# Patient Record
Sex: FEMALE | Race: BLACK OR AFRICAN AMERICAN | NOT HISPANIC OR LATINO | Employment: OTHER | URBAN - METROPOLITAN AREA
[De-identification: names, ages, dates, MRNs, and addresses within clinical notes are randomized per-mention and may not be internally consistent; named-entity substitution may affect disease eponyms.]

---

## 2017-02-18 ENCOUNTER — HOSPITAL ENCOUNTER (EMERGENCY)
Facility: HOSPITAL | Age: 57
Discharge: HOME | End: 2017-02-18
Attending: SURGERY
Payer: MEDICAID

## 2017-02-18 VITALS
WEIGHT: 147 LBS | SYSTOLIC BLOOD PRESSURE: 157 MMHG | DIASTOLIC BLOOD PRESSURE: 71 MMHG | BODY MASS INDEX: 26.04 KG/M2 | OXYGEN SATURATION: 97 % | HEART RATE: 78 BPM | RESPIRATION RATE: 16 BRPM | TEMPERATURE: 98 F

## 2017-02-18 DIAGNOSIS — J40 BRONCHITIS: Primary | ICD-10-CM

## 2017-02-18 DIAGNOSIS — Z76.0 MEDICATION REFILL: ICD-10-CM

## 2017-02-18 DIAGNOSIS — E78.5 HYPERLIPIDEMIA: ICD-10-CM

## 2017-02-18 PROCEDURE — 63600175 PHARM REV CODE 636 W HCPCS: Performed by: SURGERY

## 2017-02-18 PROCEDURE — 99284 EMERGENCY DEPT VISIT MOD MDM: CPT

## 2017-02-18 PROCEDURE — 96372 THER/PROPH/DIAG INJ SC/IM: CPT

## 2017-02-18 PROCEDURE — 25000003 PHARM REV CODE 250: Performed by: SURGERY

## 2017-02-18 PROCEDURE — 94640 AIRWAY INHALATION TREATMENT: CPT

## 2017-02-18 RX ORDER — ALBUTEROL SULFATE 90 UG/1
2 AEROSOL, METERED RESPIRATORY (INHALATION) EVERY 6 HOURS PRN
Qty: 18 G | Refills: 0 | Status: SHIPPED | OUTPATIENT
Start: 2017-02-18 | End: 2017-03-07

## 2017-02-18 RX ORDER — HYDROCHLOROTHIAZIDE 25 MG/1
25 TABLET ORAL DAILY
Qty: 30 TABLET | Refills: 0 | Status: SHIPPED | OUTPATIENT
Start: 2017-02-18 | End: 2018-06-01 | Stop reason: SDUPTHER

## 2017-02-18 RX ORDER — BENZONATATE 100 MG/1
200 CAPSULE ORAL 3 TIMES DAILY PRN
Qty: 20 CAPSULE | Refills: 0 | Status: SHIPPED | OUTPATIENT
Start: 2017-02-18 | End: 2017-02-28

## 2017-02-18 RX ORDER — AZITHROMYCIN 250 MG/1
TABLET, FILM COATED ORAL
Qty: 6 TABLET | Refills: 0 | Status: SHIPPED | OUTPATIENT
Start: 2017-02-18 | End: 2017-02-22 | Stop reason: ALTCHOICE

## 2017-02-18 RX ORDER — METHYLPREDNISOLONE 4 MG/1
TABLET ORAL
Qty: 1 PACKAGE | Refills: 0 | Status: SHIPPED | OUTPATIENT
Start: 2017-02-18 | End: 2017-03-07

## 2017-02-18 RX ORDER — LEVALBUTEROL 1.25 MG/.5ML
1.25 SOLUTION, CONCENTRATE RESPIRATORY (INHALATION)
Status: COMPLETED | OUTPATIENT
Start: 2017-02-18 | End: 2017-02-18

## 2017-02-18 RX ORDER — ATORVASTATIN CALCIUM 40 MG/1
40 TABLET, FILM COATED ORAL NIGHTLY
Qty: 30 TABLET | Refills: 11 | Status: SHIPPED | OUTPATIENT
Start: 2017-02-18 | End: 2019-02-20

## 2017-02-18 RX ORDER — ALBUTEROL SULFATE 0.83 MG/ML
2.5 SOLUTION RESPIRATORY (INHALATION) EVERY 6 HOURS PRN
Qty: 1 BOX | Refills: 0 | Status: ON HOLD | OUTPATIENT
Start: 2017-02-18 | End: 2019-02-12 | Stop reason: HOSPADM

## 2017-02-18 RX ADMIN — LEVALBUTEROL 1.25 MG: 1.25 SOLUTION, CONCENTRATE RESPIRATORY (INHALATION) at 09:02

## 2017-02-18 RX ADMIN — METHYLPREDNISOLONE SODIUM SUCCINATE 125 MG: 125 INJECTION, POWDER, FOR SOLUTION INTRAMUSCULAR; INTRAVENOUS at 09:02

## 2017-02-18 NOTE — DISCHARGE INSTRUCTIONS
What Is Acute Bronchitis?  Acute or short-term bronchitis last for days or weeks. It occurs when the bronchial tubes (airways in the lungs) are irritated by a virus, bacteria, or allergen. This causes a cough that produces yellow or greenish mucus.  Inside healthy lungs    Air travels in and out of the lungs through the airways. The linings of these airways produce sticky mucus. This mucus traps particles that enter the lungs. Tiny structures called cilia then sweep the particles out of the airways.     Healthy airway: Airways are normally open. Air moves in and out easily.      Healthy cilia: Tiny, hairlike cilia sweep mucus and particles up and out of the airways.   Lungs with bronchitis  Bronchitis often occurs with a cold or the flu virus. The airways become inflamed (red and swollen). There is a deep hacking cough from the extra mucus. Other symptoms may include:  · Wheezing  · Chest discomfort  · Shortness of breath  · Mild fever  A second infection, this time due to bacteria, may then occur. And airways irritated by allergens or smoke are more likely to get infected.        Inflamed airway: Inflammation and extra mucus narrow the airway, causing shortness of breath.      Impaired cilia: Extra mucus impairs cilia, causing congestion and wheezing. Smoking makes the problem worse.   Making a diagnosis  A physical exam, health history, and certain tests help your healthcare provider make the diagnosis.  Health history  Your healthcare provider will ask you about your symptoms.  The exam  Your provider listens to your chest for signs of congestion. He or she may also check your ears, nose, and throat.  Possible tests  · A sputum test for bacteria. This requires a sample of mucus from the lungs.  · A nasal or throat swab for bacterial infection.  · A chest X-ray if your healthcare provider thinks you have pneumonia.  · Tests to check for an underlying condition, such as allergies, asthma, or COPD. You may need  to see a specialist for more lung function testing.  Treating a cough  The main treatment for bronchitis is easing symptoms. Avoiding smoke, allergens, and other things that trigger coughing can often help. If the infection is bacterial, you may be given antibiotics. During the illness, it's important to get plenty of sleep. To ease symptoms:  · Dont smoke, and avoid secondhand smoke.  · Use a humidifier, or breathe in steam from a hot shower. This may help loosen mucus.  · Drink a lot of water and juice. They can soothe the throat and may help thin mucus.  · Sit up or use extra pillows when in bed to help lessen coughing and congestion.  · Ask your provider about using cough medicine, pain and fever medicine, or a decongestant.  Antibiotics  Most cases of bronchitis are caused by cold or flu viruses. Antibiotics dont treat viral illness. Taking antibiotics when they are not needed increases your risk of getting an infection later that is antibiotic-resistant. Your provider will prescribe antibiotics if the infection is caused by bacteria. If they are prescribed:  · Take the medicine until it is used up, even if symptoms have improved. If you dont, the bronchitis may come back.  · Take them as directed. For instance, some medicines should be taken with food.  · Ask your provider or pharmacist what side effects are common, and what to do about them.  Follow-up care  You should see your provider again in 2 to 3 weeks. By this time, symptoms should have improved. An infection that lasts longer may mean you have a more serious problem.  Prevention  · Avoid tobacco smoke. If you smoke, quit. Stay away from smoky places. Ask friends and family not to smoke around you, or in your home or car.  · Get checked for allergies.  · Ask your provider about getting a yearly flu shot, and pneumococcal or pneumonia shots.  · Wash your hands often. This helps reduce the chance of picking up viruses that cause colds and flu.  Call  your healthcare provider if:  · Symptoms worsen, or new symptoms develop.  · Breathing problems worsen or  become severe.  · Symptoms dont get better within a week, or within 3 days of taking antibiotics.   Date Last Reviewed: 6/18/2014  © 5953-0013 [employer]. [address] [place] [address], [place] [address]. All rights reserved. This information is not intended as a substitute for professional medical care. Always follow your healthcare professional's instructions.

## 2017-02-18 NOTE — ED PROVIDER NOTES
[facility]                                        February 18, 2017                   Chief Complaint  57 y.o. female with Cough (x 2 days)    History of Present Illness  Xiomy Duncan presents to the emergency room with nasal congestion today  The patient has had cough and cold symptoms since yesterday, went to local ER  The patient left without being seen, as a dry hacking cough with a smoking history  Patient is afebrile with room air oxygenation of 98%, no fever reported to the ER  Patient has been out of her blood pressure medication and breathing treatments  The patient has no sputum production, shortness of breath, clear chest x-ray today    The history is provided by the patient    Past Medical History   -- Anemia    -- Asthma    -- GERD (gastroesophageal reflux disease)    -- Hoarseness    -- Hypertension    -- Screen for colon cancer    -- Seizures    -- Ulcer      Past surgical history: BTL, tonsillectomy, cholecystectomy   ALLERGIES: PCN     Review of Systems and Physical Exam     Review of Systems  -- Constitution - no fever, denies fatigue, no weakness, no chills  -- Eyes - no tearing or redness, no visual disturbance  -- Ear, Nose - no tinnitus or earache, no nasal congestion or discharge  -- Mouth,Throat - no sore throat, no toothache, normal voice, normal swallowing  -- Respiratory - cough and congestion, no shortness of breath, no GOMEZ  -- Cardiovascular - denies chest pain, no palpitations, denies claudication  -- Gastrointestinal - denies abdominal pain, nausea, vomiting, or diarrhea  -- Musculoskeletal - denies back pain, negative for myalgias and arthralgias   -- Neurological - no headache, denies weakness or seizure; no LOC  -- Skin - denies pallor, rash, or changes in skin. no hives or welts noted    Vital Signs  -- Her blood pressure is 159/75 (abnormal) and her pulse is 78.  -- Her respiration is 16 and oxygen saturation is 97%.      Physical Exam  -- Nursing note  and vitals reviewed  -- Head: Atraumatic. Normocephalic. No obvious abnormality  -- Eyes: Pupils are equal and reactive to light. Normal conjunctiva and lids  -- Nose: Nose normal in appearance, nares grossly normal. No discharge  -- Throat: Mucous membranes moist, pharynx normal, normal tonsils. No lesions   -- Ears: External ears and TM normal bilaterally. Normal hearing and no drainage  -- Neck: Normal range of motion. Neck supple. No masses, trachea midline  -- Cardiac: Normal rate, regular rhythm and normal heart sounds  -- Pulmonary: faint rhonchi at the bilateral bases with no active wheezing   -- Abdominal: Soft, no tenderness. Normal bowel sounds. Normal liver edge  -- Musculoskeletal: Normal range of motion, no effusions. Joints stable   -- Neurological: No focal deficits. Showed good interaction with staff    Emergency Room Course     Treatment and Evaluation  -- Chest x-ray showed no infiltrate and showed no acute pathology   -- IM Steroids given today in the ER  -- Xopenex breathing treatment given today in the ER    Diagnosis  -- The primary encounter diagnosis was Bronchitis.   -- Diagnoses of Medication refill and Hyperlipidemia were also pertinent to this visit.    Disposition and Plan  -- Disposition: home  -- Condition: stable  -- Follow-up: Patient to follow up with No primary care provider on file. in 1-2 days.  -- I advised the patient that we have found no life threatening condition today  -- At this time, I believe the patient is clinically stable for discharge.   -- The patient acknowledges that close follow up with a MD is required   -- Patient agrees to comply with all instruction and direction given in the ER    This note is dictated on Dragon Natural Speaking word recognition program.  There are word recognition mistakes that are occasionally missed on review.           Celso Warren MD  02/18/17 0984

## 2017-02-18 NOTE — ED AVS SNAPSHOT
[facility] [address]               Xiomy Duncan   2017  9:12 AM   ED    Description:  Female : 1960   Department:  [facility]           Your Care was Coordinated By:     Provider Role From To    Celso Warren MD Attending Provider 17 0907 --      Reason for Visit     Cough           Diagnoses this Visit        Comments    Bronchitis    -  Primary     Medication refill         Hyperlipidemia           ED Disposition     ED Disposition Condition Comment    Discharge             To Do List           Follow-up Information     Follow up with Brittney Barrett MD. Schedule an appointment as soon as possible for a visit in 2 days.    Specialty:  Family Medicine    Contact information:    [address]  [facility] [address]  [phone]         These Medications        Disp Refills Start End    hydrochlorothiazide (HYDRODIURIL) 25 MG tablet 30 tablet 0 2017     Take 1 tablet (25 mg total) by mouth once daily. Taking 1/2 tablet daily - Oral    Pharmacy: [facility]/pharmacy #[id number] - [place] - [address] Ph #: [phone]       beclomethasone (QVAR) 40 mcg/actuation Aero 120 each 0 2017     Inhale 1 puff into the lungs 2 (two) times daily. - Inhalation    Pharmacy: [facility]/pharmacy #[id number] [place] [address] Ph #: [phone]       atorvastatin (LIPITOR) 40 MG tablet 30 tablet 11 2017     Take 1 tablet (40 mg total) by mouth every evening. - Oral    Pharmacy: [facility]/pharmacy #[id number] [place] [address] Ph #: [phone]       albuterol 90 mcg/actuation inhaler 18 g 0 2017     Inhale 2 puffs into the lungs every 6 (six) hours as needed for Wheezing. - Inhalation    Pharmacy: [facility]/pharmacy #[id number] [address] Ph #: [phone]       methylPREDNISolone (MEDROL DOSEPACK) 4 mg tablet 1 Package 0 2017     Pack as directed    Pharmacy: [facility]/pharmacy #[id number] [place]  [address] Ph #: [phone]       benzonatate (TESSALON) 100 MG capsule 20 capsule 0 2/18/2017 2/28/2017    Take 2 capsules (200 mg total) by mouth 3 (three) times daily as needed for Cough. - Oral    Pharmacy: [facility]/pharmacy #[id number]  [place] - [address] [facility] [address] Ph #: [phone]       azithromycin (Z-DORIS) 250 MG tablet 6 tablet 0 2/18/2017     Z-PACK AS DIRECTED    Pharmacy: [facility]/pharmacy #[id number] [facility] [address] [facility] Ph #: [phone]       albuterol (PROVENTIL) 2.5 mg /3 mL (0.083 %) nebulizer solution 1 Box 0 2/18/2017 2/18/2018    Take 3 mLs (2.5 mg total) by nebulization every 6 (six) hours as needed for Wheezing. - Nebulization    [id number] [place] - [address] [facility] Ph #: [phone]         [facility]     [facility] - 24/7 Assistance  Registered nurses in the [facility] provide clinical advisement, health education, appointment booking, and other advisory services.  Call for this free service at [phone].             Medications           Message regarding Medications     Verify the changes and/or additions to your medication regime listed below are the same as discussed with your clinician today.  If any of these changes or additions are incorrect, please notify your healthcare provider.        START taking these NEW medications        Refills    methylPREDNISolone (MEDROL DOSEPACK) 4 mg tablet 0    Sig: Pack as directed    Class: Normal    benzonatate (TESSALON) 100 MG capsule 0    Sig: Take 2 capsules (200 mg total) by mouth 3 (three) times daily as needed for Cough.    Class: Normal    Route: Oral    azithromycin (Z-DORIS) 250 MG tablet 0    Sig: Z-PACK AS DIRECTED    Class: Normal    albuterol (PROVENTIL) 2.5 mg /3 mL (0.083 %) nebulizer solution 0    Sig: Take 3 mLs (2.5 mg total) by nebulization every 6 (six) hours as needed for Wheezing.    Class: Normal    Route: Nebulization      These medications were administered today        Dose  Freq    levalbuterol nebulizer solution 1.25 mg 1.25 mg ED 1 Time    Sig: Take 0.5 mLs (1.25 mg total) by nebulization ED 1 Time.    Class: Normal    Route: Nebulization    methylPREDNISolone sod suc(PF) 125 mg/2 mL injection 125 mg 125 mg ED 1 Time    Sig: Inject 125 mg into the muscle ED 1 Time.    Class: Normal    Route: Intramuscular      CHANGE how you are taking these medications     Start Taking Instead of    hydrochlorothiazide (HYDRODIURIL) 25 MG tablet hydrochlorothiazide (HYDRODIURIL) 25 MG tablet    Dosage:  Take 1 tablet (25 mg total) by mouth once daily. Taking 1/2 tablet daily Dosage:  Take 25 mg by mouth once daily. Taking 1/2 tablet daily      STOP taking these medications     albuterol 2.5 mg /3 mL (0.083 %) Nebu 3 mL, albuterol 5 mg/mL Nebu 0.5 mL Inhale into the lungs.    clonidine (CATAPRES) 0.3 MG tablet Take 0.3 mg by mouth once.    lisinopril (PRINIVIL,ZESTRIL) 40 MG tablet Take 40 mg by mouth once daily.           Verify that the below list of medications is an accurate representation of the medications you are currently taking.  If none reported, the list may be blank. If incorrect, please contact your healthcare provider. Carry this list with you in case of emergency.           Current Medications     albuterol (PROVENTIL) 2.5 mg /3 mL (0.083 %) nebulizer solution Take 3 mLs (2.5 mg total) by nebulization every 6 (six) hours as needed for Wheezing.    albuterol 90 mcg/actuation inhaler Inhale 2 puffs into the lungs every 6 (six) hours as needed for Wheezing.    atorvastatin (LIPITOR) 40 MG tablet Take 1 tablet (40 mg total) by mouth every evening.    azithromycin (Z-DORIS) 250 MG tablet Z-PACK AS DIRECTED    beclomethasone (QVAR) 40 mcg/actuation Aero Inhale 1 puff into the lungs 2 (two) times daily.    benzonatate (TESSALON) 100 MG capsule Take 2 capsules (200 mg total) by mouth 3 (three) times daily as needed for Cough.    hydrochlorothiazide (HYDRODIURIL) 25 MG tablet Take 1 tablet (25 mg  total) by mouth once daily. Taking 1/2 tablet daily    methylPREDNISolone (MEDROL DOSEPACK) 4 mg tablet Pack as directed           Clinical Reference Information           Your Vitals Were     BP Pulse Temp Resp Weight SpO2    159/75 (BP Location: Right arm, Patient Position: Sitting) 78 98.1 °F (36.7 °C) (Oral) 16 66.7 kg (147 lb) 97%    BMI                26.04 kg/m2          Allergies as of 2/18/2017        Reactions    Penicillins Rash      Immunizations Administered on Date of Encounter - 2/18/2017     None      ED Micro, Lab, POCT     None      ED Imaging Orders     Start Ordered       Status Ordering Provider    02/18/17 0913 02/18/17 0912  X-Ray Chest PA And Lateral  1 time imaging      Final result         Discharge Instructions         What Is Acute Bronchitis?  Acute or short-term bronchitis last for days or weeks. It occurs when the bronchial tubes (airways in the lungs) are irritated by a virus, bacteria, or allergen. This causes a cough that produces yellow or greenish mucus.  Inside healthy lungs    Air travels in and out of the lungs through the airways. The linings of these airways produce sticky mucus. This mucus traps particles that enter the lungs. Tiny structures called cilia then sweep the particles out of the airways.     Healthy airway: Airways are normally open. Air moves in and out easily.      Healthy cilia: Tiny, hairlike cilia sweep mucus and particles up and out of the airways.   Lungs with bronchitis  Bronchitis often occurs with a cold or the flu virus. The airways become inflamed (red and swollen). There is a deep hacking cough from the extra mucus. Other symptoms may include:  · Wheezing  · Chest discomfort  · Shortness of breath  · Mild fever  A second infection, this time due to bacteria, may then occur. And airways irritated by allergens or smoke are more likely to get infected.        Inflamed airway: Inflammation and extra mucus narrow the airway, causing shortness of breath.       Impaired cilia: Extra mucus impairs cilia, causing congestion and wheezing. Smoking makes the problem worse.   Making a diagnosis  A physical exam, health history, and certain tests help your healthcare provider make the diagnosis.  Health history  Your healthcare provider will ask you about your symptoms.  The exam  Your provider listens to your chest for signs of congestion. He or she may also check your ears, nose, and throat.  Possible tests  · A sputum test for bacteria. This requires a sample of mucus from the lungs.  · A nasal or throat swab for bacterial infection.  · A chest X-ray if your healthcare provider thinks you have pneumonia.  · Tests to check for an underlying condition, such as allergies, asthma, or COPD. You may need to see a specialist for more lung function testing.  Treating a cough  The main treatment for bronchitis is easing symptoms. Avoiding smoke, allergens, and other things that trigger coughing can often help. If the infection is bacterial, you may be given antibiotics. During the illness, it's important to get plenty of sleep. To ease symptoms:  · Dont smoke, and avoid secondhand smoke.  · Use a humidifier, or breathe in steam from a hot shower. This may help loosen mucus.  · Drink a lot of water and juice. They can soothe the throat and may help thin mucus.  · Sit up or use extra pillows when in bed to help lessen coughing and congestion.  · Ask your provider about using cough medicine, pain and fever medicine, or a decongestant.  Antibiotics  Most cases of bronchitis are caused by cold or flu viruses. Antibiotics dont treat viral illness. Taking antibiotics when they are not needed increases your risk of getting an infection later that is antibiotic-resistant. Your provider will prescribe antibiotics if the infection is caused by bacteria. If they are prescribed:  · Take the medicine until it is used up, even if symptoms have improved. If you dont, the bronchitis may come  back.  · Take them as directed. For instance, some medicines should be taken with food.  · Ask your provider or pharmacist what side effects are common, and what to do about them.  Follow-up care  You should see your provider again in 2 to 3 weeks. By this time, symptoms should have improved. An infection that lasts longer may mean you have a more serious problem.  Prevention  · Avoid tobacco smoke. If you smoke, quit. Stay away from smoky places. Ask friends and family not to smoke around you, or in your home or car.  · Get checked for allergies.  · Ask your provider about getting a yearly flu shot, and pneumococcal or pneumonia shots.  · Wash your hands often. This helps reduce the chance of picking up viruses that cause colds and flu.  Call your healthcare provider if:  · Symptoms worsen, or new symptoms develop.  · Breathing problems worsen or  become severe.  · Symptoms dont get better within a week, or within 3 days of taking antibiotics.   Date Last Reviewed: 6/18/2014  © 3332-6466 [employer]. [address] [place] [address]. All rights reserved. This information is not intended as a substitute for professional medical care. Always follow your healthcare professional's instructions.          Smoking Cessation     If you would like to quit smoking:   You may be eligible for free services if you are a [place] resident and started smoking cigarettes before September 1, 1988.  Call the Smoking Cessation [employer] ([employer]) toll free at (767) 198-6023 or (267) 577-1918.   Call 2-738-QUIT-NOW if you do not meet the above criteria.             [facility] complies with applicable Federal civil rights laws and does not discriminate on the basis of race, color, national origin, age, disability, or sex.        Language Assistance Services     ATTENTION: Language assistance services are available, free of charge. Please call [phone].      ATENCIÓN: javier Glez  a mackenzie disposición servicios gratuitos de asistencia lingüística. Llame al [phone].     SUNG Ý: N?u b?n nói Ti?ng Vi?t, có các d?ch v? h? tr? ngôn ng? mi?n phí dành cho b?n. G?i s? [phone].

## 2017-02-22 ENCOUNTER — OFFICE VISIT (OUTPATIENT)
Dept: FAMILY MEDICINE | Facility: CLINIC | Age: 57
End: 2017-02-22
Payer: MEDICAID

## 2017-02-22 VITALS
SYSTOLIC BLOOD PRESSURE: 150 MMHG | DIASTOLIC BLOOD PRESSURE: 82 MMHG | HEIGHT: 63 IN | TEMPERATURE: 98 F | WEIGHT: 154.81 LBS | HEART RATE: 80 BPM | BODY MASS INDEX: 27.43 KG/M2 | RESPIRATION RATE: 20 BRPM

## 2017-02-22 DIAGNOSIS — F51.01 PRIMARY INSOMNIA: ICD-10-CM

## 2017-02-22 DIAGNOSIS — E78.5 HYPERLIPIDEMIA, UNSPECIFIED HYPERLIPIDEMIA TYPE: ICD-10-CM

## 2017-02-22 DIAGNOSIS — R73.03 PRE-DIABETES: ICD-10-CM

## 2017-02-22 DIAGNOSIS — Z78.0 MENOPAUSE: ICD-10-CM

## 2017-02-22 DIAGNOSIS — J40 WHEEZY BRONCHITIS: ICD-10-CM

## 2017-02-22 DIAGNOSIS — I10 ESSENTIAL HYPERTENSION: Primary | ICD-10-CM

## 2017-02-22 PROCEDURE — 99999 PR PBB SHADOW E&M-EST. PATIENT-LVL III: CPT | Mod: PBBFAC,,, | Performed by: FAMILY MEDICINE

## 2017-02-22 PROCEDURE — 99204 OFFICE O/P NEW MOD 45 MIN: CPT | Mod: S$PBB,,, | Performed by: FAMILY MEDICINE

## 2017-02-22 PROCEDURE — 99213 OFFICE O/P EST LOW 20 MIN: CPT | Mod: PBBFAC,25 | Performed by: FAMILY MEDICINE

## 2017-02-22 PROCEDURE — 96372 THER/PROPH/DIAG INJ SC/IM: CPT | Mod: PBBFAC

## 2017-02-22 RX ORDER — CLONIDINE HYDROCHLORIDE 0.1 MG/1
0.1 TABLET ORAL 2 TIMES DAILY
Qty: 30 TABLET | Refills: 2 | Status: SHIPPED | OUTPATIENT
Start: 2017-02-22 | End: 2017-10-15 | Stop reason: SDUPTHER

## 2017-02-22 RX ORDER — METHYLPREDNISOLONE ACETATE 40 MG/ML
40 INJECTION, SUSPENSION INTRA-ARTICULAR; INTRALESIONAL; INTRAMUSCULAR; SOFT TISSUE
Status: COMPLETED | OUTPATIENT
Start: 2017-02-22 | End: 2017-02-22

## 2017-02-22 RX ORDER — METHYLPREDNISOLONE ACETATE 40 MG/ML
40 INJECTION, SUSPENSION INTRA-ARTICULAR; INTRALESIONAL; INTRAMUSCULAR; SOFT TISSUE
Status: DISCONTINUED | OUTPATIENT
Start: 2017-02-22 | End: 2017-02-22

## 2017-02-22 RX ORDER — CEFUROXIME AXETIL 250 MG/1
250 TABLET ORAL 2 TIMES DAILY
Qty: 20 TABLET | Refills: 0 | Status: SHIPPED | OUTPATIENT
Start: 2017-02-22 | End: 2017-03-04

## 2017-02-22 RX ADMIN — METHYLPREDNISOLONE ACETATE 40 MG: 40 INJECTION, SUSPENSION INTRA-ARTICULAR; INTRALESIONAL; INTRAMUSCULAR; SOFT TISSUE at 04:02

## 2017-02-22 NOTE — PROGRESS NOTES
Chief complaint: Sinus congestion    History of present illness: Pt is 57 y.o. female complaints of sinus congestion, facial pressure, sore throat, ear ache.  Patient states glands are swollen and neck.  Patient has a cough.  This started 5 days ago.  Patient denies chest pain, shortness of breath, fever.  Patient has itchy watery eyes, itchy scratchy throat.  The patient complains of decreased hearing.  Cough is nonproductive  Patient is taking her statin every day for hyperlipidemia.  She is feeling well on the medication.  She denies side effects such as myalgias.  She tolerates her blood pressure medication as well as not having side effects such as chronic cough or dizziness.      Review of systems:  Constitutional-no weight loss, weight gain  HEENT-allergy symptoms such as itchy watery eyes, post nasal drip, itchy palate, come and go.  Respiratory-no wheezing, see history of present illness  Neurological-no weakness or numbness    Past medical history, family history, social history-same as note dated today    Medications-all reviewed and verified in nurses notes.    Physical exam: Vital signs-reviewed and verified in nurses notes  Gen.-alert, oriented, no apparent distress.  Coughing.  Head-positive facial tenderness over the frontal and maxillary sinuses  Eyes: Pupils equal round reactive to light and accommodation, extraocular muscles intact, conjunctiva clear  Ears: Tympanic membranes are clear and mobile, no fluid present.  Nose: Injected mucous membranes, erythematous, mucopurulent discharge  Throat:  Injected red streaky mucosa,  tonsils normal  Neck: Shotty, tender anterior lymphadenopathy  Heart: Regular rate and rhythm, no murmurs, rubs or gallops  Lungs:Lungs were clear to auscultation and percussion, and with normal diaphragmatic excursion. No wheezes or rales were noted.     Assessment/Plan:   Essential hypertension  -     cloNIDine (CATAPRES) 0.1 MG tablet; Take 1 tablet (0.1 mg total) by mouth 2  (two) times daily.  Dispense: 30 tablet; Refill: 2  Continue hydrochlorothiazide 25 mg daily    Hyperlipidemia, unspecified hyperlipidemia type  Low fat diet.  Avoid sweets.  A 10 pound weight loss by the next visit as a  good goal.  Increase consumption of fruits and vegetables, fish and chicken.  Use medications below:  Lipitor 20 mg by mouth daily   -     ALT (SGPT); Future  -     Basic metabolic panel; Future  -     Lipid panel; Future    Pre-diabetes   follow low carbohydrate diet     Wheezy bronchitis  -     methylPREDNISolone acetate injection 40 mg; 1 mL (40 mg total) by INTRABURSAL route one time.  -     cefUROXime (CEFTIN) 250 MG tablet; Take 1 tablet (250 mg total) by mouth 2 (two) times daily.  Dispense: 20 tablet; Refill: 0  -     umeclidinium-vilanterol (ANORO ELLIPTA) 62.5-25 mcg/actuation DsDv; Inhale 1 puff into the lungs once daily.  Dispense: 60 each; Refill: 5  -     dextromethorphan-guaifenesin  mg (MUCINEX DM)  mg per 12 hr tablet; Take 1 tablet by mouth 2 (two) times daily as needed.  Dispense: 20 tablet; Refill: 2  -     CBC auto differential; Future  Discontinue Qvar.  Quit smoking    Primary insomnia  Refill clonidine 0.1 mg before bedtime.    Menopause  -     Ambulatory referral to Gynecology    Sinusitis, acute  - azithromycin (ZITHROMAX) 500 MG tablet; Take 1 tablet (500 mg total) by mouth once daily.  - cetirizine (ZYRTEC) 10 MG tablet; Take 1 tablet (10 mg total) by mouth once daily.  - diphenhydrAMINE (BENADRYL) 25 mg capsule; Take 1 each (25 mg total) by mouth nightly as needed for Itching.    Simply saline nasal lavage q.2 to 3 hours p.r.n.  Avoid dust, allergens, other sinus irritants.  Handwashing technique discussed to prevent spreading germs.    RTC in 3 months

## 2017-02-22 NOTE — MR AVS SNAPSHOT
[facility]  [address]  [facility] [address]  Phone: [phone]  Fax: [phone]                  Xiomy Duncan   2017 3:30 PM   Office Visit    Description:  Female : 1960   Provider:  Arnie Monson MD   Department:  [facility]           Reason for Visit     Establish Care           Diagnoses this Visit        Comments    Essential hypertension    -  Primary     Hyperlipidemia, unspecified hyperlipidemia type         Pre-diabetes         Wheezy bronchitis         Primary insomnia         Menopause                To Do List           Goals (5 Years of Data)     None       These Medications        Disp Refills Start End    cefUROXime (CEFTIN) 250 MG tablet 20 tablet 0 2017 3/4/2017    Take 1 tablet (250 mg total) by mouth 2 (two) times daily. - Oral    Pharmacy: [facility]/pharmacy #[id number] [place], [place] - [address] Ph #: [phone]       umeclidinium-vilanterol (ANORO ELLIPTA) 62.5-25 mcg/actuation DsDv 60 each 5 2017     Inhale 1 puff into the lungs once daily. - Inhalation    Pharmacy: [facility]/pharmacy #[id number] [place], [place] - [address] Ph #: [phone]       dextromethorphan-guaifenesin  mg (MUCINEX DM)  mg per 12 hr tablet 20 tablet 2 2017 3/4/2017    Take 1 tablet by mouth 2 (two) times daily as needed. - Oral    Pharmacy: [facility]/pharmacy #[id number] [place], [place] - [address] Ph #: [phone]       cloNIDine (CATAPRES) 0.1 MG tablet 30 tablet 2 2017 3/24/2017    Take 1 tablet (0.1 mg total) by mouth 2 (two) times daily. - Oral    Pharmacy: [facility]/pharmacy #[id number] [place] [address] [address] Ph #: [phone]         [facility] -  Assistance  Registered nurses in the [facility] provide clinical advisement, health education, appointment booking, and other advisory services.  Call for this free service at [phone].             Medications           Message  regarding Medications     Verify the changes and/or additions to your medication regime listed below are the same as discussed with your clinician today.  If any of these changes or additions are incorrect, please notify your healthcare provider.        START taking these NEW medications        Refills    cefUROXime (CEFTIN) 250 MG tablet 0    Sig: Take 1 tablet (250 mg total) by mouth 2 (two) times daily.    Class: Normal    Route: Oral    umeclidinium-vilanterol (ANORO ELLIPTA) 62.5-25 mcg/actuation DsDv 5    Sig: Inhale 1 puff into the lungs once daily.    Class: Normal    Route: Inhalation    dextromethorphan-guaifenesin  mg (MUCINEX DM)  mg per 12 hr tablet 2    Sig: Take 1 tablet by mouth 2 (two) times daily as needed.    Class: Normal    Route: Oral    cloNIDine (CATAPRES) 0.1 MG tablet 2    Sig: Take 1 tablet (0.1 mg total) by mouth 2 (two) times daily.    Class: Normal    Route: Oral      These medications were administered today        Dose Freq    methylPREDNISolone acetate injection 40 mg 40 mg [facility] 1 time    Si mL (40 mg total) by INTRABURSAL route one time.    Class: Normal    Route: INTRABURSAL      STOP taking these medications     azithromycin (Z-DORIS) 250 MG tablet Z-PACK AS DIRECTED    beclomethasone (QVAR) 40 mcg/actuation Aero Inhale 1 puff into the lungs 2 (two) times daily.           Verify that the below list of medications is an accurate representation of the medications you are currently taking.  If none reported, the list may be blank. If incorrect, please contact your healthcare provider. Carry this list with you in case of emergency.           Current Medications     albuterol (PROVENTIL) 2.5 mg /3 mL (0.083 %) nebulizer solution Take 3 mLs (2.5 mg total) by nebulization every 6 (six) hours as needed for Wheezing.    albuterol 90 mcg/actuation inhaler Inhale 2 puffs into the lungs every 6 (six) hours as needed for Wheezing.    atorvastatin (LIPITOR) 40 MG tablet Take  "1 tablet (40 mg total) by mouth every evening.    benzonatate (TESSALON) 100 MG capsule Take 2 capsules (200 mg total) by mouth 3 (three) times daily as needed for Cough.    hydrochlorothiazide (HYDRODIURIL) 25 MG tablet Take 1 tablet (25 mg total) by mouth once daily. Taking 1/2 tablet daily    methylPREDNISolone (MEDROL DOSEPACK) 4 mg tablet Pack as directed    cefUROXime (CEFTIN) 250 MG tablet Take 1 tablet (250 mg total) by mouth 2 (two) times daily.    cloNIDine (CATAPRES) 0.1 MG tablet Take 1 tablet (0.1 mg total) by mouth 2 (two) times daily.    dextromethorphan-guaifenesin  mg (MUCINEX DM)  mg per 12 hr tablet Take 1 tablet by mouth 2 (two) times daily as needed.    umeclidinium-vilanterol (ANORO ELLIPTA) 62.5-25 mcg/actuation DsDv Inhale 1 puff into the lungs once daily.           Clinical Reference Information           Your Vitals Were     BP Pulse Temp Resp    150/82 (BP Location: Right arm, Patient Position: Sitting, BP Method: Manual) 80 97.5 °F (36.4 °C) (Tympanic) 20    Height Weight BMI    5' 3" (1.6 m) 70.2 kg (154 lb 12.8 oz) 27.42 kg/m2      Blood Pressure          Most Recent Value    BP  (!)  150/82      Allergies as of 2/22/2017     Penicillins      Immunizations Administered on Date of Encounter - 2/22/2017     None      Orders Placed During Today's Visit      Normal Orders This Visit    Ambulatory referral to Gynecology     Future Labs/Procedures Expected by Expires    ALT (SGPT)  2/23/2017 4/22/2017    Basic metabolic panel  2/23/2017 4/22/2017    CBC auto differential  2/23/2017 4/22/2017    Lipid panel  2/23/2017 4/22/2017      Smoking Cessation     If you would like to quit smoking:   You may be eligible for free services if you are a [place] resident and started smoking cigarettes before September 1, 1988.  Call the [employer]oking [employer]essation [employer] ([employer]) toll free at (965) 706-6706 or (852) 254-0573.   Call 1-800-QUIT-NOW if you do not meet the above criteria.            Language " Assistance Services     ATTENTION: Language assistance services are available, free of charge. Please call [phone].      ATENCIÓN: Si habla erika, tiene a mackenzie disposición servicios gratuitos de asistencia lingüística. Llame al [phone].     CHÚ Ý: N?u b?n nói Ti?ng Vi?t, có các d?ch v? h? tr? ngôn ng? mi?n phí dành cho b?n. G?i s? [phone].         [facility] complies with applicable Federal civil rights laws and does not discriminate on the basis of race, color, national origin, age, disability, or sex.

## 2017-02-24 DIAGNOSIS — Z12.31 OTHER SCREENING MAMMOGRAM: ICD-10-CM

## 2017-03-07 ENCOUNTER — CLINICAL SUPPORT (OUTPATIENT)
Dept: FAMILY MEDICINE | Facility: CLINIC | Age: 57
End: 2017-03-07
Payer: MEDICAID

## 2017-03-07 ENCOUNTER — OFFICE VISIT (OUTPATIENT)
Dept: OBSTETRICS AND GYNECOLOGY | Facility: CLINIC | Age: 57
End: 2017-03-07
Payer: MEDICAID

## 2017-03-07 VITALS
RESPIRATION RATE: 13 BRPM | HEIGHT: 63 IN | SYSTOLIC BLOOD PRESSURE: 132 MMHG | HEART RATE: 70 BPM | BODY MASS INDEX: 28.17 KG/M2 | WEIGHT: 159 LBS | DIASTOLIC BLOOD PRESSURE: 68 MMHG

## 2017-03-07 DIAGNOSIS — J40 WHEEZY BRONCHITIS: ICD-10-CM

## 2017-03-07 DIAGNOSIS — N95.1 MENOPAUSAL SYMPTOMS: ICD-10-CM

## 2017-03-07 DIAGNOSIS — E78.5 HYPERLIPIDEMIA, UNSPECIFIED HYPERLIPIDEMIA TYPE: ICD-10-CM

## 2017-03-07 DIAGNOSIS — Z12.39 BREAST CANCER SCREENING: ICD-10-CM

## 2017-03-07 DIAGNOSIS — Z01.411 ENCOUNTER FOR GYNECOLOGICAL EXAMINATION WITH ABNORMAL FINDING: Primary | ICD-10-CM

## 2017-03-07 DIAGNOSIS — Z12.4 CERVICAL CANCER SCREENING: ICD-10-CM

## 2017-03-07 DIAGNOSIS — Z13.820 OSTEOPOROSIS SCREENING: ICD-10-CM

## 2017-03-07 DIAGNOSIS — N39.41 URGE INCONTINENCE OF URINE: ICD-10-CM

## 2017-03-07 LAB
ALT SERPL W/O P-5'-P-CCNC: 24 U/L
ANION GAP SERPL CALC-SCNC: 10 MMOL/L
BASOPHILS # BLD AUTO: 0.05 K/UL
BASOPHILS NFR BLD: 0.5 %
BUN SERPL-MCNC: 22 MG/DL
CALCIUM SERPL-MCNC: 9.9 MG/DL
CHLORIDE SERPL-SCNC: 101 MMOL/L
CHOLEST/HDLC SERPL: 3.5 {RATIO}
CO2 SERPL-SCNC: 28 MMOL/L
CREAT SERPL-MCNC: 1.3 MG/DL
DIFFERENTIAL METHOD: ABNORMAL
EOSINOPHIL # BLD AUTO: 0.2 K/UL
EOSINOPHIL NFR BLD: 2.2 %
ERYTHROCYTE [DISTWIDTH] IN BLOOD BY AUTOMATED COUNT: 15.3 %
EST. GFR  (AFRICAN AMERICAN): 53 ML/MIN/1.73 M^2
EST. GFR  (NON AFRICAN AMERICAN): 46 ML/MIN/1.73 M^2
GLUCOSE SERPL-MCNC: 75 MG/DL
HCT VFR BLD AUTO: 42 %
HDL/CHOLESTEROL RATIO: 28.8 %
HDLC SERPL-MCNC: 177 MG/DL
HDLC SERPL-MCNC: 51 MG/DL
HGB BLD-MCNC: 13.8 G/DL
LDLC SERPL CALC-MCNC: 101.6 MG/DL
LYMPHOCYTES # BLD AUTO: 3.2 K/UL
LYMPHOCYTES NFR BLD: 33 %
MCH RBC QN AUTO: 31.4 PG
MCHC RBC AUTO-ENTMCNC: 32.9 %
MCV RBC AUTO: 96 FL
MONOCYTES # BLD AUTO: 0.8 K/UL
MONOCYTES NFR BLD: 8.7 %
NEUTROPHILS # BLD AUTO: 5.4 K/UL
NEUTROPHILS NFR BLD: 55.6 %
NONHDLC SERPL-MCNC: 126 MG/DL
PLATELET # BLD AUTO: 257 K/UL
PMV BLD AUTO: 12.3 FL
POTASSIUM SERPL-SCNC: 4.1 MMOL/L
RBC # BLD AUTO: 4.39 M/UL
SODIUM SERPL-SCNC: 139 MMOL/L
TRIGL SERPL-MCNC: 122 MG/DL
WBC # BLD AUTO: 9.62 K/UL

## 2017-03-07 PROCEDURE — 88175 CYTOPATH C/V AUTO FLUID REDO: CPT

## 2017-03-07 PROCEDURE — 99999 PR PBB SHADOW E&M-EST. PATIENT-LVL III: CPT | Mod: PBBFAC,,, | Performed by: OBSTETRICS & GYNECOLOGY

## 2017-03-07 PROCEDURE — 99213 OFFICE O/P EST LOW 20 MIN: CPT | Mod: PBBFAC | Performed by: OBSTETRICS & GYNECOLOGY

## 2017-03-07 PROCEDURE — 99396 PREV VISIT EST AGE 40-64: CPT | Mod: S$PBB,,, | Performed by: OBSTETRICS & GYNECOLOGY

## 2017-03-07 PROCEDURE — G0101 CA SCREEN;PELVIC/BREAST EXAM: HCPCS | Mod: PBBFAC | Performed by: OBSTETRICS & GYNECOLOGY

## 2017-03-07 PROCEDURE — 99999 PR PBB SHADOW E&M-EST. PATIENT-LVL I: CPT | Mod: PBBFAC,,,

## 2017-03-07 RX ORDER — OXYBUTYNIN CHLORIDE 5 MG/1
5 TABLET ORAL 2 TIMES DAILY
Qty: 60 TABLET | Refills: 2 | Status: SHIPPED | OUTPATIENT
Start: 2017-03-07 | End: 2017-07-30 | Stop reason: SDUPTHER

## 2017-03-07 NOTE — PROGRESS NOTES
Subjective:       Patient ID: Xiomy Duncan is a 57 y.o. female.    Chief Complaint:  Well Woman; Hot Flashes; and Night Sweats      History of Present Illness  Patient presents for annual exam.  Patient admits is time for mammogram.  She states she's never had a DEXA bone scan.  Patient admits to urgency incontinence of urine.  She states she will lose urine before getting to the bathroom.  She states that this is been going on for 1-2 years and happens almost every time she needs to void.  Patient also complaining of hot flashes along with mood swings and trouble sleeping.  She also admits to vaginal dryness.  Patient states she went through menopause 2014.  She states she's been having these symptoms ever since.  Counseling was done and patient would like to do trial of hormone replacement therapy along with medication for incontinence.    Menstrual History:  OB History      Para Term  AB TAB SAB Ectopic Multiple Living    4 4        4         Menarche age:   No LMP recorded. Patient is postmenopausal.         Review of Systems  Review of Systems   Constitutional: Positive for chills and diaphoresis. Negative for activity change, appetite change, fatigue, fever and unexpected weight change.   HENT: Negative for congestion, dental problem, drooling, ear discharge, ear pain, facial swelling, hearing loss, mouth sores, nosebleeds, postnasal drip, rhinorrhea, sinus pressure, sneezing, sore throat, tinnitus, trouble swallowing and voice change.    Eyes: Negative for photophobia, pain, discharge, redness, itching and visual disturbance.   Respiratory: Positive for shortness of breath and wheezing. Negative for apnea, cough, choking, chest tightness and stridor.    Cardiovascular: Negative for chest pain, palpitations and leg swelling.   Gastrointestinal: Positive for constipation. Negative for abdominal distention, abdominal pain, anal bleeding, blood in stool, diarrhea, nausea, rectal pain  and vomiting.   Endocrine: Positive for heat intolerance and polyuria. Negative for cold intolerance, polydipsia and polyphagia.   Genitourinary: Negative for decreased urine volume, difficulty urinating, dyspareunia, dysuria, enuresis, flank pain, frequency, genital sores, hematuria, menstrual problem, pelvic pain, urgency, vaginal bleeding, vaginal discharge and vaginal pain.   Musculoskeletal: Positive for back pain and neck pain. Negative for arthralgias, gait problem, joint swelling, myalgias and neck stiffness.   Skin: Negative for color change, pallor, rash and wound.   Allergic/Immunologic: Negative for environmental allergies, food allergies and immunocompromised state.   Neurological: Positive for numbness and headaches. Negative for dizziness, tremors, seizures, syncope, facial asymmetry, speech difficulty, weakness and light-headedness.   Hematological: Negative for adenopathy. Does not bruise/bleed easily.   Psychiatric/Behavioral: Positive for dysphoric mood. Negative for agitation, behavioral problems, confusion, decreased concentration, hallucinations, self-injury, sleep disturbance and suicidal ideas. The patient is not nervous/anxious and is not hyperactive.            Objective:    Physical Exam   Constitutional: She is oriented to person, place, and time. She appears well-developed and well-nourished.   Neck: No thyromegaly present.   Cardiovascular: Normal rate and regular rhythm.    Pulmonary/Chest: Effort normal and breath sounds normal. Right breast exhibits no inverted nipple, no mass, no nipple discharge, no skin change and no tenderness. Left breast exhibits no inverted nipple, no mass, no nipple discharge, no skin change and no tenderness. Breasts are symmetrical.   Abdominal: Soft. Bowel sounds are normal. She exhibits no mass. There is no tenderness. Hernia confirmed negative in the right inguinal area and confirmed negative in the left inguinal area.   Genitourinary: Vagina normal and  uterus normal. Rectal exam shows no external hemorrhoid. No breast tenderness or discharge. Uterus is not enlarged and not tender. Cervix exhibits no motion tenderness, no discharge and no friability. Right adnexum displays no mass, no tenderness and no fullness. Left adnexum displays no mass, no tenderness and no fullness. No tenderness in the vagina. No foreign body in the vagina. No vaginal discharge found.   Musculoskeletal: Normal range of motion.   Lymphadenopathy:        Right: No inguinal adenopathy present.        Left: No inguinal adenopathy present.   Neurological: She is alert and oriented to person, place, and time. She has normal reflexes.   Skin: Skin is dry.   Psychiatric: She has a normal mood and affect. Her behavior is normal. Judgment and thought content normal.   Nursing note and vitals reviewed.        Assessment:        1. Encounter for gynecological examination with abnormal finding    2. Cervical cancer screening    3. Menopausal symptoms    4. Osteoporosis screening    5. Urge incontinence of urine    6. Breast cancer screening               Plan:        Xiomy was seen today for well woman, hot flashes and night sweats.    Diagnoses and all orders for this visit:    Encounter for gynecological examination with abnormal finding    Cervical cancer screening  -     Liquid-based pap smear, screening    Menopausal symptoms  -     estrogen, conjugated,-medroxyprogesterone (PREMPRO) 0.45-1.5 mg per tablet; Take 1 tablet by mouth once daily.    Osteoporosis screening  -     DXA Bone Density Spine And Hip_Axial Skeleton; Future  -     DXA Bone Density Spine And Hip_Axial Skeleton    Urge incontinence of urine  -     oxybutynin (DITROPAN) 5 MG Tab; Take 1 tablet (5 mg total) by mouth 2 (two) times daily.    Breast cancer screening  -     Mammo Digital Screening Bilat with CAD; Standing

## 2017-03-16 ENCOUNTER — HOSPITAL ENCOUNTER (OUTPATIENT)
Dept: RADIOLOGY | Facility: HOSPITAL | Age: 57
Discharge: HOME | End: 2017-03-16
Attending: FAMILY MEDICINE
Payer: MEDICAID

## 2017-03-16 ENCOUNTER — HOSPITAL ENCOUNTER (OUTPATIENT)
Dept: RADIOLOGY | Facility: HOSPITAL | Age: 57
Discharge: HOME | End: 2017-03-16
Attending: OBSTETRICS & GYNECOLOGY
Payer: MEDICAID

## 2017-03-16 DIAGNOSIS — Z12.39 BREAST CANCER SCREENING: ICD-10-CM

## 2017-03-16 PROCEDURE — 77067 SCR MAMMO BI INCL CAD: CPT | Mod: TC

## 2017-03-16 PROCEDURE — 77080 DXA BONE DENSITY AXIAL: CPT | Mod: 26,,, | Performed by: RADIOLOGY

## 2017-03-16 PROCEDURE — 77080 DXA BONE DENSITY AXIAL: CPT | Mod: TC

## 2017-03-16 PROCEDURE — 77063 BREAST TOMOSYNTHESIS BI: CPT | Mod: 26,,, | Performed by: RADIOLOGY

## 2017-03-16 PROCEDURE — 77067 SCR MAMMO BI INCL CAD: CPT | Mod: 26,,, | Performed by: RADIOLOGY

## 2017-07-04 ENCOUNTER — HOSPITAL ENCOUNTER (EMERGENCY)
Facility: HOSPITAL | Age: 57
Discharge: HOME | End: 2017-07-04
Attending: SURGERY
Payer: MEDICAID

## 2017-07-04 VITALS
SYSTOLIC BLOOD PRESSURE: 160 MMHG | OXYGEN SATURATION: 98 % | HEART RATE: 88 BPM | HEIGHT: 63 IN | TEMPERATURE: 98 F | DIASTOLIC BLOOD PRESSURE: 70 MMHG | BODY MASS INDEX: 26.58 KG/M2 | RESPIRATION RATE: 18 BRPM | WEIGHT: 150 LBS

## 2017-07-04 DIAGNOSIS — J02.9 PHARYNGITIS, UNSPECIFIED ETIOLOGY: Primary | ICD-10-CM

## 2017-07-04 PROCEDURE — 63600175 PHARM REV CODE 636 W HCPCS: Performed by: SURGERY

## 2017-07-04 PROCEDURE — 99283 EMERGENCY DEPT VISIT LOW MDM: CPT | Mod: 25

## 2017-07-04 PROCEDURE — 96372 THER/PROPH/DIAG INJ SC/IM: CPT

## 2017-07-04 RX ORDER — AZITHROMYCIN 250 MG/1
TABLET, FILM COATED ORAL
Qty: 6 TABLET | Refills: 0 | Status: SHIPPED | OUTPATIENT
Start: 2017-07-04 | End: 2018-06-01

## 2017-07-04 RX ORDER — METHYLPREDNISOLONE 4 MG/1
TABLET ORAL
Qty: 1 PACKAGE | Refills: 0 | Status: SHIPPED | OUTPATIENT
Start: 2017-07-04 | End: 2017-12-26

## 2017-07-04 RX ORDER — METHYLPREDNISOLONE SOD SUCC 125 MG
125 VIAL (EA) INJECTION
Status: COMPLETED | OUTPATIENT
Start: 2017-07-04 | End: 2017-07-04

## 2017-07-04 RX ADMIN — METHYLPREDNISOLONE SODIUM SUCCINATE 125 MG: 125 INJECTION, POWDER, FOR SOLUTION INTRAMUSCULAR; INTRAVENOUS at 01:07

## 2017-07-04 NOTE — ED NOTES
Injection given as ordered and charted per MAR. Instructed to wait 15 additional minutes prior to leaving to monitor for reaction. Caretaker instructed to notify staff if reaction is suspected. Caregiver voiced understanding.

## 2017-07-04 NOTE — ED PROVIDER NOTES
[facility]                                        July 4, 2017                   Chief Complaint  57 y.o. female with Sore Throat    History of Present Illness  Xiomy Duncan presents to the emergency room with a sore throat this week  Patient on exam is mild oropharyngitis with no signs of tonsillitis or exudate now  Patient is a long-time smoker, she has a history of hoarseness and voice issues  Patient denies any nasal congestion, no cough or cold or earache on interview  Patient is afebrile, no stridor or drool, normal phonation and swallowing here    The history is provided by the patient    Past Medical History   -- Anemia    -- Asthma    -- Cannabis abuse, daily use    -- Colon polyps    -- GERD (gastroesophageal reflux disease)    -- Hoarseness    -- Hypertension    -- Seizures    -- Thyroid nodule    -- Ulcer      Surgical history: Cholecystectomy, tonsillectomy, BTL  ALLERGIES: Penicillin    Review of Systems and Physical Exam     Review of Systems  -- Constitution - no fever, denies fatigue, no weakness, no chills  -- Eyes - no tearing or redness, no visual disturbance  -- Ear, Nose - no tinnitus or earache, no nasal congestion or discharge  -- Mouth,Throat - sore throat, no toothache, normal voice, normal swallowing  -- Respiratory - denies cough and congestion, no shortness of breath, no GOMEZ  -- Cardiovascular - denies chest pain, no palpitations, denies claudication  -- Gastrointestinal - denies abdominal pain, nausea, vomiting, or diarrhea  -- Musculoskeletal - denies back pain, negative for myalgias and arthralgias   -- Neurological - no headache, denies weakness or seizure; no LOC  -- Skin - denies pallor, rash, or changes in skin. no hives or welts noted    Vital Signs  -- Her oral temperature is 97.9 °F (36.6 °C).   -- Her blood pressure is 168/74 (abnormal) and her pulse is 84.   -- Her respiration is 18 and oxygen saturation is 98%.      Physical Exam  -- Nursing note  and vitals reviewed  -- Constitutional: Appears well-developed and well-nourished  -- Head: Atraumatic. Normocephalic. No obvious abnormality  -- Eyes: Pupils are equal and reactive to light. Normal conjunctiva and lids  -- Nose: Nose normal in appearance, nares grossly normal. No discharge  -- Throat: mild posterior oropharnyx erythema with no exudate   -- Ears: External ears and TM normal bilaterally. Normal hearing and no drainage  -- Neck: Normal range of motion. Neck supple. No masses, trachea midline  -- Cardiac: Normal rate, regular rhythm and normal heart sounds  -- Pulmonary: Normal respiratory effort, breath sounds clear to auscultation  -- Abdominal: Soft, no tenderness. Normal bowel sounds. Normal liver edge  -- Musculoskeletal: Normal range of motion, no effusions. Joints stable   -- Neurological: No focal deficits. Showed good interaction with staff  -- Vascular: Posterior tibial, dorsalis pedis and radial pulses 2+ bilaterally      Emergency Room Course     Treatment and Evaluation  --  mg Solumedrol given today in the ER    Diagnosis  -- The encounter diagnosis was Pharyngitis, unspecified etiology.    Disposition and Plan  -- Disposition: home  -- Condition: stable  -- Follow-up: Patient to follow up with Arnie Monson MD in 1-2 days.  -- I advised the patient that we have found no life threatening condition today  -- At this time, I believe the patient is clinically stable for discharge.   -- The patient acknowledges that close follow up with a MD is required   -- Patient agrees to comply with all instruction and direction given in the ER    This note is dictated on Dragon Natural Speaking word recognition program.  There are word recognition mistakes that are occasionally missed on review.           Celso Warren MD  07/04/17 [id number]

## 2017-07-30 DIAGNOSIS — N39.41 URGE INCONTINENCE OF URINE: ICD-10-CM

## 2017-07-31 RX ORDER — OXYBUTYNIN CHLORIDE 5 MG/1
5 TABLET ORAL 2 TIMES DAILY
Qty: 60 TABLET | Refills: 2 | Status: SHIPPED | OUTPATIENT
Start: 2017-07-31 | End: 2017-10-17 | Stop reason: SDUPTHER

## 2017-09-12 DIAGNOSIS — J40 WHEEZY BRONCHITIS: ICD-10-CM

## 2017-09-12 RX ORDER — CEFUROXIME AXETIL 250 MG/1
250 TABLET ORAL 2 TIMES DAILY
Qty: 20 TABLET | Refills: 0 | OUTPATIENT
Start: 2017-09-12 | End: 2017-09-22

## 2017-10-15 DIAGNOSIS — I10 ESSENTIAL HYPERTENSION: ICD-10-CM

## 2017-10-16 RX ORDER — CLONIDINE HYDROCHLORIDE 0.1 MG/1
0.1 TABLET ORAL 2 TIMES DAILY
Qty: 30 TABLET | Refills: 2 | Status: SHIPPED | OUTPATIENT
Start: 2017-10-16 | End: 2017-12-17 | Stop reason: SDUPTHER

## 2017-10-17 DIAGNOSIS — N39.41 URGE INCONTINENCE OF URINE: ICD-10-CM

## 2017-10-17 RX ORDER — OXYBUTYNIN CHLORIDE 5 MG/1
5 TABLET ORAL 2 TIMES DAILY
Qty: 60 TABLET | Refills: 2 | Status: SHIPPED | OUTPATIENT
Start: 2017-10-17 | End: 2018-01-07 | Stop reason: SDUPTHER

## 2017-12-17 DIAGNOSIS — I10 ESSENTIAL HYPERTENSION: ICD-10-CM

## 2017-12-18 RX ORDER — CLONIDINE HYDROCHLORIDE 0.1 MG/1
0.1 TABLET ORAL 2 TIMES DAILY
Qty: 30 TABLET | Refills: 2 | Status: SHIPPED | OUTPATIENT
Start: 2017-12-18 | End: 2019-01-24 | Stop reason: SDUPTHER

## 2017-12-26 ENCOUNTER — OFFICE VISIT (OUTPATIENT)
Dept: FAMILY MEDICINE | Facility: CLINIC | Age: 57
End: 2017-12-26
Payer: MEDICAID

## 2017-12-26 VITALS
BODY MASS INDEX: 28 KG/M2 | DIASTOLIC BLOOD PRESSURE: 80 MMHG | OXYGEN SATURATION: 95 % | RESPIRATION RATE: 18 BRPM | TEMPERATURE: 97 F | SYSTOLIC BLOOD PRESSURE: 128 MMHG | WEIGHT: 164 LBS | HEIGHT: 64 IN | HEART RATE: 80 BPM

## 2017-12-26 DIAGNOSIS — H25.013 CORTICAL AGE-RELATED CATARACT OF BOTH EYES: Primary | ICD-10-CM

## 2017-12-26 DIAGNOSIS — Z01.818 PREOPERATIVE EVALUATION TO RULE OUT SURGICAL CONTRAINDICATION: ICD-10-CM

## 2017-12-26 PROCEDURE — 99213 OFFICE O/P EST LOW 20 MIN: CPT | Mod: S$PBB,,, | Performed by: FAMILY MEDICINE

## 2017-12-26 PROCEDURE — 99213 OFFICE O/P EST LOW 20 MIN: CPT | Mod: PBBFAC | Performed by: FAMILY MEDICINE

## 2017-12-26 PROCEDURE — 99999 PR PBB SHADOW E&M-EST. PATIENT-LVL III: CPT | Mod: PBBFAC,,, | Performed by: FAMILY MEDICINE

## 2017-12-26 RX ORDER — CIPROFLOXACIN HYDROCHLORIDE 3 MG/ML
SOLUTION/ DROPS OPHTHALMIC
Refills: 2 | COMMUNITY
Start: 2017-12-15 | End: 2018-06-21

## 2017-12-26 RX ORDER — FLUOXETINE HYDROCHLORIDE 20 MG/1
CAPSULE ORAL
Refills: 1 | COMMUNITY
Start: 2017-12-13 | End: 2019-02-20

## 2017-12-26 RX ORDER — KETOROLAC TROMETHAMINE 4 MG/ML
SOLUTION/ DROPS OPHTHALMIC
Refills: 2 | COMMUNITY
Start: 2017-12-15 | End: 2018-06-21

## 2017-12-26 NOTE — PROGRESS NOTES
CC: surgical clearance    HPI: Xiomy Duncan is a 57 y.o. female here for medical clearance for cataracts.  Patient has no other medical complaints.    ROS:   Gen.:  No fever, chills, weight loss, weight gain  HEENT: No headaches, sinus congestion, sore throat, change in vision  CV: No chest pain  RESP: No shortness of breath, wheezing, cough  GI: No change in bowel habits, no diarrhea, no abdominal pain, no hematochezia  : No dysuria, frequency  MSK: No muscle pain, joint pain, back pain  NEURO: No numbness, weakness  ENDO: No polyuria, polydipsia, heat or cold intolerance    PMH, PSH, ALLERGIES, SH, FH reviewed in nurse's notes above  Medications reconciled in the nurse's notes    PHYSICAL EXAM;   APPEARANCE: Well nourished, well developed, in no acute distress.    HEAD: Normocephalic, atraumatic.  EYES: PERRL. EOMI.      EARS: TM's intact. Light reflex normal. No retraction or perforation.    NOSE: Mucosa pink. Airway clear.  MOUTH & THROAT: No tonsillar enlargement. No pharyngeal erythema or exudate. No stridor.  NECK: Supple.   NODES: No cervical, axillary or inguinal lymph node enlargement.  CHEST: Lungs clear to auscultation.  CARDIOVASCULAR: Normal S1, S2. No rubs, murmurs or gallops.  ABDOMEN: Bowel sounds normal. Not distended. Soft. No tenderness or masses.  MUSCULOSKELETAL: Able to touch toes, no back pain  SKIN: No rashes or pigmented moles.  NEUROLOGIC:       Cranial Nerves: II-XII grossly intact.      Motor: 5/5 strength major flexors/extensors.      DTR's: Knees, Ankles 2+ and equal bilaterally; downgoing toes.      Sensory: Intact to light touch distally.      Gait & Posture: Normal gait and fine motion. No cerebellar signs.  MENTAL STATUS: Normal orientation to person, place and time, normal affect, normal flow thought, normal memory.    ASSESSMENT/PLAN:  Cortical age-related cataract of both eyes    Preoperative evaluation to rule out surgical contraindication      Medically cleared for  cataract surgery

## 2018-01-07 DIAGNOSIS — N39.41 URGE INCONTINENCE OF URINE: ICD-10-CM

## 2018-01-08 RX ORDER — OXYBUTYNIN CHLORIDE 5 MG/1
5 TABLET ORAL 2 TIMES DAILY
Qty: 60 TABLET | Refills: 2 | Status: SHIPPED | OUTPATIENT
Start: 2018-01-08 | End: 2020-01-08

## 2018-03-09 DIAGNOSIS — J40 WHEEZY BRONCHITIS: ICD-10-CM

## 2018-03-09 DIAGNOSIS — N95.1 MENOPAUSAL SYMPTOMS: ICD-10-CM

## 2018-03-09 RX ORDER — CONJUGATED ESTROGENS AND MEDROXYPROGESTERONE ACETATE .45; 1.5 MG/1; MG/1
1 TABLET, SUGAR COATED ORAL DAILY
Qty: 28 TABLET | Refills: 10 | Status: SHIPPED | OUTPATIENT
Start: 2018-03-09 | End: 2019-01-24 | Stop reason: SDUPTHER

## 2018-03-09 RX ORDER — UMECLIDINIUM BROMIDE AND VILANTEROL TRIFENATATE 62.5; 25 UG/1; UG/1
1 POWDER RESPIRATORY (INHALATION) DAILY
Qty: 30 EACH | Refills: 5 | Status: SHIPPED | OUTPATIENT
Start: 2018-03-09 | End: 2019-02-19 | Stop reason: SDUPTHER

## 2018-04-10 DIAGNOSIS — N39.41 URGE INCONTINENCE OF URINE: ICD-10-CM

## 2018-04-10 RX ORDER — OXYBUTYNIN CHLORIDE 5 MG/1
5 TABLET ORAL 2 TIMES DAILY
Qty: 60 TABLET | Refills: 2 | OUTPATIENT
Start: 2018-04-10 | End: 2019-04-10

## 2018-06-01 ENCOUNTER — OFFICE VISIT (OUTPATIENT)
Dept: FAMILY MEDICINE | Facility: CLINIC | Age: 58
End: 2018-06-01
Payer: MEDICAID

## 2018-06-01 VITALS
DIASTOLIC BLOOD PRESSURE: 70 MMHG | WEIGHT: 163.13 LBS | RESPIRATION RATE: 16 BRPM | BODY MASS INDEX: 28.9 KG/M2 | SYSTOLIC BLOOD PRESSURE: 128 MMHG | HEIGHT: 63 IN | HEART RATE: 74 BPM

## 2018-06-01 DIAGNOSIS — I10 ESSENTIAL HYPERTENSION: Primary | ICD-10-CM

## 2018-06-01 DIAGNOSIS — Z11.59 ENCOUNTER FOR HEPATITIS C SCREENING TEST FOR LOW RISK PATIENT: ICD-10-CM

## 2018-06-01 DIAGNOSIS — E78.5 HYPERLIPIDEMIA, UNSPECIFIED HYPERLIPIDEMIA TYPE: ICD-10-CM

## 2018-06-01 DIAGNOSIS — R73.03 PRE-DIABETES: ICD-10-CM

## 2018-06-01 DIAGNOSIS — Z12.31 VISIT FOR SCREENING MAMMOGRAM: ICD-10-CM

## 2018-06-01 LAB
ALBUMIN SERPL BCP-MCNC: 3.6 G/DL
ALP SERPL-CCNC: 68 U/L
ALT SERPL W/O P-5'-P-CCNC: 19 U/L
ANION GAP SERPL CALC-SCNC: 9 MMOL/L
AST SERPL-CCNC: 24 U/L
BASOPHILS # BLD AUTO: 0.06 K/UL
BASOPHILS NFR BLD: 0.5 %
BILIRUB SERPL-MCNC: 0.3 MG/DL
BUN SERPL-MCNC: 20 MG/DL
CALCIUM SERPL-MCNC: 9.6 MG/DL
CHLORIDE SERPL-SCNC: 106 MMOL/L
CHOLEST SERPL-MCNC: 175 MG/DL
CHOLEST/HDLC SERPL: 4.5 {RATIO}
CO2 SERPL-SCNC: 26 MMOL/L
CREAT SERPL-MCNC: 1 MG/DL
CREAT UR-MCNC: 249.1 MG/DL
DIFFERENTIAL METHOD: ABNORMAL
EOSINOPHIL # BLD AUTO: 0.2 K/UL
EOSINOPHIL NFR BLD: 1.7 %
ERYTHROCYTE [DISTWIDTH] IN BLOOD BY AUTOMATED COUNT: 14.5 %
EST. GFR  (AFRICAN AMERICAN): >60 ML/MIN/1.73 M^2
EST. GFR  (NON AFRICAN AMERICAN): >60 ML/MIN/1.73 M^2
ESTIMATED AVG GLUCOSE: 114 MG/DL
GLUCOSE SERPL-MCNC: 84 MG/DL
HBA1C MFR BLD HPLC: 5.6 %
HCT VFR BLD AUTO: 42.6 %
HDLC SERPL-MCNC: 39 MG/DL
HDLC SERPL: 22.3 %
HGB BLD-MCNC: 14 G/DL
LDLC SERPL CALC-MCNC: 115 MG/DL
LYMPHOCYTES # BLD AUTO: 3.4 K/UL
LYMPHOCYTES NFR BLD: 30.5 %
MCH RBC QN AUTO: 31.7 PG
MCHC RBC AUTO-ENTMCNC: 32.9 G/DL
MCV RBC AUTO: 96 FL
MICROALBUMIN UR DL<=1MG/L-MCNC: 7 UG/ML
MICROALBUMIN/CREATININE RATIO: 2.8 UG/MG
MONOCYTES # BLD AUTO: 0.8 K/UL
MONOCYTES NFR BLD: 6.8 %
NEUTROPHILS # BLD AUTO: 6.7 K/UL
NEUTROPHILS NFR BLD: 60.5 %
NONHDLC SERPL-MCNC: 136 MG/DL
PLATELET # BLD AUTO: 255 K/UL
PMV BLD AUTO: 11.7 FL
POTASSIUM SERPL-SCNC: 4.2 MMOL/L
PROT SERPL-MCNC: 7.5 G/DL
RBC # BLD AUTO: 4.42 M/UL
SODIUM SERPL-SCNC: 141 MMOL/L
TRIGL SERPL-MCNC: 105 MG/DL
TSH SERPL DL<=0.005 MIU/L-ACNC: 1.08 UIU/ML
WBC # BLD AUTO: 11.06 K/UL

## 2018-06-01 PROCEDURE — 83036 HEMOGLOBIN GLYCOSYLATED A1C: CPT

## 2018-06-01 PROCEDURE — 80053 COMPREHEN METABOLIC PANEL: CPT

## 2018-06-01 PROCEDURE — 36415 COLL VENOUS BLD VENIPUNCTURE: CPT | Mod: PBBFAC

## 2018-06-01 PROCEDURE — 84443 ASSAY THYROID STIM HORMONE: CPT

## 2018-06-01 PROCEDURE — 86803 HEPATITIS C AB TEST: CPT

## 2018-06-01 PROCEDURE — 99999 PR PBB SHADOW E&M-EST. PATIENT-LVL III: CPT | Mod: PBBFAC,,, | Performed by: FAMILY MEDICINE

## 2018-06-01 PROCEDURE — 82043 UR ALBUMIN QUANTITATIVE: CPT

## 2018-06-01 PROCEDURE — 85025 COMPLETE CBC W/AUTO DIFF WBC: CPT

## 2018-06-01 PROCEDURE — 99214 OFFICE O/P EST MOD 30 MIN: CPT | Mod: S$PBB,,, | Performed by: FAMILY MEDICINE

## 2018-06-01 PROCEDURE — 99213 OFFICE O/P EST LOW 20 MIN: CPT | Mod: PBBFAC | Performed by: FAMILY MEDICINE

## 2018-06-01 PROCEDURE — 80061 LIPID PANEL: CPT

## 2018-06-01 RX ORDER — TIZANIDINE 2 MG/1
2 TABLET ORAL EVERY 6 HOURS PRN
Qty: 30 TABLET | Refills: 0 | Status: SHIPPED | OUTPATIENT
Start: 2018-06-01 | End: 2018-06-11

## 2018-06-01 RX ORDER — HYDROCHLOROTHIAZIDE 25 MG/1
25 TABLET ORAL DAILY
Qty: 30 TABLET | Refills: 1 | Status: SHIPPED | OUTPATIENT
Start: 2018-06-01 | End: 2018-06-21 | Stop reason: SDUPTHER

## 2018-06-01 NOTE — PROGRESS NOTES
Subjective:       Patient ID: Xiomy Duncan is a 58 y.o. female.    Chief Complaint: medication check up    HPI  58 year old female comes in to discuss her blood pressure. She says she goes to see Mental Health and her pressures have been high. She says she was told to come and check in on her pressures, but today they are good. She is concerned because her pressures have been 130s/100s at times. She has headaches at times and she thinks it is because of her pressures.     She has some muscle spasm in her back at times that 'catches her.'    PMH, PSH, ALLERGIES, SH, FH reviewed in nurse's notes above  Medications reconciled in the nurse's notes      Review of Systems   Constitutional: Negative for chills and fever.   HENT: Negative for congestion, ear pain, postnasal drip, rhinorrhea, sore throat and trouble swallowing.    Eyes: Negative for redness and itching.   Respiratory: Negative for cough, shortness of breath and wheezing.    Cardiovascular: Negative for chest pain and palpitations.   Gastrointestinal: Negative for abdominal pain, diarrhea, nausea and vomiting.   Genitourinary: Negative for dysuria and frequency.   Musculoskeletal: Positive for back pain.   Skin: Negative for rash.   Neurological: Negative for weakness and headaches.       Objective:      Physical Exam   Constitutional: She is oriented to person, place, and time. She appears well-developed. No distress.   HENT:   Head: Normocephalic and atraumatic.   Eyes: Conjunctivae are normal. Pupils are equal, round, and reactive to light.   Neck: Normal range of motion. Neck supple. No thyromegaly present.   Cardiovascular: Normal rate, regular rhythm, normal heart sounds and intact distal pulses.    Pulmonary/Chest: Effort normal and breath sounds normal. No respiratory distress. She has no wheezes.   Abdominal: Soft. Bowel sounds are normal. There is no tenderness.   Musculoskeletal: Normal range of motion. She exhibits no edema.    Lymphadenopathy:     She has no cervical adenopathy.   Neurological: She is alert and oriented to person, place, and time.   Skin: Skin is warm and dry. No rash noted.   Psychiatric: She has a normal mood and affect. Her behavior is normal.   Nursing note and vitals reviewed.       Assessment/Plan:       Problem List Items Addressed This Visit        Cardiac/Vascular    HTN (hypertension) - Primary    Relevant Medications    hydroCHLOROthiazide (HYDRODIURIL) 25 MG tablet    Other Relevant Orders    CBC auto differential    Comprehensive metabolic panel    TSH    Microalbumin/creatinine urine ratio    Hyperlipidemia    Relevant Orders    Lipid panel       Endocrine    Pre-diabetes    Relevant Orders    Hemoglobin A1c      Other Visit Diagnoses     Visit for screening mammogram        Relevant Orders    Mammo Digital Screening Bilateral With CAD    Encounter for hepatitis C screening test for low risk patient        Relevant Orders    Hepatitis C antibody      discussed diet, exercise, avoidance of tobacco and alcohol    RTC if condition acutely worsens or any other concerns, otherwise RTC as scheduled

## 2018-06-04 LAB — HCV AB SERPL QL IA: NEGATIVE

## 2018-06-04 NOTE — PROGRESS NOTES
Lab results called to pt/ Please notify patient that results have returned, and they are normal. The patient can call with any questions. Otherwise, have them return for next visit as previously scheduled, per Dr Barrett, pt verbalizes understanding

## 2018-06-06 ENCOUNTER — HOSPITAL ENCOUNTER (OUTPATIENT)
Dept: RADIOLOGY | Facility: HOSPITAL | Age: 58
Discharge: HOME | End: 2018-06-06
Attending: FAMILY MEDICINE
Payer: MEDICAID

## 2018-06-06 VITALS — WEIGHT: 163 LBS | HEIGHT: 63 IN | BODY MASS INDEX: 28.88 KG/M2

## 2018-06-06 DIAGNOSIS — Z12.31 VISIT FOR SCREENING MAMMOGRAM: ICD-10-CM

## 2018-06-06 PROCEDURE — 77067 SCR MAMMO BI INCL CAD: CPT | Mod: 26,,, | Performed by: RADIOLOGY

## 2018-06-06 PROCEDURE — 77063 BREAST TOMOSYNTHESIS BI: CPT | Mod: 26,,, | Performed by: RADIOLOGY

## 2018-06-06 PROCEDURE — 77067 SCR MAMMO BI INCL CAD: CPT | Mod: TC

## 2018-06-21 ENCOUNTER — OFFICE VISIT (OUTPATIENT)
Dept: FAMILY MEDICINE | Facility: CLINIC | Age: 58
End: 2018-06-21
Payer: MEDICAID

## 2018-06-21 VITALS
BODY MASS INDEX: 29.41 KG/M2 | WEIGHT: 159.81 LBS | HEIGHT: 62 IN | DIASTOLIC BLOOD PRESSURE: 70 MMHG | RESPIRATION RATE: 20 BRPM | SYSTOLIC BLOOD PRESSURE: 130 MMHG | HEART RATE: 80 BPM

## 2018-06-21 DIAGNOSIS — I10 ESSENTIAL HYPERTENSION: ICD-10-CM

## 2018-06-21 DIAGNOSIS — M62.838 MUSCLE SPASM: ICD-10-CM

## 2018-06-21 DIAGNOSIS — M54.50 LUMBAR PAIN: Primary | ICD-10-CM

## 2018-06-21 LAB
ANION GAP SERPL CALC-SCNC: 11 MMOL/L
BUN SERPL-MCNC: 21 MG/DL
CALCIUM SERPL-MCNC: 9.6 MG/DL
CHLORIDE SERPL-SCNC: 102 MMOL/L
CO2 SERPL-SCNC: 25 MMOL/L
CREAT SERPL-MCNC: 1.2 MG/DL
EST. GFR  (AFRICAN AMERICAN): 58 ML/MIN/1.73 M^2
EST. GFR  (NON AFRICAN AMERICAN): 51 ML/MIN/1.73 M^2
GLUCOSE SERPL-MCNC: 79 MG/DL
POTASSIUM SERPL-SCNC: 4.1 MMOL/L
SODIUM SERPL-SCNC: 138 MMOL/L

## 2018-06-21 PROCEDURE — 80048 BASIC METABOLIC PNL TOTAL CA: CPT

## 2018-06-21 PROCEDURE — 99213 OFFICE O/P EST LOW 20 MIN: CPT | Mod: PBBFAC | Performed by: FAMILY MEDICINE

## 2018-06-21 PROCEDURE — 99999 PR PBB SHADOW E&M-EST. PATIENT-LVL III: CPT | Mod: PBBFAC,,, | Performed by: FAMILY MEDICINE

## 2018-06-21 PROCEDURE — 36415 COLL VENOUS BLD VENIPUNCTURE: CPT | Mod: PBBFAC

## 2018-06-21 PROCEDURE — 99213 OFFICE O/P EST LOW 20 MIN: CPT | Mod: S$PBB,,, | Performed by: FAMILY MEDICINE

## 2018-06-21 RX ORDER — HYDROCHLOROTHIAZIDE 25 MG/1
25 TABLET ORAL DAILY
Qty: 30 TABLET | Refills: 2 | Status: SHIPPED | OUTPATIENT
Start: 2018-06-21 | End: 2019-03-15 | Stop reason: DRUGHIGH

## 2018-06-21 RX ORDER — CYCLOBENZAPRINE HCL 5 MG
5 TABLET ORAL NIGHTLY
Qty: 30 TABLET | Refills: 2 | Status: SHIPPED | OUTPATIENT
Start: 2018-06-21 | End: 2018-06-28

## 2018-06-21 RX ORDER — HYDROCHLOROTHIAZIDE 25 MG/1
25 TABLET ORAL DAILY
Qty: 30 TABLET | Refills: 2 | Status: SHIPPED | OUTPATIENT
Start: 2018-06-21 | End: 2018-06-21 | Stop reason: SDUPTHER

## 2018-06-21 NOTE — PROGRESS NOTES
Subjective:       Patient ID: Xiomy Duncan is a 58 y.o. female.    Chief Complaint: Follow-up (for blood work) and Back Pain    HPI  58 year old female comes in for f/o back pain. She says that she is still waking up at night with a catching in her back. She only gets relief if she bends over and stretches. She is most concerned because hr mom had bone cancer. Her blood work all came back normal recently. 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 She says that she does lift heavy people at times. The muscle relaxer isnt't doing anything.    PMH, PSH, ALLERGIES, SH, FH reviewed in nurse's notes above  Medications reconciled in the nurse's notes      Review of Systems   Constitutional: Negative for chills and fever.   HENT: Negative for congestion, ear pain, postnasal drip, rhinorrhea, sore throat and trouble swallowing.    Eyes: Negative for redness and itching.   Respiratory: Negative for cough, shortness of breath and wheezing.    Cardiovascular: Negative for chest pain and palpitations.   Gastrointestinal: Negative for abdominal pain, diarrhea, nausea and vomiting.   Genitourinary: Negative for dysuria and frequency.   Skin: Negative for rash.   Neurological: Negative for weakness and headaches.       Objective:      Physical Exam   Constitutional: She is oriented to person, place, and time. She appears well-developed. No distress.   HENT:   Head: Normocephalic and atraumatic.   Eyes: Conjunctivae are normal. Pupils are equal, round, and reactive to light.   Neck: Normal range of motion. Neck supple. No thyromegaly present.   Cardiovascular: Normal rate, regular rhythm, normal heart sounds and intact distal pulses.    Pulmonary/Chest: Effort normal and breath sounds normal. No respiratory distress. She has no wheezes.   Abdominal: Soft. Bowel sounds are normal. There is no tenderness.   Musculoskeletal: Normal range of motion. She exhibits no edema.   Tenderness - lumbar paraspinal  muscles   Lymphadenopathy:     She has no cervical adenopathy.   Neurological: She is alert and oriented to person, place, and time.   Skin: Skin is warm and dry. No rash noted.   Psychiatric: She has a normal mood and affect. Her behavior is normal.   Nursing note and vitals reviewed.       Assessment/Plan:       Problem List Items Addressed This Visit     None      Visit Diagnoses     Lumbar pain    -  Primary    Relevant Orders    Ambulatory Referral to Physical/Occupational Therapy    Muscle spasm        Relevant Orders    Ambulatory Referral to Physical/Occupational Therapy    Essential hypertension        Relevant Medications    hydroCHLOROthiazide (HYDRODIURIL) 25 MG tablet    Other Relevant Orders    Basic metabolic panel        RTC if condition acutely worsens or any other concerns, otherwise RTC as scheduled

## 2018-06-26 ENCOUNTER — TELEPHONE (OUTPATIENT)
Dept: FAMILY MEDICINE | Facility: CLINIC | Age: 58
End: 2018-06-26

## 2018-06-26 NOTE — TELEPHONE ENCOUNTER
----- Message from Annie Rich sent at 2018  4:24 PM CDT -----  Contact: Self  Xiomy Duncan  MRN: [id number]  : 1960  PCP: Arnie Monson  Home Phone      [phone]  Work Phone      Not on file.  Mobile          Not on file.      MESSAGE:   Doesn't feel as the is cyclobenzaprine (FLEXERIL) 5 MG tablet is working for her back. Took two instead of one and still didn't work.  Also, the chiropractor that she was referred too has an eleven month waiting list.   Please call to advise [phone]

## 2018-06-28 DIAGNOSIS — N39.41 URGE INCONTINENCE OF URINE: ICD-10-CM

## 2018-06-28 RX ORDER — OXYBUTYNIN CHLORIDE 5 MG/1
5 TABLET ORAL 2 TIMES DAILY
Qty: 60 TABLET | Refills: 2 | OUTPATIENT
Start: 2018-06-28 | End: 2019-06-28

## 2018-06-28 RX ORDER — METHOCARBAMOL 500 MG/1
500 TABLET, FILM COATED ORAL 4 TIMES DAILY
Qty: 40 TABLET | Refills: 0 | Status: SHIPPED | OUTPATIENT
Start: 2018-06-28 | End: 2018-10-12 | Stop reason: SDUPTHER

## 2018-07-10 ENCOUNTER — OFFICE VISIT (OUTPATIENT)
Dept: FAMILY MEDICINE | Facility: CLINIC | Age: 58
End: 2018-07-10
Payer: MEDICAID

## 2018-07-10 VITALS
BODY MASS INDEX: 28.32 KG/M2 | RESPIRATION RATE: 20 BRPM | DIASTOLIC BLOOD PRESSURE: 80 MMHG | HEART RATE: 80 BPM | HEIGHT: 63 IN | SYSTOLIC BLOOD PRESSURE: 120 MMHG | WEIGHT: 159.81 LBS

## 2018-07-10 DIAGNOSIS — I10 ESSENTIAL HYPERTENSION: ICD-10-CM

## 2018-07-10 DIAGNOSIS — Z72.0 TOBACCO USE: Primary | ICD-10-CM

## 2018-07-10 DIAGNOSIS — Z86.010 HX OF COLONIC POLYPS: ICD-10-CM

## 2018-07-10 DIAGNOSIS — K29.70 GASTRITIS, PRESENCE OF BLEEDING UNSPECIFIED, UNSPECIFIED CHRONICITY, UNSPECIFIED GASTRITIS TYPE: ICD-10-CM

## 2018-07-10 PROCEDURE — 99213 OFFICE O/P EST LOW 20 MIN: CPT | Mod: PBBFAC | Performed by: FAMILY MEDICINE

## 2018-07-10 PROCEDURE — 99214 OFFICE O/P EST MOD 30 MIN: CPT | Mod: S$PBB,,, | Performed by: FAMILY MEDICINE

## 2018-07-10 PROCEDURE — 99999 PR PBB SHADOW E&M-EST. PATIENT-LVL III: CPT | Mod: PBBFAC,,, | Performed by: FAMILY MEDICINE

## 2018-07-10 RX ORDER — PANTOPRAZOLE SODIUM 40 MG/1
40 TABLET, DELAYED RELEASE ORAL DAILY
Qty: 30 TABLET | Refills: 0 | Status: SHIPPED | OUTPATIENT
Start: 2018-07-10 | End: 2018-08-06 | Stop reason: SDUPTHER

## 2018-07-10 RX ORDER — METHOCARBAMOL 500 MG/1
500 TABLET, FILM COATED ORAL 3 TIMES DAILY
Qty: 60 TABLET | Refills: 0 | Status: SHIPPED | OUTPATIENT
Start: 2018-07-10 | End: 2018-11-14

## 2018-07-10 RX ORDER — LACTULOSE 10 G/15ML
20 SOLUTION ORAL 3 TIMES DAILY
Qty: 300 ML | Refills: 0 | Status: SHIPPED | OUTPATIENT
Start: 2018-07-10 | End: 2018-10-05 | Stop reason: SDUPTHER

## 2018-07-10 NOTE — PROGRESS NOTES
Subjective:       Patient ID: Xiomy Duncan is a 58 y.o. female.    Chief Complaint: Follow-up (2 week f/u)    HPI  58 year old female comes in for f/u of her back. She says that she is much better. If she takes robaxin she is fine. She has not been to PT as her appt has not come up yet. She says that she has been constipated in spite using dulcolax and mom but last BM was 3 days ago. She also  Notes some nausea. She does admit to not having smoked marijuana over the last 3 days though. She normally smokes daily.     PMH, PSH, ALLERGIES, SH, FH reviewed in nurse's notes above  Medications reconciled in the nurse's notes    Review of Systems   Constitutional: Negative for chills and fever.   HENT: Negative for congestion, ear pain, postnasal drip, rhinorrhea, sore throat and trouble swallowing.    Eyes: Negative for redness and itching.   Respiratory: Negative for cough, shortness of breath and wheezing.    Cardiovascular: Negative for chest pain and palpitations.   Gastrointestinal: Positive for constipation and nausea. Negative for abdominal pain, diarrhea and vomiting.   Genitourinary: Negative for dysuria and frequency.   Musculoskeletal: Positive for back pain.   Skin: Negative for rash.        Toenail pain   Neurological: Negative for weakness and headaches.       Objective:      Physical Exam   Constitutional: She is oriented to person, place, and time. She appears well-developed. No distress.   HENT:   Head: Normocephalic and atraumatic.   Eyes: Conjunctivae are normal. Pupils are equal, round, and reactive to light.   Neck: Normal range of motion. Neck supple. No thyromegaly present.   Cardiovascular: Normal rate, regular rhythm, normal heart sounds and intact distal pulses.    Pulmonary/Chest: Effort normal and breath sounds normal. No respiratory distress. She has no wheezes.   Abdominal: Soft. Bowel sounds are normal. There is no tenderness.   + epigastric  tenderness   Musculoskeletal: Normal range  of motion. She exhibits no edema.   Lymphadenopathy:     She has no cervical adenopathy.   Neurological: She is alert and oriented to person, place, and time.   Skin: Skin is warm and dry. No rash noted.   Psychiatric: She has a normal mood and affect. Her behavior is normal.   Nursing note and vitals reviewed.       Assessment/Plan:       Problem List Items Addressed This Visit        Cardiac/Vascular    HTN (hypertension)       GI    Hx of colonic polyps      Other Visit Diagnoses     Tobacco use    -  Primary    Relevant Orders    Ambulatory referral to Smoking Cessation Program    Gastritis, presence of bleeding unspecified, unspecified chronicity, unspecified gastritis type        Relevant Medications    pantoprazole (PROTONIX) 40 MG tablet      okay to continue on robaxin.  Use protonix for the next 2-4 weeks.  Physical therapy once appt available.    Lactulose for constipation. If this doesn't resolve, will do colonoscopy sooner rather than 2019  (she does have a h/o colon polyps.    RTC if condition acutely worsens or any other concerns, otherwise RTC as scheduled

## 2018-07-12 ENCOUNTER — TELEPHONE (OUTPATIENT)
Dept: FAMILY MEDICINE | Facility: CLINIC | Age: 58
End: 2018-07-12

## 2018-07-12 NOTE — TELEPHONE ENCOUNTER
PA for Carlene Saldivar submitted to insurance company via [employer] web site. Key: [id number]  Awaiting insurance company response/ decision.

## 2018-07-12 NOTE — TELEPHONE ENCOUNTER
Received determination of denial for Anoro Ellipta state patient must try preferred drug Incruse Ellipta,Stiolto Respimat,Combivent,Arcapta Neohaler, and Atrovent.valdez that Dx must be one of the following COPD,Chronic Bronchitis ,Emphysema .Please review and advise.Thank you

## 2018-07-16 ENCOUNTER — CLINICAL SUPPORT (OUTPATIENT)
Dept: SMOKING CESSATION | Facility: CLINIC | Age: 58
End: 2018-07-16
Payer: COMMERCIAL

## 2018-07-16 VITALS — HEART RATE: 97 BPM | OXYGEN SATURATION: 98 %

## 2018-07-16 DIAGNOSIS — F17.210 HEAVY SMOKER (MORE THAN 20 CIGARETTES PER DAY): Primary | ICD-10-CM

## 2018-07-16 PROCEDURE — 99404 PREV MED CNSL INDIV APPRX 60: CPT | Mod: S$GLB,,,

## 2018-07-16 PROCEDURE — 99999 PR PBB SHADOW E&M-EST. PATIENT-LVL II: CPT | Mod: PBBFAC,,,

## 2018-07-16 RX ORDER — IBUPROFEN 200 MG
1 TABLET ORAL DAILY
Qty: 14 PATCH | Refills: 0 | Status: SHIPPED | OUTPATIENT
Start: 2018-07-16 | End: 2018-07-31 | Stop reason: SDUPTHER

## 2018-07-16 RX ORDER — IBUPROFEN 200 MG
1 TABLET ORAL DAILY
Qty: 14 PATCH | Refills: 0 | OUTPATIENT
Start: 2018-07-16 | End: 2018-07-16

## 2018-07-16 RX ORDER — IBUPROFEN 200 MG
1 TABLET ORAL DAILY
Qty: 14 PATCH | Refills: 0 | Status: ON HOLD | OUTPATIENT
Start: 2018-07-16 | End: 2018-12-15

## 2018-07-16 NOTE — Clinical Note
This is the first quit for this patient.She states she has never quit for any period of time. She has a grandchild that has asthma that cannot be around smoke and she wants to quit. She is smoking 40 cigarettes/day and will be using the 21 and 14 mg nicotine patches together. She would like to try Chantix and will check with [facility] to see if appropriate. Will come to bi-weekly sessions.

## 2018-07-18 ENCOUNTER — TELEPHONE (OUTPATIENT)
Dept: SMOKING CESSATION | Facility: CLINIC | Age: 58
End: 2018-07-18

## 2018-07-18 NOTE — TELEPHONE ENCOUNTER
Tried to call about Chant ix. The patient does have a history of seizures and not sure  If she is on medication at this time. Do not have any listed. Called to see to  more  information about the Chantix . Tried to call andrew bolton, could not leave message.

## 2018-07-31 ENCOUNTER — CLINICAL SUPPORT (OUTPATIENT)
Dept: SMOKING CESSATION | Facility: CLINIC | Age: 58
End: 2018-07-31
Payer: COMMERCIAL

## 2018-07-31 DIAGNOSIS — F17.210 HEAVY SMOKER (MORE THAN 20 CIGARETTES PER DAY): Primary | ICD-10-CM

## 2018-07-31 PROCEDURE — 99999 PR PBB SHADOW E&M-EST. PATIENT-LVL I: CPT | Mod: PBBFAC,,,

## 2018-07-31 PROCEDURE — 99403 PREV MED CNSL INDIV APPRX 45: CPT | Mod: S$GLB,,,

## 2018-07-31 RX ORDER — IBUPROFEN 200 MG
1 TABLET ORAL DAILY
Qty: 14 PATCH | Refills: 0 | Status: ON HOLD | OUTPATIENT
Start: 2018-07-31 | End: 2018-12-15

## 2018-07-31 NOTE — PROGRESS NOTES
Individual Follow-Up Form    7/31/2018    Quit Date: Oct 1, 2018    Clinical Status of Patient: Outpatient    Length of Service: 45 minutes    Continuing Medication: yes  Patches    Other Medications: none     Target Symptoms: Withdrawal and medication side effects. The following were  rated moderate (3) to severe (4) on TCRS:  · Moderate (3): desire/crave, depressed. Anxious, restless,heartracing,headaches,tremor, sweating more than usual , dry mouth. Constipation, drowsiness. Nicotine replacement therapy, nicotine withdrawal symptoms.   · Severe (4): difficulty concentrating, insomnia, skin burning and itching, nausea.v nicotine with placement therapy, nicotine withdrawal .     Comments: The patient has cut back to 15 cigarettes/ day using the 21 mg and 14 mg nicotine patch. She does have trouble keeping there patch on due to sweating from hot flashes. She will try moving to different sites. She has stopped taking cigarettes to work, but immediately wants one when she gets home. Suggested may a few. CO measurement = 25 ppm which indicates a heavy smoker. completion of TCRS (Tobacco Cessation Rating Scale) reviewed strategies, cues, and triggers. Introduced the negative impact of tobacco on health, the health advantages of discontinuing the use of tobacco, time line improved health changes after a quit, withdrawal issues to expect from nicotine and habit, and ways to achieve the goal of a quit.      Diagnosis: F17.210    Next Visit: 2 weeks

## 2018-07-31 NOTE — Clinical Note
The patient has cut back to 15 cigarettes/ day using the 21 mg and 14 mg nicotine patch. She does have trouble keeping there patch on due to sweating from hot flashes. She will try moving to different sites. She has stopped taking cigarettes to work, but immediately wants one when she gets home. Suggested may a few. CO measurement = 25 ppm which indicates a heavy smoker. completion of TCRS (Tobacco Cessation Rating Scale) reviewed strategies, cues, and triggers. Introduced the negative impact of tobacco on health, the health advantages of discontinuing the use of tobacco, time line improved health changes after a quit, withdrawal issues to expect from nicotine and habit, and ways to achieve the goal of a quit.

## 2018-08-06 DIAGNOSIS — K29.70 GASTRITIS, PRESENCE OF BLEEDING UNSPECIFIED, UNSPECIFIED CHRONICITY, UNSPECIFIED GASTRITIS TYPE: ICD-10-CM

## 2018-08-07 RX ORDER — PANTOPRAZOLE SODIUM 40 MG/1
TABLET, DELAYED RELEASE ORAL
Qty: 30 TABLET | Refills: 0 | Status: SHIPPED | OUTPATIENT
Start: 2018-08-07 | End: 2018-09-06 | Stop reason: SDUPTHER

## 2018-08-14 ENCOUNTER — CLINICAL SUPPORT (OUTPATIENT)
Dept: SMOKING CESSATION | Facility: CLINIC | Age: 58
End: 2018-08-14
Payer: COMMERCIAL

## 2018-08-14 DIAGNOSIS — F17.210 HEAVY SMOKER (MORE THAN 20 CIGARETTES PER DAY): Primary | ICD-10-CM

## 2018-08-14 PROCEDURE — 99403 PREV MED CNSL INDIV APPRX 45: CPT | Mod: S$GLB,,,

## 2018-08-14 PROCEDURE — 99999 PR PBB SHADOW E&M-EST. PATIENT-LVL I: CPT | Mod: PBBFAC,,,

## 2018-08-14 RX ORDER — VARENICLINE TARTRATE 0.5 (11)-1
KIT ORAL
Qty: 1 PACKAGE | Refills: 0 | Status: SHIPPED | OUTPATIENT
Start: 2018-08-14 | End: 2018-08-28

## 2018-08-14 NOTE — Clinical Note
The patient is smoking 5-7 cigarettes/day using the 21 mg nicotine patches which she states do not help as much. She had requested the Chantix starter pack which was ordered today after trial with patches. CO measurement = 25 which indicates heavy smoking recently. completion of TCRS (Tobacco Cessation Rating Scale) reviewed strategies, controlling environment, cues, triggers, new goals set. Introduced high risk situations with preparation interventions, caffeine similarities with withdrawal issues of habit and nicotine, Alcohol, Understanding urges, cravings, stress and relaxation. Open discussion with intervention discussion. The patient denies any abnormal behavioral or mental changes at this time.The patient will continue individual sessions and medication monitoring by CTTS. Prescribed medication management will be by practitoner.

## 2018-08-14 NOTE — PROGRESS NOTES
Individual Follow-Up Form # 2  8/14/2018    Quit Date: TBD    Clinical Status of Patient: Outpatient    Length of Service: 45 minutes    Continuing Medication: yes  Patches    Other Medications: none     Target Symptoms: Withdrawal and medication side effects. The following were  rated moderate (3) to severe (4) on TCRS:  · Moderate (3): weight gain , depression, difficulty concentration,back pain, appetite loss,burning skin, headaches , nausea, dry mouth, constipation= nicotine withdrawal symptoms,  · Severe (4): insomnia, nicotine withdrawal    Comments: The patient is smoking 5-7 cigarettes/day using the 21 mg nicotine patches which she states do not help as much. She had requested the Chantix starter pack which was ordered today after trial with patches. CO measurement = 25 which indicates heavy smoking recently. completion of TCRS (Tobacco Cessation Rating Scale) reviewed strategies, controlling environment, cues, triggers, new goals set. Introduced high risk situations with preparation interventions, caffeine similarities with withdrawal issues of habit and nicotine, Alcohol, Understanding urges, cravings, stress and relaxation. Open discussion with intervention discussion. The patient denies any abnormal behavioral or mental changes at this time.The patient will continue individual sessions and medication monitoring by CTTS. Prescribed medication management will be by practitoner.           Diagnosis: F17.210    Next Visit: 2 weeks

## 2018-08-28 ENCOUNTER — CLINICAL SUPPORT (OUTPATIENT)
Dept: SMOKING CESSATION | Facility: CLINIC | Age: 58
End: 2018-08-28
Payer: COMMERCIAL

## 2018-08-28 DIAGNOSIS — F17.210 HEAVY SMOKER (MORE THAN 20 CIGARETTES PER DAY): Primary | ICD-10-CM

## 2018-08-28 PROCEDURE — 99999 PR PBB SHADOW E&M-EST. PATIENT-LVL I: CPT | Mod: PBBFAC,,,

## 2018-08-28 PROCEDURE — 99403 PREV MED CNSL INDIV APPRX 45: CPT | Mod: S$GLB,,,

## 2018-08-28 RX ORDER — VARENICLINE TARTRATE 0.5 (11)-1
KIT ORAL
Qty: 1 PACKAGE | Refills: 0 | Status: ON HOLD | OUTPATIENT
Start: 2018-08-28 | End: 2018-12-15

## 2018-08-28 NOTE — PROGRESS NOTES
Individual Follow-Up Form    8/28/2018    Quit Date: TBD    Clinical Status of Patient: Outpatient    Length of Service: 45 minutes    Continuing Medication: yes  Patches    Other Medications: Chantix     Target Symptoms: Withdrawal and medication side effects. The following were  rated moderate (3) to severe (4) on TCRS:  · Moderate (3): angry, depressed, difficulty concentrating, insomnia, restless, skin burning ,headaches, sweating more than usual, dry mouth, urinate more often,constipation, blurred vision, nicotine withdrawal, nicotine withdrawal therapy  · Severe (4): none    Comments: The patient is smoking 3 cigarettes per day using the 21 mg and 14 mg nicotine patch. She has motivation from an asthmatic grandchild and her son. She was congratulated on her accomplishment and she is pleased with her progress in the program. The Chantix order did not go through and was reordered today and was received by the pharmacy. Will wear the 21 mg patch for 1 week after starting the Chantix starter pack and then wear no patches since the Chantix is a nicotine blocker. completion of TCRS (Tobacco Cessation Rating Scale) reviewed strategies, controlling environment, cues, triggers, new goals set. Introduced high risk situations with preparation interventions, caffeine similarities with withdrawal issues of habit and nicotine, Alcohol, Understanding urges, cravings, stress and relaxation.The patient will continue individual sessions and medication monitoring by CTTS. Prescribed medication management will be by practitoner.The patient denies any abnormal behavioral or mental changes at this time.    Diagnosis: F17.210    Next Visit: 2 weeks

## 2018-08-28 NOTE — Clinical Note
The patient is smoking 3 cigarettes per day using the 21 mg and 14 mg nicotine patch. She has motivation from an asthmatic grandchild and her son. She was congratulated on her accomplishment and she is pleased with her progress in the program. The Chantix order did not go through and was reordered today and was received by the pharmacy. Will wear the 21 mg patch for 1 week after starting the Chantix starter pack and then wear no patches since the Chantix is a nicotine blocker. completion of TCRS (Tobacco Cessation Rating Scale) reviewed strategies, controlling environment, cues, triggers, new goals set. Introduced high risk situations with preparation interventions, caffeine similarities with withdrawal issues of habit and nicotine, Alcohol, Understanding urges, cravings, stress and relaxation.The patient will continue individual sessions and medication monitoring by CTTS. Prescribed medication management will be by practitoner.The patient denies any abnormal behavioral or mental changes at this time.

## 2018-09-06 DIAGNOSIS — K29.70 GASTRITIS, PRESENCE OF BLEEDING UNSPECIFIED, UNSPECIFIED CHRONICITY, UNSPECIFIED GASTRITIS TYPE: ICD-10-CM

## 2018-09-06 RX ORDER — PANTOPRAZOLE SODIUM 40 MG/1
TABLET, DELAYED RELEASE ORAL
Qty: 30 TABLET | Refills: 0 | Status: SHIPPED | OUTPATIENT
Start: 2018-09-06 | End: 2018-10-04 | Stop reason: SDUPTHER

## 2018-09-11 ENCOUNTER — CLINICAL SUPPORT (OUTPATIENT)
Dept: SMOKING CESSATION | Facility: CLINIC | Age: 58
End: 2018-09-11
Payer: COMMERCIAL

## 2018-09-11 DIAGNOSIS — F17.210 LIGHT CIGARETTE SMOKER (1-9 CIGS/DAY): Primary | ICD-10-CM

## 2018-09-11 PROCEDURE — 99407 BEHAV CHNG SMOKING > 10 MIN: CPT | Mod: S$GLB,,,

## 2018-09-11 RX ORDER — VARENICLINE TARTRATE 1 MG/1
1 TABLET, FILM COATED ORAL 2 TIMES DAILY
Qty: 60 TABLET | Refills: 0 | Status: SHIPPED | OUTPATIENT
Start: 2018-09-11 | End: 2018-10-09 | Stop reason: SDUPTHER

## 2018-09-11 NOTE — PROGRESS NOTES
The patient is smoking 1 cigarettes/day using the 1 mg Chantix BID. She does state it makes her nauseated, but she is dealing with it. Explained it does go away and she is eating before she takes it and is drinking water after. She has been delaying smoking and it is beginning to taste bad. She feels the Chantix is helping even with the nausea. Discussed working on habite more because this would be the problem. Scheduled appt.for 9/24/18 and Reordered ! Mg Chantix BID because she will run out before appt. Will call if nausea not better.

## 2018-09-24 ENCOUNTER — TELEPHONE (OUTPATIENT)
Dept: SMOKING CESSATION | Facility: CLINIC | Age: 58
End: 2018-09-24

## 2018-09-25 ENCOUNTER — CLINICAL SUPPORT (OUTPATIENT)
Dept: SMOKING CESSATION | Facility: CLINIC | Age: 58
End: 2018-09-25
Payer: COMMERCIAL

## 2018-09-25 DIAGNOSIS — F17.210 LIGHT CIGARETTE SMOKER (1-9 CIGS/DAY): Primary | ICD-10-CM

## 2018-09-25 PROCEDURE — 99403 PREV MED CNSL INDIV APPRX 45: CPT | Mod: S$GLB,,,

## 2018-09-25 NOTE — Clinical Note
The patient is smoking 1 cigarette/day for  The rest of the week using the 1 mg Chantix BID. Her goal is to do dry runs next week. She is very excited about her progress and was praised for her efforts. The CO measurement = 25  Ppm possibly due to heavily smoked up car. The patient denies any abnormal behavioral or mental changes at this time.The patient will continue individual sessions and medication monitoring by CTTS. Prescribed medication management will be by practitoner.completion of TCRS (Tobacco Cessation Rating Scale) reviewed strategies, habitual behavior, stress, and high risk situations. Introduced stress with addition interventions, SOLVE, relaxation with interventions, nutrition, exercise, weight gain, and the importance of rewarding oneself for accomplishments toward becoming tobacco free. Open discussion of all items with interventions.

## 2018-10-04 DIAGNOSIS — K29.70 GASTRITIS, PRESENCE OF BLEEDING UNSPECIFIED, UNSPECIFIED CHRONICITY, UNSPECIFIED GASTRITIS TYPE: ICD-10-CM

## 2018-10-04 RX ORDER — PANTOPRAZOLE SODIUM 40 MG/1
TABLET, DELAYED RELEASE ORAL
Qty: 30 TABLET | Refills: 0 | Status: SHIPPED | OUTPATIENT
Start: 2018-10-04 | End: 2018-11-01 | Stop reason: SDUPTHER

## 2018-10-08 RX ORDER — LACTULOSE 10 G/15ML
SOLUTION ORAL; RECTAL
Qty: 300 ML | Refills: 0 | Status: ON HOLD | OUTPATIENT
Start: 2018-10-08 | End: 2019-02-12 | Stop reason: HOSPADM

## 2018-10-09 DIAGNOSIS — F17.210 LIGHT CIGARETTE SMOKER (1-9 CIGS/DAY): ICD-10-CM

## 2018-10-09 RX ORDER — VARENICLINE TARTRATE 1 MG/1
TABLET, FILM COATED ORAL
Qty: 60 TABLET | Refills: 0 | Status: ON HOLD | OUTPATIENT
Start: 2018-10-09 | End: 2018-12-15

## 2018-10-09 NOTE — TELEPHONE ENCOUNTER
Attempt 1 tried to call missed appt. Phone would not take messages, played music.Chantix reordered today

## 2018-10-12 ENCOUNTER — TELEPHONE (OUTPATIENT)
Dept: FAMILY MEDICINE | Facility: CLINIC | Age: 58
End: 2018-10-12

## 2018-10-12 RX ORDER — METHOCARBAMOL 500 MG/1
500 TABLET, FILM COATED ORAL 4 TIMES DAILY
Qty: 40 TABLET | Refills: 0 | Status: SHIPPED | OUTPATIENT
Start: 2018-10-12 | End: 2018-10-22

## 2018-10-16 ENCOUNTER — CLINICAL SUPPORT (OUTPATIENT)
Dept: SMOKING CESSATION | Facility: CLINIC | Age: 58
End: 2018-10-16
Payer: COMMERCIAL

## 2018-10-16 DIAGNOSIS — F17.210 LIGHT CIGARETTE SMOKER (1-9 CIGS/DAY): Primary | ICD-10-CM

## 2018-10-16 PROCEDURE — 99406 BEHAV CHNG SMOKING 3-10 MIN: CPT | Mod: S$GLB,,,

## 2018-10-16 NOTE — PROGRESS NOTES
The patient has not smoked in 24 hours which is her quit date. She is really excited and determined not to smoke. She does state she does feel more stress and her sleep is disrupted more which possibly can be related to nicotine withdrawal. She states she still has some cravings, but the 1 mg Chantix BID has really helped. She just got a refill and will use the Chantix BID for 2 weeks then will try to cut back. Stated she may be able to get 3rd refill if she has trouble. Will call for a follow-up in 2 weeks.

## 2018-10-17 NOTE — TELEPHONE ENCOUNTER
Received a fax from [facility] that the Methocarbamol 500mg is not available and they are requesting an alternative. Please advise

## 2018-10-19 NOTE — TELEPHONE ENCOUNTER
Patient has tried several other muscle relaxers this has been the best.  Should I send to a different pharmacy?  vj

## 2018-10-30 ENCOUNTER — TELEPHONE (OUTPATIENT)
Dept: SMOKING CESSATION | Facility: CLINIC | Age: 58
End: 2018-10-30

## 2018-10-31 ENCOUNTER — TELEPHONE (OUTPATIENT)
Dept: FAMILY MEDICINE | Facility: CLINIC | Age: 58
End: 2018-10-31

## 2018-10-31 NOTE — TELEPHONE ENCOUNTER
Pt called states she needs a rx for back pain. States she can not see the chiropractor until 3 more months    Pharmacy: [facility]

## 2018-11-01 DIAGNOSIS — K29.70 GASTRITIS, PRESENCE OF BLEEDING UNSPECIFIED, UNSPECIFIED CHRONICITY, UNSPECIFIED GASTRITIS TYPE: ICD-10-CM

## 2018-11-06 DIAGNOSIS — K29.70 GASTRITIS, PRESENCE OF BLEEDING UNSPECIFIED, UNSPECIFIED CHRONICITY, UNSPECIFIED GASTRITIS TYPE: ICD-10-CM

## 2018-11-06 RX ORDER — PANTOPRAZOLE SODIUM 40 MG/1
TABLET, DELAYED RELEASE ORAL
Qty: 30 TABLET | Refills: 0 | Status: SHIPPED | OUTPATIENT
Start: 2018-11-06 | End: 2018-11-06 | Stop reason: SDUPTHER

## 2018-11-07 RX ORDER — PANTOPRAZOLE SODIUM 40 MG/1
TABLET, DELAYED RELEASE ORAL
Qty: 30 TABLET | Refills: 0 | Status: ON HOLD | OUTPATIENT
Start: 2018-11-07 | End: 2018-12-16 | Stop reason: HOSPADM

## 2018-11-10 DIAGNOSIS — F17.210 LIGHT CIGARETTE SMOKER (1-9 CIGS/DAY): ICD-10-CM

## 2018-11-14 ENCOUNTER — APPOINTMENT (OUTPATIENT)
Dept: RADIOLOGY | Facility: CLINIC | Age: 58
End: 2018-11-14
Attending: FAMILY MEDICINE
Payer: MEDICAID

## 2018-11-14 ENCOUNTER — OFFICE VISIT (OUTPATIENT)
Dept: FAMILY MEDICINE | Facility: CLINIC | Age: 58
End: 2018-11-14
Payer: MEDICAID

## 2018-11-14 VITALS
HEART RATE: 62 BPM | SYSTOLIC BLOOD PRESSURE: 130 MMHG | RESPIRATION RATE: 20 BRPM | WEIGHT: 162.5 LBS | BODY MASS INDEX: 29.9 KG/M2 | DIASTOLIC BLOOD PRESSURE: 82 MMHG | HEIGHT: 62 IN

## 2018-11-14 DIAGNOSIS — M54.50 LUMBAR PAIN: Primary | ICD-10-CM

## 2018-11-14 DIAGNOSIS — M54.50 LUMBAR PAIN: ICD-10-CM

## 2018-11-14 PROCEDURE — 72100 X-RAY EXAM L-S SPINE 2/3 VWS: CPT | Mod: 26,,, | Performed by: RADIOLOGY

## 2018-11-14 PROCEDURE — 99999 PR PBB SHADOW E&M-EST. PATIENT-LVL IV: CPT | Mod: PBBFAC,,, | Performed by: FAMILY MEDICINE

## 2018-11-14 PROCEDURE — 99214 OFFICE O/P EST MOD 30 MIN: CPT | Mod: S$PBB,,, | Performed by: FAMILY MEDICINE

## 2018-11-14 PROCEDURE — 99214 OFFICE O/P EST MOD 30 MIN: CPT | Mod: PBBFAC,25 | Performed by: FAMILY MEDICINE

## 2018-11-14 PROCEDURE — 72100 X-RAY EXAM L-S SPINE 2/3 VWS: CPT | Mod: TC,PO

## 2018-11-14 RX ORDER — VARENICLINE TARTRATE 1 MG/1
TABLET, FILM COATED ORAL
Qty: 60 TABLET | Refills: 0 | OUTPATIENT
Start: 2018-11-14

## 2018-11-14 RX ORDER — TIOTROPIUM BROMIDE AND OLODATEROL 3.124; 2.736 UG/1; UG/1
SPRAY, METERED RESPIRATORY (INHALATION)
Qty: 4 G | Refills: 2 | Status: SHIPPED | OUTPATIENT
Start: 2018-11-14 | End: 2019-02-17 | Stop reason: SDUPTHER

## 2018-11-14 RX ORDER — ALPRAZOLAM 0.25 MG/1
0.25 TABLET ORAL 2 TIMES DAILY PRN
Qty: 10 TABLET | Refills: 0 | Status: SHIPPED | OUTPATIENT
Start: 2018-11-14 | End: 2018-12-21 | Stop reason: SDUPTHER

## 2018-11-14 RX ORDER — CHLORZOXAZONE 500 MG/1
500 TABLET ORAL 4 TIMES DAILY PRN
Qty: 60 TABLET | Refills: 0 | Status: SHIPPED | OUTPATIENT
Start: 2018-11-14 | End: 2018-11-25 | Stop reason: SDUPTHER

## 2018-11-14 NOTE — PROGRESS NOTES
Subjective:       Patient ID: Xiomy Duncan is a 58 y.o. female.    Chief Complaint: Back Pain    HPI  58 year old female comes in with c/o continued lumbar pain. We ordered PT over 5 months ago and she is still on a waiting list. Her pain is worsening. She notes that she has a sharp pain in her lumbar area without pain into her legs or weakness.    PMH, PSH, ALLERGIES, SH, FH reviewed in nurse's notes above  Medications reconciled in the nurse's notes      Review of Systems   Constitutional: Negative for chills and fever.   HENT: Negative for congestion, ear pain, postnasal drip, rhinorrhea, sore throat and trouble swallowing.    Eyes: Negative for redness and itching.   Respiratory: Negative for cough, shortness of breath and wheezing.    Cardiovascular: Negative for chest pain and palpitations.   Gastrointestinal: Negative for abdominal pain, diarrhea, nausea and vomiting.   Genitourinary: Negative for dysuria and frequency.   Musculoskeletal: Positive for back pain.   Skin: Negative for rash.   Neurological: Negative for weakness and headaches.       Objective:      Physical Exam   Constitutional: She is oriented to person, place, and time. She appears well-developed. No distress.   HENT:   Head: Normocephalic and atraumatic.   Eyes: Conjunctivae are normal. Pupils are equal, round, and reactive to light.   Neck: Normal range of motion. Neck supple. No thyromegaly present.   Cardiovascular: Normal rate, regular rhythm, normal heart sounds and intact distal pulses.   Pulmonary/Chest: Effort normal and breath sounds normal. No respiratory distress. She has no wheezes.   Abdominal: Soft. Bowel sounds are normal. There is no tenderness.   Musculoskeletal: Normal range of motion. She exhibits no edema.   Tenderness to right paraspinal mm in L4/5   Lymphadenopathy:     She has no cervical adenopathy.   Neurological: She is alert and oriented to person, place, and time.   Skin: Skin is warm and dry. No rash noted.    Psychiatric: She has a normal mood and affect. Her behavior is normal.   Nursing note and vitals reviewed.       Assessment/Plan:       Problem List Items Addressed This Visit     None      Visit Diagnoses     Lumbar pain    -  Primary    Relevant Medications    ALPRAZolam (XANAX) 0.25 MG tablet    Other Relevant Orders    X-Ray Lumbar Spine Ap And Lateral    MRI Lumbar Spine Without Contrast        RTC if condition acutely worsens or any other concerns, otherwise RTC as scheduled

## 2018-11-20 ENCOUNTER — TELEPHONE (OUTPATIENT)
Dept: FAMILY MEDICINE | Facility: CLINIC | Age: 58
End: 2018-11-20

## 2018-11-20 ENCOUNTER — HOSPITAL ENCOUNTER (OUTPATIENT)
Dept: RADIOLOGY | Facility: HOSPITAL | Age: 58
Discharge: HOME | End: 2018-11-20
Attending: FAMILY MEDICINE
Payer: MEDICAID

## 2018-11-20 DIAGNOSIS — M54.50 LUMBAR PAIN: ICD-10-CM

## 2018-11-20 PROCEDURE — 72148 MRI LUMBAR SPINE W/O DYE: CPT | Mod: TC

## 2018-11-20 PROCEDURE — 72148 MRI LUMBAR SPINE W/O DYE: CPT | Mod: 26,,, | Performed by: RADIOLOGY

## 2018-11-27 RX ORDER — CHLORZOXAZONE 500 MG/1
500 TABLET ORAL 4 TIMES DAILY PRN
Qty: 60 TABLET | Refills: 0 | Status: SHIPPED | OUTPATIENT
Start: 2018-11-27 | End: 2018-12-22 | Stop reason: SDUPTHER

## 2018-12-02 ENCOUNTER — TELEPHONE (OUTPATIENT)
Dept: FAMILY MEDICINE | Facility: CLINIC | Age: 58
End: 2018-12-02

## 2018-12-02 DIAGNOSIS — M54.50 LUMBAR PAIN: Primary | ICD-10-CM

## 2018-12-02 NOTE — TELEPHONE ENCOUNTER
Please call patient and have her scheduled to see dr. reese this week if he has an opening.  Thanks!  Mh

## 2018-12-02 NOTE — TELEPHONE ENCOUNTER
----- Message from Flaco Orantes Jr., MD sent at 11/27/2018  7:36 PM CST -----  I think it would be worth seeing her in clinic. I am not too familiar with her insurance. She may want to see if they cover interventional pain procedures prior to making an appointment.   ----- Message -----  From: Brittney Barrett MD  Sent: 11/27/2018   8:26 AM  To: MD Anat Toth Jr. Jong,  This lady has chronic back pain but we have really hit a brick wall with physical therapy - she has been on a wait list for like 7 months or something ridiculous.  Based on her MRI, do you think she'd benefit from intervention with you?  I hate to waste your time and hers sending her to you - she really doesn't have any neurologic findings, but chronic lumbar pain and muscle spasm...  Thanks.  Luci

## 2018-12-07 ENCOUNTER — TELEPHONE (OUTPATIENT)
Dept: FAMILY MEDICINE | Facility: CLINIC | Age: 58
End: 2018-12-07

## 2018-12-07 DIAGNOSIS — M54.50 CHRONIC LOW BACK PAIN WITHOUT SCIATICA, UNSPECIFIED BACK PAIN LATERALITY: Primary | ICD-10-CM

## 2018-12-07 DIAGNOSIS — G89.29 CHRONIC LOW BACK PAIN WITHOUT SCIATICA, UNSPECIFIED BACK PAIN LATERALITY: Primary | ICD-10-CM

## 2018-12-07 RX ORDER — BENZONATATE 200 MG/1
200 CAPSULE ORAL 3 TIMES DAILY PRN
Qty: 20 CAPSULE | Refills: 2 | Status: ON HOLD | OUTPATIENT
Start: 2018-12-07 | End: 2018-12-16 | Stop reason: HOSPADM

## 2018-12-07 NOTE — TELEPHONE ENCOUNTER
Attempted to contact pt to obtain more information, no answer, unable to LM. Please advise, thank you.

## 2018-12-07 NOTE — TELEPHONE ENCOUNTER
----- Message from Vannesa Monzon sent at 2018  9:17 AM CST -----  Contact: Self  Xiomy Duncan  MRN: [id number]  : 1960  PCP: Arnie Monson  Home Phone      [phone]  Work Phone      Not on file.  Mobile          [phone]      MESSAGE:   Patient would like to get medical advice.  Symptoms (please be specific): dry Cough  How long has patient had these symptoms:  2 days  Pharmacy name and location:  [facility]  Any drug allergies:  N/A  Comments:     Phone: [phone]

## 2018-12-07 NOTE — TELEPHONE ENCOUNTER
Pt called back states she has a dry cough, denied fever. Requesting a rx     Pharmacy: [facility]

## 2018-12-14 ENCOUNTER — HOSPITAL ENCOUNTER (EMERGENCY)
Facility: HOSPITAL | Age: 58
Discharge: ANOTHER HOSPITAL | End: 2018-12-15
Attending: SURGERY
Payer: MEDICAID

## 2018-12-14 DIAGNOSIS — K92.2 GASTROINTESTINAL HEMORRHAGE, UNSPECIFIED GASTROINTESTINAL HEMORRHAGE TYPE: Primary | ICD-10-CM

## 2018-12-14 DIAGNOSIS — R11.10 VOMITING: ICD-10-CM

## 2018-12-14 LAB
ABO + RH BLD: NORMAL
ALBUMIN SERPL BCP-MCNC: 3.2 G/DL
ALP SERPL-CCNC: 63 U/L
ALT SERPL W/O P-5'-P-CCNC: 17 U/L
ANION GAP SERPL CALC-SCNC: 10 MMOL/L
APTT BLDCRRT: 27.6 SEC
AST SERPL-CCNC: 23 U/L
BASOPHILS # BLD AUTO: 0.04 K/UL
BASOPHILS # BLD AUTO: 0.05 K/UL
BASOPHILS NFR BLD: 0.3 %
BASOPHILS NFR BLD: 0.3 %
BILIRUB SERPL-MCNC: 0.2 MG/DL
BLD GP AB SCN CELLS X3 SERPL QL: NORMAL
BUN SERPL-MCNC: 32 MG/DL
CALCIUM SERPL-MCNC: 8.7 MG/DL
CHLORIDE SERPL-SCNC: 108 MMOL/L
CK MB SERPL-MCNC: 2 NG/ML
CK MB SERPL-RTO: 0.5 %
CK SERPL-CCNC: 366 U/L
CK SERPL-CCNC: 366 U/L
CO2 SERPL-SCNC: 24 MMOL/L
CREAT SERPL-MCNC: 1 MG/DL
DIFFERENTIAL METHOD: ABNORMAL
DIFFERENTIAL METHOD: ABNORMAL
EOSINOPHIL # BLD AUTO: 0.1 K/UL
EOSINOPHIL # BLD AUTO: 0.2 K/UL
EOSINOPHIL NFR BLD: 0.4 %
EOSINOPHIL NFR BLD: 1.2 %
ERYTHROCYTE [DISTWIDTH] IN BLOOD BY AUTOMATED COUNT: 14.9 %
ERYTHROCYTE [DISTWIDTH] IN BLOOD BY AUTOMATED COUNT: 15 %
EST. GFR  (AFRICAN AMERICAN): >60 ML/MIN/1.73 M^2
EST. GFR  (NON AFRICAN AMERICAN): >60 ML/MIN/1.73 M^2
GLUCOSE SERPL-MCNC: 70 MG/DL
HCT VFR BLD AUTO: 30.3 %
HCT VFR BLD AUTO: 37 %
HGB BLD-MCNC: 11.6 G/DL
HGB BLD-MCNC: 9.7 G/DL
INR PPP: 1
LYMPHOCYTES # BLD AUTO: 3.7 K/UL
LYMPHOCYTES # BLD AUTO: 5.5 K/UL
LYMPHOCYTES NFR BLD: 26.8 %
LYMPHOCYTES NFR BLD: 32.6 %
MCH RBC QN AUTO: 30.3 PG
MCH RBC QN AUTO: 30.9 PG
MCHC RBC AUTO-ENTMCNC: 31.4 G/DL
MCHC RBC AUTO-ENTMCNC: 32 G/DL
MCV RBC AUTO: 97 FL
MCV RBC AUTO: 97 FL
MONOCYTES # BLD AUTO: 0.7 K/UL
MONOCYTES # BLD AUTO: 1.4 K/UL
MONOCYTES NFR BLD: 5.3 %
MONOCYTES NFR BLD: 8.2 %
NEUTROPHILS # BLD AUTO: 9.3 K/UL
NEUTROPHILS # BLD AUTO: 9.7 K/UL
NEUTROPHILS NFR BLD: 57.7 %
NEUTROPHILS NFR BLD: 67.2 %
PLATELET # BLD AUTO: 237 K/UL
PLATELET # BLD AUTO: 301 K/UL
PMV BLD AUTO: 11.4 FL
PMV BLD AUTO: 11.5 FL
POTASSIUM SERPL-SCNC: 3.7 MMOL/L
PROT SERPL-MCNC: 6.7 G/DL
PROTHROMBIN TIME: 11 SEC
RBC # BLD AUTO: 3.14 M/UL
RBC # BLD AUTO: 3.83 M/UL
SODIUM SERPL-SCNC: 142 MMOL/L
TROPONIN I SERPL DL<=0.01 NG/ML-MCNC: <0.006 NG/ML
WBC # BLD AUTO: 13.84 K/UL
WBC # BLD AUTO: 16.89 K/UL

## 2018-12-14 PROCEDURE — 63600175 PHARM REV CODE 636 W HCPCS: Performed by: SURGERY

## 2018-12-14 PROCEDURE — 85730 THROMBOPLASTIN TIME PARTIAL: CPT

## 2018-12-14 PROCEDURE — 25500020 PHARM REV CODE 255: Performed by: SURGERY

## 2018-12-14 PROCEDURE — C9113 INJ PANTOPRAZOLE SODIUM, VIA: HCPCS | Performed by: SURGERY

## 2018-12-14 PROCEDURE — 93010 ELECTROCARDIOGRAM REPORT: CPT | Mod: ,,, | Performed by: INTERNAL MEDICINE

## 2018-12-14 PROCEDURE — 82550 ASSAY OF CK (CPK): CPT

## 2018-12-14 PROCEDURE — 85610 PROTHROMBIN TIME: CPT

## 2018-12-14 PROCEDURE — 86901 BLOOD TYPING SEROLOGIC RH(D): CPT

## 2018-12-14 PROCEDURE — 36415 COLL VENOUS BLD VENIPUNCTURE: CPT

## 2018-12-14 PROCEDURE — 25000003 PHARM REV CODE 250: Performed by: SURGERY

## 2018-12-14 PROCEDURE — 80053 COMPREHEN METABOLIC PANEL: CPT

## 2018-12-14 PROCEDURE — 84484 ASSAY OF TROPONIN QUANT: CPT

## 2018-12-14 PROCEDURE — 99285 EMERGENCY DEPT VISIT HI MDM: CPT | Mod: 25

## 2018-12-14 PROCEDURE — 96375 TX/PRO/DX INJ NEW DRUG ADDON: CPT

## 2018-12-14 PROCEDURE — 96365 THER/PROPH/DIAG IV INF INIT: CPT

## 2018-12-14 PROCEDURE — 82553 CREATINE MB FRACTION: CPT

## 2018-12-14 PROCEDURE — 96374 THER/PROPH/DIAG INJ IV PUSH: CPT | Mod: 59

## 2018-12-14 PROCEDURE — 96361 HYDRATE IV INFUSION ADD-ON: CPT

## 2018-12-14 PROCEDURE — 85025 COMPLETE CBC W/AUTO DIFF WBC: CPT

## 2018-12-14 PROCEDURE — 93005 ELECTROCARDIOGRAM TRACING: CPT

## 2018-12-14 RX ORDER — ONDANSETRON 2 MG/ML
4 INJECTION INTRAMUSCULAR; INTRAVENOUS
Status: COMPLETED | OUTPATIENT
Start: 2018-12-14 | End: 2018-12-14

## 2018-12-14 RX ORDER — PANTOPRAZOLE SODIUM 40 MG/10ML
INJECTION, POWDER, LYOPHILIZED, FOR SOLUTION INTRAVENOUS
Status: DISCONTINUED
Start: 2018-12-14 | End: 2018-12-15 | Stop reason: HOSPADM

## 2018-12-14 RX ORDER — SODIUM CHLORIDE 9 MG/ML
1000 INJECTION, SOLUTION INTRAVENOUS
Status: COMPLETED | OUTPATIENT
Start: 2018-12-14 | End: 2018-12-14

## 2018-12-14 RX ADMIN — SODIUM CHLORIDE 1000 ML: 0.9 INJECTION, SOLUTION INTRAVENOUS at 05:12

## 2018-12-14 RX ADMIN — PANTOPRAZOLE SODIUM 8 MG/HR: 40 INJECTION, POWDER, LYOPHILIZED, FOR SOLUTION INTRAVENOUS at 09:12

## 2018-12-14 RX ADMIN — IOHEXOL 75 ML: 350 INJECTION, SOLUTION INTRAVENOUS at 06:12

## 2018-12-14 RX ADMIN — ONDANSETRON 4 MG: 2 INJECTION INTRAMUSCULAR; INTRAVENOUS at 05:12

## 2018-12-14 RX ADMIN — SODIUM CHLORIDE 1000 ML: 0.9 INJECTION, SOLUTION INTRAVENOUS at 06:12

## 2018-12-14 RX ADMIN — SODIUM CHLORIDE 1000 ML: 0.9 INJECTION, SOLUTION INTRAVENOUS at 08:12

## 2018-12-14 RX ADMIN — IOHEXOL 30 ML: 350 INJECTION, SOLUTION INTRAVENOUS at 05:12

## 2018-12-15 ENCOUNTER — HOSPITAL ENCOUNTER (OUTPATIENT)
Facility: HOSPITAL | Age: 58
Discharge: HOME | DRG: 379 | End: 2018-12-16
Attending: FAMILY MEDICINE | Admitting: FAMILY MEDICINE
Payer: MEDICAID

## 2018-12-15 ENCOUNTER — ANESTHESIA (OUTPATIENT)
Dept: ENDOSCOPY | Facility: HOSPITAL | Age: 58
DRG: 379 | End: 2018-12-15
Payer: MEDICAID

## 2018-12-15 ENCOUNTER — ANESTHESIA EVENT (OUTPATIENT)
Dept: ENDOSCOPY | Facility: HOSPITAL | Age: 58
DRG: 379 | End: 2018-12-15
Payer: MEDICAID

## 2018-12-15 VITALS
SYSTOLIC BLOOD PRESSURE: 116 MMHG | DIASTOLIC BLOOD PRESSURE: 59 MMHG | HEART RATE: 68 BPM | TEMPERATURE: 97 F | OXYGEN SATURATION: 100 % | RESPIRATION RATE: 16 BRPM

## 2018-12-15 DIAGNOSIS — I10 ESSENTIAL HYPERTENSION: ICD-10-CM

## 2018-12-15 DIAGNOSIS — K29.70 GASTRITIS, PRESENCE OF BLEEDING UNSPECIFIED, UNSPECIFIED CHRONICITY, UNSPECIFIED GASTRITIS TYPE: ICD-10-CM

## 2018-12-15 DIAGNOSIS — K92.2 UPPER GI BLEED: Primary | ICD-10-CM

## 2018-12-15 LAB
ABO + RH BLD: NORMAL
ALBUMIN SERPL BCP-MCNC: 2.9 G/DL
ALP SERPL-CCNC: 58 U/L
ALT SERPL W/O P-5'-P-CCNC: 15 U/L
ANION GAP SERPL CALC-SCNC: 6 MMOL/L
APTT BLDCRRT: 31.2 SEC
AST SERPL-CCNC: 22 U/L
BASOPHILS # BLD AUTO: 0.04 K/UL
BASOPHILS NFR BLD: 0.3 %
BILIRUB SERPL-MCNC: 0.3 MG/DL
BILIRUB UR QL STRIP: NEGATIVE
BLD GP AB SCN CELLS X3 SERPL QL: NORMAL
BUN SERPL-MCNC: 22 MG/DL
CALCIUM SERPL-MCNC: 8.1 MG/DL
CHLORIDE SERPL-SCNC: 110 MMOL/L
CLARITY UR: CLEAR
CO2 SERPL-SCNC: 23 MMOL/L
COLOR UR: YELLOW
CREAT SERPL-MCNC: 0.8 MG/DL
DIFFERENTIAL METHOD: ABNORMAL
EOSINOPHIL # BLD AUTO: 0.2 K/UL
EOSINOPHIL NFR BLD: 1.2 %
ERYTHROCYTE [DISTWIDTH] IN BLOOD BY AUTOMATED COUNT: 14.9 %
EST. GFR  (AFRICAN AMERICAN): >60 ML/MIN/1.73 M^2
EST. GFR  (NON AFRICAN AMERICAN): >60 ML/MIN/1.73 M^2
ESTIMATED AVG GLUCOSE: 114 MG/DL
GLUCOSE SERPL-MCNC: 89 MG/DL
GLUCOSE UR QL STRIP: NEGATIVE
HBA1C MFR BLD HPLC: 5.6 %
HCT VFR BLD AUTO: 31.1 %
HCT VFR BLD AUTO: 32.2 %
HCT VFR BLD AUTO: 33.5 %
HGB BLD-MCNC: 10.1 G/DL
HGB BLD-MCNC: 10.3 G/DL
HGB BLD-MCNC: 10.7 G/DL
HGB UR QL STRIP: ABNORMAL
INR PPP: 1
KETONES UR QL STRIP: NEGATIVE
LEUKOCYTE ESTERASE UR QL STRIP: NEGATIVE
LYMPHOCYTES # BLD AUTO: 5.1 K/UL
LYMPHOCYTES NFR BLD: 39.2 %
MAGNESIUM SERPL-MCNC: 1.9 MG/DL
MCH RBC QN AUTO: 30.5 PG
MCHC RBC AUTO-ENTMCNC: 31.9 G/DL
MCV RBC AUTO: 95 FL
MONOCYTES # BLD AUTO: 0.9 K/UL
MONOCYTES NFR BLD: 7.1 %
NEUTROPHILS # BLD AUTO: 6.8 K/UL
NEUTROPHILS NFR BLD: 52 %
NITRITE UR QL STRIP: NEGATIVE
PH UR STRIP: 7 [PH] (ref 5–8)
PHOSPHATE SERPL-MCNC: 2.3 MG/DL
PLATELET # BLD AUTO: 262 K/UL
PMV BLD AUTO: 11.4 FL
POCT GLUCOSE: 123 MG/DL (ref 70–110)
POTASSIUM SERPL-SCNC: 4 MMOL/L
PROT SERPL-MCNC: 6 G/DL
PROT UR QL STRIP: NEGATIVE
PROTHROMBIN TIME: 10.7 SEC
RBC # BLD AUTO: 3.51 M/UL
SODIUM SERPL-SCNC: 139 MMOL/L
SP GR UR STRIP: <=1.005 (ref 1–1.03)
URN SPEC COLLECT METH UR: ABNORMAL
UROBILINOGEN UR STRIP-ACNC: NEGATIVE EU/DL
WBC # BLD AUTO: 13.06 K/UL

## 2018-12-15 PROCEDURE — 88342 IMHCHEM/IMCYTCHM 1ST ANTB: CPT

## 2018-12-15 PROCEDURE — G0378 HOSPITAL OBSERVATION PER HR: HCPCS

## 2018-12-15 PROCEDURE — 80053 COMPREHEN METABOLIC PANEL: CPT

## 2018-12-15 PROCEDURE — 88342 IMHCHEM/IMCYTCHM 1ST ANTB: CPT | Mod: 26,,, | Performed by: PATHOLOGY

## 2018-12-15 PROCEDURE — 85014 HEMATOCRIT: CPT

## 2018-12-15 PROCEDURE — 11000001 HC ACUTE MED/SURG PRIVATE ROOM

## 2018-12-15 PROCEDURE — 83735 ASSAY OF MAGNESIUM: CPT

## 2018-12-15 PROCEDURE — 85018 HEMOGLOBIN: CPT | Mod: 91

## 2018-12-15 PROCEDURE — 43255 EGD CONTROL BLEEDING ANY: CPT | Mod: 59 | Performed by: INTERNAL MEDICINE

## 2018-12-15 PROCEDURE — 25000003 PHARM REV CODE 250: Performed by: STUDENT IN AN ORGANIZED HEALTH CARE EDUCATION/TRAINING PROGRAM

## 2018-12-15 PROCEDURE — 37000009 HC ANESTHESIA EA ADD 15 MINS: Performed by: INTERNAL MEDICINE

## 2018-12-15 PROCEDURE — 99900038 HC OT GENERIC THERAPY SCREENING (STAT)

## 2018-12-15 PROCEDURE — 63600175 PHARM REV CODE 636 W HCPCS: Performed by: STUDENT IN AN ORGANIZED HEALTH CARE EDUCATION/TRAINING PROGRAM

## 2018-12-15 PROCEDURE — 94761 N-INVAS EAR/PLS OXIMETRY MLT: CPT

## 2018-12-15 PROCEDURE — 43239 EGD BIOPSY SINGLE/MULTIPLE: CPT | Performed by: INTERNAL MEDICINE

## 2018-12-15 PROCEDURE — 0W3P8ZZ CONTROL BLEEDING IN GASTROINTESTINAL TRACT, VIA NATURAL OR ARTIFICIAL OPENING ENDOSCOPIC: ICD-10-PCS | Performed by: INTERNAL MEDICINE

## 2018-12-15 PROCEDURE — 99900037 HC PT THERAPY SCREENING (STAT)

## 2018-12-15 PROCEDURE — 36415 COLL VENOUS BLD VENIPUNCTURE: CPT

## 2018-12-15 PROCEDURE — 25000003 PHARM REV CODE 250: Performed by: FAMILY MEDICINE

## 2018-12-15 PROCEDURE — 63600175 PHARM REV CODE 636 W HCPCS: Performed by: ANESTHESIOLOGY

## 2018-12-15 PROCEDURE — 88341 IMHCHEM/IMCYTCHM EA ADD ANTB: CPT | Mod: 26,,, | Performed by: PATHOLOGY

## 2018-12-15 PROCEDURE — 86901 BLOOD TYPING SEROLOGIC RH(D): CPT

## 2018-12-15 PROCEDURE — 85730 THROMBOPLASTIN TIME PARTIAL: CPT

## 2018-12-15 PROCEDURE — 99233 SBSQ HOSP IP/OBS HIGH 50: CPT | Mod: 25,,, | Performed by: INTERNAL MEDICINE

## 2018-12-15 PROCEDURE — C9113 INJ PANTOPRAZOLE SODIUM, VIA: HCPCS | Performed by: STUDENT IN AN ORGANIZED HEALTH CARE EDUCATION/TRAINING PROGRAM

## 2018-12-15 PROCEDURE — 94640 AIRWAY INHALATION TREATMENT: CPT

## 2018-12-15 PROCEDURE — 0DB68ZX EXCISION OF STOMACH, VIA NATURAL OR ARTIFICIAL OPENING ENDOSCOPIC, DIAGNOSTIC: ICD-10-PCS | Performed by: INTERNAL MEDICINE

## 2018-12-15 PROCEDURE — 37000008 HC ANESTHESIA 1ST 15 MINUTES: Performed by: INTERNAL MEDICINE

## 2018-12-15 PROCEDURE — 43270 EGD LESION ABLATION: CPT | Mod: ,,, | Performed by: INTERNAL MEDICINE

## 2018-12-15 PROCEDURE — 00731 ANES UPR GI NDSC PX NOS: CPT | Performed by: INTERNAL MEDICINE

## 2018-12-15 PROCEDURE — 88305 TISSUE EXAM BY PATHOLOGIST: CPT | Mod: 26,,, | Performed by: PATHOLOGY

## 2018-12-15 PROCEDURE — 84100 ASSAY OF PHOSPHORUS: CPT

## 2018-12-15 PROCEDURE — 27201012 HC FORCEPS, HOT/COLD, DISP: Performed by: INTERNAL MEDICINE

## 2018-12-15 PROCEDURE — 83036 HEMOGLOBIN GLYCOSYLATED A1C: CPT

## 2018-12-15 PROCEDURE — 81003 URINALYSIS AUTO W/O SCOPE: CPT

## 2018-12-15 PROCEDURE — 25000242 PHARM REV CODE 250 ALT 637 W/ HCPCS: Performed by: STUDENT IN AN ORGANIZED HEALTH CARE EDUCATION/TRAINING PROGRAM

## 2018-12-15 PROCEDURE — 85025 COMPLETE CBC W/AUTO DIFF WBC: CPT

## 2018-12-15 PROCEDURE — 85610 PROTHROMBIN TIME: CPT

## 2018-12-15 PROCEDURE — 86920 COMPATIBILITY TEST SPIN: CPT

## 2018-12-15 PROCEDURE — 88305 TISSUE EXAM BY PATHOLOGIST: CPT

## 2018-12-15 PROCEDURE — G0379 DIRECT REFER HOSPITAL OBSERV: HCPCS

## 2018-12-15 PROCEDURE — 27202330 HC PROBE, INJECTION: Performed by: INTERNAL MEDICINE

## 2018-12-15 PROCEDURE — 43239 EGD BIOPSY SINGLE/MULTIPLE: CPT | Mod: 59,,, | Performed by: INTERNAL MEDICINE

## 2018-12-15 RX ORDER — IBUPROFEN 200 MG
1 TABLET ORAL DAILY
Status: DISCONTINUED | OUTPATIENT
Start: 2018-12-15 | End: 2018-12-16 | Stop reason: HOSPADM

## 2018-12-15 RX ORDER — DICLOFENAC SODIUM 25 MG/1
75 TABLET, DELAYED RELEASE ORAL 2 TIMES DAILY
Status: ON HOLD | COMMUNITY
End: 2018-12-16 | Stop reason: HOSPADM

## 2018-12-15 RX ORDER — GLUCAGON 1 MG
1 KIT INJECTION
Status: DISCONTINUED | OUTPATIENT
Start: 2018-12-15 | End: 2018-12-16 | Stop reason: HOSPADM

## 2018-12-15 RX ORDER — IBUPROFEN 200 MG
24 TABLET ORAL
Status: DISCONTINUED | OUTPATIENT
Start: 2018-12-15 | End: 2018-12-16 | Stop reason: HOSPADM

## 2018-12-15 RX ORDER — PANTOPRAZOLE SODIUM 40 MG/10ML
40 INJECTION, POWDER, LYOPHILIZED, FOR SOLUTION INTRAVENOUS 2 TIMES DAILY
Status: DISCONTINUED | OUTPATIENT
Start: 2018-12-15 | End: 2018-12-16 | Stop reason: HOSPADM

## 2018-12-15 RX ORDER — ONDANSETRON 2 MG/ML
4 INJECTION INTRAMUSCULAR; INTRAVENOUS EVERY 6 HOURS PRN
Status: DISCONTINUED | OUTPATIENT
Start: 2018-12-15 | End: 2018-12-16 | Stop reason: HOSPADM

## 2018-12-15 RX ORDER — IPRATROPIUM BROMIDE AND ALBUTEROL SULFATE 2.5; .5 MG/3ML; MG/3ML
3 SOLUTION RESPIRATORY (INHALATION) EVERY 4 HOURS PRN
Status: DISCONTINUED | OUTPATIENT
Start: 2018-12-15 | End: 2018-12-16 | Stop reason: HOSPADM

## 2018-12-15 RX ORDER — HYDROCHLOROTHIAZIDE 25 MG/1
25 TABLET ORAL DAILY
Status: DISCONTINUED | OUTPATIENT
Start: 2018-12-15 | End: 2018-12-16 | Stop reason: HOSPADM

## 2018-12-15 RX ORDER — GABAPENTIN 100 MG/1
100 CAPSULE ORAL 3 TIMES DAILY
Status: DISCONTINUED | OUTPATIENT
Start: 2018-12-15 | End: 2018-12-16 | Stop reason: HOSPADM

## 2018-12-15 RX ORDER — FLUOXETINE HYDROCHLORIDE 20 MG/1
20 CAPSULE ORAL DAILY
Status: DISCONTINUED | OUTPATIENT
Start: 2018-12-15 | End: 2018-12-16 | Stop reason: HOSPADM

## 2018-12-15 RX ORDER — SODIUM CHLORIDE 0.9 % (FLUSH) 0.9 %
5 SYRINGE (ML) INJECTION
Status: DISCONTINUED | OUTPATIENT
Start: 2018-12-15 | End: 2018-12-16 | Stop reason: HOSPADM

## 2018-12-15 RX ORDER — OXYBUTYNIN CHLORIDE 5 MG/1
5 TABLET ORAL 2 TIMES DAILY
Status: DISCONTINUED | OUTPATIENT
Start: 2018-12-15 | End: 2018-12-16 | Stop reason: HOSPADM

## 2018-12-15 RX ORDER — HYDROCODONE BITARTRATE AND ACETAMINOPHEN 500; 5 MG/1; MG/1
TABLET ORAL
Status: DISCONTINUED | OUTPATIENT
Start: 2018-12-15 | End: 2018-12-16 | Stop reason: HOSPADM

## 2018-12-15 RX ORDER — ACETAMINOPHEN 325 MG/1
650 TABLET ORAL EVERY 6 HOURS PRN
Status: DISCONTINUED | OUTPATIENT
Start: 2018-12-15 | End: 2018-12-16 | Stop reason: HOSPADM

## 2018-12-15 RX ORDER — ACETAMINOPHEN 10 MG/ML
1000 INJECTION, SOLUTION INTRAVENOUS ONCE
Status: DISCONTINUED | OUTPATIENT
Start: 2018-12-15 | End: 2018-12-15

## 2018-12-15 RX ORDER — IBUPROFEN 200 MG
16 TABLET ORAL
Status: DISCONTINUED | OUTPATIENT
Start: 2018-12-15 | End: 2018-12-16 | Stop reason: HOSPADM

## 2018-12-15 RX ORDER — GABAPENTIN 100 MG/1
100 CAPSULE ORAL 3 TIMES DAILY
COMMUNITY
End: 2019-02-27 | Stop reason: SDUPTHER

## 2018-12-15 RX ORDER — ATORVASTATIN CALCIUM 40 MG/1
40 TABLET, FILM COATED ORAL NIGHTLY
Status: DISCONTINUED | OUTPATIENT
Start: 2018-12-15 | End: 2018-12-16 | Stop reason: HOSPADM

## 2018-12-15 RX ORDER — PANTOPRAZOLE SODIUM 40 MG/10ML
40 INJECTION, POWDER, LYOPHILIZED, FOR SOLUTION INTRAVENOUS DAILY
Status: DISCONTINUED | OUTPATIENT
Start: 2018-12-15 | End: 2018-12-15

## 2018-12-15 RX ORDER — ALPRAZOLAM 0.25 MG/1
0.25 TABLET ORAL 2 TIMES DAILY PRN
Status: DISCONTINUED | OUTPATIENT
Start: 2018-12-15 | End: 2018-12-16 | Stop reason: HOSPADM

## 2018-12-15 RX ORDER — PROPOFOL 10 MG/ML
VIAL (ML) INTRAVENOUS
Status: DISCONTINUED | OUTPATIENT
Start: 2018-12-15 | End: 2018-12-15

## 2018-12-15 RX ADMIN — PANTOPRAZOLE SODIUM 40 MG: 40 INJECTION, POWDER, FOR SOLUTION INTRAVENOUS at 02:12

## 2018-12-15 RX ADMIN — GABAPENTIN 100 MG: 100 CAPSULE ORAL at 09:12

## 2018-12-15 RX ADMIN — PROPOFOL 30 MG: 10 INJECTION, EMULSION INTRAVENOUS at 01:12

## 2018-12-15 RX ADMIN — PROPOFOL 50 MG: 10 INJECTION, EMULSION INTRAVENOUS at 01:12

## 2018-12-15 RX ADMIN — PANTOPRAZOLE SODIUM 40 MG: 40 INJECTION, POWDER, FOR SOLUTION INTRAVENOUS at 09:12

## 2018-12-15 RX ADMIN — GABAPENTIN 100 MG: 100 CAPSULE ORAL at 08:12

## 2018-12-15 RX ADMIN — IPRATROPIUM BROMIDE AND ALBUTEROL SULFATE 3 ML: .5; 3 SOLUTION RESPIRATORY (INHALATION) at 07:12

## 2018-12-15 RX ADMIN — OXYBUTYNIN CHLORIDE 5 MG: 5 TABLET ORAL at 09:12

## 2018-12-15 RX ADMIN — GABAPENTIN 100 MG: 100 CAPSULE ORAL at 02:12

## 2018-12-15 RX ADMIN — ONDANSETRON 4 MG: 2 INJECTION INTRAMUSCULAR; INTRAVENOUS at 02:12

## 2018-12-15 RX ADMIN — HYDROCHLOROTHIAZIDE 25 MG: 25 TABLET ORAL at 08:12

## 2018-12-15 RX ADMIN — FLUOXETINE HYDROCHLORIDE 20 MG: 20 CAPSULE ORAL at 08:12

## 2018-12-15 RX ADMIN — PANTOPRAZOLE SODIUM 40 MG: 40 INJECTION, POWDER, FOR SOLUTION INTRAVENOUS at 08:12

## 2018-12-15 RX ADMIN — OXYBUTYNIN CHLORIDE 5 MG: 5 TABLET ORAL at 08:12

## 2018-12-15 RX ADMIN — ATORVASTATIN CALCIUM 40 MG: 40 TABLET, FILM COATED ORAL at 09:12

## 2018-12-15 RX ADMIN — ACETAMINOPHEN 650 MG: 325 TABLET ORAL at 11:12

## 2018-12-15 NOTE — HPI
58 year old female presented with 3 episodes of hematemesis. Presently she hemodynamically stable but reports light-headedness and dizziness at the onset of her symptoms.  She reports epigastric pain.  Patient ha been on NSAID for lower back pain.

## 2018-12-15 NOTE — PLAN OF CARE
Problem: Bleeding (Gastrointestinal Bleeding)  Goal: Hemostasis  Outcome: Ongoing (interventions implemented as appropriate)  Intervention: Manage Gastrointestinal Bleeding   12/15/18 0336   Manage Acute Allergic Reaction   Stabilization Measures provider notified   Monitor/Manage Chemotherapy Gastrointestinal Effects   Environmental Support calm environment promoted         Problem: Fluid Volume Deficit  Goal: Fluid Balance  Outcome: Ongoing (interventions implemented as appropriate)  Intervention: Monitor and Manage Hypovolemia   12/15/18 0336   Monitor and Manage Cardiac Rhythm Effects   Fluid/Electrolyte Management intravenous fluid replacement initiated

## 2018-12-15 NOTE — SUBJECTIVE & OBJECTIVE
Past Medical History:   Diagnosis Date    Anemia     Asthma     Cannabis abuse, daily use     Colon polyps     GERD (gastroesophageal reflux disease) 2014    Hoarseness 2014    Hypertension     Seizures     Thyroid nodule     Ulcer        Past Surgical History:   Procedure Laterality Date    CHOLECYSTECTOMY      COLONOSCOPY N/A 6/10/2014    Performed by GREG Aleman MD at [facility] ([address] [place] [address])    TONSILLECTOMY      TUBAL LIGATION         Review of patient's allergies indicates:   Allergen Reactions    Penicillins Rash     Family History     Problem Relation (Age of Onset)    Bone cancer Mother    Diabetes Maternal Uncle    Hypertension Brother, Maternal Uncle, Maternal Grandmother    Stomach cancer Maternal Grandfather        Tobacco Use    Smoking status: Former Smoker     Packs/day: 2.00     Years: 46.00     Pack years: 92.00     Last attempt to quit: 10/15/2018     Years since quittin.1    Smokeless tobacco: Never Used   Substance and Sexual Activity    Alcohol use: No    Drug use: No     Comment: every day    Sexual activity: No     Birth control/protection: None, Post-menopausal     Comment: single     Review of Systems   Constitutional: Negative for appetite change and fever.   HENT: Negative for sore throat and trouble swallowing.    Eyes: Negative for photophobia and visual disturbance.   Respiratory: Negative for wheezing.    Cardiovascular: Negative for chest pain and palpitations.   Gastrointestinal:        See HPI for details   Musculoskeletal: Negative for arthralgias, joint swelling and neck pain.   Skin: Negative for rash and wound.   Neurological: Negative for dizziness, tremors and weakness.   Psychiatric/Behavioral: Negative for behavioral problems and suicidal ideas.     Objective:     Vital Signs (Most Recent):  Temp: 98.4 °F (36.9 °C) (12/15/18 1100)  Pulse: 63 (12/15/18 1000)  Resp: 20 (12/15/18 1000)  BP: 138/62 (12/15/18 1000)  SpO2: 100 % (12/15/18 1000)  Vital Signs (24h Range):  Temp:  [96.7 °F (35.9 °C)-98.4 °F (36.9 °C)] 98.4 °F (36.9 °C)  Pulse:  [55-98] 63  Resp:  [15-26] 20  SpO2:  [99 %-100 %] 100 %  BP: (115-152)/(54-79) 138/62     Weight: 76.4 kg (168 lb 6.9 oz) (12/15/18 0245)  Body mass index is 30.81 kg/m².      Intake/Output Summary (Last 24 hours) at 12/15/2018 1129  Last data filed at 12/15/2018 0900  Gross per 24 hour   Intake 150 ml   Output 1350 ml   Net -1200 ml       Lines/Drains/Airways     Peripheral Intravenous Line                 Peripheral IV - Single Lumen 12/14/18 1705 Left Antecubital less than 1 day         Peripheral IV - Single Lumen 12/14/18 2202 Right Antecubital less than 1 day                Physical Exam   Constitutional: She is oriented to person, place, and time. She appears well-nourished.   HENT:   Mouth/Throat: Oropharynx is clear and moist.   Eyes: Pupils are equal, round, and reactive to light.   Neck: Neck supple.   Cardiovascular: Normal heart sounds.   Pulmonary/Chest: Effort normal and breath sounds normal.   Abdominal: Soft. She exhibits no mass. There is no tenderness. There is no guarding.   Musculoskeletal: Normal range of motion.   Lymphadenopathy:     She has no cervical adenopathy.   Neurological: She is alert and oriented to person, place, and time.   Skin: Skin is warm. No rash noted.   Psychiatric: She has a normal mood and affect.       Significant Labs:  CBC:   Recent Labs   Lab 12/14/18  1635 12/14/18  2048 12/15/18  0228 12/15/18  0824   WBC 16.89* 13.84* 13.06*  --    HGB 11.6* 9.7* 10.7* 10.3*   HCT 37.0 30.3* 33.5* 32.2*    237 262  --

## 2018-12-15 NOTE — ED NOTES
Patient has been accepted to [facility] room 262A.  EMS has been called.  Patient has been informed. No questions or concerns at this time.

## 2018-12-15 NOTE — [employer] PATIENT INSTRUCTIONS
Discharge Summary/Instructions after an Endoscopic Procedure  Patient Name: Xiomy Duncan  Patient MRN: [id number]  Patient YOB: 1960  Saturday, December 15, 2018  Alisson Villagomez MD  RESTRICTIONS:  During your procedure today, you received medications for sedation.  These   medications may affect your judgment, balance and coordination.  Therefore,   for 24 hours, you have the following restrictions:   - DO NOT drive a car, operate machinery, make legal/financial decisions,   sign important papers or drink alcohol.    ACTIVITY:  Today: no heavy lifting, straining or running due to procedural   sedation/anesthesia.  The following day: return to full activity including work.  DIET:  Eat and drink normally unless instructed otherwise.     TREATMENT FOR COMMON SIDE EFFECTS:  - Mild abdominal pain, nausea, belching, bloating or excessive gas:  rest,   eat lightly and use a heating pad.  - Sore Throat: treat with throat lozenges and/or gargle with warm salt   water.  - Because air was used during the procedure, expelling large amounts of air   from your rectum or belching is normal.  - If a bowel prep was taken, you may not have a bowel movement for 1-3 days.    This is normal.  SYMPTOMS TO WATCH FOR AND REPORT TO YOUR PHYSICIAN:  1. Abdominal pain or bloating, other than gas cramps.  2. Chest pain.  3. Back pain.  4. Signs of infection such as: chills or fever occurring within 24 hours   after the procedure.  5. Rectal bleeding, which would show as bright red, maroon, or black stools.   (A tablespoon of blood from the rectum is not serious, especially if   hemorrhoids are present.)  6. Vomiting.  7. Weakness or dizziness.  GO DIRECTLY TO THE NEAREST EMERGENCY ROOM IF YOU HAVE ANY OF THE FOLLOWING:      Difficulty breathing              Chills and/or fever over 101 F   Persistent vomiting and/or vomiting blood   Severe abdominal pain   Severe chest pain   Black, tarry stools   Bleeding- more than one  tablespoon   Any other symptom or condition that you feel may need urgent attention  Your doctor recommends these additional instructions:  If any biopsies were taken, your doctors clinic will contact you in 1 to 2   weeks with any results.  - Await pathology results.   - Recommend acid suppression medication.   - No aspirin, ibuprofen, naproxen, or other non-steroidal anti-inflammatory   drugs.   - Repeat upper endoscopy in 2 months for surveillance.   - May transfer patient to hospital [facility] for ongoing care.  For questions, problems or results please call your physician - Alisson Villagomez MD at Work:  [phone].  EMERGENCY PHONE NUMBER: [phone],  LAB RESULTS: [phone]  IF A COMPLICATION OR EMERGENCY SITUATION ARISES AND YOU ARE UNABLE TO REACH   YOUR PHYSICIAN - GO DIRECTLY TO THE EMERGENCY ROOM.  Alisson Villagomez MD  12/15/2018 1:38:59 PM  This report has been verified and signed electronically.  [employer]

## 2018-12-15 NOTE — H&P
[facility]  History & Physical  Critical Care       Admit Date: (Not on file)   LOS: 0 days     Chief Complaint/Reason for Admission:  Hematemesis    HPI    Xiomy Duncan is a 58 y.o. female patient with PMHx of HTN, HLD, GERD, PUD, colon polyps found in 2014 presented to ED with 3 episodes of hematemesis at home and another witnessed episode of hematemesis  ~300 cc bright red blood soon after arrival to the [place] ED.     She does report epigastric pain, but denies melena, hematochezia, NSAID/bloood thinner use,  dizziness, syncope, chest pain, sob, palpitation. She remained hemodynamically stable.     Labs did show a significant for drop in Hgb to 11.6 >> 2 L NS >> 9.7 from baseline Hgb ~14 (06/18). Platelets 234 and INR 1.0. CT abdomen showed hyperattenuating fluid I the right colon, which may represent fluid mixed with oral contrast. She was started on protonix infusion 8 mg/hr and transferred to the [place] ICU for  ICU admission and GI consultation.      PMHx  Past Medical History:   Diagnosis Date    Anemia     Asthma     Cannabis abuse, daily use     Colon polyps     GERD (gastroesophageal reflux disease) 8/7/2014    Hoarseness 8/7/2014    Hypertension     Seizures     Thyroid nodule     Ulcer         PSHx  Past Surgical History:   Procedure Laterality Date    CHOLECYSTECTOMY      COLONOSCOPY N/A 6/10/2014    Performed by [facility] at [facility] ([facility])    TONSILLECTOMY      TUBAL LIGATION         Family History  Family History   Problem Relation Age of Onset    Bone cancer Mother     Hypertension Brother     Hypertension Maternal Uncle     Diabetes Maternal Uncle     Hypertension Maternal Grandmother     Stomach cancer Maternal Grandfather     Breast cancer Neg Hx     Colon cancer Neg Hx     Ovarian cancer Neg Hx        Social  Social History     Socioeconomic History    Marital status: Single     Spouse name: Not on file    Number of children: Not  on file    Years of education: Not on file    Highest education level: Not on file   Social Needs    Financial resource strain: Not on file    Food insecurity - worry: Not on file    Food insecurity - inability: Not on file    Transportation needs - medical: Not on file    Transportation needs - non-medical: Not on file   Occupational History    Occupation:      Employer: [employer]     Comment: [employer]   Tobacco Use    Smoking status: Former Smoker     Packs/day: 2.00     Years: 46.00     Pack years: 92.00     Last attempt to quit: 10/15/2018     Years since quittin.1    Smokeless tobacco: Never Used   Substance and Sexual Activity    Alcohol use: No    Drug use: No     Comment: every day    Sexual activity: No     Birth control/protection: None, Post-menopausal     Comment: single   Other Topics Concern    Not on file   Social History Narrative    Not on file       Home Medications  Current Facility-Administered Medications on File Prior to Encounter   Medication Dose Route Frequency Provider Last Rate Last Dose    [COMPLETED] 0.9%  NaCl infusion  1,000 mL Intravenous ED 1 Time Celso Warren MD   Stopped at 18 1800    [COMPLETED] 0.9%  NaCl infusion  1,000 mL Intravenous ED 1 Time Celso Warren MD   Stopped at 18    [COMPLETED] 0.9%  NaCl infusion  1,000 mL Intravenous ED 1 Time Celso Warren  mL/hr at 18 1,000 mL at 18    [COMPLETED] omnipaque 350 iohexol 30 mL  30 mL Oral ONCE PRN Celso Warren MD   30 mL at 18 1700    [COMPLETED] omnipaque 350 iohexol 75 mL  75 mL Intravenous ONCE PRN Celso Warren MD   75 mL at 18 1810    [COMPLETED] ondansetron injection 4 mg  4 mg Intravenous ED 1 Time Celso Warren MD   4 mg at 18 1707    [COMPLETED] pantoprazole 40 mg in dextrose 5 % 100 mL infusion (ready to mix system)  8 mg/hr Intravenous ED 1 Time Celso Warren MD 20 mL/hr at 18 2132 8 mg/hr at  12/14/18 8909     Current Outpatient Medications on File Prior to Encounter   Medication Sig Dispense Refill    albuterol (PROVENTIL) 2.5 mg /3 mL (0.083 %) nebulizer solution Take 3 mLs (2.5 mg total) by nebulization every 6 (six) hours as needed for Wheezing. 1 Box 0    ALPRAZolam (XANAX) 0.25 MG tablet Take 1 tablet (0.25 mg total) by mouth 2 (two) times daily as needed for Anxiety. 10 tablet 0    ANORO ELLIPTA 62.5-25 mcg/actuation DsDv INHALE 1 PUFF INTO THE LUNGS ONCE DAILY. 30 each 5    atorvastatin (LIPITOR) 40 MG tablet Take 1 tablet (40 mg total) by mouth every evening. 30 tablet 11    benzonatate (TESSALON) 200 MG capsule Take 1 capsule (200 mg total) by mouth 3 (three) times daily as needed for Cough. 20 capsule 2    CHANTIX 1 mg Tab TAKE 1 TABLET (1MG TOTAL) BY MOUTH 2 (TWO) TIMES DAILY 60 tablet 0    cloNIDine (CATAPRES) 0.1 MG tablet TAKE 1 TABLET (0.1 MG TOTAL) BY MOUTH 2 (TWO) TIMES DAILY. 30 tablet 2    FLUoxetine (PROZAC) 20 MG capsule 1 CAPSULE(S) ORALLY 1 TIMES A DAY (IN THE MORNING)  1    hydroCHLOROthiazide (HYDRODIURIL) 25 MG tablet Take 1 tablet (25 mg total) by mouth once daily. 30 tablet 2    lactulose (CHRONULAC) 10 gram/15 mL solution TAKE 30 MLS (20 G TOTAL) BY MOUTH 3 (THREE) TIMES DAILY. 300 mL 0    nicotine (NICODERM CQ) 14 mg/24 hr Place 1 patch onto the skin once daily. 14 patch 0    nicotine (NICODERM CQ) 21 mg/24 hr Place 1 patch onto the skin once daily. . Heavy smoker  [employer]. 14 patch 0    oxybutynin (DITROPAN) 5 MG Tab TAKE 1 TABLET (5 MG TOTAL) BY MOUTH 2 (TWO) TIMES DAILY. 60 tablet 2    pantoprazole (PROTONIX) 40 MG tablet TAKE 1 TABLET BY MOUTH EVERY DAY 30 tablet 0    PREMPRO 0.45-1.5 mg per tablet TAKE 1 TABLET BY MOUTH ONCE DAILY. 28 tablet 10    STIOLTO RESPIMAT 2.5-2.5 mcg/actuation Mist INHALE 1 INHALATION INTO THE LUNGS ONCE DAILY. CONTROLLER 4 g 2    varenicline (CHANTIX STARTING MONTH BOX) 0.5 mg (11)- 1 mg (42) tablet Take one 0.5mg tab  by mouth once daily X3 days,then increase to one 0.5mg tab twice daily X4 days,then increase to one 1mg tab twice daily 1 Package 0       Allergies  Review of patient's allergies indicates:   Allergen Reactions    Penicillins Rash       ROS- As per HPI, otherwise negative  ROS    Objective  Vitals(Most Recent)                  Vitals Range  Temp:  [96.7 °F (35.9 °C)-98 °F (36.7 °C)]   Pulse:  [62-91]   Resp:  [16-18]   BP: (117-146)/(54-73)   SpO2:  [99 %-100 %]     I/O  No intake/output data recorded.  No intake or output data in the 24 hours ending 12/14/18 2343      Physical Exam  Physical Exam  General: No acute distress. Alert and oriented x3.  HEENT: normocephalic, atraumatic, pupils equally round and reactive. Extraocular muscles intact. Clear OP. Moist mucus membranes  Neck: supple, no lymphadenopathy, no JVD  Cardiovascular: regular rate and rhythm, no murmurs rubs or gallops  Lungs: clear to auscultation bilaterally, no wheezes or increase work of breathing.  Abdomen: Soft, epigastric, felice-umbilical and RUQ tender, no rebound, no guarding, non-distended  Extremities: No clubbing, cyanosis or edema. 2+ peripheral pulses, 5/5 strength in all extremities  Neuro: Cranial nerves 2-12 grossly intact with no focal deficits.  Skin: No rashes or erythema present.        Labs  Recent Labs   Lab 12/14/18  2048   WBC 13.84*   RBC 3.14*   HGB 9.7*   HCT 30.3*      MCV 97   MCH 30.9   MCHC 32.0     Recent Labs   Lab 12/14/18  1634   CALCIUM 8.7   PROT 6.7      K 3.7   CO2 24      BUN 32*   CREATININE 1.0   ALKPHOS 63   ALT 17   AST 23   BILITOT 0.2         Lab Results   Component Value Date    INR 1.0 12/14/2018    APTT 27.6 12/14/2018       Recent Labs     12/14/18  1634 12/14/18  1635   TROPONINI  --  <0.006   CPKMB 2.0  --    *  366*  --        A1c:   Lab Results   Component Value Date    HGBA1C 5.6 06/01/2018   , Last Gluc: No results for input(s): POCTGLUCOSE in the last 168  hours.    TSH:   Lab Results   Component Value Date    TSH 1.080 06/01/2018     Imaging     CT abdom/pelvis 12/14/18:  There is hyperattenuating fluid in the right colon, which may represent fluid mixed with oral contrast however findings are suspicious for blood given the homogeneous appearance of the fluid.  Occult mass lesion in this region not excluded.  Recommend direct visualization.  No obstruction. Appendix is normal.      Assessment/Plan:  Xiomy Duncan is a 58 y.o. female patient w/ PMHx of HTN, HLD, GERD, PUD, colon polyps who presented to the [place] ED with 3 episodes of hematemesis and subsequently had witnessed 300 CC of BRB vomiting in the ED.  H/H dropped from 11.6 to 9.7 after fluids and was down from baseline of 14.  Pt transferred for ICU observation with GI consultation.    Here with   1. Upper GI bleed  2. HTN  3. PUD  4. GERD  5. Hx of three 1-3 mm polyps in recto-sigmoid colon 2014      Neuro  AAO X4  No analgesia nor sedatation needed  Hx of anxiety, home meds include xanax 0.25 BID PRN and clonidine also perhaps for anxiety?  Pt had had seizures but has been weaned off of antieplileptics and seizure free for years now    CV  On HCTZ for BP control  Will delay restarting BP med until after ensuring stability with fluid status    Pulm:  Likely COPD, perhaps undiagnosed.  Ot did smoke for >12 pack years  Restarted home meds along with duo-neb breathing treatments  Nicotine patch ordered  BARI    FEN/GI:  Upper GI Bleed/ Peptic Ulcer Disease  Hematemsis at home and witnessed in the ED- suspect Acute Upper GI Bleed  Monitoring electrolytes daily  NPO  Avoid NSAIDS  Consult [facility] through switchboard.  Talked with fellow on call.  Protonix 40 mg IV BID    Heme/ID:  H/H in [place] 9.7/30.3 (down from 11.6/37 earlier in the day) baseline Hgb is 14  Type and screen obtained in [place] ED, will repeat at [facility] in case blood bank needs it repeated  Prepared two units  Monitor H/H q  4 hours  No physical exam signs of infection at this point, WBC 13.84  Will monitor    Renal:  BUN/Cr 31/1.0 with GFR > 60  No known kidney disease  Will monitor UOP    Endocrine  June HgBA1C 5.6  Will monitor Gluc in daily labs  TSH on admit    PPX:  GI ppx-  PPI BID  DVT ppx-  RAMESH hose, bleeding risk      Deconditioning: PT/OT  Code Status: Full    Dispo  - pending clinical improvement/stability and GI recommendations      12/14/2018 Adi Ruiz M.D., 2  Critical Care Medicine  [facility]

## 2018-12-15 NOTE — PROGRESS NOTES
Progress Note  ICU    Admit Date: 12/15/2018   LOS: 0 days     SUBJECTIVE:     No episodes of hematemesis since transferred.  H/H stable.  VSS.  Pt is hungry.  Dr. Villagomez aware of pt and will evaluate.  No melena, no hematochezia.      Continuous Infusions:  Scheduled Meds:   atorvastatin  40 mg Oral QHS    FLUoxetine  20 mg Oral Daily    gabapentin  100 mg Oral TID    hydroCHLOROthiazide  25 mg Oral Daily    nicotine  1 patch Transdermal Daily    oxybutynin  5 mg Oral BID    pantoprazole  40 mg Intravenous BID     PRN Meds:sodium chloride, albuterol-ipratropium, ALPRAZolam, dextrose 50%, dextrose 50%, glucagon (human recombinant), glucose, glucose, [START ON 12/16/2018] influenza, ondansetron, sodium chloride 0.9%    Review of patient's allergies indicates:   Allergen Reactions    Penicillins Rash     ROS   Denies chest pain, denies SOB, abdom pain present but mild, +hungry    OBJECTIVE:     Vital Signs (Most Recent)  Temp: 97.7 °F (36.5 °C) (12/15/18 0305)  Pulse: 60 (12/15/18 0700)  Resp: (!) 21 (12/15/18 0700)  BP: 125/60 (12/15/18 0700)  SpO2: 100 % (12/15/18 0700)    Vital Signs Range (Last 24H):  Temp:  [96.7 °F (35.9 °C)-98 °F (36.7 °C)]   Pulse:  [57-91]   Resp:  [15-26]   BP: (115-146)/(54-73)   SpO2:  [99 %-100 %]     Current Diet Order   Procedures    Diet NPO Except for: Medication, Sips with Medication     Order Specific Question:   Except for     Answer:   Medication     Order Specific Question:   Except for     Answer:   Sips with Medication      I & O (Last 24H):    Intake/Output Summary (Last 24 hours) at 12/15/2018 0750  Last data filed at 12/15/2018 0600  Gross per 24 hour   Intake 150 ml   Output 1050 ml   Net -900 ml     Wt Readings from Last 3 Encounters:   12/15/18 76.4 kg (168 lb 6.9 oz)   11/14/18 73.7 kg (162 lb 7.7 oz)   07/10/18 72.5 kg (159 lb 12.8 oz)       Physical Exam   General: No acute distress. Alert and oriented x3.  HEENT: normocephalic, atraumatic, pupils equally  round and reactive. Extraocular muscles intact. Clear OP. Moist mucus membranes  Neck: supple, no lymphadenopathy, no JVD  Cardiovascular: regular rate and rhythm, no murmurs rubs or gallops  Lungs: clear to auscultation bilaterally, no wheezes or increase work of breathing.  Abdomen: Soft, epigastric, felice-umbilical and RUQ tender, no rebound, no guarding, non-distended  Extremities: No clubbing, cyanosis or edema. 2+ peripheral pulses, 5/5 strength in all extremities  Neuro: Cranial nerves 2-12 grossly intact with no focal deficits.  Skin: No rashes or erythema present.          Lines/Drains:       Peripheral IV - Single Lumen 12/14/18 1705 Left Antecubital (Active)   Site Assessment Clean;Dry;Intact;No redness;No swelling 12/15/2018  3:05 AM   Line Status No blood return;Flushed;Saline locked 12/15/2018  3:05 AM   Dressing Status Clean;Dry;Intact 12/15/2018  3:05 AM   Dressing Change Due 12/18/18 12/15/2018  3:05 AM   Site Change Due 12/18/18 12/15/2018  2:10 AM   Reason Not Rotated Not due 12/15/2018  3:05 AM   Number of days: 0            Peripheral IV - Single Lumen 12/14/18 2202 Right Antecubital (Active)   Site Assessment Clean;Dry;Intact;No redness;No swelling 12/15/2018  3:05 AM   Line Status Saline locked 12/15/2018  3:05 AM   Dressing Status Clean;Dry;Intact 12/15/2018  3:05 AM   Dressing Change Due 12/18/18 12/15/2018  3:05 AM   Site Change Due 12/18/18 12/15/2018  2:10 AM   Reason Not Rotated Not due 12/15/2018  3:05 AM   Number of days: 0         LABS  CBC  Recent Labs   Lab 12/14/18  1635 12/14/18  2048 12/15/18  0228   WBC 16.89* 13.84* 13.06*   RBC 3.83* 3.14* 3.51*   HGB 11.6* 9.7* 10.7*   HCT 37.0 30.3* 33.5*    237 262   MCV 97 97 95   MCH 30.3 30.9 30.5   MCHC 31.4* 32.0 31.9*       BMP  Recent Labs   Lab 12/14/18  1634 12/15/18  0228    139   K 3.7 4.0   CO2 24 23    110   BUN 32* 22*   CREATININE 1.0 0.8       CMP  Sodium   Date Value Ref Range Status   12/15/2018 139 136 -  145 mmol/L Final     Potassium   Date Value Ref Range Status   12/15/2018 4.0 3.5 - 5.1 mmol/L Final     Chloride   Date Value Ref Range Status   12/15/2018 110 95 - 110 mmol/L Final     CO2   Date Value Ref Range Status   12/15/2018 23 23 - 29 mmol/L Final     Glucose   Date Value Ref Range Status   12/15/2018 89 70 - 110 mg/dL Final     BUN, Bld   Date Value Ref Range Status   12/15/2018 22 (H) 6 - 20 mg/dL Final     Creatinine   Date Value Ref Range Status   12/15/2018 0.8 0.5 - 1.4 mg/dL Final     Calcium   Date Value Ref Range Status   12/15/2018 8.1 (L) 8.7 - 10.5 mg/dL Final     Total Protein   Date Value Ref Range Status   12/15/2018 6.0 6.0 - 8.4 g/dL Final     Albumin   Date Value Ref Range Status   12/15/2018 2.9 (L) 3.5 - 5.2 g/dL Final     Total Bilirubin   Date Value Ref Range Status   12/15/2018 0.3 0.1 - 1.0 mg/dL Final     Comment:     For infants and newborns, interpretation of results should be based  on gestational age, weight and in agreement with clinical  observations.  Premature Infant recommended reference ranges:  Up to 24 hours.............<8.0 mg/dL  Up to 48 hours............<12.0 mg/dL  3-5 days..................<15.0 mg/dL  6-29 days.................<15.0 mg/dL       Alkaline Phosphatase   Date Value Ref Range Status   12/15/2018 58 55 - 135 U/L Final     AST   Date Value Ref Range Status   12/15/2018 22 10 - 40 U/L Final     ALT   Date Value Ref Range Status   12/15/2018 15 10 - 44 U/L Final     Anion Gap   Date Value Ref Range Status   12/15/2018 6 (L) 8 - 16 mmol/L Final     eGFR if    Date Value Ref Range Status   12/15/2018 >60 >60 mL/min/1.73 m^2 Final     eGFR if non    Date Value Ref Range Status   12/15/2018 >60 >60 mL/min/1.73 m^2 Final     Comment:     Calculation used to obtain the estimated glomerular filtration  rate (eGFR) is the CKD-EPI equation.            POCT-Glucose  No results found for: POCTGLUCOSE  Recent Labs   Lab 12/14/18  4745  12/15/18  0228   CALCIUM 8.7 8.1*   MG  --  1.9   PHOS  --  2.3*         LFT  Recent Labs   Lab 12/14/18  1634 12/15/18  0228   PROT 6.7 6.0   ALBUMIN 3.2* 2.9*   BILITOT 0.2 0.3   AST 23 22   ALKPHOS 63 58   ALT 17 15         COAGS  Recent Labs   Lab 12/14/18  1635 12/15/18  0228   INR 1.0 1.0   APTT 27.6 31.2       CE  Recent Labs   Lab 12/14/18  1635   TROPONINI <0.006     BNP  No results for input(s): BNP in the last 168 hours.    ABGs  No results for input(s): PH, PCO2, PO2, HCO3, POCSATURATED, BE in the last 24 hours.    UA  Recent Labs   Lab 12/15/18  0419   COLORU Yellow   SPECGRAV <=1.005*   PHUR 7.0   PROTEINUA Negative     LAST HbA1c  Lab Results   Component Value Date    HGBA1C 5.6 12/15/2018       Microbiology Results (last 7 days)     ** No results found for the last 168 hours. **          IP CONSULT TO GI    No new subjective & objective note has been filed under this hospital service since the last note was generated.        ASSESSMENT/PLAN:       Xiomy Duncan is a 58 y.o. female with extensive pmh who presented with Upper GI bleed.     The encounter diagnosis was Upper GI bleed.    Problem list  1. Upper GI bleed  2. HTN  3. PUD  4. GERD  5. Hx of three 1-3 mm polyps in recto-sigmoid colon 2014        Neuro  AAO X4  No analgesia nor sedatation needed  Hx of anxiety, home meds include xanax 0.25 BID PRN and clonidine also perhaps for anxiety?  Pt had had seizures but has been weaned off of antieplileptics and seizure free for years now     CV  On HCTZ for BP control  Will delay restarting BP med until after ensuring stability with fluid status     Pulm:  Likely COPD, perhaps undiagnosed.  Ot did smoke for >12 pack years  Restarted home meds along with duo-neb breathing treatments  Nicotine patch ordered  BARI     FEN/GI:  Upper GI Bleed/ Peptic Ulcer Disease  Hematemsis at home and witnessed in the ED- suspect Acute Upper GI Bleed  Monitoring electrolytes daily  NPO  Avoid NSAIDS  Consult  [facility] through switchboard.  Talked with fellow on call.  Protonix 40 mg IV BID     Heme/ID:  H/H in [place] 9.7/30.3 (down from 11.6/37 earlier in the day) baseline Hgb is 14  Type and screen obtained in [place] ED, will repeat at [facility] in case blood bank needs it repeated  Prepared two units  Monitor H/H q 4 hours  No physical exam signs of infection at this point, WBC improving without abx  Will monitor     Renal:  BUN/Cr 31/1.0 with GFR > 60  No known kidney disease  Will monitor UOP  Urine normal except for hematuria     Endocrine  June HgBA1C 5.6  Will monitor Gluc in daily labs     PPX:  GI ppx-  PPI BID  DVT ppx-  RAMESH hose, bleeding risk        Deconditioning: PT/OT  Code Status: Full     Dispo  - pending clinical improvement/stability and GI recommendations           Critical Care Time:   Critical secondary to Patient has a condition that poses threat to life and bodily function:      Critical care was time spent personally by me on the following activities: development of treatment plan with patient or surrogate and bedside caregivers, discussions with consultants, evaluation of patient's response to treatment, examination of patient, ordering and performing treatments and interventions, ordering and review of laboratory studies, ordering and review of radiographic studies, pulse oximetry, re-evaluation of patient's condition.  This critical care time did not overlap with that of any other provider or involve time for any procedures.     12/15/2018 Adi Ruiz M.D., PGY2  Critical Care Medicine  [facility]

## 2018-12-15 NOTE — PLAN OF CARE
Chief Complaint  58 y.o. female with Hematemesis    TN met bedside with pt  And sons bedside in ICU. 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 Pt's son, Ciro arcos is listed as emergency contact. He or his brother can provide transportation home for pt on discharge. Pt normally drives self to appts.    Pt denies any needs or concerns at this time. Discharge planning brochure and business card provided. CM numbers written on white board. Pt encouraged to call with any questions or needs. CM will continue to follow patient throughout the transitions of care, and assist with any discharge needs.       12/15/18 9686   Discharge Assessment   Assessment Type Discharge Planning Assessment   Confirmed/corrected address and phone number on facesheet? Yes   Assessment information obtained from? Patient;Caregiver  (son: Ciro Arcos [phone])   Expected Length of Stay (days) 3   Communicated expected length of stay with patient/caregiver yes   Prior to hospitilization cognitive status: Alert/Oriented   Prior to hospitalization functional status: Independent   Current cognitive status: Alert/Oriented   Current Functional Status: Needs Assistance   Facility Arrived From: [facility] ED   Lives With grandchild(cliff)  (7 yr old grand daughter)   Able to Return to Prior Arrangements yes   Is patient able to care for self after discharge? Yes   Who are your caregiver(s) and their phone number(s)? son: Ciro Arcos [phone]   Patient's perception of discharge disposition home or selfcare   Readmission Within the Last 30 Days no previous admission in last 30 days   Patient currently being followed by outpatient case management? No   Patient currently receives any other outside agency services? No   Equipment Currently Used at Home none   Do you have any problems affording any of your prescribed medications? No   Is the patient taking medications as prescribed? yes   Does the  patient have transportation home? Yes   Transportation Anticipated family or friend will provide   Dialysis Name and Scheduled days N/A   Does the patient receive services at the [facility]madin Clinic? No   Discharge Plan A Home   Discharge Plan B Home with family   Patient/Family in Agreement with Plan yes     Eliz Jamison RN Transitional Navigator  [phone]

## 2018-12-15 NOTE — TRANSFER OF CARE
"Anesthesia Transfer of Care Note    Patient: Xiomy Duncan    Procedure(s) Performed: Procedure(s) (LRB):  ESOPHAGOGASTRODUODENOSCOPY (EGD) (N/A)    Patient location: ICU    Anesthesia Type: general and MAC    Transport from OR: Transported from OR on 2-3 L/min O2 by NC with adequate spontaneous ventilation    Post pain: adequate analgesia    Post assessment: no apparent anesthetic complications    Post vital signs: stable    Level of consciousness: awake, alert and oriented    Nausea/Vomiting: no nausea/vomiting    Complications: none          Last vitals:   Visit Vitals  /62 (BP Location: Right arm, Patient Position: Lying)   Pulse 63   Temp 36.9 °C (98.4 °F) (Oral)   Resp 20   Ht 5' 2" (1.575 m)   Wt 76.4 kg (168 lb 6.9 oz)   SpO2 100%   Breastfeeding? No   BMI 30.81 kg/m²     "

## 2018-12-15 NOTE — PROGRESS NOTES
Physical Therapy   Screen    Xiomy Duncan   MRN: [id number]         Pt had a Eval/tx order and chart review was performed.  Pt reports performing bed mobility, transfers and ambulating to BSC independently with  no LE strength or balance deficits. RN verified that pt doesn't exhibit any mobility deficits.  Pt doesn't require PT intervention at this time.  Debbie Atkinson, PT   12/15/2018

## 2018-12-15 NOTE — CONSULTS
[facility]  Gastroenterology  Consult Note    Patient Name: Xiomy Duncan  MRN: [id number]  Admission Date: 12/15/2018  Hospital Length of Stay: 0 days  Code Status: Full Code   Attending Provider: Christoph Bull III, MD   Consulting Provider: Alisson Villagomez MD  Primary Care Physician: Arnie Monson MD  Principal Problem:Upper GI bleed    Consults  Subjective:     HPI:  58 year old female presented with 3 episodes of hematemesis. Presently she hemodynamically stable but reports light-headedness and dizziness at the onset of her symptoms.  She reports epigastric pain.  Patient ha been on NSAID for lower back pain.    Past Medical History:   Diagnosis Date    Anemia     Asthma     Cannabis abuse, daily use     Colon polyps     GERD (gastroesophageal reflux disease) 2014    Hoarseness 2014    Hypertension     Seizures     Thyroid nodule     Ulcer        Past Surgical History:   Procedure Laterality Date    CHOLECYSTECTOMY      COLONOSCOPY N/A 6/10/2014    Performed by GREG Aleman MD at [facility] ([address] [place] [address])    TONSILLECTOMY      TUBAL LIGATION         Review of patient's allergies indicates:   Allergen Reactions    Penicillins Rash     Family History     Problem Relation (Age of Onset)    Bone cancer Mother    Diabetes Maternal Uncle    Hypertension Brother, Maternal Uncle, Maternal Grandmother    Stomach cancer Maternal Grandfather        Tobacco Use    Smoking status: Former Smoker     Packs/day: 2.00     Years: 46.00     Pack years: 92.00     Last attempt to quit: 10/15/2018     Years since quittin.1    Smokeless tobacco: Never Used   Substance and Sexual Activity    Alcohol use: No    Drug use: No     Comment: every day    Sexual activity: No     Birth control/protection: None, Post-menopausal     Comment: single     Review of Systems   Constitutional: Negative for appetite change and fever.   HENT: Negative for sore throat and trouble swallowing.     Eyes: Negative for photophobia and visual disturbance.   Respiratory: Negative for wheezing.    Cardiovascular: Negative for chest pain and palpitations.   Gastrointestinal:        See HPI for details   Musculoskeletal: Negative for arthralgias, joint swelling and neck pain.   Skin: Negative for rash and wound.   Neurological: Negative for dizziness, tremors and weakness.   Psychiatric/Behavioral: Negative for behavioral problems and suicidal ideas.     Objective:     Vital Signs (Most Recent):  Temp: 98.4 °F (36.9 °C) (12/15/18 1100)  Pulse: 63 (12/15/18 1000)  Resp: 20 (12/15/18 1000)  BP: 138/62 (12/15/18 1000)  SpO2: 100 % (12/15/18 1000) Vital Signs (24h Range):  Temp:  [96.7 °F (35.9 °C)-98.4 °F (36.9 °C)] 98.4 °F (36.9 °C)  Pulse:  [55-98] 63  Resp:  [15-26] 20  SpO2:  [99 %-100 %] 100 %  BP: (115-152)/(54-79) 138/62     Weight: 76.4 kg (168 lb 6.9 oz) (12/15/18 0245)  Body mass index is 30.81 kg/m².      Intake/Output Summary (Last 24 hours) at 12/15/2018 1129  Last data filed at 12/15/2018 0900  Gross per 24 hour   Intake 150 ml   Output 1350 ml   Net -1200 ml       Lines/Drains/Airways     Peripheral Intravenous Line                 Peripheral IV - Single Lumen 12/14/18 1705 Left Antecubital less than 1 day         Peripheral IV - Single Lumen 12/14/18 2202 Right Antecubital less than 1 day                Physical Exam   Constitutional: She is oriented to person, place, and time. She appears well-nourished.   HENT:   Mouth/Throat: Oropharynx is clear and moist.   Eyes: Pupils are equal, round, and reactive to light.   Neck: Neck supple.   Cardiovascular: Normal heart sounds.   Pulmonary/Chest: Effort normal and breath sounds normal.   Abdominal: Soft. She exhibits no mass. There is no tenderness. There is no guarding.   Musculoskeletal: Normal range of motion.   Lymphadenopathy:     She has no cervical adenopathy.   Neurological: She is alert and oriented to person, place, and time.   Skin: Skin is warm.  No rash noted.   Psychiatric: She has a normal mood and affect.       Significant Labs:  CBC:   Recent Labs   Lab 12/14/18  1635 12/14/18  2048 12/15/18  0228 12/15/18  0824   WBC 16.89* 13.84* 13.06*  --    HGB 11.6* 9.7* 10.7* 10.3*   HCT 37.0 30.3* 33.5* 32.2*    237 262  --            Assessment/Plan:     * Upper GI bleed    Possibly from  secondary to NSAID use.  Continue PPI  Check serial Hemoglobin and hematocrit.  Transfuse as needed.  EGD  Avoid NSAID         Thank you for your consult. I will follow-up with patient. Please contact us if you have any additional questions.    Alisson Villagomez MD  Gastroenterology  [facility]

## 2018-12-15 NOTE — ED PROVIDER NOTES
[facility]                                                 Chief Complaint  58 y.o. female with Hematemesis    History of Present Illness  Xiomy Duncan presents to the emergency room with hematemesis today  Patient had 3 episodes of nausea and vomiting with blood this afternoon PTA  Patient arrived in triage with significant hematemesis, bright red blood per nurse  Patient approximately vomited 300 mls a bright red blood, has epigastric pain  Patient has a history significant for GERD and peptic ulcer disease per interview  Patient denies any black stools or melena, does not appear toxic or dehydrated    The history is provided by the patient   device was not used during this ER visit  Medical history: Anemia, asthma, cannabis, polyps, GERD, seizures, HTN, thyroid nodule, ulcers  Surgeries: Cholecystectomy, colonoscopy, tonsillectomy, tubal ligation  Allergies: Penicillin    Review of Systems and Physical Exam      Review of Systems  -- Constitution - no fever, denies fatigue, no weakness, no chills  -- Eyes - no tearing or redness, no visual disturbance  -- Ear, Nose - no tinnitus or earache, no nasal congestion or discharge  -- Mouth,Throat - no sore throat, no toothache, normal voice, normal swallowing  -- Respiratory - denies cough and congestion, no shortness of breath, no GOMEZ  -- Cardiovascular - denies chest pain, no palpitations, denies claudication  -- Gastrointestinal - nausea vomiting with blood, 300 cc in triage  -- Genitourinary - no dysuria, denies flank pain, no hematuria, no STD risk  -- Musculoskeletal - denies back pain, negative for myalgias and arthralgias   -- Neurological - no headache, denies weakness or seizure; no LOC  -- Skin - denies pallor, rash, or changes in skin. no hives or welts noted  -- Psychiatric - Denies SI or HI, no psychosis or fractured thought noted     Vital Signs  Her blood pressure is 137/73 and her pulse is 65.   Her  respiration is 18 and oxygen saturation is 99%.     Physical Exam  -- Nursing note and vitals reviewed  -- Constitutional: Appears well-developed and well-nourished  -- Head: Atraumatic. Normocephalic. No obvious abnormality  -- Eyes: Pupils are equal and reactive to light. Normal conjunctiva and lids  -- Cardiac: Normal rate, regular rhythm and normal heart sounds  -- Pulmonary: Normal respiratory effort, breath sounds clear to auscultation  -- Abdominal: Soft, no tenderness. Normal bowel sounds. Normal liver edge  -- Musculoskeletal: Normal range of motion, no effusions. Joints stable   -- Neurological: No focal deficits. Showed good interaction with staff  -- Vascular: Posterior tibial, dorsalis pedis and radial pulses 2+ bilaterally      Emergency Room Course      Lab Results     K 3.7      CO2 24   BUN 32 (H)   CREATININE 1.0   GLU 70   ALKPHOS 63   AST 23   ALT 17   BILITOT 0.2   ALBUMIN 3.2 (L)   PROT 6.7   WBC 13.84 (H)   HGB 9.7 (L)   HCT 30.3 (L)       (H)    (H)   CPKMB 2.0   TROPONINI <0.006   INR 1.0     EKG  -- The EKG findings today were without concerning findings from baseline    CT Abdomen Pelvis  There is hyperattenuating fluid in the right colon, which may represent fluid mixed with oral contrast however findings are suspicious for blood given the homogeneous appearance of the fluid.  Occult mass lesion in this region not excluded.  Recommend direct visualization.  No obstruction. Appendix is normal.     Medications Given  pantoprazole 40 mg in dextrose 5 % 100 mL infusion (ready to mix system) (not administered)   0.9%  NaCl infusion (0 mLs Intravenous Stopped 12/14/18 1800)   ondansetron injection 4 mg (4 mg Intravenous Given 12/14/18 1707)   omnipaque 350 iohexol 30 mL (30 mLs Oral Given 12/14/18 1700)   omnipaque 350 iohexol 75 mL (75 mLs Intravenous Given 12/14/18 1810)   0.9%  NaCl infusion (0 mLs Intravenous Stopped 12/14/18 2011)   0.9%  NaCl infusion  (1,000 mLs Intravenous New Bag 12/14/18 2012)     Medical Decision Making  -- Diagnosis management comments: 58 y.o. female with hematemesis this afternoon  -- patient had significant hematemesis in the ER, epigastric pain noted on exam  -- patient hemoglobin was 14 this year, 11 on triage, 9 after for are wait in the ER  -- continued abdominal pain, consider upper GI bleed in the differential diagnosis  -- IV Protonix started, patient be transferred to higher level of care for GI consult    Diagnosis  -- Gastrointestinal hemorrhage, unspecified gastrointestinal hemorrhage type.   -- A diagnosis of Vomiting was also pertinent to this visit.    Disposition and Plan  -- Disposition: transfer  -- Condition: stable  -- The patient needs a higher level of care  -- The patient is appropriate for transfer    This note is dictated on M*Modal word recognition program.  There are word recognition mistakes that are occasionally missed on review.         Celso Warren MD  12/14/18 1902

## 2018-12-15 NOTE — PLAN OF CARE
Problem: Fall Injury Risk  Goal: Absence of Fall and Fall-Related Injury    Intervention: Identify and Manage Contributors to Fall Injury Risk   12/15/18 0335   Manage Acute Allergic Reaction   Medication Review/Management medications reviewed   Identify and Manage Contributors to Fall Injury Risk   Self-Care Promotion independence encouraged;BADL personal objects within reach;BADL personal routines maintained

## 2018-12-15 NOTE — ANESTHESIA POSTPROCEDURE EVALUATION
"Anesthesia Post Evaluation    Patient: Xiomy Duncan    Procedure(s) Performed: Procedure(s) (LRB):  ESOPHAGOGASTRODUODENOSCOPY (EGD) (N/A)    Final Anesthesia Type: MAC  Patient location during evaluation: PACU  Patient participation: Yes- Able to Participate  Level of consciousness: awake and alert  Post-procedure vital signs: reviewed and stable  Pain management: adequate  Airway patency: patent  PONV status at discharge: No PONV  Anesthetic complications: no      Cardiovascular status: blood pressure returned to baseline  Respiratory status: unassisted  Hydration status: euvolemic          Visit Vitals  /62 (BP Location: Right arm, Patient Position: Lying)   Pulse 63   Temp 36.9 °C (98.4 °F) (Oral)   Resp 20   Ht 5' 2" (1.575 m)   Wt 76.4 kg (168 lb 6.9 oz)   SpO2 100%   Breastfeeding? No   BMI 30.81 kg/m²       Pain/Elodia Score: Pain Rating Prior to Med Admin: 6 (12/15/2018 11:18 AM)        "

## 2018-12-15 NOTE — PT/OT/SLP PROGRESS
Occupational Therapy  Functional Screen & Discharge    Patient Name:  Xiomy Duncan   MRN:  [id number]    OT eval and treat order recv'ed, chart reviewed, pt seen. Pt is indep and working at baseline.  Pt with chronic back pain.  Pt currently indep/Mod I LB dressing, toilet tf and toileting.  No functional deficits noted.    Discharge OT    Law Kitchen OT  12/15/2018

## 2018-12-15 NOTE — PLAN OF CARE
Outside Transfer Acceptance Note    Transferring Physician or Mid Level Provider/Speciality: Dr. Warren / ED     Accepting Physician: Clark Levine     Date of Acceptance: 12/14/2018     Code Status: Full    Transferring Facility/Hospital: [facility]     Reason for Transfer to [facility]: Acute GI bleed     Report from Transferring Physician or Mid-Level provider/Hospital course:     Patient is a 58 year old female with PMH of HTN, HLD, GERD, PUD, colon polyps presented to ED with vomiting blood. She had 3 episodes of hematemesis at home and another witnessed episode of hematemesis  ~300 cc bright red blood soon after arrival to ED. She endorses epigastric pain, but denies melena, hematochezia, NSAID/bloood thinner use,  dizziness, syncope, chest pain, sob, palpitation. She remained hemodynamically stable. Labs significant for Drop in Hgb to 11.6 >> 2 L NS >> 9.7 from baseline Hgb ~14 (06/18). Platelet 234 and INR 1.0. CT abdomen showed hyperattenuating fluid I the right colon, which may represent fluid mixed with oral contrast. She was started on protonix infusion 8 mg/hr.     /73 (BP Location: Right arm, Patient Position: Lying)   Pulse 65   Temp 98 °F (36.7 °C) (Oral)   Resp 18   SpO2 99%       Recent Results (from the past 24 hour(s))   Comprehensive metabolic panel    Collection Time: 12/14/18  4:34 PM   Result Value Ref Range    Sodium 142 136 - 145 mmol/L    Potassium 3.7 3.5 - 5.1 mmol/L    Chloride 108 95 - 110 mmol/L    CO2 24 23 - 29 mmol/L    Glucose 70 70 - 110 mg/dL    BUN, Bld 32 (H) 6 - 20 mg/dL    Creatinine 1.0 0.5 - 1.4 mg/dL    Calcium 8.7 8.7 - 10.5 mg/dL    Total Protein 6.7 6.0 - 8.4 g/dL    Albumin 3.2 (L) 3.5 - 5.2 g/dL    Total Bilirubin 0.2 0.1 - 1.0 mg/dL    Alkaline Phosphatase 63 55 - 135 U/L    AST 23 10 - 40 U/L    ALT 17 10 - 44 U/L    Anion Gap 10 8 - 16 mmol/L    eGFR if African American >60 >60 mL/min/1.73 m^2    eGFR if non African American >60 >60 mL/min/1.73 m^2   CK-MB     Collection Time: 12/14/18  4:34 PM   Result Value Ref Range     (H) 20 - 180 U/L    CPK MB 2.0 0.1 - 6.5 ng/mL    MB% 0.5 0.0 - 5.0 %   CK    Collection Time: 12/14/18  4:34 PM   Result Value Ref Range     (H) 20 - 180 U/L   CBC auto differential    Collection Time: 12/14/18  4:35 PM   Result Value Ref Range    WBC 16.89 (H) 3.90 - 12.70 K/uL    RBC 3.83 (L) 4.00 - 5.40 M/uL    Hemoglobin 11.6 (L) 12.0 - 16.0 g/dL    Hematocrit 37.0 37.0 - 48.5 %    MCV 97 82 - 98 fL    MCH 30.3 27.0 - 31.0 pg    MCHC 31.4 (L) 32.0 - 36.0 g/dL    RDW 15.0 (H) 11.5 - 14.5 %    Platelets 301 150 - 350 K/uL    MPV 11.5 9.2 - 12.9 fL    Gran # (ANC) 9.7 (H) 1.8 - 7.7 K/uL    Lymph # 5.5 (H) 1.0 - 4.8 K/uL    Mono # 1.4 (H) 0.3 - 1.0 K/uL    Eos # 0.2 0.0 - 0.5 K/uL    Baso # 0.05 0.00 - 0.20 K/uL    Gran% 57.7 38.0 - 73.0 %    Lymph% 32.6 18.0 - 48.0 %    Mono% 8.2 4.0 - 15.0 %    Eosinophil% 1.2 0.0 - 8.0 %    Basophil% 0.3 0.0 - 1.9 %    Differential Method Automated    Troponin I    Collection Time: 12/14/18  4:35 PM   Result Value Ref Range    Troponin I <0.006 0.000 - 0.026 ng/mL   APTT    Collection Time: 12/14/18  4:35 PM   Result Value Ref Range    aPTT 27.6 21.0 - 32.0 sec   Protime-INR    Collection Time: 12/14/18  4:35 PM   Result Value Ref Range    Prothrombin Time 11.0 9.0 - 12.5 sec    INR 1.0 0.8 - 1.2   CBC auto differential    Collection Time: 12/14/18  8:48 PM   Result Value Ref Range    WBC 13.84 (H) 3.90 - 12.70 K/uL    RBC 3.14 (L) 4.00 - 5.40 M/uL    Hemoglobin 9.7 (L) 12.0 - 16.0 g/dL    Hematocrit 30.3 (L) 37.0 - 48.5 %    MCV 97 82 - 98 fL    MCH 30.9 27.0 - 31.0 pg    MCHC 32.0 32.0 - 36.0 g/dL    RDW 14.9 (H) 11.5 - 14.5 %    Platelets 237 150 - 350 K/uL    MPV 11.4 9.2 - 12.9 fL    Gran # (ANC) 9.3 (H) 1.8 - 7.7 K/uL    Lymph # 3.7 1.0 - 4.8 K/uL    Mono # 0.7 0.3 - 1.0 K/uL    Eos # 0.1 0.0 - 0.5 K/uL    Baso # 0.04 0.00 - 0.20 K/uL    Gran% 67.2 38.0 - 73.0 %    Lymph% 26.8 18.0 - 48.0 %     Mono% 5.3 4.0 - 15.0 %    Eosinophil% 0.4 0.0 - 8.0 %    Basophil% 0.3 0.0 - 1.9 %    Differential Method Automated        CT abdomen with PO/IV contrast : There is hyperattenuating fluid in the right colon, which may represent fluid mixed with oral contrast however findings are suspicious for blood given the homogeneous appearance of the fluid.  Occult mass lesion in this region not excluded.  Recommend direct visualization.  No obstruction.      Impression:           - Three 1 to 3 mm polyps at the recto-sigmoid colon                                        and in the sigmoid colon. Resected and retrieved.  Recommendation:       - Repeat colonoscopy in 5-10 years for surveillance       I have discussed the case with Dr. Bull from [facility] for ICU admission with GI consult       To do list upon patient arrival:   -admit to ICU for acute upper GI bleed  -maintain 2 large bore IV access  -Intravascular volume resuscitation as needed with IVF and blood products   -avoid NSAID, antiplatelet and anticoagulant use   -NPO   - consult GI (Dr. Bull stated he would contact GI      Clark Levine DO   - [facility]   Attending Staff Physician   [facility]

## 2018-12-15 NOTE — ANESTHESIA PREPROCEDURE EVALUATION
12/15/2018  Xiomy Duncan is a 58 y.o., female here for upper GI Bleed    Past Medical History:   Diagnosis Date    Anemia     Asthma     Cannabis abuse, daily use     Colon polyps     GERD (gastroesophageal reflux disease) 8/7/2014    Hoarseness 8/7/2014    Hypertension     Seizures     Thyroid nodule     Ulcer      Past Surgical History:   Procedure Laterality Date    CHOLECYSTECTOMY      COLONOSCOPY N/A 6/10/2014    Performed by GREG [facility]man MD at [facility] (4TH F[facility])    TONSILLECTOMY      TUBAL LIGATION           Anesthesia Evaluation    I have reviewed the Patient Summary Reports.    I have reviewed the Nursing Notes.   I have reviewed the Medications.     Review of Systems  Anesthesia Hx:  History of prior surgery of interest to airway management or planning:  Denies Personal Hx of Anesthesia complications.   EENT/Dental:EENT/Dental Normal   Cardiovascular:   Hypertension    Pulmonary:   Asthma    Hepatic/GI:   PUD, GERD    Neurological:   Seizures    Endocrine:  Endocrine Normal        Physical Exam  General:  Obesity    Airway/Jaw/Neck:  Airway Findings: Mouth Opening: Normal General Airway Assessment: Adult  Mallampati: II  TM Distance: Normal, at least 6 cm     Eyes/Ears/Nose:  EYES/EARS/NOSE FINDINGS: Normal    Chest/Lungs:  Chest/Lungs Clear    Heart/Vascular:  Heart Findings: Normal       Mental Status:  Mental Status Findings: Normal        Anesthesia Plan  Type of Anesthesia, risks & benefits discussed:  Anesthesia Type:  general, MAC  Patient's Preference:   Intra-op Monitoring Plan:   Intra-op Monitoring Plan Comments:   Post Op Pain Control Plan:   Post Op Pain Control Plan Comments:   Induction:    Beta Blocker:         Informed Consent: Patient understands risks and agrees with Anesthesia plan.  Questions answered. Anesthesia consent signed with patient.  ASA Score: 2      Day of Surgery Review of History & Physical:            Ready For Surgery From Anesthesia Perspective.

## 2018-12-15 NOTE — NURSING TRANSFER
Nursing Transfer Note      12/15/2018     Transfer To: to rm 519 to AB[facility] Kumar            Transfer via bed      Transported by abner edwards and transport     Medicines sent: none    Chart send with patient: Yes    Notified: son, friend    Patient reassessed at: 12/15 @ 1545    Upon arrival to floor: cardiac monitor applied, patient oriented to room, call bell in reach and bed in lowest position

## 2018-12-15 NOTE — ASSESSMENT & PLAN NOTE
Possibly from  secondary to NSAID use.  Continue PPI  Check serial Hemoglobin and hematocrit.  Transfuse as needed.  EGD  Avoid NSAID

## 2018-12-16 VITALS
BODY MASS INDEX: 29.05 KG/M2 | RESPIRATION RATE: 20 BRPM | OXYGEN SATURATION: 100 % | HEIGHT: 62 IN | WEIGHT: 157.88 LBS | HEART RATE: 65 BPM | DIASTOLIC BLOOD PRESSURE: 62 MMHG | SYSTOLIC BLOOD PRESSURE: 139 MMHG | TEMPERATURE: 98 F

## 2018-12-16 LAB
ALBUMIN SERPL BCP-MCNC: 3 G/DL
ALP SERPL-CCNC: 55 U/L
ALT SERPL W/O P-5'-P-CCNC: 14 U/L
ANION GAP SERPL CALC-SCNC: 8 MMOL/L
AST SERPL-CCNC: 20 U/L
BASOPHILS # BLD AUTO: 0.04 K/UL
BASOPHILS # BLD AUTO: 0.06 K/UL
BASOPHILS # BLD AUTO: 0.06 K/UL
BASOPHILS NFR BLD: 0.4 %
BASOPHILS NFR BLD: 0.5 %
BASOPHILS NFR BLD: 0.6 %
BILIRUB SERPL-MCNC: 0.2 MG/DL
BUN SERPL-MCNC: 14 MG/DL
CALCIUM SERPL-MCNC: 8.8 MG/DL
CHLORIDE SERPL-SCNC: 105 MMOL/L
CO2 SERPL-SCNC: 26 MMOL/L
CREAT SERPL-MCNC: 0.9 MG/DL
DIFFERENTIAL METHOD: ABNORMAL
EOSINOPHIL # BLD AUTO: 0.2 K/UL
EOSINOPHIL # BLD AUTO: 0.2 K/UL
EOSINOPHIL # BLD AUTO: 0.3 K/UL
EOSINOPHIL NFR BLD: 1.5 %
EOSINOPHIL NFR BLD: 2 %
EOSINOPHIL NFR BLD: 2.3 %
ERYTHROCYTE [DISTWIDTH] IN BLOOD BY AUTOMATED COUNT: 14.8 %
ERYTHROCYTE [DISTWIDTH] IN BLOOD BY AUTOMATED COUNT: 15 %
ERYTHROCYTE [DISTWIDTH] IN BLOOD BY AUTOMATED COUNT: 15.1 %
EST. GFR  (AFRICAN AMERICAN): >60 ML/MIN/1.73 M^2
EST. GFR  (NON AFRICAN AMERICAN): >60 ML/MIN/1.73 M^2
GLUCOSE SERPL-MCNC: 91 MG/DL
HCT VFR BLD AUTO: 30 %
HCT VFR BLD AUTO: 30.7 %
HCT VFR BLD AUTO: 32.9 %
HGB BLD-MCNC: 10.7 G/DL
HGB BLD-MCNC: 9.8 G/DL
HGB BLD-MCNC: 9.9 G/DL
LYMPHOCYTES # BLD AUTO: 2.9 K/UL
LYMPHOCYTES # BLD AUTO: 3.9 K/UL
LYMPHOCYTES # BLD AUTO: 4 K/UL
LYMPHOCYTES NFR BLD: 32.7 %
LYMPHOCYTES NFR BLD: 35.3 %
LYMPHOCYTES NFR BLD: 41 %
MAGNESIUM SERPL-MCNC: 1.9 MG/DL
MCH RBC QN AUTO: 30.8 PG
MCH RBC QN AUTO: 30.9 PG
MCH RBC QN AUTO: 31.1 PG
MCHC RBC AUTO-ENTMCNC: 32.2 G/DL
MCHC RBC AUTO-ENTMCNC: 32.5 G/DL
MCHC RBC AUTO-ENTMCNC: 32.7 G/DL
MCV RBC AUTO: 95 FL
MCV RBC AUTO: 95 FL
MCV RBC AUTO: 96 FL
MONOCYTES # BLD AUTO: 0.7 K/UL
MONOCYTES # BLD AUTO: 0.8 K/UL
MONOCYTES # BLD AUTO: 0.8 K/UL
MONOCYTES NFR BLD: 6.7 %
MONOCYTES NFR BLD: 6.8 %
MONOCYTES NFR BLD: 9.3 %
NEUTROPHILS # BLD AUTO: 4.9 K/UL
NEUTROPHILS # BLD AUTO: 5 K/UL
NEUTROPHILS # BLD AUTO: 6.1 K/UL
NEUTROPHILS NFR BLD: 50.1 %
NEUTROPHILS NFR BLD: 54.9 %
NEUTROPHILS NFR BLD: 55.5 %
PHOSPHATE SERPL-MCNC: 3.7 MG/DL
PLATELET # BLD AUTO: 247 K/UL
PLATELET # BLD AUTO: 254 K/UL
PLATELET # BLD AUTO: 267 K/UL
PMV BLD AUTO: 11.1 FL
PMV BLD AUTO: 11.6 FL
PMV BLD AUTO: 12.2 FL
POCT GLUCOSE: 73 MG/DL (ref 70–110)
POTASSIUM SERPL-SCNC: 3.8 MMOL/L
PROT SERPL-MCNC: 6 G/DL
RBC # BLD AUTO: 3.15 M/UL
RBC # BLD AUTO: 3.21 M/UL
RBC # BLD AUTO: 3.46 M/UL
SODIUM SERPL-SCNC: 139 MMOL/L
WBC # BLD AUTO: 11.05 K/UL
WBC # BLD AUTO: 8.93 K/UL
WBC # BLD AUTO: 9.82 K/UL

## 2018-12-16 PROCEDURE — 90471 IMMUNIZATION ADMIN: CPT | Performed by: FAMILY MEDICINE

## 2018-12-16 PROCEDURE — 85025 COMPLETE CBC W/AUTO DIFF WBC: CPT

## 2018-12-16 PROCEDURE — 80053 COMPREHEN METABOLIC PANEL: CPT

## 2018-12-16 PROCEDURE — 63600175 PHARM REV CODE 636 W HCPCS: Performed by: STUDENT IN AN ORGANIZED HEALTH CARE EDUCATION/TRAINING PROGRAM

## 2018-12-16 PROCEDURE — 83735 ASSAY OF MAGNESIUM: CPT

## 2018-12-16 PROCEDURE — G0378 HOSPITAL OBSERVATION PER HR: HCPCS

## 2018-12-16 PROCEDURE — 84100 ASSAY OF PHOSPHORUS: CPT

## 2018-12-16 PROCEDURE — 63600175 PHARM REV CODE 636 W HCPCS: Performed by: FAMILY MEDICINE

## 2018-12-16 PROCEDURE — 25000003 PHARM REV CODE 250: Performed by: STUDENT IN AN ORGANIZED HEALTH CARE EDUCATION/TRAINING PROGRAM

## 2018-12-16 PROCEDURE — 90686 IIV4 VACC NO PRSV 0.5 ML IM: CPT | Performed by: FAMILY MEDICINE

## 2018-12-16 PROCEDURE — 36415 COLL VENOUS BLD VENIPUNCTURE: CPT

## 2018-12-16 PROCEDURE — C9113 INJ PANTOPRAZOLE SODIUM, VIA: HCPCS | Performed by: STUDENT IN AN ORGANIZED HEALTH CARE EDUCATION/TRAINING PROGRAM

## 2018-12-16 PROCEDURE — S4991 NICOTINE PATCH NONLEGEND: HCPCS | Performed by: STUDENT IN AN ORGANIZED HEALTH CARE EDUCATION/TRAINING PROGRAM

## 2018-12-16 RX ORDER — PANTOPRAZOLE SODIUM 40 MG/1
40 TABLET, DELAYED RELEASE ORAL DAILY
Qty: 360 TABLET | Refills: 0 | Status: SHIPPED | OUTPATIENT
Start: 2018-12-16 | End: 2018-12-16 | Stop reason: SDUPTHER

## 2018-12-16 RX ORDER — PANTOPRAZOLE SODIUM 40 MG/1
40 TABLET, DELAYED RELEASE ORAL 2 TIMES DAILY
Qty: 180 TABLET | Refills: 3 | Status: SHIPPED | OUTPATIENT
Start: 2018-12-16 | End: 2018-12-21 | Stop reason: SDUPTHER

## 2018-12-16 RX ADMIN — HYDROCHLOROTHIAZIDE 25 MG: 25 TABLET ORAL at 08:12

## 2018-12-16 RX ADMIN — FLUOXETINE HYDROCHLORIDE 20 MG: 20 CAPSULE ORAL at 08:12

## 2018-12-16 RX ADMIN — OXYBUTYNIN CHLORIDE 5 MG: 5 TABLET ORAL at 08:12

## 2018-12-16 RX ADMIN — PANTOPRAZOLE SODIUM 40 MG: 40 INJECTION, POWDER, FOR SOLUTION INTRAVENOUS at 08:12

## 2018-12-16 RX ADMIN — NICOTINE 1 PATCH: 21 PATCH, EXTENDED RELEASE TRANSDERMAL at 08:12

## 2018-12-16 RX ADMIN — GABAPENTIN 100 MG: 100 CAPSULE ORAL at 08:12

## 2018-12-16 RX ADMIN — INFLUENZA A VIRUS A/MICHIGAN/45/2015 X-275 (H1N1) ANTIGEN (FORMALDEHYDE INACTIVATED), INFLUENZA A VIRUS A/SINGAPORE/INFIMH-16-0019/2016 IVR-186 (H3N2) ANTIGEN (FORMALDEHYDE INACTIVATED), INFLUENZA B VIRUS B/PHUKET/3073/2013 ANTIGEN (FORMALDEHYDE INACTIVATED), AND INFLUENZA B VIRUS B/MARYLAND/15/2016 BX-69A ANTIGEN (FORMALDEHYDE INACTIVATED) 0.5 ML: 15; 15; 15; 15 INJECTION, SUSPENSION INTRAMUSCULAR at 09:12

## 2018-12-16 NOTE — DISCHARGE SUMMARY
Discharge Summary      Admit Date: 12/15/2018    Discharge Date and Time: 12/16/2018    Attending Physician: Christoph Bull III, MD     Discharge Physician: Ousmane Rivera MD     Principal Diagnoses: Upper GI bleed  The primary encounter diagnosis was Upper GI bleed. A diagnosis of Gastritis, presence of bleeding unspecified, unspecified chronicity, unspecified gastritis type was also pertinent to this visit.    Discharged Condition: stable    Hospital Course: Xiomy Duncan is a 58 y.o. female with pmh  has a past medical history of Anemia, Asthma, Cannabis abuse, daily use, Colon polyps, GERD (gastroesophageal reflux disease) (8/7/2014), Hoarseness (8/7/2014), Hypertension, Seizures, Thyroid nodule, and Ulcer. who presented with Upper GI bleed due to heavy NSAID use with an admission H/H of 9.1/30.3 with no blood transfusions being required. She as started on a PPI, her NSAID was stopped, and an EGD was performed which showed a nonbleeding gastric ulcer with adherent clot which was coagulated and a biopsy was taken. She was stable with no active bleeding and she was discharged on a PPI and her NSAID was discontinued. She will follow up with GI and avoid NSAIDs.     Neuro  AAO X4  No analgesia nor sedatation needed  Hx of anxiety, home meds include xanax 0.25 BID PRN and clonidine also perhaps for anxiety?  Pt had had seizures but has been weaned off of antieplileptics and seizure free for years now     CV  HTN  On HCTZ for BP control  Will delay restarting BP med until after ensuring stability with fluid status     Pulm:  Likely COPD, perhaps undiagnosed.  Ot did smoke for >12 pack years  Restarted home meds along with duo-neb breathing treatments  Nicotine patch ordered  BARI     FEN/GI:  Gastric ulcer   EGD yesterday showed non bleeding gastric ulcer coagulation and biopsy taking  Avoid NSAIDS home NSAID discontinued   Protonix 40 mg oral     Heme/ID:  H/H in [place] 9.7/30.3 (down from 11.6/37 earlier in  the day) baseline Hgb is 14  Type and screen obtained in [place] ED, will repeat at [facility] in case blood bank needs it repeated  Prepared two units  Monitor H/H q 4 hours  No physical exam signs of infection at this point, WBC improving without abx  Will monitor     Renal:  Stable      Endocrine  June HgBA1C 5.6  Stable      PPX:  GI ppx-  PPI BID  DVT ppx-  RAMESH hose, bleeding risk    Findings:       The examined esophagus was normal.       Localized mild inflammation characterized by erythema was found in        the gastric antrum.       One non-bleeding cratered gastric ulcer with adherent clot was found        on the lesser curvature of the stomach. The lesion was eighteen mm        by twenty-two mm in largest dimension with raised margin. Clot        removal with suction was attempted. This was successful and revealed        an oozing lesion with a visible vessel. Coagulation for hemostasis        using bipolar probe was successful. Biopsies were taken form the        ulcer margin with a cold forceps for histology.       The cardia and gastric fundus were normal on retroflexion.       The examined duodenum was normal.  Impression:           - Normal esophagus.                        - Gastritis.                        - Non-bleeding gastric ulcer with adherent clot.                         Treated with bipolar cautery. Biopsied.                        - Normal examined duodenum.  Recommendation:       - Await pathology results.                        - Recommend acid suppression medication.                        - No aspirin, ibuprofen, naproxen, or other                         non-steroidal anti-inflammatory drugs.                        - Repeat upper endoscopy in 2 months for                         surveillance.                        - May transfer patient to [facility] for ongoing                         care.    Consults: IP CONSULT TO GI  IP CONSULT TO ANESTHESIOLOGY    Disposition: Home or  Self Care    Patient Instructions:   Current Discharge Medication List      CONTINUE these medications which have CHANGED    Details   pantoprazole (PROTONIX) 40 MG tablet Take 1 tablet (40 mg total) by mouth once daily.  Qty: 360 tablet, Refills: 0         CONTINUE these medications which have NOT CHANGED    Details   gabapentin (NEURONTIN) 100 MG capsule Take 100 mg by mouth 3 (three) times daily.      albuterol (PROVENTIL) 2.5 mg /3 mL (0.083 %) nebulizer solution Take 3 mLs (2.5 mg total) by nebulization every 6 (six) hours as needed for Wheezing.  Qty: 1 Box, Refills: 0      ALPRAZolam (XANAX) 0.25 MG tablet Take 1 tablet (0.25 mg total) by mouth 2 (two) times daily as needed for Anxiety.  Qty: 10 tablet, Refills: 0    Associated Diagnoses: Lumbar pain      ANORO ELLIPTA 62.5-25 mcg/actuation DsDv INHALE 1 PUFF INTO THE LUNGS ONCE DAILY.  Qty: 30 each, Refills: 5    Associated Diagnoses: Wheezy bronchitis      atorvastatin (LIPITOR) 40 MG tablet Take 1 tablet (40 mg total) by mouth every evening.  Qty: 30 tablet, Refills: 11    Associated Diagnoses: Hyperlipidemia      cloNIDine (CATAPRES) 0.1 MG tablet TAKE 1 TABLET (0.1 MG TOTAL) BY MOUTH 2 (TWO) TIMES DAILY.  Qty: 30 tablet, Refills: 2    Associated Diagnoses: Essential hypertension      FLUoxetine (PROZAC) 20 MG capsule 1 CAPSULE(S) ORALLY 1 TIMES A DAY (IN THE MORNING)  Refills: 1      hydroCHLOROthiazide (HYDRODIURIL) 25 MG tablet Take 1 tablet (25 mg total) by mouth once daily.  Qty: 30 tablet, Refills: 2    Associated Diagnoses: Essential hypertension      lactulose (CHRONULAC) 10 gram/15 mL solution TAKE 30 MLS (20 G TOTAL) BY MOUTH 3 (THREE) TIMES DAILY.  Qty: 300 mL, Refills: 0      oxybutynin (DITROPAN) 5 MG Tab TAKE 1 TABLET (5 MG TOTAL) BY MOUTH 2 (TWO) TIMES DAILY.  Qty: 60 tablet, Refills: 2    Associated Diagnoses: Urge incontinence of urine      PREMPRO 0.45-1.5 mg per tablet TAKE 1 TABLET BY MOUTH ONCE DAILY.  Qty: 28 tablet, Refills: 10     Associated Diagnoses: Menopausal symptoms      STIOLTO RESPIMAT 2.5-2.5 mcg/actuation Mist INHALE 1 INHALATION INTO THE LUNGS ONCE DAILY. CONTROLLER  Qty: 4 g, Refills: 2         STOP taking these medications       diclofenac (VOLTAREN) 25 MG TbEC Comments:   Reason for Stopping:         benzonatate (TESSALON) 200 MG capsule Comments:   Reason for Stopping:         CHANTIX 1 mg Tab Comments:   Reason for Stopping:         nicotine (NICODERM CQ) 14 mg/24 hr Comments:   Reason for Stopping:         nicotine (NICODERM CQ) 21 mg/24 hr Comments:   Reason for Stopping:         varenicline (CHANTIX STARTING MONTH BOX) 0.5 mg (11)- 1 mg (42) tablet Comments:   Reason for Stopping:               Discharge Procedure Orders   Ambulatory referral to Gastroenterology   Referral Priority: Routine Referral Type: Consultation   Referral Reason: Specialty Services Required   Referred to Provider: JOSE DAVID BARTHOLOMEW Requested Specialty: Gastroenterology   Number of Visits Requested: 1     Diet diabetic     Notify your health care provider if you experience any of the following:  temperature >100.4     Notify your health care provider if you experience any of the following:  persistent nausea and vomiting or diarrhea     Notify your health care provider if you experience any of the following:  severe uncontrolled pain     Notify your health care provider if you experience any of the following:  persistent dizziness, light-headedness, or visual disturbances     Notify your health care provider if you experience any of the following:  increased confusion or weakness     Activity as tolerated     33 minutes was spent on this discharge summary.     Ousmane Rivera MD   12/16/2018  7:41 AM

## 2018-12-16 NOTE — PLAN OF CARE
Problem: Adult Inpatient Plan of Care  Goal: Plan of Care Review  Outcome: Ongoing (interventions implemented as appropriate)  Patient AAo x 4. NAD. VSS. Patient had no complaints of pain this shift. Up independently. Advanced to regular diet this shift. Call light within reach. Bed in lowest position. Side rails x 2. Will continue to monitor.

## 2018-12-16 NOTE — PLAN OF CARE
Problem: Adult Inpatient Plan of Care  Goal: Plan of Care Review  Outcome: Ongoing (interventions implemented as appropriate)  Pt in NAD. VSS. AAOx4. Pt complains of no pain throughout shift. Pt ate Je[employer] witness[employer] chicken prior to start of shift and tolerated well, no complaints of nausea. Pt on accuchecks. Pt has been resting quietly and able to sleep. Will continue to monitor.

## 2018-12-16 NOTE — NURSING
Pt requesting something to help with cough. Respiratory called to provide breathing treatment for pt.

## 2018-12-16 NOTE — PLAN OF CARE
Discharge orders noted, no HH or HME ordered.    Pt's nurse will go over medications/signs and symptoms prior to discharge       12/16/18 0855   Final Note   Assessment Type Final Discharge Note   Anticipated Discharge Disposition Home   What phone number can be called within the next 1-3 days to see how you are doing after discharge? [phone]   Hospital Follow Up  Appt(s) scheduled? No  (Offices closed for weekend. Patient to schedule own follow up appointment.)   Right [facility] Referral Info   Post Acute Recommendation No Care     Eliz Jamison RN Transitional Navigator  [phone]

## 2018-12-16 NOTE — PLAN OF CARE
Problem: Adult Inpatient Plan of Care  Goal: Plan of Care Review  Called to pt room to administer PRN aerosol tx per RN. Pt was asleep, no respiratory distress noted. Breathsounds clear throughout and sats 100% on RA. Aerosol tx given with no adverse effects, will cont to monitor.

## 2018-12-16 NOTE — PROGRESS NOTES
Progress Note  ICU    Admit Date: 12/15/2018   LOS: 1 day     SUBJECTIVE:     No episodes of hematemesis since transferred.  H/H stable.  Tolerating diet.  No melena, no hematochezia. EGD done yesterday that showed non bleeding gastric ulcer which was coagulated and biopsy taken. On PPI and will be discharged today.       Continuous Infusions:  Scheduled Meds:   atorvastatin  40 mg Oral QHS    FLUoxetine  20 mg Oral Daily    gabapentin  100 mg Oral TID    hydroCHLOROthiazide  25 mg Oral Daily    nicotine  1 patch Transdermal Daily    oxybutynin  5 mg Oral BID    pantoprazole  40 mg Intravenous BID     PRN Meds:sodium chloride, acetaminophen, albuterol-ipratropium, ALPRAZolam, dextrose 50%, dextrose 50%, glucagon (human recombinant), glucose, glucose, influenza, ondansetron, sodium chloride 0.9%    Review of patient's allergies indicates:   Allergen Reactions    Penicillins Rash     ROS   Denies chest pain, denies SOB, abdom pain present but mild, +hungry    OBJECTIVE:     Vital Signs (Most Recent)  Temp: 98.4 °F (36.9 °C) (12/16/18 0634)  Pulse: 61 (12/16/18 0634)  Resp: 18 (12/16/18 0634)  BP: 139/65 (12/16/18 0634)  SpO2: 100 % (12/16/18 0045)    Vital Signs Range (Last 24H):  Temp:  [98.3 °F (36.8 °C)-98.4 °F (36.9 °C)]   Pulse:  [58-98]   Resp:  [16-21]   BP: (135-154)/(56-83)   SpO2:  [98 %-100 %]     Current Diet Order   Procedures    Diet Adult Regular (IDDSI Level 7)    Diet diabetic      I & O (Last 24H):    Intake/Output Summary (Last 24 hours) at 12/16/2018 0806  Last data filed at 12/15/2018 1410  Gross per 24 hour   Intake --   Output 934 ml   Net -934 ml     Wt Readings from Last 3 Encounters:   12/16/18 71.6 kg (157 lb 13.6 oz)   11/14/18 73.7 kg (162 lb 7.7 oz)   07/10/18 72.5 kg (159 lb 12.8 oz)       Physical Exam   General: No acute distress. Alert and oriented x3.  HEENT: normocephalic, atraumatic, pupils equally round and reactive. Extraocular muscles intact. Clear OP. Moist mucus  membranes  Neck: supple, no lymphadenopathy, no JVD  Cardiovascular: regular rate and rhythm, no murmurs rubs or gallops  Lungs: clear to auscultation bilaterally, no wheezes or increase work of breathing.  Abdomen: Soft, epigastric, felice-umbilical and RUQ tender, no rebound, no guarding, non-distended  Extremities: No clubbing, cyanosis or edema. 2+ peripheral pulses, 5/5 strength in all extremities  Neuro: Cranial nerves 2-12 grossly intact with no focal deficits.  Skin: No rashes or erythema present.          Lines/Drains:       Peripheral IV - Single Lumen 12/14/18 1705 Left Antecubital (Active)   Site Assessment Clean;Dry;Intact;No redness;No swelling 12/15/2018  3:05 AM   Line Status No blood return;Flushed;Saline locked 12/15/2018  3:05 AM   Dressing Status Clean;Dry;Intact 12/15/2018  3:05 AM   Dressing Change Due 12/18/18 12/15/2018  3:05 AM   Site Change Due 12/18/18 12/15/2018  2:10 AM   Reason Not Rotated Not due 12/15/2018  3:05 AM   Number of days: 0            Peripheral IV - Single Lumen 12/14/18 2202 Right Antecubital (Active)   Site Assessment Clean;Dry;Intact;No redness;No swelling 12/15/2018  3:05 AM   Line Status Saline locked 12/15/2018  3:05 AM   Dressing Status Clean;Dry;Intact 12/15/2018  3:05 AM   Dressing Change Due 12/18/18 12/15/2018  3:05 AM   Site Change Due 12/18/18 12/15/2018  2:10 AM   Reason Not Rotated Not due 12/15/2018  3:05 AM   Number of days: 0         LABS  CBC  Recent Labs   Lab 12/14/18  1635 12/14/18  2048 12/15/18  0228 12/15/18  0824 12/15/18  1129 12/15/18  2335 12/16/18  0416   WBC 16.89* 13.84* 13.06*  --   --  9.82 8.93   RBC 3.83* 3.14* 3.51*  --   --  3.15* 3.21*   HGB 11.6* 9.7* 10.7* 10.3* 10.1* 9.8* 9.9*   HCT 37.0 30.3* 33.5* 32.2* 31.1* 30.0* 30.7*    237 262  --   --  254 247   MCV 97 97 95  --   --  95 96   MCH 30.3 30.9 30.5  --   --  31.1* 30.8   MCHC 31.4* 32.0 31.9*  --   --  32.7 32.2       [facility]  Recent Labs   Lab 12/14/18  1634 12/15/18  0228  12/16/18  0416    139 139   K 3.7 4.0 3.8   CO2 24 23 26    110 105   BUN 32* 22* 14   CREATININE 1.0 0.8 0.9       CMP  Sodium   Date Value Ref Range Status   12/16/2018 139 136 - 145 mmol/L Final     Potassium   Date Value Ref Range Status   12/16/2018 3.8 3.5 - 5.1 mmol/L Final     Chloride   Date Value Ref Range Status   12/16/2018 105 95 - 110 mmol/L Final     CO2   Date Value Ref Range Status   12/16/2018 26 23 - 29 mmol/L Final     Glucose   Date Value Ref Range Status   12/16/2018 91 70 - 110 mg/dL Final     BUN, Bld   Date Value Ref Range Status   12/16/2018 14 6 - 20 mg/dL Final     Creatinine   Date Value Ref Range Status   12/16/2018 0.9 0.5 - 1.4 mg/dL Final     Calcium   Date Value Ref Range Status   12/16/2018 8.8 8.7 - 10.5 mg/dL Final     Total Protein   Date Value Ref Range Status   12/16/2018 6.0 6.0 - 8.4 g/dL Final     Albumin   Date Value Ref Range Status   12/16/2018 3.0 (L) 3.5 - 5.2 g/dL Final     Total Bilirubin   Date Value Ref Range Status   12/16/2018 0.2 0.1 - 1.0 mg/dL Final     Comment:     For infants and newborns, interpretation of results should be based  on gestational age, weight and in agreement with clinical  observations.  Premature Infant recommended reference ranges:  Up to 24 hours.............<8.0 mg/dL  Up to 48 hours............<12.0 mg/dL  3-5 days..................<15.0 mg/dL  6-29 days.................<15.0 mg/dL       Alkaline Phosphatase   Date Value Ref Range Status   12/16/2018 55 55 - 135 U/L Final     AST   Date Value Ref Range Status   12/16/2018 20 10 - 40 U/L Final     ALT   Date Value Ref Range Status   12/16/2018 14 10 - 44 U/L Final     Anion Gap   Date Value Ref Range Status   12/16/2018 8 8 - 16 mmol/L Final     eGFR if    Date Value Ref Range Status   12/16/2018 >60 >60 mL/min/1.73 m^2 Final     eGFR if non    Date Value Ref Range Status   12/16/2018 >60 >60 mL/min/1.73 m^2 Final     Comment:     Calculation  used to obtain the estimated glomerular filtration  rate (eGFR) is the CKD-EPI equation.            POCT-Glucose  POCT Glucose   Date Value Ref Range Status   12/16/2018 73 70 - 110 mg/dL Final   12/15/2018 123 (H) 70 - 110 mg/dL Final     Recent Labs   Lab 12/14/18  1634 12/15/18  0228 12/16/18  0416   CALCIUM 8.7 8.1* 8.8   MG  --  1.9 1.9   PHOS  --  2.3* 3.7         LFT  Recent Labs   Lab 12/14/18  1634 12/15/18  0228 12/16/18  0416   PROT 6.7 6.0 6.0   ALBUMIN 3.2* 2.9* 3.0*   BILITOT 0.2 0.3 0.2   AST 23 22 20   ALKPHOS 63 58 55   ALT 17 15 14         COAGS  Recent Labs   Lab 12/14/18  1635 12/15/18  0228   INR 1.0 1.0   APTT 27.6 31.2       CE  Recent Labs   Lab 12/14/18  1635   TROPONINI <0.006     BNP  No results for input(s): BNP in the last 168 hours.    ABGs  No results for input(s): PH, PCO2, PO2, HCO3, POCSATURATED, BE in the last 24 hours.    UA  No results for input(s): COLORU, CLARITYU, SPECGRAV, PHUR, PROTEINUA, GLUCOSEU, BLOODU, WBCU, RBCU, BACTERIA, MUCUS in the last 24 hours.    Invalid input(s):  BILIRUBINCON  LAST HbA1c  Lab Results   Component Value Date    HGBA1C 5.6 12/15/2018       Microbiology Results (last 7 days)     ** No results found for the last 168 hours. **          IP CONSULT TO GI  IP CONSULT TO ANESTHESIOLOGY    No new subjective & objective note has been filed under this hospital service since the last note was generated.        ASSESSMENT/PLAN:       Xiomy Duncan is a 58 y.o. female with extensive pmh who presented with Upper GI bleed.     The primary encounter diagnosis was Upper GI bleed. A diagnosis of Gastritis, presence of bleeding unspecified, unspecified chronicity, unspecified gastritis type was also pertinent to this visit.    Problem list  1. Upper GI bleed  2. HTN  3. PUD  4. GERD  5. Hx of three 1-3 mm polyps in recto-sigmoid colon 2014        Neuro  AAO X4  No analgesia nor sedatation needed  Hx of anxiety, home meds include xanax 0.25 BID PRN and clonidine  also perhaps for anxiety?  Pt had had seizures but has been weaned off of antieplileptics and seizure free for years now     CV  HTN  On HCTZ for BP control  Will delay restarting BP med until after ensuring stability with fluid status     Pulm:  Likely COPD, perhaps undiagnosed.  Ot did smoke for >12 pack years  Restarted home meds along with duo-neb breathing treatments  Nicotine patch ordered  BARI     FEN/GI:  Gastric ulcer   EGD yesterday showed non bleeding gastric ulcer coagulation and biopsy taking  Avoid NSAIDS home NSAID discontinued   Protonix 40 mg oral     Heme/ID:  H/H in [place] 9.7/30.3 (down from 11.6/37 earlier in the day) baseline Hgb is 14  Type and screen obtained in [place] ED, will repeat at [facility] in case blood bank needs it repeated  Prepared two units  Monitor H/H q 4 hours  No physical exam signs of infection at this point, WBC improving without abx  Will monitor     Renal:  Stable      Endocrine  June HgBA1C 5.6  Stable      PPX:  GI ppx-  PPI BID  DVT ppx-  RAMESH hose, bleeding risk        Code Status: Full     Dispo  - D/c today               12/16/2018 Ousmane Rivera M.D., PGY2  [facility]

## 2018-12-16 NOTE — DISCHARGE INSTRUCTIONS
No aspirin, ibuprofen, naproxen, or other non-steroidal anti-inflammatory drugs    Understanding Gastric Ulcers     A gastric ulcer is an open sore in the stomach lining. It is sometimes called a peptic ulcer. This is a more general term for ulcers that may be in the stomach or the upper part of the small intestine. Ulcers can cause pain. But they may also have no symptoms for a long time.  What causes gastric ulcers?  Gastric ulcers have a few common causes. To find the cause of your ulcer, your healthcare provider will give you an exam and take your health history. He or she may also order some tests. The main causes of gastric ulcers include:  · Infection with the H. pylori (Helicobacter pylori) bacteria. This damages the stomach lining. Digestive juices can then harm the digestive tract.  · Long-term use of some over-the-counter pain medicines. This reduces the bodys ability to protect the stomach from damage.  Other causes of gastric ulcers include:  · Heavy alcohol use  · Having a family history of ulcers  · In rare cases, a tumor in the digestive tract may cause an ulcer  Symptoms of a gastric ulcer  Ulcer symptoms may appear and then go away for a time. Symptoms of a gastric ulcer may include:  · Stomach pain, often a dull or burning feeling toward the top of your belly  · Feeling full or bloated  · Heartburn or acid reflux  · Upset stomach (nausea) or vomiting  · Vomiting blood  · Lack of appetite  · Weight loss  · Black stool  · Red blood in the stool  Treatment for a gastric ulcer  Treatment for gastric ulcers may depend on what is causing them. Treatment may include:  · Not using over-the-counter medicines. You will likely need to stop taking these medicines. But in some cases these medicines cant be safely stopped. Check with your healthcare provider to see what is best for you.   · Taking medicines to ease symptoms. These medicines may help to reduce the amount of acid your stomach makes. They also  may help coat your stomach lining.  · Taking antibiotics. If your ulcer was caused by H. pylori, your provider will likely prescribe antibiotics to get rid of the infection.  · Having an endoscopy. This is often done to check the stomach and diagnose the ulcer. In some cases it can also treat the ulcer. It involves passing a flexible tube through your mouth into your stomach and small intestine.  · Using a tube (catheter) to stop the bleeding. A thin tube is passed into one of your blood vessels. Special tools are used to help stop the bleeding.  · Having surgery. You often may need this if the ulcer has caused severe symptoms.  Making some lifestyle changes can reduce ulcer symptoms. It may also prevent more damage to your digestive tract. These changes include:  · Not taking over-the-counter pain medicines. Talk with your provider about using another type of pain reliever.  · Not taking aspirin unless your provider has advised it  · Limiting the amount of alcohol you drink  · Quitting smoking  Possible complications of a gastric ulcer  Gastric ulcers can have serious complications. These can include:  · Bleeding into the stomach  · A hole (perforation) in the stomach  · A blockage that interferes with food moving from your stomach to the small intestine  An ongoing infection with H. pylori may be a risk factor for stomach cancer. This is one reason it is important to get rid of this bacteria.  When to call your healthcare provider  Call your healthcare provider right away if you have any of these:  · Vomiting blood, or vomit that looks like coffee grounds  · Bloody, black, or tarry-looking stools  · Fever of 100.4°F (38°C) or higher, or as directed  · Pain that gets worse  · Symptoms that dont get better with treatment, or symptoms that get worse  · New symptoms   Date Last Reviewed: 5/1/2016  © 6857-4012 [employer]. [address] [place] [address], [place] [address]. All rights reserved. This information  is not intended as a substitute for professional medical care. Always follow your healthcare professional's instructions.

## 2018-12-18 ENCOUNTER — TELEPHONE (OUTPATIENT)
Dept: NEUROLOGY | Facility: HOSPITAL | Age: 58
End: 2018-12-18

## 2018-12-18 ENCOUNTER — TELEPHONE (OUTPATIENT)
Dept: FAMILY MEDICINE | Facility: CLINIC | Age: 58
End: 2018-12-18

## 2018-12-18 NOTE — TELEPHONE ENCOUNTER
----- Message from Annie Rich sent at 2018  9:42 AM CST -----  Contact: pt  Xiomy Duncan  MRN: [id number]  : 1960  PCP: Arnie Monson  Home Phone      [phone]  Work Phone      Not on file.  Mobile          [phone]      MESSAGE:   Pt requests a sooner appointment than the  can schedule.  Does patient feel like they need to be seen today:  This week  What is the nature of the appointment:  ER f/u  What visit type:  ep  Did you check other providers/department schedules for availability:   yes  Comments: Pt was vomiting blood, she went to [facility], they sent her to [facility] in [place] & They diagnosed her with Gastro trouble.     Phone:  [phone]

## 2018-12-18 NOTE — TELEPHONE ENCOUNTER
----- Message from Shravan Lancaster MD sent at 12/18/2018 11:43 AM CST -----  Contact: Patient  I can see her but her next visit should be an EGD in mid January to check to make sure her stomach ulcer has healed. Schedule for EGD instead of clinic visit     ----- Message -----  From: Eliz Robles LPN  Sent: 12/18/2018  11:33 AM  To: Shravan Lancaster MD    Pt referred by Dr. Villagomez, has medicaid.  Ok to schedule with you or forward to [facility]?  ----- Message -----  From: Rosamaria Drake  Sent: 12/18/2018   9:51 AM  To: Tonny Chenye Staff    GI:  Patient called, was referred by ER to see Dr. Lancaster.  Patient has [facility] Medicaid and saw Dr. Villagomez in the ER on 12/15/18.  She can be reached at [phone].  Thank you  abc

## 2018-12-19 ENCOUNTER — TELEPHONE (OUTPATIENT)
Dept: NEUROLOGY | Facility: HOSPITAL | Age: 58
End: 2018-12-19

## 2018-12-19 DIAGNOSIS — K92.2 UPPER GASTROINTESTINAL BLEEDING: Primary | ICD-10-CM

## 2018-12-19 LAB
BLD PROD TYP BPU: NORMAL
BLD PROD TYP BPU: NORMAL
BLOOD UNIT EXPIRATION DATE: NORMAL
BLOOD UNIT EXPIRATION DATE: NORMAL
BLOOD UNIT TYPE CODE: 6200
BLOOD UNIT TYPE CODE: 6200
BLOOD UNIT TYPE: NORMAL
BLOOD UNIT TYPE: NORMAL
CODING SYSTEM: NORMAL
CODING SYSTEM: NORMAL
DISPENSE STATUS: NORMAL
DISPENSE STATUS: NORMAL
TRANS ERYTHROCYTES VOL PATIENT: NORMAL ML
TRANS ERYTHROCYTES VOL PATIENT: NORMAL ML

## 2018-12-19 NOTE — TELEPHONE ENCOUNTER
----- Message from Shravan Lancaster MD sent at 12/19/2018  2:12 PM CST -----  Contact: Patient  Schedule for EGD with me in late January     ----- Message -----  From: Eliz Robles LPN  Sent: 12/18/2018  11:33 AM  To: Shravan Lancaster MD    Pt referred by Dr. Villagomez, has medicaid.  Ok to schedule with you or forward to [facility]?  ----- Message -----  From: Rosamaria Drake  Sent: 12/18/2018   9:51 AM  To: Tonny Cheney Staff    GI:  Patient called, was referred by ER to see Dr. Lancaster.  Patient has [facility] Medicaid and saw Dr. Villagomez in the ER on 12/15/18.  She can be reached at [phone].  Thank you  abc

## 2018-12-19 NOTE — TELEPHONE ENCOUNTER
EGD scheduled with pt on Monday, January 28, 2019.  Pt instructed to eat light meals on Sunday, January 27, 2019 with nothing to eat or drink after midnight.  Pt notified she will be contacted by Endoscopy staff 2-3 days prior to procedure with arrival time to [facility] 1st Floor Admit.  Pt repeated instructions correctly.

## 2018-12-21 ENCOUNTER — TELEPHONE (OUTPATIENT)
Dept: FAMILY MEDICINE | Facility: CLINIC | Age: 58
End: 2018-12-21

## 2018-12-21 ENCOUNTER — OFFICE VISIT (OUTPATIENT)
Dept: FAMILY MEDICINE | Facility: CLINIC | Age: 58
End: 2018-12-21
Payer: MEDICAID

## 2018-12-21 VITALS
DIASTOLIC BLOOD PRESSURE: 68 MMHG | SYSTOLIC BLOOD PRESSURE: 132 MMHG | BODY MASS INDEX: 28.93 KG/M2 | HEART RATE: 68 BPM | RESPIRATION RATE: 18 BRPM | WEIGHT: 157.19 LBS | HEIGHT: 62 IN

## 2018-12-21 DIAGNOSIS — K25.7 CHRONIC GASTRIC ULCER, UNSPECIFIED WHETHER GASTRIC ULCER HEMORRHAGE OR PERFORATION PRESENT: Primary | ICD-10-CM

## 2018-12-21 DIAGNOSIS — K92.2 UPPER GI BLEED: ICD-10-CM

## 2018-12-21 DIAGNOSIS — M54.50 LUMBAR PAIN: ICD-10-CM

## 2018-12-21 DIAGNOSIS — Z23 NEED FOR TETANUS, DIPHTHERIA, AND ACELLULAR PERTUSSIS (TDAP) VACCINE IN PATIENT OF ADOLESCENT AGE OR OLDER: ICD-10-CM

## 2018-12-21 LAB
ANION GAP SERPL CALC-SCNC: 11 MMOL/L
BASOPHILS # BLD AUTO: 0.07 K/UL
BASOPHILS NFR BLD: 0.5 %
BUN SERPL-MCNC: 22 MG/DL
CALCIUM SERPL-MCNC: 9.7 MG/DL
CHLORIDE SERPL-SCNC: 98 MMOL/L
CO2 SERPL-SCNC: 28 MMOL/L
CREAT SERPL-MCNC: 1.2 MG/DL
DIFFERENTIAL METHOD: ABNORMAL
EOSINOPHIL # BLD AUTO: 0.3 K/UL
EOSINOPHIL NFR BLD: 2.1 %
ERYTHROCYTE [DISTWIDTH] IN BLOOD BY AUTOMATED COUNT: 15 %
EST. GFR  (AFRICAN AMERICAN): 58 ML/MIN/1.73 M^2
EST. GFR  (NON AFRICAN AMERICAN): 51 ML/MIN/1.73 M^2
GLUCOSE SERPL-MCNC: 90 MG/DL
HCT VFR BLD AUTO: 35.6 %
HGB BLD-MCNC: 11.5 G/DL
LYMPHOCYTES # BLD AUTO: 4.6 K/UL
LYMPHOCYTES NFR BLD: 31.2 %
MCH RBC QN AUTO: 30.8 PG
MCHC RBC AUTO-ENTMCNC: 32.3 G/DL
MCV RBC AUTO: 95 FL
MONOCYTES # BLD AUTO: 1.3 K/UL
MONOCYTES NFR BLD: 8.8 %
NEUTROPHILS # BLD AUTO: 8.4 K/UL
NEUTROPHILS NFR BLD: 57.4 %
PLATELET # BLD AUTO: 371 K/UL
PMV BLD AUTO: 12.2 FL
POTASSIUM SERPL-SCNC: 3.9 MMOL/L
RBC # BLD AUTO: 3.73 M/UL
SODIUM SERPL-SCNC: 137 MMOL/L
WBC # BLD AUTO: 14.59 K/UL

## 2018-12-21 PROCEDURE — 36415 COLL VENOUS BLD VENIPUNCTURE: CPT | Mod: PBBFAC

## 2018-12-21 PROCEDURE — 80048 BASIC METABOLIC PNL TOTAL CA: CPT

## 2018-12-21 PROCEDURE — 85025 COMPLETE CBC W/AUTO DIFF WBC: CPT

## 2018-12-21 PROCEDURE — 99214 OFFICE O/P EST MOD 30 MIN: CPT | Mod: S$PBB,,, | Performed by: FAMILY MEDICINE

## 2018-12-21 PROCEDURE — 99495 TRANSJ CARE MGMT MOD F2F 14D: CPT | Mod: PBBFAC | Performed by: FAMILY MEDICINE

## 2018-12-21 PROCEDURE — 90471 IMMUNIZATION ADMIN: CPT | Mod: PBBFAC

## 2018-12-21 PROCEDURE — 99999 PR PBB SHADOW E&M-EST. PATIENT-LVL III: CPT | Mod: PBBFAC,,, | Performed by: FAMILY MEDICINE

## 2018-12-21 PROCEDURE — 99213 OFFICE O/P EST LOW 20 MIN: CPT | Mod: PBBFAC | Performed by: FAMILY MEDICINE

## 2018-12-21 RX ORDER — PANTOPRAZOLE SODIUM 40 MG/1
40 TABLET, DELAYED RELEASE ORAL 2 TIMES DAILY
Qty: 180 TABLET | Refills: 3 | Status: SHIPPED | OUTPATIENT
Start: 2018-12-21 | End: 2019-01-24 | Stop reason: SDUPTHER

## 2018-12-21 RX ORDER — ALPRAZOLAM 0.25 MG/1
0.25 TABLET ORAL 2 TIMES DAILY PRN
Qty: 10 TABLET | Refills: 0 | Status: SHIPPED | OUTPATIENT
Start: 2018-12-21 | End: 2019-01-24 | Stop reason: SDUPTHER

## 2018-12-21 RX ORDER — DICLOFENAC SODIUM 75 MG/1
75 TABLET, DELAYED RELEASE ORAL 2 TIMES DAILY
Refills: 1 | COMMUNITY
Start: 2018-12-11 | End: 2018-12-21

## 2018-12-21 RX ORDER — CYCLOBENZAPRINE HCL 5 MG
5 TABLET ORAL NIGHTLY
Refills: 2 | COMMUNITY
Start: 2018-10-31 | End: 2018-12-21

## 2018-12-21 NOTE — LETTER
[facility]  Family Medicine  [address] [place] [id number]  Phone: [phone]  Fax: [phone]   December 24, 2018     Patient: Xiomy Duncan   YOB: 1960   Date of Visit: 12/21/2018       To Whom it May Concern:    Xiomy Duncan was seen in my clinic on 12/21/2018. She may return to work on 12/26/2018.    If you have any questions or concerns, please don't hesitate to call.    Sincerely,         Arnie Monson MD/DORIAN, LPN

## 2018-12-21 NOTE — TELEPHONE ENCOUNTER
----- Message from Vannesa Monzon sent at 2018  2:13 PM CST -----  Contact: pt  Xiomy Duncan  MRN: [id number]  : 1960  PCP: Arnie Monson  Home Phone      [phone]  Work Phone      Not on file.  Mobile          [phone]      MESSAGE:   Pt said she just saw , she wanted to talk to the nurse about returning to work Monday.   [phone]

## 2018-12-21 NOTE — PROGRESS NOTES
Subjective:       Patient ID: Xiomy Duncan is a 58 y.o. female.    Chief Complaint: Hospital Follow Up (GI bleed)      Transitional Care Note    Family and/or Caretaker present at visit?  No.  Diagnostic tests reviewed/disposition: No diagnosic tests pending after this hospitalization.  Disease/illness education: Gastric ulcer  Home health/community services discussion/referrals: Patient does not have home health established from hospital visit.  They do not need home health.  If needed, we will set up home health for the patient.   Establishment or re-establishment of referral orders for community resources: No other necessary community resources.   Discussion with other health care providers: No discussion with other health care providers necessary.     Patient was admitted to the hospital for acute gastric ulcer, hematemesis.  EGD was done.  The all bleeding ulcer was injected in the bleeding resolved.  She was discharged 5 days ago.  She was prescribed Protonix 40 mg twice daily but she never filled it.  She did not know she was supposed to fill it.  She is complaining of some epigastric discomfort.  She denies melena, hematochezia.      Review of Systems   Constitutional: Negative for activity change, chills, fatigue, fever and unexpected weight change.   HENT: Negative for sore throat and trouble swallowing.    Respiratory: Negative for cough, chest tightness and shortness of breath.    Cardiovascular: Negative for chest pain and leg swelling.   Gastrointestinal: Positive for abdominal pain.   Endocrine: Negative for cold intolerance and heat intolerance.   Genitourinary: Negative for difficulty urinating.   Musculoskeletal: Negative for back pain and joint swelling.   Skin: Negative for rash.   Neurological: Negative for numbness.   Hematological: Negative for adenopathy.   Psychiatric/Behavioral: Negative for decreased concentration.       Objective:      Vitals:    12/21/18 1243   BP: 132/68   Pulse:  68   Resp: 18     Physical Exam   Constitutional: She is oriented to person, place, and time. She appears well-developed and well-nourished.   HENT:   Head: Normocephalic and atraumatic.   Eyes: EOM are normal. Pupils are equal, round, and reactive to light.   Neck: Normal range of motion. Neck supple. No JVD present. No thyromegaly present.   Cardiovascular: Normal rate, regular rhythm, normal heart sounds and intact distal pulses. Exam reveals no gallop and no friction rub.   No murmur heard.  Pulmonary/Chest: Effort normal and breath sounds normal.   Abdominal: Soft. Bowel sounds are normal. She exhibits no distension and no mass. There is tenderness. There is no guarding.   Patient has tenderness in all quadrants.  Left upper and left lower quadrant seems to be the worse.   Neurological: She is alert and oriented to person, place, and time. No cranial nerve deficit.   Skin: Skin is warm.   Psychiatric: She has a normal mood and affect. Her behavior is normal. Judgment and thought content normal.       Lab Results   Component Value Date    WBC 11.05 12/16/2018    HGB 10.7 (L) 12/16/2018    HCT 32.9 (L) 12/16/2018    MCV 95 12/16/2018     12/16/2018     BMP  Lab Results   Component Value Date     12/16/2018    K 3.8 12/16/2018     12/16/2018    CO2 26 12/16/2018    BUN 14 12/16/2018    CREATININE 0.9 12/16/2018    CALCIUM 8.8 12/16/2018    ANIONGAP 8 12/16/2018    ESTGFRAFRICA >60 12/16/2018    EGFRNONAA >60 12/16/2018     Lab Results   Component Value Date    HGBA1C 5.6 12/15/2018       Assessment:       1. Chronic gastric ulcer, unspecified whether gastric ulcer hemorrhage or perforation present    2. Lumbar pain    3. Upper GI bleed    4. Need for tetanus, diphtheria, and acellular pertussis (Tdap) vaccine in patient of adolescent age or older        Plan:   Xiomy was seen today for hospital follow up.    Diagnoses and all orders for this visit:    Chronic gastric ulcer, unspecified  whether gastric ulcer hemorrhage or perforation present  -     pantoprazole (PROTONIX) 40 MG tablet; Take 1 tablet (40 mg total) by mouth 2 (two) times daily.  Avoid NSAIDS  Await biopsy    Lumbar pain  -     ALPRAZolam (XANAX) 0.25 MG tablet; Take 1 tablet (0.25 mg total) by mouth 2 (two) times daily as needed for Anxiety.    Upper GI bleed  -     pantoprazole (PROTONIX) 40 MG tablet; Take 1 tablet (40 mg total) by mouth 2 (two) times daily.  -     Basic metabolic panel; Future  -     CBC auto differential; Future    TDAP given  Follow-up with Dr. Barrett in 2 weeks

## 2018-12-24 RX ORDER — CHLORZOXAZONE 500 MG/1
500 TABLET ORAL 4 TIMES DAILY PRN
Qty: 60 TABLET | Refills: 0 | Status: SHIPPED | OUTPATIENT
Start: 2018-12-24 | End: 2019-01-06 | Stop reason: SDUPTHER

## 2018-12-24 NOTE — TELEPHONE ENCOUNTER
Patient notified but due to not getting a call back on Friday, she can not go back to work until Wednesday.  Excuse is at the .

## 2019-01-03 ENCOUNTER — TELEPHONE (OUTPATIENT)
Dept: FAMILY MEDICINE | Facility: CLINIC | Age: 59
End: 2019-01-03

## 2019-01-03 NOTE — TELEPHONE ENCOUNTER
Pt has appt with Dr Barrett on 1/9/19 @1:45pm        pt was on his service last month, had a gastric biopsy,Path results yesterday came back as cancer.      did send a message to  personally.      He just wanted to make sure that Dr. Gaitan is aware and gets a visit with her.     [phone]   (ask for )

## 2019-01-03 NOTE — TELEPHONE ENCOUNTER
----- Message from Brittney Correa sent at 1/3/2019 10:18 AM CST -----  Contact: Dr. Bull - [facility] family Medicine  Xiomy Duncan  MRN: [id number]  : 1960  PCP: Arnie Monson  Home Phone      [phone]  Work Phone      Not on file.  Mobile          [phone]      MESSAGE:    pt was on his service last month, had a gastric biopsy,Path results yesterday came back as cancer.  did send a message to  personally.  He just wanted to make sure that Dr. Gaitan is aware and gets a visit with her.   [phone]   (ask for )

## 2019-01-04 DIAGNOSIS — C16.9 ADENOCARCINOMA OF STOMACH: Primary | ICD-10-CM

## 2019-01-04 NOTE — TELEPHONE ENCOUNTER
Patient needs to come in for results from her recent hospital stay and to have future work up scheduled.  I can do this, but she cannot wait until 1/23 for her appt.  Please contact her to see when she can come in - perhaps Tuesday. If not and she can only come today or Monday, just let me know (call my cell) and I will arrange getting there.  MH

## 2019-01-07 RX ORDER — ONDANSETRON HYDROCHLORIDE 8 MG/1
8 TABLET, FILM COATED ORAL EVERY 8 HOURS PRN
Qty: 20 TABLET | Refills: 0 | Status: SHIPPED | OUTPATIENT
Start: 2019-01-07 | End: 2019-01-14

## 2019-01-07 NOTE — TELEPHONE ENCOUNTER
Apt rescheduled for tomorrow with Dr. Barrett.  She is requesting something to be sent in for nausea. Please advise.

## 2019-01-08 ENCOUNTER — OFFICE VISIT (OUTPATIENT)
Dept: FAMILY MEDICINE | Facility: CLINIC | Age: 59
End: 2019-01-08
Payer: MEDICAID

## 2019-01-08 VITALS
WEIGHT: 152.19 LBS | HEIGHT: 62 IN | BODY MASS INDEX: 28.01 KG/M2 | RESPIRATION RATE: 20 BRPM | HEART RATE: 82 BPM | OXYGEN SATURATION: 98 % | DIASTOLIC BLOOD PRESSURE: 70 MMHG | SYSTOLIC BLOOD PRESSURE: 116 MMHG

## 2019-01-08 DIAGNOSIS — C80.1 ADENOCARCINOMA: Primary | ICD-10-CM

## 2019-01-08 PROCEDURE — 99213 PR OFFICE/OUTPT VISIT, EST, LEVL III, 20-29 MIN: ICD-10-PCS | Mod: S$PBB,,, | Performed by: FAMILY MEDICINE

## 2019-01-08 PROCEDURE — 99213 OFFICE O/P EST LOW 20 MIN: CPT | Mod: S$PBB,,, | Performed by: FAMILY MEDICINE

## 2019-01-08 PROCEDURE — 99999 PR PBB SHADOW E&M-EST. PATIENT-LVL IV: ICD-10-PCS | Mod: PBBFAC,,, | Performed by: FAMILY MEDICINE

## 2019-01-08 PROCEDURE — 99999 PR PBB SHADOW E&M-EST. PATIENT-LVL IV: CPT | Mod: PBBFAC,,, | Performed by: FAMILY MEDICINE

## 2019-01-08 PROCEDURE — 99214 OFFICE O/P EST MOD 30 MIN: CPT | Mod: PBBFAC | Performed by: FAMILY MEDICINE

## 2019-01-08 RX ORDER — PANTOPRAZOLE SODIUM 40 MG/1
40 TABLET, DELAYED RELEASE ORAL DAILY
Qty: 30 TABLET | Refills: 5 | Status: SHIPPED | OUTPATIENT
Start: 2019-01-08 | End: 2019-02-27 | Stop reason: SDUPTHER

## 2019-01-08 NOTE — PROGRESS NOTES
Subjective:       Patient ID: Xiomy Duncan is a 58 y.o. female.    Chief Complaint: Follow-up    HPI  58 year old femselma comes in to discuss recent biopsy results. She notes that she has been seeing pain management and was placed on nsaids and muscle relaxers and her back pain was improving. However, she started abruptly with vomiting and hematemesis and went to the ER. She was transferred and evaluated via EGD and dx'd with an ulcer. She notes that since d/c she has not been taking any new meds and denies having a script for protonix. She says she has had no abdominal pain, fevers, weight loss, bleeding, melena, etc.  During the stay, biopsies were taken of her ulcerated lesion and adenocarcinoma dx'd. She was informed this by her pcp on Friday and comes in today to secure follow up.    PMH, PSH, ALLERGIES, SH, FH reviewed in nurse's notes above  Medications reconciled in the nurse's notes    Review of Systems   Constitutional: Negative for chills and fever.   HENT: Negative for congestion, ear pain, postnasal drip, rhinorrhea, sore throat and trouble swallowing.    Eyes: Negative for redness and itching.   Respiratory: Negative for cough, shortness of breath and wheezing.    Cardiovascular: Negative for chest pain and palpitations.   Gastrointestinal: Negative for abdominal pain, diarrhea, nausea and vomiting.   Genitourinary: Negative for dysuria and frequency.   Musculoskeletal: Positive for back pain.   Skin: Negative for rash.   Neurological: Negative for weakness and headaches.       Objective:      Physical Exam   Constitutional: She is oriented to person, place, and time. She appears well-developed. No distress.   HENT:   Head: Normocephalic and atraumatic.   Eyes: Conjunctivae are normal. Pupils are equal, round, and reactive to light.   Neck: Normal range of motion. Neck supple. No thyromegaly present.   Cardiovascular: Normal rate, regular rhythm, normal heart sounds and intact distal pulses.    Pulmonary/Chest: Effort normal and breath sounds normal. No respiratory distress. She has no wheezes.   Abdominal: Soft. Bowel sounds are normal. There is no tenderness.   Musculoskeletal: Normal range of motion. She exhibits no edema.   Lymphadenopathy:     She has no cervical adenopathy.   Neurological: She is alert and oriented to person, place, and time.   Skin: Skin is warm and dry. No rash noted.   Psychiatric: She has a normal mood and affect. Her behavior is normal.   Nursing note and vitals reviewed.       Assessment/Plan:       Problem List Items Addressed This Visit     None      Visit Diagnoses     Adenocarcinoma    -  Primary    Relevant Orders    Ambulatory referral to Gastroenterology      Patient to see dr. Villagomez on Friday. She also has an EGD scheduled. She notes that the appt with GI has been confirmed. Dr. Gaitan has placed a referral to general surgery as well. No oncology appt has been made. Will reach out to Dr. Villagomez for planning of treatment and further f/u.    Niece here with her would like a second opinion via GI and would like to see more than 1 physician prior to making a plan.     protonix sent in.    RTC if condition acutely worsens or any other concerns, otherwise RTC as scheduled

## 2019-01-09 ENCOUNTER — TELEPHONE (OUTPATIENT)
Dept: FAMILY MEDICINE | Facility: CLINIC | Age: 59
End: 2019-01-09

## 2019-01-11 ENCOUNTER — DOCUMENTATION ONLY (OUTPATIENT)
Dept: SURGERY | Facility: CLINIC | Age: 59
End: 2019-01-11

## 2019-01-11 ENCOUNTER — OFFICE VISIT (OUTPATIENT)
Dept: SURGERY | Facility: CLINIC | Age: 59
End: 2019-01-11
Payer: MEDICAID

## 2019-01-11 VITALS
SYSTOLIC BLOOD PRESSURE: 132 MMHG | WEIGHT: 152 LBS | DIASTOLIC BLOOD PRESSURE: 75 MMHG | BODY MASS INDEX: 27.97 KG/M2 | HEIGHT: 62 IN | HEART RATE: 69 BPM

## 2019-01-11 DIAGNOSIS — C16.9 MALIGNANT NEOPLASM OF STOMACH, UNSPECIFIED LOCATION: Primary | ICD-10-CM

## 2019-01-11 DIAGNOSIS — D49.0 GASTRIC NEOPLASM: ICD-10-CM

## 2019-01-11 PROCEDURE — 99215 PR OFFICE/OUTPT VISIT, EST, LEVL V, 40-54 MIN: ICD-10-PCS | Mod: S$GLB,,, | Performed by: INTERNAL MEDICINE

## 2019-01-11 PROCEDURE — 99215 OFFICE O/P EST HI 40 MIN: CPT | Mod: S$GLB,,, | Performed by: INTERNAL MEDICINE

## 2019-01-11 RX ORDER — CHLORZOXAZONE 500 MG/1
500 TABLET ORAL 4 TIMES DAILY PRN
Qty: 60 TABLET | Refills: 0 | Status: SHIPPED | OUTPATIENT
Start: 2019-01-11 | End: 2019-01-24 | Stop reason: SDUPTHER

## 2019-01-11 NOTE — PROGRESS NOTES
Subjective:       Patient ID: Xiomy Duncan is a 58 y.o. female.    Chief Complaint: Post-op Evaluation    HPI Patient presented for follow up post EGD.  She was seen on 12/15/18 with an upper GI bleed which revealed a createred Gastric Ulcer with raised margins. Histopathology was positive for Adenocarcinoma.  She has lost 11 pounds in the last 3 weeks.  There is no further GI bleed.    Review of Systems   Constitutional: Negative for appetite change.   HENT: Negative for trouble swallowing.    Eyes: Negative for photophobia.   Respiratory: Negative for cough and shortness of breath.    Cardiovascular: Negative for palpitations.   Gastrointestinal:        See HPI for details   Genitourinary: Negative for frequency and hematuria.   Skin: Negative for rash.   Neurological: Negative for weakness and headaches.   Psychiatric/Behavioral: Negative for behavioral problems and suicidal ideas.       Objective:      Physical Exam   Eyes: Pupils are equal, round, and reactive to light.   Neck: No thyromegaly present.   Cardiovascular: Normal rate, regular rhythm and normal heart sounds.   Pulmonary/Chest: Effort normal and breath sounds normal.   Abdominal: Soft. She exhibits no mass. There is no tenderness. There is no rebound and no guarding.   Musculoskeletal: She exhibits no tenderness.   Psychiatric: Her behavior is normal. Thought content normal.       Assessment:       1. Gastric neoplasm        Plan:       Xiomy was seen today for post-op evaluation.    Diagnoses and all orders for this visit:    Gastric neoplasm  -     CT Abdomen Pelvis W Wo Contrast; Future  -     CEA; Future  -     X-Ray Chest PA And Lateral; Future  -     CBC auto differential; Future  -     Comprehensive metabolic panel; Future    Refer to Dr. Borjas for EUS for staging  Refer to Dr. Tripp and Oncologist.    Further plan to follow.    All their questions were answered.

## 2019-01-11 NOTE — PROGRESS NOTES
Pt notified Dr. Borjas's office will call her today with instructions and arrival time for eus on 1/14/19. appt with Dr. Etienne 1/16/19 @ 1:00pm &  1/16/19 @ 2:30pm. Egd for 1/28/19 cancelled per Dr. Villagomez's orders

## 2019-01-16 ENCOUNTER — OFFICE VISIT (OUTPATIENT)
Dept: SURGERY | Facility: CLINIC | Age: 59
End: 2019-01-16
Payer: MEDICAID

## 2019-01-16 ENCOUNTER — INITIAL CONSULT (OUTPATIENT)
Dept: HEMATOLOGY/ONCOLOGY | Facility: CLINIC | Age: 59
End: 2019-01-16
Payer: MEDICAID

## 2019-01-16 VITALS
DIASTOLIC BLOOD PRESSURE: 83 MMHG | WEIGHT: 152 LBS | HEART RATE: 76 BPM | TEMPERATURE: 99 F | HEIGHT: 64 IN | SYSTOLIC BLOOD PRESSURE: 141 MMHG | HEART RATE: 70 BPM | BODY MASS INDEX: 25.95 KG/M2 | DIASTOLIC BLOOD PRESSURE: 62 MMHG | OXYGEN SATURATION: 99 % | HEIGHT: 64 IN | BODY MASS INDEX: 26 KG/M2 | WEIGHT: 152.31 LBS | SYSTOLIC BLOOD PRESSURE: 135 MMHG

## 2019-01-16 DIAGNOSIS — C16.3 CARCINOMA OF ANTRUM OF STOMACH: Primary | ICD-10-CM

## 2019-01-16 DIAGNOSIS — C16.9 MALIGNANT NEOPLASM OF STOMACH, UNSPECIFIED LOCATION: Primary | ICD-10-CM

## 2019-01-16 PROCEDURE — 99999 PR PBB SHADOW E&M-EST. PATIENT-LVL III: ICD-10-PCS | Mod: PBBFAC,,, | Performed by: INTERNAL MEDICINE

## 2019-01-16 PROCEDURE — 99999 PR PBB SHADOW E&M-EST. PATIENT-LVL III: CPT | Mod: PBBFAC,,, | Performed by: INTERNAL MEDICINE

## 2019-01-16 PROCEDURE — 99213 OFFICE O/P EST LOW 20 MIN: CPT | Mod: PBBFAC | Performed by: INTERNAL MEDICINE

## 2019-01-16 PROCEDURE — 99205 PR OFFICE/OUTPT VISIT, NEW, LEVL V, 60-74 MIN: ICD-10-PCS | Mod: S$PBB,,, | Performed by: INTERNAL MEDICINE

## 2019-01-16 PROCEDURE — 99204 PR OFFICE/OUTPT VISIT, NEW, LEVL IV, 45-59 MIN: ICD-10-PCS | Mod: S$GLB,,, | Performed by: SURGERY

## 2019-01-16 PROCEDURE — 99204 OFFICE O/P NEW MOD 45 MIN: CPT | Mod: S$GLB,,, | Performed by: SURGERY

## 2019-01-16 PROCEDURE — 99205 OFFICE O/P NEW HI 60 MIN: CPT | Mod: S$PBB,,, | Performed by: INTERNAL MEDICINE

## 2019-01-16 RX ORDER — SODIUM CHLORIDE 9 MG/ML
INJECTION, SOLUTION INTRAVENOUS CONTINUOUS
Status: CANCELLED | OUTPATIENT
Start: 2019-01-16

## 2019-01-16 RX ORDER — DICLOFENAC SODIUM 75 MG/1
75 TABLET, DELAYED RELEASE ORAL 2 TIMES DAILY
Refills: 1 | COMMUNITY
Start: 2019-01-06 | End: 2019-03-15 | Stop reason: SINTOL

## 2019-01-16 RX ORDER — LIDOCAINE HYDROCHLORIDE 10 MG/ML
1 INJECTION, SOLUTION EPIDURAL; INFILTRATION; INTRACAUDAL; PERINEURAL ONCE
Status: DISCONTINUED | OUTPATIENT
Start: 2019-01-16 | End: 2019-02-12 | Stop reason: HOSPADM

## 2019-01-16 NOTE — PROGRESS NOTES
Chief Complaint :   Gastric Cancer    Hx of Present illness :  Patient is a 58 y.o. year old female who presents to the clinic today for   Oncology evaluation. Developed sx of Upper GI bleeding. Further studies done. EGD revealed ulcer in gastric antrum Bx positive for malignancy. Had EUS and bx by . Had Bx of liver lesion. Results pending.      Allergies :    Review of patient's allergies indicates:   Allergen Reactions    Penicillins Rash       Occupation :  Caregiver    Transfusion :  none    Menstrual & obstetric Hx :  4; para 4  Age of menarche:  18  Age of first pregnancy:  22  Lactation history:  tried  Age of menopause: 52  HRT: Yes    Present Meds :      Medication List           Accurate as of 19  7:52 AM. If you have any questions, ask your nurse or doctor.               CONTINUE taking these medications    albuterol 2.5 mg /3 mL (0.083 %) nebulizer solution  Commonly known as:  PROVENTIL  Take 3 mLs (2.5 mg total) by nebulization every 6 (six) hours as needed for Wheezing.     ALPRAZolam 0.25 MG tablet  Commonly known as:  XANAX  Take 1 tablet (0.25 mg total) by mouth 2 (two) times daily as needed for Anxiety.     ANORO ELLIPTA 62.5-25 mcg/actuation Dsdv  Generic drug:  umeclidinium-vilanterol  INHALE 1 PUFF INTO THE LUNGS ONCE DAILY.     atorvastatin 40 MG tablet  Commonly known as:  LIPITOR  Take 1 tablet (40 mg total) by mouth every evening.     chlorzoxazone 500 mg Tab  Commonly known as:  PARAFON FORTE  TAKE 1 TABLET (500 MG TOTAL) BY MOUTH 4 (FOUR) TIMES DAILY AS NEEDED.     cloNIDine 0.1 MG tablet  Commonly known as:  CATAPRES  TAKE 1 TABLET (0.1 MG TOTAL) BY MOUTH 2 (TWO) TIMES DAILY.     FLUoxetine 20 MG capsule     gabapentin 100 MG capsule  Commonly known as:  NEURONTIN     hydroCHLOROthiazide 25 MG tablet  Commonly known as:  HYDRODIURIL  Take 1 tablet (25 mg total) by mouth once daily.     lactulose 10 gram/15 mL solution  Commonly known as:  CHRONULAC  TAKE 30 MLS (20 G  TOTAL) BY MOUTH 3 (THREE) TIMES DAILY.     oxybutynin 5 MG Tab  Commonly known as:  DITROPAN  TAKE 1 TABLET (5 MG TOTAL) BY MOUTH 2 (TWO) TIMES DAILY.     * pantoprazole 40 MG tablet  Commonly known as:  PROTONIX  Take 1 tablet (40 mg total) by mouth 2 (two) times daily.     * pantoprazole 40 MG tablet  Commonly known as:  PROTONIX  Take 1 tablet (40 mg total) by mouth once daily.     PREMPRO 0.45-1.5 mg per tablet  Generic drug:  estrogen (conjugated)-medroxyprogesterone  TAKE 1 TABLET BY MOUTH ONCE DAILY.     STIOLTO RESPIMAT 2.5-2.5 mcg/actuation Mist  Generic drug:  tiotropium-olodaterol  INHALE 1 INHALATION INTO THE LUNGS ONCE DAILY. CONTROLLER         * This list has 2 medication(s) that are the same as other medications prescribed for you. Read the directions carefully, and ask your doctor or other care provider to review them with you.                Past Medical Hx :  Recent Dx of carcinoma stomach. Known to have GERD, Hypertension, thyroid nodule, seizures, hyperlipidemiaColon polyps and asthma.  No hx of DVT or PE    Past Medical Hx :  Past Medical History:   Diagnosis Date    Anemia     Asthma     Cannabis abuse, daily use     Colon polyps     GERD (gastroesophageal reflux disease) 8/7/2014    Hoarseness 8/7/2014    Hypertension     Seizures     Thyroid nodule     Ulcer        Travel Hx :  N/A    Immunization :  Immunization History   Administered Date(s) Administered    Influenza - Quadrivalent - PF 12/16/2018    Tdap 12/21/2018       Family Hx :  Family History   Problem Relation Age of Onset    Bone cancer Mother     Hypertension Brother     Hypertension Maternal Uncle     Diabetes Maternal Uncle     Hypertension Maternal Grandmother     Stomach cancer Maternal Grandfather     Breast cancer Neg Hx     Colon cancer Neg Hx     Ovarian cancer Neg Hx        Social Hx :  Social History     Socioeconomic History    Marital status: Single     Spouse name: Not on file    Number of  children: Not on file    Years of education: Not on file    Highest education level: Not on file   Social Needs    Financial resource strain: Not on file    Food insecurity - worry: Not on file    Food insecurity - inability: Not on file    Transportation needs - medical: Not on file    Transportation needs - non-medical: Not on file   Occupational History    Occupation:      Employer: [employer]     Comment: [employer]   Tobacco Use    Smoking status: Former Smoker     Packs/day: 2.00     Years: 46.00     Pack years: 92.00     Last attempt to quit: 10/15/2018     Years since quittin.2    Smokeless tobacco: Never Used   Substance and Sexual Activity    Alcohol use: No    Drug use: No     Comment: every day    Sexual activity: No     Birth control/protection: None, Post-menopausal     Comment: single   Other Topics Concern    Not on file   Social History Narrative    Not on file       Surgery : EGD and Bx : colonoscopy    Symptoms :    Review of Systems   Constitutional: Positive for weight loss. Negative for chills, diaphoresis, fever and malaise/fatigue.        Poor appetite. Eleven pound weight loss in one months   HENT: Negative for congestion, hearing loss, nosebleeds, sore throat and tinnitus.    Eyes: Negative for blurred vision, double vision and photophobia.   Respiratory: Negative for cough, hemoptysis, sputum production and shortness of breath.         Dx of asthma   Cardiovascular: Negative for chest pain, palpitations, orthopnea and claudication.   Gastrointestinal: Positive for constipation and nausea. Negative for abdominal pain, heartburn and vomiting.   Genitourinary: Negative.  Negative for dysuria, flank pain, frequency, hematuria and urgency.   Musculoskeletal: Negative.  Negative for back pain, joint pain, myalgias and neck pain.   Skin: Negative for itching and rash.   Neurological: Positive for weakness. Negative for dizziness, tingling, tremors and headaches.    Endo/Heme/Allergies: Negative for environmental allergies. Does not bruise/bleed easily.   Psychiatric/Behavioral: Positive for depression. The patient is nervous/anxious. The patient does not have insomnia.        Physical Exam :  Niece present  Physical Exam   Constitutional: She is oriented to person, place, and time and well-developed, well-nourished, and in no distress.   HENT:   Head: Normocephalic and atraumatic.   Right Ear: External ear normal.   Left Ear: External ear normal.   Nose: Nose normal.   Mouth/Throat: Oropharynx is clear and moist. No oropharyngeal exudate.   Eyes: Conjunctivae and EOM are normal. Pupils are equal, round, and reactive to light. Right eye exhibits no discharge. Left eye exhibits no discharge. No scleral icterus.   Neck: Normal range of motion. Neck supple. No JVD present. No tracheal deviation present. No thyromegaly present.   Cardiovascular: Normal rate, regular rhythm, normal heart sounds and intact distal pulses. PMI is not displaced. Exam reveals no gallop and no friction rub.   No murmur heard.  Pulmonary/Chest: Effort normal and breath sounds normal. No stridor. No apnea.   Abdominal: Soft. Normal appearance, normal aorta and bowel sounds are normal. She exhibits no distension, no ascites and no mass. There is no hepatosplenomegaly. There is no tenderness. There is no rebound, no guarding and no CVA tenderness. No hernia.       Musculoskeletal: Normal range of motion. She exhibits no edema, tenderness or deformity.   Lymphadenopathy:        Head (right side): No submental, no submandibular, no tonsillar, no preauricular, no posterior auricular and no occipital adenopathy present.        Head (left side): No submental, no submandibular, no tonsillar, no preauricular, no posterior auricular and no occipital adenopathy present.     She has no cervical adenopathy.     She has no axillary adenopathy.        Right: No inguinal, no supraclavicular and no epitrochlear adenopathy  present.        Left: Supraclavicular adenopathy present. No inguinal and no epitrochlear adenopathy present.   Small tender Left supraclavicular nodes palpable   Neurological: She is alert and oriented to person, place, and time. She has normal motor skills, normal sensation, normal strength, normal reflexes and intact cranial nerves. She displays normal reflexes. No cranial nerve deficit. She exhibits normal muscle tone. Gait normal. Coordination normal. GCS score is 15.   Skin: Skin is warm, dry and intact. No rash noted. No cyanosis or erythema. No pallor. Nails show no clubbing.   Psychiatric: Mood, memory, affect and judgment normal.         Labs & Imaging : 12/15/18 : Bx of ulcer gastric antrum : poorly differentiated adenocarcinoma        Dx :  Poorly differentiated carcinoma Gastric antrum.       Assessment & Plan:  Reviewed with patient & Niece. Wait liver bx results. Has  Appointment to see  today. Await his evaluation.

## 2019-01-16 NOTE — H&P (VIEW-ONLY)
History & Physical    SUBJECTIVE:     History of Present Illness:  Patient is a 58 y.o. female presents with gastric cancer on biopsy EGD with node on EUS. Weight lost of over 11 lbs in the last month with a history of tobacco abuse.    Chief Complaint   Patient presents with    Other     gastric ca       Review of patient's allergies indicates:   Allergen Reactions    Penicillins Rash       Current Outpatient Medications   Medication Sig Dispense Refill    ALPRAZolam (XANAX) 0.25 MG tablet Take 1 tablet (0.25 mg total) by mouth 2 (two) times daily as needed for Anxiety. 10 tablet 0    ANORO ELLIPTA 62.5-25 mcg/actuation DsDv INHALE 1 PUFF INTO THE LUNGS ONCE DAILY. 30 each 5    atorvastatin (LIPITOR) 40 MG tablet Take 1 tablet (40 mg total) by mouth every evening. 30 tablet 11    chlorzoxazone (PARAFON FORTE) 500 mg Tab TAKE 1 TABLET (500 MG TOTAL) BY MOUTH 4 (FOUR) TIMES DAILY AS NEEDED. 60 tablet 0    diclofenac (VOLTAREN) 75 MG EC tablet Take 75 mg by mouth 2 (two) times daily.  1    FLUoxetine (PROZAC) 20 MG capsule 1 CAPSULE(S) ORALLY 1 TIMES A DAY (IN THE MORNING)  1    gabapentin (NEURONTIN) 100 MG capsule Take 100 mg by mouth 3 (three) times daily.      hydroCHLOROthiazide (HYDRODIURIL) 25 MG tablet Take 1 tablet (25 mg total) by mouth once daily. 30 tablet 2    lactulose (CHRONULAC) 10 gram/15 mL solution TAKE 30 MLS (20 G TOTAL) BY MOUTH 3 (THREE) TIMES DAILY. 300 mL 0    pantoprazole (PROTONIX) 40 MG tablet Take 1 tablet (40 mg total) by mouth 2 (two) times daily. 180 tablet 3    pantoprazole (PROTONIX) 40 MG tablet Take 1 tablet (40 mg total) by mouth once daily. 30 tablet 5    PREMPRO 0.45-1.5 mg per tablet TAKE 1 TABLET BY MOUTH ONCE DAILY. 28 tablet 10    STIOLTO RESPIMAT 2.5-2.5 mcg/actuation Mist INHALE 1 INHALATION INTO THE LUNGS ONCE DAILY. CONTROLLER 4 g 2    albuterol (PROVENTIL) 2.5 mg /3 mL (0.083 %) nebulizer solution Take 3 mLs (2.5 mg total) by nebulization every 6 (six)  hours as needed for Wheezing. 1 Box 0    cloNIDine (CATAPRES) 0.1 MG tablet TAKE 1 TABLET (0.1 MG TOTAL) BY MOUTH 2 (TWO) TIMES DAILY. 30 tablet 2    oxybutynin (DITROPAN) 5 MG Tab TAKE 1 TABLET (5 MG TOTAL) BY MOUTH 2 (TWO) TIMES DAILY. 60 tablet 2     No current facility-administered medications for this visit.        Past Medical History:   Diagnosis Date    Anemia     Asthma     Cannabis abuse, daily use     Colon polyps     GERD (gastroesophageal reflux disease) 2014    Hoarseness 2014    Hypertension     Seizures     Thyroid nodule     Ulcer      Past Surgical History:   Procedure Laterality Date    CHOLECYSTECTOMY      COLONOSCOPY N/A 6/10/2014    [facility])    ESOPHAGOGASTRODUODENOSCOPY (EGD) N/A 12/15/2018    [facility]    TONSILLECTOMY      TUBAL LIGATION       Family History   Problem Relation Age of Onset    Bone cancer Mother     Hypertension Brother     Hypertension Maternal Uncle     Diabetes Maternal Uncle     Hypertension Maternal Grandmother     Stomach cancer Maternal Grandfather     Breast cancer Neg Hx     Colon cancer Neg Hx     Ovarian cancer Neg Hx      Social History     Tobacco Use    Smoking status: Former Smoker     Packs/day: 2.00     Years: 46.00     Pack years: 92.00     Last attempt to quit: 10/15/2018     Years since quittin.2    Smokeless tobacco: Never Used   Substance Use Topics    Alcohol use: No    Drug use: No     Comment: every day        Review of Systems:  Review of Systems   Constitutional: Positive for appetite change and unexpected weight change.   HENT: Negative.    Eyes: Negative.    Respiratory: Negative.    Cardiovascular: Negative.    Gastrointestinal: Positive for nausea.   Endocrine: Negative.    Musculoskeletal: Negative.    Skin: Negative.    Allergic/Immunologic: Negative.    Neurological: Negative.    Hematological: Negative.   "  Psychiatric/Behavioral: Negative.    All other systems reviewed and are negative.      OBJECTIVE:     Vital Signs (Most Recent)  Pulse: 76 (01/16/19 1343)  BP: (!) 141/83 (01/16/19 1343)  5' 3.5" (1.613 m)  68.9 kg (152 lb)     Physical Exam:  Physical Exam   Constitutional: She is oriented to person, place, and time. She appears well-developed and well-nourished.   HENT:   Head: Normocephalic and atraumatic.   Right Ear: External ear normal.   Left Ear: External ear normal.   Nose: Nose normal.   Mouth/Throat: Oropharynx is clear and moist.   Eyes: Conjunctivae and EOM are normal. Pupils are equal, round, and reactive to light.   Neck: Normal range of motion. Neck supple.   Cardiovascular: Normal rate, regular rhythm, normal heart sounds and intact distal pulses.   Pulmonary/Chest: Effort normal and breath sounds normal.   Abdominal: Soft. Bowel sounds are normal.   Musculoskeletal: Normal range of motion.   Lymphadenopathy:     She has cervical adenopathy.   Neurological: She is alert and oriented to person, place, and time. She has normal reflexes.   Skin: Skin is warm and dry.   Psychiatric: She has a normal mood and affect. Her behavior is normal. Thought content normal.   Vitals reviewed.      Laboratory  none    Diagnostic Results:  CT: Reviewed  mass in the antrum with gastrohepatic nodes    ASSESSMENT/PLAN:     Gastric cancer     PLAN:Plan     To the OR for gastrectomy with risks and possible complications being explained and she agrees to proceed       "

## 2019-01-16 NOTE — LETTER
January 16, 2019      Alisson Villagomez MD  [address]  [address]  [facility] [address]           [facility]  [address] [facility] [address], [address]  [place] [address]  Phone: [phone]  Fax: [phone]          Patient: Xiomy Duncan   MR Number: [id number]   YOB: 1960   Date of Visit: 1/16/2019       Dear Dr. Alisson Villagomez:    Thank you for referring Xiomy Duncan to me for evaluation. Attached you will find relevant portions of my assessment and plan of care.    If you have questions, please do not hesitate to call me. I look forward to following Xiomy Duncan along with you.    Sincerely,    Jesus Tripp MD    Enclosure  CC:  No Recipients    If you would like to receive this communication electronically, please contact [email or web address] or [phone] to request more information on [employer] [employer] access.    For providers and/or their staff who would like to refer a patient to [facility], please contact us through our one-stop-shop provider referral line, [facility], at [phone].    If you feel you have received this communication in error or would no longer like to receive these types of communications, please e-mail [email or web address]

## 2019-01-16 NOTE — LETTER
January 16, 2019      Jesus Tripp MD  [address] [facility]  [address]  [place] [address]           [facility]  [address] [facility]  [place] [address]  Phone: [phone]          Patient: Xiomy Duncan   MR Number: [id number]   YOB: 1960   Date of Visit: 1/16/2019       Dear Dr. Jesus Tripp:    Thank you for referring Xiomy Duncan to me for evaluation. Attached you will find relevant portions of my assessment and plan of care.    If you have questions, please do not hesitate to call me. I look forward to following Xiomy Duncan along with you.    Sincerely,    Brittany Ramires MD    Enclosure  CC:  No Recipients    If you would like to receive this communication electronically, please contact [email or web address] or [phone] to request more information on [employer] Link access.    For providers and/or their staff who would like to refer a patient to [facility], please contact us through our one-stop-shop provider referral line, [facility], at [phone].    If you feel you have received this communication in error or would no longer like to receive these types of communications, please e-mail [email or web address]

## 2019-01-16 NOTE — PROGRESS NOTES
History & Physical    SUBJECTIVE:     History of Present Illness:  Patient is a 58 y.o. female presents with gastric cancer on biopsy EGD with node on EUS. Weight lost of over 11 lbs in the last month with a history of tobacco abuse.    Chief Complaint   Patient presents with    Other     gastric ca       Review of patient's allergies indicates:   Allergen Reactions    Penicillins Rash       Current Outpatient Medications   Medication Sig Dispense Refill    ALPRAZolam (XANAX) 0.25 MG tablet Take 1 tablet (0.25 mg total) by mouth 2 (two) times daily as needed for Anxiety. 10 tablet 0    ANORO ELLIPTA 62.5-25 mcg/actuation DsDv INHALE 1 PUFF INTO THE LUNGS ONCE DAILY. 30 each 5    atorvastatin (LIPITOR) 40 MG tablet Take 1 tablet (40 mg total) by mouth every evening. 30 tablet 11    chlorzoxazone (PARAFON FORTE) 500 mg Tab TAKE 1 TABLET (500 MG TOTAL) BY MOUTH 4 (FOUR) TIMES DAILY AS NEEDED. 60 tablet 0    diclofenac (VOLTAREN) 75 MG EC tablet Take 75 mg by mouth 2 (two) times daily.  1    FLUoxetine (PROZAC) 20 MG capsule 1 CAPSULE(S) ORALLY 1 TIMES A DAY (IN THE MORNING)  1    gabapentin (NEURONTIN) 100 MG capsule Take 100 mg by mouth 3 (three) times daily.      hydroCHLOROthiazide (HYDRODIURIL) 25 MG tablet Take 1 tablet (25 mg total) by mouth once daily. 30 tablet 2    lactulose (CHRONULAC) 10 gram/15 mL solution TAKE 30 MLS (20 G TOTAL) BY MOUTH 3 (THREE) TIMES DAILY. 300 mL 0    pantoprazole (PROTONIX) 40 MG tablet Take 1 tablet (40 mg total) by mouth 2 (two) times daily. 180 tablet 3    pantoprazole (PROTONIX) 40 MG tablet Take 1 tablet (40 mg total) by mouth once daily. 30 tablet 5    PREMPRO 0.45-1.5 mg per tablet TAKE 1 TABLET BY MOUTH ONCE DAILY. 28 tablet 10    STIOLTO RESPIMAT 2.5-2.5 mcg/actuation Mist INHALE 1 INHALATION INTO THE LUNGS ONCE DAILY. CONTROLLER 4 g 2    albuterol (PROVENTIL) 2.5 mg /3 mL (0.083 %) nebulizer solution Take 3 mLs (2.5 mg total) by nebulization every 6 (six)  hours as needed for Wheezing. 1 Box 0    cloNIDine (CATAPRES) 0.1 MG tablet TAKE 1 TABLET (0.1 MG TOTAL) BY MOUTH 2 (TWO) TIMES DAILY. 30 tablet 2    oxybutynin (DITROPAN) 5 MG Tab TAKE 1 TABLET (5 MG TOTAL) BY MOUTH 2 (TWO) TIMES DAILY. 60 tablet 2     No current facility-administered medications for this visit.        Past Medical History:   Diagnosis Date    Anemia     Asthma     Cannabis abuse, daily use     Colon polyps     GERD (gastroesophageal reflux disease) 2014    Hoarseness 2014    Hypertension     Seizures     Thyroid nodule     Ulcer      Past Surgical History:   Procedure Laterality Date    CHOLECYSTECTOMY      COLONOSCOPY N/A 6/10/2014    [facility] ([facility])    ESOPHAGOGASTRODUODENOSCOPY (EGD) N/A 12/15/2018    [facility]    TONSILLECTOMY      TUBAL LIGATION       Family History   Problem Relation Age of Onset    Bone cancer Mother     Hypertension Brother     Hypertension Maternal Uncle     Diabetes Maternal Uncle     Hypertension Maternal Grandmother     Stomach cancer Maternal Grandfather     Breast cancer Neg Hx     Colon cancer Neg Hx     Ovarian cancer Neg Hx      Social History     Tobacco Use    Smoking status: Former Smoker     Packs/day: 2.00     Years: 46.00     Pack years: 92.00     Last attempt to quit: 10/15/2018     Years since quittin.2    Smokeless tobacco: Never Used   Substance Use Topics    Alcohol use: No    Drug use: No     Comment: every day        Review of Systems:  Review of Systems   Constitutional: Positive for appetite change and unexpected weight change.   HENT: Negative.    Eyes: Negative.    Respiratory: Negative.    Cardiovascular: Negative.    Gastrointestinal: Positive for nausea.   Endocrine: Negative.    Musculoskeletal: Negative.    Skin: Negative.    Allergic/Immunologic: Negative.    Neurological: Negative.    Hematological: Negative.   "  Psychiatric/Behavioral: Negative.    All other systems reviewed and are negative.      OBJECTIVE:     Vital Signs (Most Recent)  Pulse: 76 (01/16/19 1343)  BP: (!) 141/83 (01/16/19 1343)  5' 3.5" (1.613 m)  68.9 kg (152 lb)     Physical Exam:  Physical Exam   Constitutional: She is oriented to person, place, and time. She appears well-developed and well-nourished.   HENT:   Head: Normocephalic and atraumatic.   Right Ear: External ear normal.   Left Ear: External ear normal.   Nose: Nose normal.   Mouth/Throat: Oropharynx is clear and moist.   Eyes: Conjunctivae and EOM are normal. Pupils are equal, round, and reactive to light.   Neck: Normal range of motion. Neck supple.   Cardiovascular: Normal rate, regular rhythm, normal heart sounds and intact distal pulses.   Pulmonary/Chest: Effort normal and breath sounds normal.   Abdominal: Soft. Bowel sounds are normal.   Musculoskeletal: Normal range of motion.   Lymphadenopathy:     She has cervical adenopathy.   Neurological: She is alert and oriented to person, place, and time. She has normal reflexes.   Skin: Skin is warm and dry.   Psychiatric: She has a normal mood and affect. Her behavior is normal. Thought content normal.   Vitals reviewed.      Laboratory  none    Diagnostic Results:  CT: Reviewed  mass in the antrum with gastrohepatic nodes    ASSESSMENT/PLAN:     Gastric cancer     PLAN:Plan     To the OR for gastrectomy with risks and possible complications being explained and she agrees to proceed       "

## 2019-01-17 ENCOUNTER — CLINICAL SUPPORT (OUTPATIENT)
Dept: SMOKING CESSATION | Facility: CLINIC | Age: 59
End: 2019-01-17
Payer: COMMERCIAL

## 2019-01-17 DIAGNOSIS — F17.200 NICOTINE DEPENDENCE: Primary | ICD-10-CM

## 2019-01-17 PROCEDURE — 99407 BEHAV CHNG SMOKING > 10 MIN: CPT | Mod: S$GLB,,, | Performed by: INTERNAL MEDICINE

## 2019-01-17 PROCEDURE — 99407 PR TOBACCO USE CESSATION INTENSIVE >10 MINUTES: ICD-10-PCS | Mod: S$GLB,,, | Performed by: INTERNAL MEDICINE

## 2019-01-17 NOTE — PROGRESS NOTES
Spoke with patient today in regard to smoking cessation progress for 3/6 month telephone follow up, she states tobacco free since 10/15/2018. Commended patient on the accomplishment. Patient states using the prescribed medication regimen of Chantix to help aid in her quit. Informed patient of benefit period, future follow up, and contact information if any further help or support is needed. Will complete smart form for 3 and 6 month follow up on Quit attempt #1.

## 2019-01-23 DIAGNOSIS — N95.1 MENOPAUSAL SYMPTOMS: ICD-10-CM

## 2019-01-23 RX ORDER — CONJUGATED ESTROGENS AND MEDROXYPROGESTERONE ACETATE .45; 1.5 MG/1; MG/1
1 TABLET, SUGAR COATED ORAL DAILY
Qty: 28 TABLET | Refills: 10 | OUTPATIENT
Start: 2019-01-23

## 2019-01-23 NOTE — TELEPHONE ENCOUNTER
Please let patient know HRT refill was declined; it has been almost two years since her last visit with us.

## 2019-01-24 ENCOUNTER — OFFICE VISIT (OUTPATIENT)
Dept: OBSTETRICS AND GYNECOLOGY | Facility: CLINIC | Age: 59
End: 2019-01-24
Payer: MEDICAID

## 2019-01-24 VITALS
SYSTOLIC BLOOD PRESSURE: 140 MMHG | WEIGHT: 151.81 LBS | HEART RATE: 70 BPM | BODY MASS INDEX: 27.94 KG/M2 | DIASTOLIC BLOOD PRESSURE: 78 MMHG | HEIGHT: 62 IN

## 2019-01-24 DIAGNOSIS — M54.50 LUMBAR PAIN: ICD-10-CM

## 2019-01-24 DIAGNOSIS — Z01.419 WELL WOMAN EXAM WITH ROUTINE GYNECOLOGICAL EXAM: Primary | ICD-10-CM

## 2019-01-24 DIAGNOSIS — Z80.0 FAMILY HISTORY OF STOMACH CANCER: ICD-10-CM

## 2019-01-24 DIAGNOSIS — C16.9: ICD-10-CM

## 2019-01-24 DIAGNOSIS — I10 ESSENTIAL HYPERTENSION: ICD-10-CM

## 2019-01-24 DIAGNOSIS — N95.1 MENOPAUSAL SYMPTOMS: ICD-10-CM

## 2019-01-24 PROCEDURE — 99396 PREV VISIT EST AGE 40-64: CPT | Mod: S$PBB,,, | Performed by: OBSTETRICS & GYNECOLOGY

## 2019-01-24 PROCEDURE — 99396 PR PREVENTIVE VISIT,EST,40-64: ICD-10-PCS | Mod: S$PBB,,, | Performed by: OBSTETRICS & GYNECOLOGY

## 2019-01-24 PROCEDURE — 99999 PR PBB SHADOW E&M-EST. PATIENT-LVL III: ICD-10-PCS | Mod: PBBFAC,,, | Performed by: OBSTETRICS & GYNECOLOGY

## 2019-01-24 PROCEDURE — 99999 PR PBB SHADOW E&M-EST. PATIENT-LVL III: CPT | Mod: PBBFAC,,, | Performed by: OBSTETRICS & GYNECOLOGY

## 2019-01-24 PROCEDURE — 99213 OFFICE O/P EST LOW 20 MIN: CPT | Mod: PBBFAC | Performed by: OBSTETRICS & GYNECOLOGY

## 2019-01-24 RX ORDER — CHLORZOXAZONE 500 MG/1
500 TABLET ORAL 4 TIMES DAILY PRN
Qty: 60 TABLET | Refills: 0 | Status: SHIPPED | OUTPATIENT
Start: 2019-01-24 | End: 2019-02-10 | Stop reason: SDUPTHER

## 2019-01-24 RX ORDER — CLONIDINE HYDROCHLORIDE 0.1 MG/1
0.1 TABLET ORAL 2 TIMES DAILY
Qty: 30 TABLET | Refills: 2 | Status: SHIPPED | OUTPATIENT
Start: 2019-01-24 | End: 2019-02-27 | Stop reason: SDUPTHER

## 2019-01-24 RX ORDER — ALPRAZOLAM 0.25 MG/1
0.25 TABLET ORAL 2 TIMES DAILY PRN
Qty: 10 TABLET | Refills: 0 | Status: SHIPPED | OUTPATIENT
Start: 2019-01-24 | End: 2019-02-20

## 2019-01-24 NOTE — PROGRESS NOTES
Subjective:    Patient ID: Xiomy Duncan is a 59 y.o. female.     Chief Complaint: Annual Well Woman Exam     History of Present Illness:  Xiomy presents today for Annual Well Woman exam. .No LMP recorded. Patient is postmenopausal.. She is currently using Estrogen and Progesterone and she reports no problems with hot flashes, night sweats, irritability and vaginal dryness. She denies breast tenderness, masses, nipple discharge. Mammogram 2018 was normal.  She reports no problems with urination.She was recently found to have stomach cancer and is scheduled for surgery on 19. She states her grandfather also had stomach cancer.    Menstrual History:   No LMP recorded. Patient is postmenopausal..     OB History      Para Term  AB Living    4 4 4     4    SAB TAB Ectopic Multiple Live Births                       Review of Systems   Constitutional: Negative for activity change, appetite change, chills, diaphoresis, fatigue, fever and unexpected weight change.   HENT: Negative for mouth sores and tinnitus.    Eyes: Negative for discharge and visual disturbance.   Respiratory: Positive for wheezing. Negative for cough and shortness of breath.    Cardiovascular: Negative for chest pain, palpitations and leg swelling.   Gastrointestinal: Positive for abdominal pain, constipation, nausea and vomiting. Negative for blood in stool and diarrhea.   Endocrine: Positive for diabetes and hot flashes. Negative for hair loss, hyperthyroidism and hypothyroidism.   Genitourinary: Negative for decreased libido, dyspareunia, dysuria, flank pain, frequency, genital sores, hematuria, menorrhagia, menstrual problem, pelvic pain, urgency, vaginal bleeding, vaginal discharge, vaginal pain, urinary incontinence, postcoital bleeding and vaginal odor.   Musculoskeletal: Positive for back pain. Negative for joint swelling and myalgias.   Neurological: Positive for headaches. Negative for seizures, syncope and  numbness.   Hematological: Negative for adenopathy. Does not bruise/bleed easily.   Psychiatric/Behavioral: Positive for depression and sleep disturbance. The patient is nervous/anxious.    Breast: Negative for mastodynia and nipple discharge        Objective:    Vital Signs:  Vitals:    01/24/19 0824   BP: (!) 140/78   Pulse: 70       Physical Exam:  General:  alert,normal appearing gravid female   Skin:  Skin color, texture, turgor normal. No rashes or lesions   HEENT:  conjunctivae/corneas clear. PERRL.   Neck: supple, trachea midline, no adenopathy or thyromegally   Respiratory:  clear to auscultation bilaterally   Heart:  regular rate and rhythm, S1, S2 normal, no murmur, click, rub or gallop   Breasts:  Nipples are protruding and have no nipple discharge. No palpable masses, erythema, skin changes, tenderness, or adenopathy.   Abdomen:  soft, non-tender. Bowel sounds normal. No masses,  no organomegaly   Pelvis: Patient declines exam today   Extremities: Normal ROM; no edema, no cyanosis   Neurologial: Normal strength and tone. No focal numbness or weakness. Reflexes 2+ and equal.   Psychiatric: normal mood, speech, dress, and thought processes         Assessment:      1. Well woman exam with routine gynecological exam    2. Menopausal symptoms    3. Primary stomach cancer    4. Family history of stomach cancer          Plan:      Well woman exam with routine gynecological exam    Menopausal symptoms  -     estrogen, conjugated,-medroxyprogesterone (PREMPRO) 0.45-1.5 mg per tablet; Take 1 tablet by mouth once daily.  Dispense: 28 tablet; Refill: 11    Primary stomach cancer    Family history of stomach cancer      I have advised her to discontinue hormones prior to surgery until she is ambulatory and doing well.she may restart at that time if needed. She is currrently on clonidine, prozac, and gabapentin, and may actually not need to restart.    COUNSELING:  Xiomy was counseled on A.C.O.G. Pap guidelines  and recommendations for yearly pelvic exams in addition to recommendations for yearly mammograms and monthly self breast exams. In addition she was counseled on adequate intake of calcium and vitamin D; to see her PCP for other health maintenance.

## 2019-01-24 NOTE — TELEPHONE ENCOUNTER
----- Message from Brittney Correa sent at 2019 11:38 AM CST -----  Contact: pt  Xiomy Ducnan  MRN: [id number]  : 1960  PCP: Arnie Monson  Home Phone      [phone]  Work Phone      Not on file.  Mobile          [phone]      MESSAGE:    Pt wants to go and file for Disability with the state, they are saying she needs paper work from her  Wondering what she needs to do?     [phone]

## 2019-01-28 RX ORDER — PANTOPRAZOLE SODIUM 40 MG/1
40 TABLET, DELAYED RELEASE ORAL DAILY
Qty: 30 TABLET | Refills: 5 | Status: CANCELLED | OUTPATIENT
Start: 2019-01-28 | End: 2019-02-27

## 2019-02-01 ENCOUNTER — HOSPITAL ENCOUNTER (OUTPATIENT)
Dept: PREADMISSION TESTING | Facility: HOSPITAL | Age: 59
Discharge: HOME | End: 2019-02-01
Attending: SURGERY
Payer: MEDICAID

## 2019-02-01 VITALS
DIASTOLIC BLOOD PRESSURE: 78 MMHG | BODY MASS INDEX: 28.03 KG/M2 | SYSTOLIC BLOOD PRESSURE: 120 MMHG | TEMPERATURE: 98 F | HEIGHT: 62 IN | OXYGEN SATURATION: 99 % | HEART RATE: 68 BPM | RESPIRATION RATE: 17 BRPM | WEIGHT: 152.31 LBS

## 2019-02-01 DIAGNOSIS — C16.9 MALIGNANT NEOPLASM OF STOMACH, UNSPECIFIED LOCATION: ICD-10-CM

## 2019-02-01 LAB
ALBUMIN SERPL BCP-MCNC: 3.2 G/DL
ALP SERPL-CCNC: 60 U/L
ALT SERPL W/O P-5'-P-CCNC: 10 U/L
ANION GAP SERPL CALC-SCNC: 6 MMOL/L
APTT BLDCRRT: 30.8 SEC
AST SERPL-CCNC: 15 U/L
BASOPHILS # BLD AUTO: 0.06 K/UL
BASOPHILS NFR BLD: 0.6 %
BILIRUB SERPL-MCNC: 0.2 MG/DL
BUN SERPL-MCNC: 14 MG/DL
CALCIUM SERPL-MCNC: 9.3 MG/DL
CHLORIDE SERPL-SCNC: 106 MMOL/L
CO2 SERPL-SCNC: 27 MMOL/L
CREAT SERPL-MCNC: 0.8 MG/DL
DIFFERENTIAL METHOD: ABNORMAL
EOSINOPHIL # BLD AUTO: 0.2 K/UL
EOSINOPHIL NFR BLD: 2 %
ERYTHROCYTE [DISTWIDTH] IN BLOOD BY AUTOMATED COUNT: 16.3 %
EST. GFR  (AFRICAN AMERICAN): >60 ML/MIN/1.73 M^2
EST. GFR  (NON AFRICAN AMERICAN): >60 ML/MIN/1.73 M^2
GLUCOSE SERPL-MCNC: 90 MG/DL
HCT VFR BLD AUTO: 33.8 %
HGB BLD-MCNC: 10.5 G/DL
INR PPP: 1
LYMPHOCYTES # BLD AUTO: 3.6 K/UL
LYMPHOCYTES NFR BLD: 33.2 %
MCH RBC QN AUTO: 27.5 PG
MCHC RBC AUTO-ENTMCNC: 31.1 G/DL
MCV RBC AUTO: 89 FL
MONOCYTES # BLD AUTO: 0.7 K/UL
MONOCYTES NFR BLD: 6.5 %
NEUTROPHILS # BLD AUTO: 6.2 K/UL
NEUTROPHILS NFR BLD: 57.7 %
PLATELET # BLD AUTO: 479 K/UL
PMV BLD AUTO: 11 FL
POTASSIUM SERPL-SCNC: 4.1 MMOL/L
PROT SERPL-MCNC: 6.9 G/DL
PROTHROMBIN TIME: 10.2 SEC
RBC # BLD AUTO: 3.82 M/UL
SODIUM SERPL-SCNC: 139 MMOL/L
WBC # BLD AUTO: 10.76 K/UL

## 2019-02-01 PROCEDURE — 80053 COMPREHEN METABOLIC PANEL: CPT

## 2019-02-01 PROCEDURE — 85730 THROMBOPLASTIN TIME PARTIAL: CPT

## 2019-02-01 PROCEDURE — 85025 COMPLETE CBC W/AUTO DIFF WBC: CPT

## 2019-02-01 PROCEDURE — 85610 PROTHROMBIN TIME: CPT

## 2019-02-01 PROCEDURE — 36415 COLL VENOUS BLD VENIPUNCTURE: CPT

## 2019-02-01 NOTE — DISCHARGE INSTRUCTIONS
Your surgery is scheduled for _Friday Feb. 8, 2019___________________.    Call [phone] between 2 p.m. and 5 p.m. on   _Thursday___ to find out your arrival time for the day of your surgery.      Please report to SAME DAY SURGERY UNIT on the 2nd FLOOR at _______ a.m.  Use front door entrance. The doors open at 0530 am.      INSTRUCTIONS IMPORTANT!!!  ¨ Do not eat or drink after 12 midnight-including water. OK to brush teeth, no   gum, candy or mints!    ¨ Take only these medicines with a small swallow of water-morning of surgery.  tke Catapres, Gabapentin, and Protonix with water.      __x__  Prep instructions:    SHOWER     __x__  Please shower using Hibiclens soap the night before AND  the morning of your surgery/procedure. Do not use Hibiclens on your face or genitals         x      If your surgery is around your belly button (Navel) be sure to wash inside your  belly button also. Rinse hibiclens off completely.  __x__  No shaving of procedural area at least 4-5 days before surgery due to  increased risk of skin irritation and/or possible infection.  __x__  Do not wear makeup, including mascara. WEARING EYE MAKEUP MAY  LEAD TO SERIOUS EYE INJURY during surgery.  __x__  No powder, lotions or creams to your abdomen.  __x__  You may wear only deodorant on the day of surgery.  __x__  Please remove all jewelry, including piercings and leave at home.  __x__  No money or valuables needed. Please leave at home.  You may bring your cell phone.  ____  Please bring any documents given by your doctor.  ____  If going home the same day, arrange for a ride home. You will not be able to   drive if Anesthesia was used.  ____  Children under 18 years require a parent / guardian present the entire time they are in surgery / recovery.  _x___  Wear loose fitting clothing. Allow for dressings, bandages.  _x___  Stop Aspirin, Ibuprofen, Motrin and Aleve at least 3-5 days before  surgery, unless otherwise instructed by your doctor,  or the nurse.      x        You MAY use Tylenol/acetaminophen until day of surgery.  ____  If you take diabetic medication, do not take am of surgery unless instructed by   Doctor.  __x__  Call MD for temperature above 101 degrees.        __x__ Stop taking any Fish Oil supplement or any Vitamins that contain Vitamin  E at least 5 days prior to surgery.          I have read or had read and explained to me, and understand the above information.  Additional comments or instructions:Please call   [phone] if you have any questions regarding the instructions above.

## 2019-02-07 ENCOUNTER — ANESTHESIA EVENT (OUTPATIENT)
Dept: SURGERY | Facility: HOSPITAL | Age: 59
DRG: 328 | End: 2019-02-07
Payer: MEDICAID

## 2019-02-08 ENCOUNTER — ANESTHESIA (OUTPATIENT)
Dept: SURGERY | Facility: HOSPITAL | Age: 59
DRG: 328 | End: 2019-02-08
Payer: MEDICAID

## 2019-02-08 ENCOUNTER — HOSPITAL ENCOUNTER (INPATIENT)
Facility: HOSPITAL | Age: 59
LOS: 4 days | Discharge: HOME | DRG: 328 | End: 2019-02-12
Attending: SURGERY | Admitting: SURGERY
Payer: MEDICAID

## 2019-02-08 DIAGNOSIS — C16.9 MALIGNANT NEOPLASM OF STOMACH, UNSPECIFIED LOCATION: ICD-10-CM

## 2019-02-08 DIAGNOSIS — C16.2 MALIGNANT NEOPLASM OF BODY OF STOMACH: Primary | ICD-10-CM

## 2019-02-08 LAB
ABO + RH BLD: NORMAL
BLD GP AB SCN CELLS X3 SERPL QL: NORMAL
POCT GLUCOSE: 96 MG/DL (ref 70–110)

## 2019-02-08 PROCEDURE — 25000003 PHARM REV CODE 250: Performed by: ANESTHESIOLOGY

## 2019-02-08 PROCEDURE — 94799 UNLISTED PULMONARY SVC/PX: CPT

## 2019-02-08 PROCEDURE — 36000709 HC OR TIME LEV III EA ADD 15 MIN: Performed by: SURGERY

## 2019-02-08 PROCEDURE — S0020 INJECTION, BUPIVICAINE HYDRO: HCPCS | Performed by: SURGERY

## 2019-02-08 PROCEDURE — 27100025 HC TUBING, SET FLUID WARMER: Performed by: REGISTERED NURSE

## 2019-02-08 PROCEDURE — 43632 REMOVAL OF STOMACH PARTIAL: CPT | Mod: ,,, | Performed by: SURGERY

## 2019-02-08 PROCEDURE — 88309 TISSUE EXAM BY PATHOLOGIST: CPT | Mod: 26,,, | Performed by: PATHOLOGY

## 2019-02-08 PROCEDURE — 11000001 HC ACUTE MED/SURG PRIVATE ROOM

## 2019-02-08 PROCEDURE — 43632 PR REMV STOMACH,PART,DISTAL,GASTROJEJUN: ICD-10-PCS | Mod: ,,, | Performed by: SURGERY

## 2019-02-08 PROCEDURE — 82962 GLUCOSE BLOOD TEST: CPT | Performed by: SURGERY

## 2019-02-08 PROCEDURE — D9220A PRA ANESTHESIA: Mod: CRNA,,, | Performed by: REGISTERED NURSE

## 2019-02-08 PROCEDURE — 94761 N-INVAS EAR/PLS OXIMETRY MLT: CPT

## 2019-02-08 PROCEDURE — 63600175 PHARM REV CODE 636 W HCPCS: Performed by: SURGERY

## 2019-02-08 PROCEDURE — C9290 INJ, BUPIVACAINE LIPOSOME: HCPCS | Performed by: SURGERY

## 2019-02-08 PROCEDURE — D9220A PRA ANESTHESIA: ICD-10-PCS | Mod: CRNA,,, | Performed by: REGISTERED NURSE

## 2019-02-08 PROCEDURE — 27200677 HC TRANSDUCER MONITOR KIT SINGLE: Performed by: REGISTERED NURSE

## 2019-02-08 PROCEDURE — 37000008 HC ANESTHESIA 1ST 15 MINUTES: Performed by: SURGERY

## 2019-02-08 PROCEDURE — 88309 TISSUE SPECIMEN TO PATHOLOGY - SURGERY: ICD-10-PCS | Mod: 26,,, | Performed by: PATHOLOGY

## 2019-02-08 PROCEDURE — 88331 PATH CONSLTJ SURG 1 BLK 1SPC: CPT | Mod: 26,,, | Performed by: PATHOLOGY

## 2019-02-08 PROCEDURE — 86850 RBC ANTIBODY SCREEN: CPT

## 2019-02-08 PROCEDURE — 37000009 HC ANESTHESIA EA ADD 15 MINS: Performed by: SURGERY

## 2019-02-08 PROCEDURE — 88331 PATH CONSLTJ SURG 1 BLK 1SPC: CPT | Performed by: PATHOLOGY

## 2019-02-08 PROCEDURE — 27201423 OPTIME MED/SURG SUP & DEVICES STERILE SUPPLY: Performed by: SURGERY

## 2019-02-08 PROCEDURE — 63600175 PHARM REV CODE 636 W HCPCS: Performed by: REGISTERED NURSE

## 2019-02-08 PROCEDURE — 71000039 HC RECOVERY, EACH ADD'L HOUR: Performed by: SURGERY

## 2019-02-08 PROCEDURE — D9220A PRA ANESTHESIA: Mod: ANES,,, | Performed by: ANESTHESIOLOGY

## 2019-02-08 PROCEDURE — 36415 COLL VENOUS BLD VENIPUNCTURE: CPT

## 2019-02-08 PROCEDURE — 25000003 PHARM REV CODE 250: Performed by: REGISTERED NURSE

## 2019-02-08 PROCEDURE — 71000033 HC RECOVERY, INTIAL HOUR: Performed by: SURGERY

## 2019-02-08 PROCEDURE — 36000708 HC OR TIME LEV III 1ST 15 MIN: Performed by: SURGERY

## 2019-02-08 PROCEDURE — 63600175 PHARM REV CODE 636 W HCPCS: Performed by: ANESTHESIOLOGY

## 2019-02-08 PROCEDURE — D9220A PRA ANESTHESIA: ICD-10-PCS | Mod: ANES,,, | Performed by: ANESTHESIOLOGY

## 2019-02-08 PROCEDURE — 25000003 PHARM REV CODE 250: Performed by: SURGERY

## 2019-02-08 PROCEDURE — 63600175 PHARM REV CODE 636 W HCPCS: Performed by: NURSE ANESTHETIST, CERTIFIED REGISTERED

## 2019-02-08 PROCEDURE — C1751 CATH, INF, PER/CENT/MIDLINE: HCPCS | Performed by: REGISTERED NURSE

## 2019-02-08 PROCEDURE — 88331 TISSUE SPECIMEN TO PATHOLOGY - SURGERY: ICD-10-PCS | Mod: 26,,, | Performed by: PATHOLOGY

## 2019-02-08 RX ORDER — LIDOCAINE HCL/PF 100 MG/5ML
SYRINGE (ML) INTRAVENOUS
Status: DISCONTINUED | OUTPATIENT
Start: 2019-02-08 | End: 2019-02-08

## 2019-02-08 RX ORDER — ACETAMINOPHEN 10 MG/ML
INJECTION, SOLUTION INTRAVENOUS
Status: DISCONTINUED | OUTPATIENT
Start: 2019-02-08 | End: 2019-02-08

## 2019-02-08 RX ORDER — MORPHINE SULFATE 10 MG/ML
5 INJECTION INTRAMUSCULAR; INTRAVENOUS; SUBCUTANEOUS EVERY 4 HOURS PRN
Status: DISCONTINUED | OUTPATIENT
Start: 2019-02-08 | End: 2019-02-10

## 2019-02-08 RX ORDER — SODIUM CHLORIDE 9 MG/ML
INJECTION, SOLUTION INTRAVENOUS CONTINUOUS
Status: DISCONTINUED | OUTPATIENT
Start: 2019-02-08 | End: 2019-02-11

## 2019-02-08 RX ORDER — SODIUM CHLORIDE, SODIUM LACTATE, POTASSIUM CHLORIDE, CALCIUM CHLORIDE 600; 310; 30; 20 MG/100ML; MG/100ML; MG/100ML; MG/100ML
INJECTION, SOLUTION INTRAVENOUS CONTINUOUS PRN
Status: DISCONTINUED | OUTPATIENT
Start: 2019-02-08 | End: 2019-02-08

## 2019-02-08 RX ORDER — SODIUM CHLORIDE 0.9 % (FLUSH) 0.9 %
3 SYRINGE (ML) INJECTION
Status: DISCONTINUED | OUTPATIENT
Start: 2019-02-08 | End: 2019-02-08 | Stop reason: HOSPADM

## 2019-02-08 RX ORDER — CEFAZOLIN SODIUM 2 G/50ML
2 SOLUTION INTRAVENOUS
Status: COMPLETED | OUTPATIENT
Start: 2019-02-08 | End: 2019-02-09

## 2019-02-08 RX ORDER — HYDROMORPHONE HYDROCHLORIDE 2 MG/ML
0.2 INJECTION, SOLUTION INTRAMUSCULAR; INTRAVENOUS; SUBCUTANEOUS EVERY 5 MIN PRN
Status: DISCONTINUED | OUTPATIENT
Start: 2019-02-08 | End: 2019-02-08 | Stop reason: HOSPADM

## 2019-02-08 RX ORDER — SODIUM CHLORIDE 9 MG/ML
INJECTION, SOLUTION INTRAVENOUS CONTINUOUS
Status: DISCONTINUED | OUTPATIENT
Start: 2019-02-08 | End: 2019-02-08

## 2019-02-08 RX ORDER — HYDRALAZINE HYDROCHLORIDE 20 MG/ML
5 INJECTION INTRAMUSCULAR; INTRAVENOUS ONCE AS NEEDED
Status: COMPLETED | OUTPATIENT
Start: 2019-02-08 | End: 2019-02-08

## 2019-02-08 RX ORDER — BUPIVACAINE HYDROCHLORIDE 2.5 MG/ML
INJECTION, SOLUTION INFILTRATION; PERINEURAL
Status: DISCONTINUED | OUTPATIENT
Start: 2019-02-08 | End: 2019-02-08 | Stop reason: HOSPADM

## 2019-02-08 RX ORDER — PHENYLEPHRINE HYDROCHLORIDE 10 MG/ML
INJECTION INTRAVENOUS
Status: DISCONTINUED | OUTPATIENT
Start: 2019-02-08 | End: 2019-02-08

## 2019-02-08 RX ORDER — LABETALOL HYDROCHLORIDE 5 MG/ML
INJECTION, SOLUTION INTRAVENOUS
Status: DISCONTINUED | OUTPATIENT
Start: 2019-02-08 | End: 2019-02-08

## 2019-02-08 RX ORDER — FENTANYL CITRATE 50 UG/ML
INJECTION, SOLUTION INTRAMUSCULAR; INTRAVENOUS
Status: DISCONTINUED | OUTPATIENT
Start: 2019-02-08 | End: 2019-02-08

## 2019-02-08 RX ORDER — CISATRACURIUM BESYLATE 10 MG/ML
INJECTION, SOLUTION INTRAVENOUS
Status: DISCONTINUED | OUTPATIENT
Start: 2019-02-08 | End: 2019-02-08

## 2019-02-08 RX ORDER — ACETAMINOPHEN 10 MG/ML
1000 INJECTION, SOLUTION INTRAVENOUS ONCE
Status: COMPLETED | OUTPATIENT
Start: 2019-02-08 | End: 2019-02-08

## 2019-02-08 RX ORDER — ALVIMOPAN 12 MG/1
12 CAPSULE ORAL 2 TIMES DAILY
Status: DISCONTINUED | OUTPATIENT
Start: 2019-02-08 | End: 2019-02-12 | Stop reason: HOSPADM

## 2019-02-08 RX ORDER — GLYCOPYRROLATE 0.2 MG/ML
INJECTION INTRAMUSCULAR; INTRAVENOUS
Status: DISCONTINUED | OUTPATIENT
Start: 2019-02-08 | End: 2019-02-08

## 2019-02-08 RX ORDER — ONDANSETRON 2 MG/ML
INJECTION INTRAMUSCULAR; INTRAVENOUS
Status: DISCONTINUED | OUTPATIENT
Start: 2019-02-08 | End: 2019-02-08

## 2019-02-08 RX ORDER — LIDOCAINE HYDROCHLORIDE 10 MG/ML
1 INJECTION, SOLUTION EPIDURAL; INFILTRATION; INTRACAUDAL; PERINEURAL ONCE
Status: DISCONTINUED | OUTPATIENT
Start: 2019-02-08 | End: 2019-02-08 | Stop reason: HOSPADM

## 2019-02-08 RX ORDER — CEFAZOLIN SODIUM 2 G/50ML
2 SOLUTION INTRAVENOUS
Status: COMPLETED | OUTPATIENT
Start: 2019-02-08 | End: 2019-02-08

## 2019-02-08 RX ORDER — PROPOFOL 10 MG/ML
VIAL (ML) INTRAVENOUS
Status: DISCONTINUED | OUTPATIENT
Start: 2019-02-08 | End: 2019-02-08

## 2019-02-08 RX ORDER — ENOXAPARIN SODIUM 100 MG/ML
30 INJECTION SUBCUTANEOUS EVERY 24 HOURS
Status: DISCONTINUED | OUTPATIENT
Start: 2019-02-09 | End: 2019-02-12 | Stop reason: HOSPADM

## 2019-02-08 RX ORDER — SODIUM CHLORIDE, SODIUM LACTATE, POTASSIUM CHLORIDE, CALCIUM CHLORIDE 600; 310; 30; 20 MG/100ML; MG/100ML; MG/100ML; MG/100ML
INJECTION, SOLUTION INTRAVENOUS CONTINUOUS
Status: DISCONTINUED | OUTPATIENT
Start: 2019-02-08 | End: 2019-02-08

## 2019-02-08 RX ORDER — NEOSTIGMINE METHYLSULFATE 1 MG/ML
INJECTION, SOLUTION INTRAVENOUS
Status: DISCONTINUED | OUTPATIENT
Start: 2019-02-08 | End: 2019-02-08

## 2019-02-08 RX ADMIN — PROPOFOL 40 MG: 10 INJECTION, EMULSION INTRAVENOUS at 10:02

## 2019-02-08 RX ADMIN — LIDOCAINE HYDROCHLORIDE 80 MG: 20 INJECTION, SOLUTION INTRAVENOUS at 09:02

## 2019-02-08 RX ADMIN — FENTANYL CITRATE 100 MCG: 50 INJECTION INTRAMUSCULAR; INTRAVENOUS at 09:02

## 2019-02-08 RX ADMIN — GLYCOPYRROLATE 0.2 MG: 0.2 INJECTION, SOLUTION INTRAMUSCULAR; INTRAVENOUS at 10:02

## 2019-02-08 RX ADMIN — LABETALOL HYDROCHLORIDE 10 MG: 5 INJECTION, SOLUTION INTRAVENOUS at 11:02

## 2019-02-08 RX ADMIN — HYDROMORPHONE HYDROCHLORIDE 0.2 MG: 2 INJECTION, SOLUTION INTRAMUSCULAR; INTRAVENOUS; SUBCUTANEOUS at 11:02

## 2019-02-08 RX ADMIN — MIDAZOLAM HYDROCHLORIDE 2 MG: 1 INJECTION, SOLUTION INTRAMUSCULAR; INTRAVENOUS at 09:02

## 2019-02-08 RX ADMIN — CISATRACURIUM BESYLATE 2 MG: 10 INJECTION INTRAVENOUS at 10:02

## 2019-02-08 RX ADMIN — CEFAZOLIN SODIUM 2 G: 2 SOLUTION INTRAVENOUS at 06:02

## 2019-02-08 RX ADMIN — HYDRALAZINE HYDROCHLORIDE 5 MG: 20 INJECTION INTRAMUSCULAR; INTRAVENOUS at 11:02

## 2019-02-08 RX ADMIN — PHENYLEPHRINE HYDROCHLORIDE 100 MCG: 10 INJECTION INTRAVENOUS at 09:02

## 2019-02-08 RX ADMIN — CEFAZOLIN SODIUM 2 G: 2 SOLUTION INTRAVENOUS at 09:02

## 2019-02-08 RX ADMIN — SODIUM CHLORIDE, SODIUM LACTATE, POTASSIUM CHLORIDE, AND CALCIUM CHLORIDE: .6; .31; .03; .02 INJECTION, SOLUTION INTRAVENOUS at 09:02

## 2019-02-08 RX ADMIN — PHENYLEPHRINE HYDROCHLORIDE 100 MCG: 10 INJECTION INTRAVENOUS at 10:02

## 2019-02-08 RX ADMIN — FENTANYL CITRATE 50 MCG: 50 INJECTION INTRAMUSCULAR; INTRAVENOUS at 09:02

## 2019-02-08 RX ADMIN — SODIUM CHLORIDE: 0.9 INJECTION, SOLUTION INTRAVENOUS at 02:02

## 2019-02-08 RX ADMIN — HYDROMORPHONE HYDROCHLORIDE 0.2 MG: 2 INJECTION, SOLUTION INTRAMUSCULAR; INTRAVENOUS; SUBCUTANEOUS at 01:02

## 2019-02-08 RX ADMIN — CISATRACURIUM BESYLATE 8 MG: 10 INJECTION INTRAVENOUS at 09:02

## 2019-02-08 RX ADMIN — ONDANSETRON 4 MG: 2 INJECTION, SOLUTION INTRAMUSCULAR; INTRAVENOUS at 10:02

## 2019-02-08 RX ADMIN — MORPHINE SULFATE 5 MG: 10 INJECTION INTRAVENOUS at 03:02

## 2019-02-08 RX ADMIN — SODIUM CHLORIDE: 0.9 INJECTION, SOLUTION INTRAVENOUS at 08:02

## 2019-02-08 RX ADMIN — ACETAMINOPHEN 1000 MG: 10 INJECTION, SOLUTION INTRAVENOUS at 09:02

## 2019-02-08 RX ADMIN — HYDROMORPHONE HYDROCHLORIDE 0.2 MG: 2 INJECTION, SOLUTION INTRAMUSCULAR; INTRAVENOUS; SUBCUTANEOUS at 12:02

## 2019-02-08 RX ADMIN — ACETAMINOPHEN 1000 MG: 10 INJECTION, SOLUTION INTRAVENOUS at 05:02

## 2019-02-08 RX ADMIN — LABETALOL HYDROCHLORIDE 5 MG: 5 INJECTION, SOLUTION INTRAVENOUS at 10:02

## 2019-02-08 RX ADMIN — GLYCOPYRROLATE 0.6 MG: 0.2 INJECTION, SOLUTION INTRAMUSCULAR; INTRAVENOUS at 11:02

## 2019-02-08 RX ADMIN — PROPOFOL 160 MG: 10 INJECTION, EMULSION INTRAVENOUS at 09:02

## 2019-02-08 RX ADMIN — NEOSTIGMINE METHYLSULFATE 5 MG: 1 INJECTION INTRAVENOUS at 11:02

## 2019-02-08 RX ADMIN — MORPHINE SULFATE 5 MG: 10 INJECTION INTRAVENOUS at 08:02

## 2019-02-08 RX ADMIN — PROMETHAZINE HYDROCHLORIDE 6.25 MG: 25 INJECTION INTRAMUSCULAR; INTRAVENOUS at 03:02

## 2019-02-08 RX ADMIN — LABETALOL HYDROCHLORIDE 10 MG: 5 INJECTION, SOLUTION INTRAVENOUS at 10:02

## 2019-02-08 NOTE — OP NOTE
DATE OF PROCEDURE:  02/08/2019.    PREOPERATIVE DIAGNOSIS:  Gastric carcinoma.    POSTOPERATIVE DIAGNOSIS:  Gastric carcinoma.    PROCEDURE PERFORMED:  Radical subtotal gastrectomy.    SURGEON:  Jesus Tripp M.D.    ASSISTANT:  Rose Clarke M.D., PGY5 and Lizeth Krishnamurthy M.D., PGY4.    ANESTHESIA:  General.    ESTIMATED BLOOD LOSS:  150 mL    DESCRIPTION OF OPERATION:  The patient was brought to the Operating Room, placed   on the operating room table in supine position.  Under adequate general   anesthesia, prepped and draped around her abdomen in the usual sterile fashion.    An incision was made in the midline to enter the patient's abdominal cavity.    No evidence of any metastatic disease could be appreciated.  The liver was   clean.  The mass could be felt at the lesser curve mid just at the incisura   angularis.  The patient had a Cattell-Braasch maneuver performed taking the   colon down from her gallbladder fossa.  The patient then had a generous Kocher   maneuver to the great vessels and then the omentum was taken down from the   transverse colon and down along to the pancreas through the pancreas to the   gastroepiploic lymph nodes and all of these were taken.  Frozen section was done   on the top of the pancreas to the left gastric nose and coronary artery on that   side and all of these lymph nodes were dissected above the common hepatic and   splenic artery.  As these were taken off, the duodenum was then divided   approximately 1 to 2 cm distal to the pylorus and some of the lorna hepatis   lymph nodes were taken as well and swept all the way up with the wrist.    Dissection of the gastrocolic ligament was done all the way up to the spleen and   the short gastrics and then the gastrohepatic ligament was divided at the liver   up to the left gastric artery.  Once all this was complete, the stomach was   then divided from the first level short gastrics up to about 2-3 cm distal to   the cardia.   Once this was performed, the jejunum was then brought out   approximately 18-23 cm distal to the ligament of Treitz and then a B2   anastomosis was then performed with a stapler posterior lie.  An angled NG tube   was made to be in place as well as the area where the NG tube was pushed through   the staple line.  This was re-stapled.  Without any evidence of any ongoing   bleeding or issues with hemostasis being obtained with the cautery with NG tube   in place, the patient then had her abdomen closed in layers with absorbable   suture.  Exparel was placed on posterior rectus space on both sides.  She was   awakened and transported to the Recovery Room in satisfactory condition.      PRAFUL  dd: 02/08/2019 11:03:04 (CST)  td: 02/08/2019 12:23:30 (CST)  Doc ID   #[id number]  Job ID #[id number]    CC:

## 2019-02-08 NOTE — TRANSFER OF CARE
"Anesthesia Transfer of Care Note    Patient: Xiomy Duncan    Procedure(s) Performed: Procedure(s) (LRB):  OPEN GASTRECTOMY- radical subtotal (N/A)    Patient location: PACU    Anesthesia Type: general    Transport from OR: Transported from OR on room air with adequate spontaneous ventilation    Post pain: adequate analgesia    Post assessment: no apparent anesthetic complications and tolerated procedure well    Post vital signs: stable    Level of consciousness: responds to stimulation    Nausea/Vomiting: no nausea/vomiting    Complications: none    Transfer of care protocol was followed      Last vitals:   Visit Vitals  BP (!) 162/78   Pulse 72   Temp 36.5 °C (97.7 °F) (Oral)   Resp 18   Ht 5' 2" (1.575 m)   Wt 69.1 kg (152 lb 5 oz)   SpO2 96%   Breastfeeding? No   BMI 27.86 kg/m²     "

## 2019-02-08 NOTE — ANESTHESIA PREPROCEDURE EVALUATION
02/08/2019    Pre-operative evaluation for Procedure(s) (LRB):  OPEN GASTRECTOMY (N/A)    Xiomy Duncan is a 59 y.o. female     Patient Active Problem List   Diagnosis    HTN (hypertension)    Stable asthma    Thyroid nodule    Hyperlipidemia    Hx of colonic polyps    Overweight (BMI 25.0-29.9)    Pre-diabetes    Urge incontinence    GERD (gastroesophageal reflux disease)    Preoperative evaluation to rule out surgical contraindication    Upper GI bleed    Carcinoma of antrum of stomach    Malignant neoplasm of stomach       Review of patient's allergies indicates:   Allergen Reactions    Penicillins Rash       No current facility-administered medications on file prior to encounter.      Current Outpatient Medications on File Prior to Encounter   Medication Sig Dispense Refill    atorvastatin (LIPITOR) 40 MG tablet Take 1 tablet (40 mg total) by mouth every evening. 30 tablet 11    gabapentin (NEURONTIN) 100 MG capsule Take 100 mg by mouth 3 (three) times daily.      pantoprazole (PROTONIX) 40 MG tablet Take 1 tablet (40 mg total) by mouth once daily. 30 tablet 5    albuterol (PROVENTIL) 2.5 mg /3 mL (0.083 %) nebulizer solution Take 3 mLs (2.5 mg total) by nebulization every 6 (six) hours as needed for Wheezing. 1 Box 0    ANORO ELLIPTA 62.5-25 mcg/actuation DsDv INHALE 1 PUFF INTO THE LUNGS ONCE DAILY. 30 each 5    diclofenac (VOLTAREN) 75 MG EC tablet Take 75 mg by mouth 2 (two) times daily.  1    FLUoxetine (PROZAC) 20 MG capsule 1 CAPSULE(S) ORALLY 1 TIMES A DAY (IN THE MORNING)  1    hydroCHLOROthiazide (HYDRODIURIL) 25 MG tablet Take 1 tablet (25 mg total) by mouth once daily. 30 tablet 2    lactulose (CHRONULAC) 10 gram/15 mL solution TAKE 30 MLS (20 G TOTAL) BY MOUTH 3 (THREE) TIMES DAILY. 300 mL 0    oxybutynin (DITROPAN) 5 MG Tab TAKE 1 TABLET (5 MG TOTAL) BY MOUTH 2 (TWO) TIMES DAILY. 60 tablet 2    STIOLTO RESPIMAT 2.5-2.5 mcg/actuation Mist INHALE 1 INHALATION INTO THE  LUNGS ONCE DAILY. CONTROLLER 4 g 2       VITALS  Vitals:    02/08/19 0804   BP: 132/63   Pulse: 75   Resp: 18   Temp: 36.2 °C (97.2 °F)           LABS  BMP  Lab Results   Component Value Date     02/01/2019    K 4.1 02/01/2019     02/01/2019    CO2 27 02/01/2019    BUN 14 02/01/2019    CREATININE 0.8 02/01/2019    CALCIUM 9.3 02/01/2019    ANIONGAP 6 (L) 02/01/2019    ESTGFRAFRICA >60 02/01/2019    EGFRNONAA >60 02/01/2019     Lab Results   Component Value Date    WBC 10.76 02/01/2019    HGB 10.5 (L) 02/01/2019    HCT 33.8 (L) 02/01/2019    MCV 89 02/01/2019     (H) 02/01/2019         2D Echo:  Results for orders placed or performed during the hospital encounter of 06/11/14   2D echo with color flow doppler   Result Value Ref Range    QEF 65     Diastolic Dysfunction No          Anesthesia Evaluation    I have reviewed the Patient Summary Reports.    I have reviewed the Nursing Notes.   I have reviewed the Medications.     Review of Systems  Anesthesia Hx:  History of prior surgery of interest to airway management or planning:  Denies Personal Hx of Anesthesia complications.   Social:  Non-Smoker, Former Smoker    Hematology/Oncology:        Current/Recent Cancer.   EENT/Dental:EENT/Dental Normal   Cardiovascular:   Hypertension    Pulmonary:   COPD Asthma    Hepatic/GI:   PUD, GERD    Musculoskeletal:  Spine Disorders: lumbar    Neurological:   Seizures    Endocrine:  Endocrine Normal        Physical Exam  General:  Well nourished    Airway/Jaw/Neck:  Airway Findings: Mouth Opening: Normal Tongue: Normal  General Airway Assessment: Adult  Mallampati: II  TM Distance: 4 - 6 cm  Jaw/Neck Findings:  Neck ROM: Normal ROM     Eyes/Ears/Nose:  EYES/EARS/NOSE FINDINGS: Normal   Dental:  Dental Findings:   Chest/Lungs:  Chest/Lungs Findings: Normal Respiratory Rate     Heart/Vascular:  Heart Findings: Normal       Mental Status:  Mental Status Findings:  Cooperative, Alert and Oriented         Anesthesia  Plan  Type of Anesthesia, risks & benefits discussed:  Anesthesia Type:  general  Patient's Preference:   Intra-op Monitoring Plan: standard ASA monitors  Intra-op Monitoring Plan Comments:   Post Op Pain Control Plan: IV/PO Opioids PRN  Post Op Pain Control Plan Comments:   Induction:   IV  Beta Blocker:  Patient is not currently on a Beta-Blocker (No further documentation required).       Informed Consent: Patient understands risks and agrees with Anesthesia plan.  Questions answered. Anesthesia consent signed with patient.  ASA Score: 3     Day of Surgery Review of History & Physical: I have interviewed and examined the patient. I have reviewed the patient's H&P dated:  There are no significant changes.          Ready For Surgery From Anesthesia Perspective.

## 2019-02-08 NOTE — BRIEF OP NOTE
[facility]  Brief Operative Note    SUMMARY     Surgery Date: 2/8/2019     Surgeon(s) and Role:     * Jesus Tripp MD - Primary    Assisting Surgeon: Rose Clarke MD PGY-5  Lizeth Krishnamurthy MD PGY-4    Pre-op Diagnosis:  Malignant neoplasm of stomach, unspecified location [C16.9]    Post-op Diagnosis:  Post-Op Diagnosis Codes:     * Malignant neoplasm of stomach, unspecified location [C16.9]    Procedure(s) (LRB):  OPEN GASTRECTOMY- radical subtotal (N/A)    Anesthesia: General    Description of Procedure: open subtotal gastrectomy with lymph node dissection and greater omental bursectomy    Description of the findings of the procedure: mass at the mid lesser curve    Estimated Blood Loss: 150 mL         Specimens:   Specimen (12h ago, onward)    Start     Ordered    02/08/19 1029  Specimen to Pathology - Surgery  Once     Comments:  Stomach with stitch on margin- frozen     Start Status   02/08/19 1029 Collected (02/08/19 1030)       02/08/19 1028

## 2019-02-09 LAB
ALBUMIN SERPL BCP-MCNC: 2.4 G/DL
ALP SERPL-CCNC: 77 U/L
ALT SERPL W/O P-5'-P-CCNC: 95 U/L
ANION GAP SERPL CALC-SCNC: 6 MMOL/L
ANION GAP SERPL CALC-SCNC: 6 MMOL/L
AST SERPL-CCNC: 103 U/L
BASOPHILS # BLD AUTO: 0.01 K/UL
BASOPHILS NFR BLD: 0.1 %
BILIRUB SERPL-MCNC: 0.3 MG/DL
BUN SERPL-MCNC: 15 MG/DL
BUN SERPL-MCNC: 15 MG/DL
CALCIUM SERPL-MCNC: 8.3 MG/DL
CALCIUM SERPL-MCNC: 8.3 MG/DL
CHLORIDE SERPL-SCNC: 108 MMOL/L
CHLORIDE SERPL-SCNC: 108 MMOL/L
CO2 SERPL-SCNC: 24 MMOL/L
CO2 SERPL-SCNC: 24 MMOL/L
CREAT SERPL-MCNC: 0.8 MG/DL
CREAT SERPL-MCNC: 0.8 MG/DL
DIFFERENTIAL METHOD: ABNORMAL
EOSINOPHIL # BLD AUTO: 0 K/UL
EOSINOPHIL NFR BLD: 0 %
ERYTHROCYTE [DISTWIDTH] IN BLOOD BY AUTOMATED COUNT: 16.8 %
EST. GFR  (AFRICAN AMERICAN): >60 ML/MIN/1.73 M^2
EST. GFR  (AFRICAN AMERICAN): >60 ML/MIN/1.73 M^2
EST. GFR  (NON AFRICAN AMERICAN): >60 ML/MIN/1.73 M^2
EST. GFR  (NON AFRICAN AMERICAN): >60 ML/MIN/1.73 M^2
GLUCOSE SERPL-MCNC: 102 MG/DL
GLUCOSE SERPL-MCNC: 102 MG/DL
HCT VFR BLD AUTO: 28.3 %
HGB BLD-MCNC: 8.9 G/DL
LYMPHOCYTES # BLD AUTO: 2 K/UL
LYMPHOCYTES NFR BLD: 16.2 %
MAGNESIUM SERPL-MCNC: 1.6 MG/DL
MCH RBC QN AUTO: 27.4 PG
MCHC RBC AUTO-ENTMCNC: 31.4 G/DL
MCV RBC AUTO: 87 FL
MONOCYTES # BLD AUTO: 1.1 K/UL
MONOCYTES NFR BLD: 9 %
NEUTROPHILS # BLD AUTO: 9.3 K/UL
NEUTROPHILS NFR BLD: 74.7 %
PLATELET # BLD AUTO: 440 K/UL
PMV BLD AUTO: 11.2 FL
POTASSIUM SERPL-SCNC: 3.9 MMOL/L
POTASSIUM SERPL-SCNC: 3.9 MMOL/L
PROT SERPL-MCNC: 5.6 G/DL
RBC # BLD AUTO: 3.25 M/UL
SODIUM SERPL-SCNC: 138 MMOL/L
SODIUM SERPL-SCNC: 138 MMOL/L
WBC # BLD AUTO: 12.49 K/UL

## 2019-02-09 PROCEDURE — 11000001 HC ACUTE MED/SURG PRIVATE ROOM

## 2019-02-09 PROCEDURE — 85025 COMPLETE CBC W/AUTO DIFF WBC: CPT

## 2019-02-09 PROCEDURE — 36415 COLL VENOUS BLD VENIPUNCTURE: CPT

## 2019-02-09 PROCEDURE — 25000003 PHARM REV CODE 250: Performed by: SURGERY

## 2019-02-09 PROCEDURE — 83735 ASSAY OF MAGNESIUM: CPT

## 2019-02-09 PROCEDURE — 94799 UNLISTED PULMONARY SVC/PX: CPT

## 2019-02-09 PROCEDURE — 80053 COMPREHEN METABOLIC PANEL: CPT

## 2019-02-09 PROCEDURE — 63600175 PHARM REV CODE 636 W HCPCS: Performed by: SURGERY

## 2019-02-09 RX ADMIN — MORPHINE SULFATE 5 MG: 10 INJECTION INTRAVENOUS at 12:02

## 2019-02-09 RX ADMIN — MORPHINE SULFATE 5 MG: 10 INJECTION INTRAVENOUS at 11:02

## 2019-02-09 RX ADMIN — MORPHINE SULFATE 5 MG: 10 INJECTION INTRAVENOUS at 09:02

## 2019-02-09 RX ADMIN — ENOXAPARIN SODIUM 30 MG: 100 INJECTION SUBCUTANEOUS at 05:02

## 2019-02-09 RX ADMIN — MORPHINE SULFATE 5 MG: 10 INJECTION INTRAVENOUS at 05:02

## 2019-02-09 RX ADMIN — CEFAZOLIN SODIUM 2 G: 2 SOLUTION INTRAVENOUS at 12:02

## 2019-02-09 RX ADMIN — SODIUM CHLORIDE: 0.9 INJECTION, SOLUTION INTRAVENOUS at 12:02

## 2019-02-09 RX ADMIN — SODIUM CHLORIDE: 0.9 INJECTION, SOLUTION INTRAVENOUS at 09:02

## 2019-02-09 NOTE — PROGRESS NOTES
[facility]  General Surgery  Progress Note    Subjective:     Interval History:   POD 1 radical subtotal gastrectomy   NG in nares with bilious output  Urinating without harris       Post-Op Info:  Procedure(s) (LRB):  OPEN GASTRECTOMY- radical subtotal (N/A)   1 Day Post-Op      Medications:  Continuous Infusions:   sodium chloride 0.9% 125 mL/hr at 02/09/19 0907     Scheduled Meds:   alvimopan  12 mg Oral BID    enoxaparin  30 mg Subcutaneous Daily     PRN Meds:morphine, promethazine (PHENERGAN) IVPB     Objective:     Vital Signs (Most Recent):  Temp: 97.9 °F (36.6 °C) (02/09/19 0740)  Pulse: 97 (02/09/19 0740)  Resp: 18 (02/09/19 0740)  BP: (!) 147/67 (02/09/19 0740)  SpO2: (!) 93 % (02/09/19 0740) Vital Signs (24h Range):  Temp:  [97.5 °F (36.4 °C)-99.5 °F (37.5 °C)] 97.9 °F (36.6 °C)  Pulse:  [62-97] 97  Resp:  [12-24] 18  SpO2:  [92 %-99 %] 93 %  BP: (113-158)/(55-74) 147/67  Arterial Line BP: (156)/(68) 156/68       Intake/Output Summary (Last 24 hours) at 2/9/2019 1150  Last data filed at 2/9/2019 0048  Gross per 24 hour   Intake 710.42 ml   Output 325 ml   Net 385.42 ml       Physical Exam  Gen; Alert and oriented   HEENT: NG in nares, bilious output  CV; RRR  Abd: soft, appropriately tender to palpation, incision well approximated  EXT; moves all, no edema     Significant Labs:  CBC:   Recent Labs   Lab 02/09/19  0503   WBC 12.49   RBC 3.25*   HGB 8.9*   HCT 28.3*   *   MCV 87   MCH 27.4   MCHC 31.4*     CMP:   Recent Labs   Lab 02/09/19  0503     102   CALCIUM 8.3*  8.3*   ALBUMIN 2.4*   PROT 5.6*     138   K 3.9  3.9   CO2 24  24     108   BUN 15  15   CREATININE 0.8  0.8   ALKPHOS 77   ALT 95*   *   BILITOT 0.3       Significant Diagnostics:  None    Assessment/Plan:       Ms Duncan is a 59 year old woman with a history of gastric cancer status post radical subtotal gastrectomy on 2/8/19.     -Continue NG to LIWS  -Continue strict NPO and  MIVF  -s/p operative abx   -Iv morphine while NPO    PT, OOB and lovenox       Active Diagnoses:    Diagnosis Date Noted POA    PRINCIPAL PROBLEM:  Malignant neoplasm of stomach [C16.9] 01/16/2019 Yes      Problems Resolved During this Admission:         Chente Calvo MD  General Surgery  [facility]

## 2019-02-09 NOTE — PLAN OF CARE
Problem: Adult Inpatient Plan of Care  Goal: Plan of Care Review  Outcome: Ongoing (interventions implemented as appropriate)   02/09/19 2944   Plan of Care Review   Plan of Care Reviewed With patient   Alert and oriented. C/o stomach pain. IV prn meds given with moderate relief. Turns and repositions self independently. SCDs continued throughout night. IVF NS at 125 mL and IV abx continued per order.  Midline incision remains intact, no drainage noted. Transparant dressing remains in place.  NG tube continued to LIWS, draining green drainage.  Requests bedpan when needed. Bed alarm set. Remains free from falls. No distress noted. Strict I/O's maintained. NPO maintained. No distress noted throughout night.

## 2019-02-09 NOTE — PLAN OF CARE
02/09/19 1225   Discharge Assessment   Assessment Type Discharge Planning Assessment   Confirmed/corrected address and phone number on facesheet? Yes   Assessment information obtained from? Patient   Expected Length of Stay (days) 3   Communicated expected length of stay with patient/caregiver yes   Prior to hospitilization cognitive status: Alert/Oriented   Prior to hospitalization functional status: Independent   Current cognitive status: Alert/Oriented   Current Functional Status: Independent   Lives With alone   Able to Return to Prior Arrangements yes   Is patient able to care for self after discharge? Yes   Who are your caregiver(s) and their phone number(s)? Lakisha Tomlinson  [phone]  el   Patient's perception of discharge disposition admitted as an inpatient   Readmission Within the Last 30 Days previous discharge plan unsuccessful;current reason for admission unrelated to previous admission   If yes, most recent facility name: [facility]   Patient currently being followed by outpatient case management? No   Patient currently receives any other outside agency services? No   Equipment Currently Used at Home none   Do you have any problems affording any of your prescribed medications? No   Is the patient taking medications as prescribed? yes   Does the patient have transportation home? Yes   Does the patient receive services at the [facility]? No   Discharge Plan A Home   DME Needed Upon Discharge  none   Patient/Family in Agreement with Plan yes   Readmission Questionnaire   At the time of your discharge, did someone talk to you about what your health problems were? Yes   At the time of discharge, did someone talk to you about what to watch out for regarding worsening of your health problem? Yes   At the time of discharge, did someone talk to you about what to do if you experienced worsening of your health problem? Yes   At the time of discharge, did someone talk to you about which medication  to take when you left the hospital and which ones to stop taking? Yes   At the time of discharge, did someone talk to you about when and where to follow up with a doctor after you left the hospital? Yes   What do you believe caused you to be sick enough to be re-admitted? I don't know   How often do you need to have someone help you when you read instructions, pamphlets, or other written material from your doctor or pharmacy? Never   Do you have problems taking your medications as prescribed? No   Do you have any problems affording any of  your prescribed medications? No   Do you have problems obtaining/receiving your medications? No   Does the patient have transportation to healthcare appointments? Yes   Living Arrangements house   Does the patient have family/friends to help with healtcare needs after discharge? yes   Does your caregiver provide all the help you need? Yes   Are you currently feeling confused? No   Are you currently having problems thinking? No   Are you currently having memory problems? No   Have you felt down, depressed, or hopeless? 0   Have you felt little interest or pleasure in doing things? 0

## 2019-02-09 NOTE — PLAN OF CARE
Problem: Adult Inpatient Plan of Care  Goal: Plan of Care Review  Outcome: Ongoing (interventions implemented as appropriate)   02/08/19 1800   Plan of Care Review   Plan of Care Reviewed With patient;family   Patient arouses easily with touch or voice, answers appropriately. NPO, NG tube to LIWS green, strict NPO. IV fluids,IV antibiotics and IV tylenol IV morphine for abd incisional pain with pillow on belly for pain management. Midline clear dressing CDI. BLE SCds.  Medicated once with phenergan for nausea. Due to void. Hourly rounds, bed alarm on, gabriela light in reach. Free of falls, no distress noted. Continue with plan of care as ordered.

## 2019-02-10 LAB
ANION GAP SERPL CALC-SCNC: 8 MMOL/L
BASOPHILS # BLD AUTO: 0.03 K/UL
BASOPHILS NFR BLD: 0.1 %
BUN SERPL-MCNC: 7 MG/DL
CALCIUM SERPL-MCNC: 8.6 MG/DL
CHLORIDE SERPL-SCNC: 106 MMOL/L
CO2 SERPL-SCNC: 23 MMOL/L
CREAT SERPL-MCNC: 0.7 MG/DL
DIFFERENTIAL METHOD: ABNORMAL
EOSINOPHIL # BLD AUTO: 0.1 K/UL
EOSINOPHIL NFR BLD: 0.3 %
ERYTHROCYTE [DISTWIDTH] IN BLOOD BY AUTOMATED COUNT: 17.2 %
EST. GFR  (AFRICAN AMERICAN): >60 ML/MIN/1.73 M^2
EST. GFR  (NON AFRICAN AMERICAN): >60 ML/MIN/1.73 M^2
GLUCOSE SERPL-MCNC: 64 MG/DL
HCT VFR BLD AUTO: 27.6 %
HGB BLD-MCNC: 8.7 G/DL
LYMPHOCYTES # BLD AUTO: 2.4 K/UL
LYMPHOCYTES NFR BLD: 11.7 %
MAGNESIUM SERPL-MCNC: 1.6 MG/DL
MCH RBC QN AUTO: 27.4 PG
MCHC RBC AUTO-ENTMCNC: 31.5 G/DL
MCV RBC AUTO: 87 FL
MONOCYTES # BLD AUTO: 1.9 K/UL
MONOCYTES NFR BLD: 9.2 %
NEUTROPHILS # BLD AUTO: 16 K/UL
NEUTROPHILS NFR BLD: 78.7 %
PLATELET # BLD AUTO: 440 K/UL
PMV BLD AUTO: 11.5 FL
POTASSIUM SERPL-SCNC: 3.6 MMOL/L
RBC # BLD AUTO: 3.18 M/UL
SODIUM SERPL-SCNC: 137 MMOL/L
WBC # BLD AUTO: 20.31 K/UL

## 2019-02-10 PROCEDURE — 94761 N-INVAS EAR/PLS OXIMETRY MLT: CPT

## 2019-02-10 PROCEDURE — 94799 UNLISTED PULMONARY SVC/PX: CPT

## 2019-02-10 PROCEDURE — 36415 COLL VENOUS BLD VENIPUNCTURE: CPT

## 2019-02-10 PROCEDURE — 85025 COMPLETE CBC W/AUTO DIFF WBC: CPT

## 2019-02-10 PROCEDURE — 80048 BASIC METABOLIC PNL TOTAL CA: CPT

## 2019-02-10 PROCEDURE — 83735 ASSAY OF MAGNESIUM: CPT

## 2019-02-10 PROCEDURE — 87040 BLOOD CULTURE FOR BACTERIA: CPT

## 2019-02-10 PROCEDURE — 11000001 HC ACUTE MED/SURG PRIVATE ROOM

## 2019-02-10 PROCEDURE — 25000003 PHARM REV CODE 250: Performed by: SURGERY

## 2019-02-10 PROCEDURE — 63600175 PHARM REV CODE 636 W HCPCS: Performed by: SURGERY

## 2019-02-10 PROCEDURE — 63600175 PHARM REV CODE 636 W HCPCS: Performed by: STUDENT IN AN ORGANIZED HEALTH CARE EDUCATION/TRAINING PROGRAM

## 2019-02-10 RX ORDER — ACETAMINOPHEN 10 MG/ML
1000 INJECTION, SOLUTION INTRAVENOUS EVERY 8 HOURS
Status: COMPLETED | OUTPATIENT
Start: 2019-02-10 | End: 2019-02-11

## 2019-02-10 RX ORDER — MORPHINE SULFATE 10 MG/ML
4 INJECTION INTRAMUSCULAR; INTRAVENOUS; SUBCUTANEOUS EVERY 4 HOURS PRN
Status: DISCONTINUED | OUTPATIENT
Start: 2019-02-10 | End: 2019-02-12

## 2019-02-10 RX ORDER — CHLORZOXAZONE 500 MG/1
500 TABLET ORAL 4 TIMES DAILY PRN
Qty: 60 TABLET | Refills: 0 | Status: SHIPPED | OUTPATIENT
Start: 2019-02-10 | End: 2019-02-20

## 2019-02-10 RX ORDER — MAGNESIUM SULFATE 1 G/100ML
1 INJECTION INTRAVENOUS
Status: COMPLETED | OUTPATIENT
Start: 2019-02-10 | End: 2019-02-10

## 2019-02-10 RX ORDER — ACETAMINOPHEN 10 MG/ML
1000 INJECTION, SOLUTION INTRAVENOUS ONCE
Status: COMPLETED | OUTPATIENT
Start: 2019-02-10 | End: 2019-02-10

## 2019-02-10 RX ADMIN — MORPHINE SULFATE 4 MG: 10 INJECTION INTRAVENOUS at 10:02

## 2019-02-10 RX ADMIN — SODIUM CHLORIDE: 0.9 INJECTION, SOLUTION INTRAVENOUS at 09:02

## 2019-02-10 RX ADMIN — MORPHINE SULFATE 5 MG: 10 INJECTION INTRAVENOUS at 03:02

## 2019-02-10 RX ADMIN — MORPHINE SULFATE 4 MG: 10 INJECTION INTRAVENOUS at 04:02

## 2019-02-10 RX ADMIN — ENOXAPARIN SODIUM 30 MG: 100 INJECTION SUBCUTANEOUS at 04:02

## 2019-02-10 RX ADMIN — MAGNESIUM SULFATE 1 G: 1 INJECTION INTRAVENOUS at 01:02

## 2019-02-10 RX ADMIN — MORPHINE SULFATE 4 MG: 10 INJECTION INTRAVENOUS at 12:02

## 2019-02-10 RX ADMIN — ACETAMINOPHEN 1000 MG: 10 INJECTION, SOLUTION INTRAVENOUS at 09:02

## 2019-02-10 RX ADMIN — MAGNESIUM SULFATE 1 G: 1 INJECTION INTRAVENOUS at 12:02

## 2019-02-10 RX ADMIN — PROMETHAZINE HYDROCHLORIDE 6.25 MG: 25 INJECTION INTRAMUSCULAR; INTRAVENOUS at 07:02

## 2019-02-10 RX ADMIN — ACETAMINOPHEN 1000 MG: 10 INJECTION, SOLUTION INTRAVENOUS at 05:02

## 2019-02-10 RX ADMIN — MORPHINE SULFATE 5 MG: 10 INJECTION INTRAVENOUS at 07:02

## 2019-02-10 NOTE — PLAN OF CARE
Problem: Adult Inpatient Plan of Care  Goal: Plan of Care Review  Outcome: Ongoing (interventions implemented as appropriate)   02/10/19 4974   Plan of Care Review   Plan of Care Reviewed With patient   Pt remains free of falls and injuries. S/p radical open gastrectomy on 2/8. Midline abd incision CDI. NGT to LIWS, small amt of output. Encouraged ambulation and use of IS. IVFs cont as scheduled. Pain managed with PRN morphine. Low grade temp overnight, cool towel applied and blankets removed. No other complaints at this time.

## 2019-02-10 NOTE — PLAN OF CARE
Problem: Fall Injury Risk  Goal: Absence of Fall and Fall-Related Injury  Outcome: Ongoing (interventions implemented as appropriate)  Patient remains free from falls and injury. No distress noted. VSS. No complaint of pain, n/v, diarrhea, or SOB. Will continue to monitor, will continue with plan of care.  Intervention: Identify and Manage Contributors to Fall Injury Risk   02/09/19 0800   Identify and Manage Contributors to Fall Injury Risk   Self-Care Promotion independence encouraged   Manage Acute Allergic Reaction   Medication Review/Management medications reviewed     Intervention: Promote Injury-Free Environment   02/09/19 0800 02/09/19 1600   Optimize Balance and Safe Activity   Safety Promotion/Fall Prevention --  assistive device/personal item within reach   Optimize [place] and Functional Mobility   Environmental Safety Modification assistive device/personal items within reach --          Comments: Patient remains free from falls and injury. No distress noted. VSS. No complaint of pain, n/v, diarrhea, or SOB. Will continue to monitor, will continue with plan of care.

## 2019-02-10 NOTE — PROGRESS NOTES
[facility]  General Surgery  Progress Note    Subjective:     Interval History:   POD 2 radical subtotal gastrectomy   NG in nares with minimal bilious output  Improving abdominal pain       Post-Op Info:  Procedure(s) (LRB):  OPEN GASTRECTOMY- radical subtotal (N/A)   2 Days Post-Op      Medications:  Continuous Infusions:   sodium chloride 0.9% 125 mL/hr at 02/09/19 0907     Scheduled Meds:   alvimopan  12 mg Oral BID    enoxaparin  30 mg Subcutaneous Daily    magnesium sulfate IVPB  1 g Intravenous Q1H     PRN Meds:morphine, promethazine (PHENERGAN) IVPB     Objective:     Vital Signs (Most Recent):  Temp: 99 °F (37.2 °C) (02/10/19 0750)  Pulse: 100 (02/10/19 0750)  Resp: 17 (02/10/19 0750)  BP: (!) 140/64 (02/10/19 0750)  SpO2: (!) 92 % (02/10/19 0750) Vital Signs (24h Range):  Temp:  [98.6 °F (37 °C)-100.4 °F (38 °C)] 99 °F (37.2 °C)  Pulse:  [] 100  Resp:  [16-19] 17  SpO2:  [91 %-100 %] 92 %  BP: (140-167)/(64-73) 140/64       Intake/Output Summary (Last 24 hours) at 2/10/2019 0952  Last data filed at 2/10/2019 0700  Gross per 24 hour   Intake 1297 ml   Output 225 ml   Net 1072 ml       Physical Exam  Gen; Alert and oriented   HEENT: NG in nares,   CV; RRR  Abd: soft, appropriately tender to palpation, incision well approximated  EXT; moves all, no edema     Significant Labs:  CBC:   Recent Labs   Lab 02/10/19  0716   WBC 20.31*   RBC 3.18*   HGB 8.7*   HCT 27.6*   *   MCV 87   MCH 27.4   MCHC 31.5*     CMP:   Recent Labs   Lab 02/09/19  0503 02/10/19  0716     102 64*   CALCIUM 8.3*  8.3* 8.6*   ALBUMIN 2.4*  --    PROT 5.6*  --      138 137   K 3.9  3.9 3.6   CO2 24  24 23     108 106   BUN 15  15 7   CREATININE 0.8  0.8 0.7   ALKPHOS 77  --    ALT 95*  --    *  --    BILITOT 0.3  --        Significant Diagnostics:  None    Assessment/Plan:       Ms Duncan is a 59 year old woman with a history of gastric cancer status post radical  subtotal gastrectomy on 2/8/19.     -Discontinue NG tube  -Continue NPO and MIVF  -IV pain medication morphine while NPO    PT, OOB and lovenox       Active Diagnoses:    Diagnosis Date Noted POA    PRINCIPAL PROBLEM:  Malignant neoplasm of stomach [C16.9] 01/16/2019 Yes      Problems Resolved During this Admission:         Chente Calvo MD  General Surgery  [facility]

## 2019-02-11 LAB
ANION GAP SERPL CALC-SCNC: 10 MMOL/L
ANION GAP SERPL CALC-SCNC: 6 MMOL/L
BASOPHILS # BLD AUTO: 0.03 K/UL
BASOPHILS NFR BLD: 0.2 %
BILIRUB UR QL STRIP: NEGATIVE
BUN SERPL-MCNC: 4 MG/DL
BUN SERPL-MCNC: 4 MG/DL
CALCIUM SERPL-MCNC: 8.1 MG/DL
CALCIUM SERPL-MCNC: 8.2 MG/DL
CHLORIDE SERPL-SCNC: 108 MMOL/L
CHLORIDE SERPL-SCNC: 108 MMOL/L
CLARITY UR: CLEAR
CO2 SERPL-SCNC: 22 MMOL/L
CO2 SERPL-SCNC: 24 MMOL/L
COLOR UR: YELLOW
CREAT SERPL-MCNC: 0.7 MG/DL
CREAT SERPL-MCNC: 0.7 MG/DL
DIFFERENTIAL METHOD: ABNORMAL
EOSINOPHIL # BLD AUTO: 0.2 K/UL
EOSINOPHIL NFR BLD: 1.6 %
ERYTHROCYTE [DISTWIDTH] IN BLOOD BY AUTOMATED COUNT: 17.3 %
EST. GFR  (AFRICAN AMERICAN): >60 ML/MIN/1.73 M^2
EST. GFR  (AFRICAN AMERICAN): >60 ML/MIN/1.73 M^2
EST. GFR  (NON AFRICAN AMERICAN): >60 ML/MIN/1.73 M^2
EST. GFR  (NON AFRICAN AMERICAN): >60 ML/MIN/1.73 M^2
GLUCOSE SERPL-MCNC: 62 MG/DL
GLUCOSE SERPL-MCNC: 68 MG/DL
GLUCOSE UR QL STRIP: NEGATIVE
HCT VFR BLD AUTO: 26.4 %
HGB BLD-MCNC: 8.5 G/DL
HGB UR QL STRIP: NEGATIVE
KETONES UR QL STRIP: ABNORMAL
LEUKOCYTE ESTERASE UR QL STRIP: NEGATIVE
LYMPHOCYTES # BLD AUTO: 1.4 K/UL
LYMPHOCYTES NFR BLD: 10.1 %
MAGNESIUM SERPL-MCNC: 2 MG/DL
MCH RBC QN AUTO: 27.9 PG
MCHC RBC AUTO-ENTMCNC: 32.2 G/DL
MCV RBC AUTO: 87 FL
MONOCYTES # BLD AUTO: 1.1 K/UL
MONOCYTES NFR BLD: 7.5 %
NEUTROPHILS # BLD AUTO: 11.4 K/UL
NEUTROPHILS NFR BLD: 80.6 %
NITRITE UR QL STRIP: NEGATIVE
PH UR STRIP: 6 [PH] (ref 5–8)
PLATELET # BLD AUTO: 435 K/UL
PMV BLD AUTO: 11.1 FL
POTASSIUM SERPL-SCNC: 3.2 MMOL/L
POTASSIUM SERPL-SCNC: 3.8 MMOL/L
PROT UR QL STRIP: NEGATIVE
RBC # BLD AUTO: 3.05 M/UL
SODIUM SERPL-SCNC: 138 MMOL/L
SODIUM SERPL-SCNC: 140 MMOL/L
SP GR UR STRIP: 1.01 (ref 1–1.03)
URN SPEC COLLECT METH UR: ABNORMAL
UROBILINOGEN UR STRIP-ACNC: NEGATIVE EU/DL
WBC # BLD AUTO: 14.19 K/UL

## 2019-02-11 PROCEDURE — 85025 COMPLETE CBC W/AUTO DIFF WBC: CPT

## 2019-02-11 PROCEDURE — 81003 URINALYSIS AUTO W/O SCOPE: CPT

## 2019-02-11 PROCEDURE — 83735 ASSAY OF MAGNESIUM: CPT

## 2019-02-11 PROCEDURE — 63600175 PHARM REV CODE 636 W HCPCS: Performed by: SURGERY

## 2019-02-11 PROCEDURE — 94799 UNLISTED PULMONARY SVC/PX: CPT

## 2019-02-11 PROCEDURE — 25000003 PHARM REV CODE 250: Performed by: SURGERY

## 2019-02-11 PROCEDURE — 80048 BASIC METABOLIC PNL TOTAL CA: CPT | Mod: 91

## 2019-02-11 PROCEDURE — 63600175 PHARM REV CODE 636 W HCPCS: Performed by: STUDENT IN AN ORGANIZED HEALTH CARE EDUCATION/TRAINING PROGRAM

## 2019-02-11 PROCEDURE — 80048 BASIC METABOLIC PNL TOTAL CA: CPT

## 2019-02-11 PROCEDURE — 25000003 PHARM REV CODE 250: Performed by: STUDENT IN AN ORGANIZED HEALTH CARE EDUCATION/TRAINING PROGRAM

## 2019-02-11 PROCEDURE — 11000001 HC ACUTE MED/SURG PRIVATE ROOM

## 2019-02-11 PROCEDURE — 36415 COLL VENOUS BLD VENIPUNCTURE: CPT

## 2019-02-11 RX ORDER — DEXTROSE, SODIUM CHLORIDE, SODIUM LACTATE, POTASSIUM CHLORIDE, AND CALCIUM CHLORIDE 5; .6; .31; .03; .02 G/100ML; G/100ML; G/100ML; G/100ML; G/100ML
INJECTION, SOLUTION INTRAVENOUS CONTINUOUS
Status: DISCONTINUED | OUTPATIENT
Start: 2019-02-11 | End: 2019-02-12

## 2019-02-11 RX ORDER — POTASSIUM CHLORIDE 7.45 MG/ML
10 INJECTION INTRAVENOUS
Status: COMPLETED | OUTPATIENT
Start: 2019-02-11 | End: 2019-02-11

## 2019-02-11 RX ADMIN — MORPHINE SULFATE 4 MG: 10 INJECTION INTRAVENOUS at 02:02

## 2019-02-11 RX ADMIN — POTASSIUM CHLORIDE 10 MEQ: 7.46 INJECTION, SOLUTION INTRAVENOUS at 10:02

## 2019-02-11 RX ADMIN — ENOXAPARIN SODIUM 30 MG: 100 INJECTION SUBCUTANEOUS at 06:02

## 2019-02-11 RX ADMIN — ALVIMOPAN 12 MG: 12 CAPSULE ORAL at 10:02

## 2019-02-11 RX ADMIN — MORPHINE SULFATE 4 MG: 10 INJECTION INTRAVENOUS at 03:02

## 2019-02-11 RX ADMIN — POTASSIUM CHLORIDE 10 MEQ: 7.46 INJECTION, SOLUTION INTRAVENOUS at 11:02

## 2019-02-11 RX ADMIN — POTASSIUM CHLORIDE 10 MEQ: 7.46 INJECTION, SOLUTION INTRAVENOUS at 02:02

## 2019-02-11 RX ADMIN — ACETAMINOPHEN 1000 MG: 10 INJECTION, SOLUTION INTRAVENOUS at 03:02

## 2019-02-11 RX ADMIN — DEXTROSE, SODIUM CHLORIDE, SODIUM LACTATE, POTASSIUM CHLORIDE, AND CALCIUM CHLORIDE: 5; .6; .31; .03; .02 INJECTION, SOLUTION INTRAVENOUS at 08:02

## 2019-02-11 RX ADMIN — DEXTROSE, SODIUM CHLORIDE, SODIUM LACTATE, POTASSIUM CHLORIDE, AND CALCIUM CHLORIDE: 5; .6; .31; .03; .02 INJECTION, SOLUTION INTRAVENOUS at 10:02

## 2019-02-11 RX ADMIN — MORPHINE SULFATE 4 MG: 10 INJECTION INTRAVENOUS at 07:02

## 2019-02-11 RX ADMIN — PROMETHAZINE HYDROCHLORIDE 6.25 MG: 25 INJECTION INTRAMUSCULAR; INTRAVENOUS at 08:02

## 2019-02-11 RX ADMIN — PROMETHAZINE HYDROCHLORIDE 6.25 MG: 25 INJECTION INTRAMUSCULAR; INTRAVENOUS at 10:02

## 2019-02-11 RX ADMIN — MORPHINE SULFATE 4 MG: 10 INJECTION INTRAVENOUS at 08:02

## 2019-02-11 RX ADMIN — ACETAMINOPHEN 1000 MG: 10 INJECTION, SOLUTION INTRAVENOUS at 05:02

## 2019-02-11 RX ADMIN — POTASSIUM CHLORIDE 10 MEQ: 7.46 INJECTION, SOLUTION INTRAVENOUS at 12:02

## 2019-02-11 NOTE — PROGRESS NOTES
[facility]  General Surgery  Progress Note    Subjective:     Interval History: temp to 101.4 yesterday evening, resolved with tylenol. Denies flatus, bloating, nausea, vomiting, abdominal pain. +ambulating    Post-Op Info:  Procedure(s) (LRB):  OPEN GASTRECTOMY- radical subtotal (N/A)   3 Days Post-Op      Medications:  Continuous Infusions:   sodium chloride 0.9% 125 mL/hr at 02/10/19 2100     Scheduled Meds:   acetaminophen  1,000 mg Intravenous Q8H    alvimopan  12 mg Oral BID    enoxaparin  30 mg Subcutaneous Daily    lactated ringers  500 mL Intravenous Once     PRN Meds:morphine, promethazine (PHENERGAN) IVPB     Objective:     Vital Signs (Most Recent):  Temp: 98.1 °F (36.7 °C) (02/10/19 2320)  Pulse: 80 (02/10/19 2320)  Resp: 18 (02/10/19 2320)  BP: (!) 170/77 (02/10/19 2320)  SpO2: 95 % (02/10/19 2320) Vital Signs (24h Range):  Temp:  [98.1 °F (36.7 °C)-101.4 °F (38.6 °C)] 98.1 °F (36.7 °C)  Pulse:  [] 80  Resp:  [16-18] 18  SpO2:  [92 %-95 %] 95 %  BP: (113-170)/(52-77) 170/77       Intake/Output Summary (Last 24 hours) at 2/11/2019 0724  Last data filed at 2/11/2019 0700  Gross per 24 hour   Intake 1697 ml   Output --   Net 1697 ml       Physical Exam  Gen; Alert and oriented   CV; RRR  Pulm; no increased work of breathing  Abd: soft, appropriately tender to palpation, incision healing very well  EXT; moves all, no edema      Significant Labs:  BMP:   Recent Labs   Lab 02/10/19  0716   GLU 64*      K 3.6      CO2 23   BUN 7   CREATININE 0.7   CALCIUM 8.6*   MG 1.6     CBC:   Recent Labs   Lab 02/11/19  0539   WBC 14.19*   RBC 3.05*   HGB 8.5*   HCT 26.4*   *   MCV 87   MCH 27.9   MCHC 32.2       Significant Diagnostics:  None    Assessment/Plan:   Ms Duncan is a 59 year old woman with a history of gastric cancer status post radical subtotal gastrectomy on 2/8/19, POD#3, awaiting return of bowel function     Continue NPO, IVF  PT,  OOB  Lovenox            Active Diagnoses:    Diagnosis Date Noted POA    PRINCIPAL PROBLEM:  Malignant neoplasm of stomach [C16.9] 01/16/2019 Yes      Problems Resolved During this Admission:         Rose Clarke MD  General Surgery  [facility]

## 2019-02-11 NOTE — NURSING
Temperature 101.4; MD notified. IV tylenol and LR bolus ordered. No distress noted. Will continue to monitor, will continue with plan of care.

## 2019-02-11 NOTE — ANESTHESIA POSTPROCEDURE EVALUATION
"Anesthesia Post Evaluation    Patient: Xiomy Duncan    Procedure(s) Performed: Procedure(s) (LRB):  OPEN GASTRECTOMY- radical subtotal (N/A)    Final Anesthesia Type: general  Patient location during evaluation: PACU  Patient participation: Yes- Able to Participate  Level of consciousness: awake and alert and oriented  Post-procedure vital signs: reviewed and stable  Pain management: adequate  Airway patency: patent  PONV status at discharge: No PONV  Anesthetic complications: no      Cardiovascular status: blood pressure returned to baseline, hemodynamically stable and stable  Respiratory status: unassisted, spontaneous ventilation and room air  Hydration status: euvolemic  Follow-up not needed.        Visit Vitals  BP (!) 149/67 (BP Location: Left arm, Patient Position: Lying)   Pulse 87   Temp 36.8 °C (98.2 °F) (Oral)   Resp 18   Ht 5' 2" (1.575 m)   Wt 69.1 kg (152 lb 5 oz)   SpO2 96%   Breastfeeding? No   BMI 27.86 kg/m²       Pain/Elodia Score: Pain Rating Prior to Med Admin: 7 (2/11/2019  7:06 AM)  Pain Rating Post Med Admin: 2 (2/11/2019  3:09 AM)        "

## 2019-02-11 NOTE — NURSING
Pt aoox4 free of falls or injuries.Pt ate about 50% of her lunch, she complained of nausea after this. Phenergan given with moderate relief. Ate about 25% of dinner. Pt states that she is passing gas. No bm. Midline incision clean, dry and intact. Pain managed with prn iv med. Pt ambulates in independently in rodriguez. Call light within reach, bed in lowest position.

## 2019-02-12 VITALS
HEIGHT: 62 IN | WEIGHT: 152.31 LBS | OXYGEN SATURATION: 95 % | SYSTOLIC BLOOD PRESSURE: 163 MMHG | TEMPERATURE: 100 F | BODY MASS INDEX: 28.03 KG/M2 | DIASTOLIC BLOOD PRESSURE: 73 MMHG | RESPIRATION RATE: 18 BRPM | HEART RATE: 83 BPM

## 2019-02-12 LAB
ANION GAP SERPL CALC-SCNC: 6 MMOL/L
BASOPHILS # BLD AUTO: 0.03 K/UL
BASOPHILS NFR BLD: 0.2 %
BUN SERPL-MCNC: 4 MG/DL
CALCIUM SERPL-MCNC: 8.2 MG/DL
CHLORIDE SERPL-SCNC: 105 MMOL/L
CO2 SERPL-SCNC: 27 MMOL/L
CREAT SERPL-MCNC: 0.6 MG/DL
DIFFERENTIAL METHOD: ABNORMAL
EOSINOPHIL # BLD AUTO: 0.4 K/UL
EOSINOPHIL NFR BLD: 2.8 %
ERYTHROCYTE [DISTWIDTH] IN BLOOD BY AUTOMATED COUNT: 17.1 %
EST. GFR  (AFRICAN AMERICAN): >60 ML/MIN/1.73 M^2
EST. GFR  (NON AFRICAN AMERICAN): >60 ML/MIN/1.73 M^2
GLUCOSE SERPL-MCNC: 99 MG/DL
HCT VFR BLD AUTO: 25 %
HGB BLD-MCNC: 7.8 G/DL
LYMPHOCYTES # BLD AUTO: 1.8 K/UL
LYMPHOCYTES NFR BLD: 14.3 %
MAGNESIUM SERPL-MCNC: 1.5 MG/DL
MCH RBC QN AUTO: 26.4 PG
MCHC RBC AUTO-ENTMCNC: 31.2 G/DL
MCV RBC AUTO: 85 FL
MONOCYTES # BLD AUTO: 1.2 K/UL
MONOCYTES NFR BLD: 9.5 %
NEUTROPHILS # BLD AUTO: 9.4 K/UL
NEUTROPHILS NFR BLD: 73.2 %
PLATELET # BLD AUTO: 470 K/UL
PMV BLD AUTO: 11 FL
POTASSIUM SERPL-SCNC: 3.3 MMOL/L
RBC # BLD AUTO: 2.96 M/UL
SODIUM SERPL-SCNC: 138 MMOL/L
WBC # BLD AUTO: 12.79 K/UL

## 2019-02-12 PROCEDURE — 25000003 PHARM REV CODE 250: Performed by: SURGERY

## 2019-02-12 PROCEDURE — 36415 COLL VENOUS BLD VENIPUNCTURE: CPT

## 2019-02-12 PROCEDURE — 63600175 PHARM REV CODE 636 W HCPCS: Performed by: STUDENT IN AN ORGANIZED HEALTH CARE EDUCATION/TRAINING PROGRAM

## 2019-02-12 PROCEDURE — 94799 UNLISTED PULMONARY SVC/PX: CPT

## 2019-02-12 PROCEDURE — 63600175 PHARM REV CODE 636 W HCPCS: Performed by: SURGERY

## 2019-02-12 PROCEDURE — 83735 ASSAY OF MAGNESIUM: CPT

## 2019-02-12 PROCEDURE — 85025 COMPLETE CBC W/AUTO DIFF WBC: CPT

## 2019-02-12 PROCEDURE — 94761 N-INVAS EAR/PLS OXIMETRY MLT: CPT

## 2019-02-12 PROCEDURE — 80048 BASIC METABOLIC PNL TOTAL CA: CPT

## 2019-02-12 PROCEDURE — 25000003 PHARM REV CODE 250: Performed by: STUDENT IN AN ORGANIZED HEALTH CARE EDUCATION/TRAINING PROGRAM

## 2019-02-12 RX ORDER — OXYCODONE HYDROCHLORIDE 5 MG/1
5 TABLET ORAL EVERY 4 HOURS PRN
Qty: 20 TABLET | Refills: 0 | Status: SHIPPED | OUTPATIENT
Start: 2019-02-12 | End: 2019-02-19

## 2019-02-12 RX ORDER — ONDANSETRON 4 MG/1
4 TABLET, ORALLY DISINTEGRATING ORAL EVERY 6 HOURS PRN
Qty: 40 TABLET | Refills: 0 | Status: SHIPPED | OUTPATIENT
Start: 2019-02-12 | End: 2019-03-15 | Stop reason: SDUPTHER

## 2019-02-12 RX ORDER — OXYCODONE HYDROCHLORIDE 5 MG/1
5 TABLET ORAL EVERY 4 HOURS PRN
Status: DISCONTINUED | OUTPATIENT
Start: 2019-02-12 | End: 2019-02-12 | Stop reason: HOSPADM

## 2019-02-12 RX ADMIN — PROMETHAZINE HYDROCHLORIDE 6.25 MG: 25 INJECTION INTRAMUSCULAR; INTRAVENOUS at 07:02

## 2019-02-12 RX ADMIN — MORPHINE SULFATE 4 MG: 10 INJECTION INTRAVENOUS at 04:02

## 2019-02-12 RX ADMIN — OXYCODONE HYDROCHLORIDE 5 MG: 5 TABLET ORAL at 01:02

## 2019-02-12 RX ADMIN — DEXTROSE, SODIUM CHLORIDE, SODIUM LACTATE, POTASSIUM CHLORIDE, AND CALCIUM CHLORIDE: 5; .6; .31; .03; .02 INJECTION, SOLUTION INTRAVENOUS at 07:02

## 2019-02-12 NOTE — PLAN OF CARE
Problem: Pain Acute  Goal: Optimal Pain Control  Outcome: Ongoing (interventions implemented as appropriate)  Intervention: Develop Pain Management Plan   02/12/19 0154   Prevent or Manage Pain   Pain Management Interventions care clustered;pain management plan reviewed with patient/caregiver;quiet environment facilitated     Intervention: Prevent or Manage Pain   02/12/19 0154   Prevent or Manage Pain   Sensory Stimulation Regulation care clustered;lighting decreased;music/television provided for relaxation   Sleep/Rest Enhancement awakenings minimized;noise level reduced

## 2019-02-12 NOTE — PROGRESS NOTES
Pt lying in bed, awake. Pt requesting diet advance to regular. Explained to pt we are waiting on registered Dietician for recommendations. All questions answered. Will cont to monitor.

## 2019-02-12 NOTE — PROGRESS NOTES
TN taught Symptoms and Problems for *** home care with pt and ***  with teach back:  1. ***, 2.***. TN placed education sheet in [employer] Packet..     Help at home will be from ***, ***, assisting in pt's recovery.     TN taught patient about things ***is responsible for when discharged TO HELP WITH*** RECOVERY:  Particularly on how to Manage *** Care At Home:  1. Getting his prescriptions filled.  2. Taking his medications as directed. DO NOT MISS ANY DOSES!  3. Going to his follow-up doctor appointments.   .  Martha Black RN, BSN, STN CCM

## 2019-02-12 NOTE — PROGRESS NOTES
TN taught Symptoms and Problems for Post Op home care with pt and el Caruso  with teach back:  1. Temp 100.5, 2.Smelling drainage. TN placed education sheet in [employer] Packet..     Help at home will be from Zuly gallegos, assisting in pt's recovery.     TN taught patient about things she is responsible for when discharged TO HELP WITH her  RECOVERY:  Particularly on how to Manage her Care At Home:  1. Getting his prescriptions filled.  2. Taking his medications as directed. DO NOT MISS ANY DOSES!  3. Going to his follow-up doctor appointments.   .  Martha Black, RN, BSN, STN CCM

## 2019-02-12 NOTE — PROGRESS NOTES
WRITTEN DISCHARGE INFORMATION:   .  Follow-up Information     Arnie Monson MD On 2/19/2019.    Specialty:  Family Medicine  Why:  out patient services: 3:15pm follow up from the hospital  Contact information:  [address]   [place] [address]  [phone]             Jesus Tripp MD On 2/20/2019.    Specialties:  General Surgery, Oncology  Why:  out patient services: 11:15AM follow up from the hospital/post op  Contact information:  [address]  [address]  [place] [address]  [phone]               Things that YOU are responsible for, to HELP YOU, Manage Your Care At Home:  1. Getting your prescriptions filled.  2. Taking you medications as directed. DO NOT MISS ANY DOSES!  3. Going to your follow-up doctor appointments. This is important because it allows the doctor to monitor your progress and to determine if any changes need to be made to your treatment plan.                                                         Help at Home  After discharge for assistance [facility] 24/7 assistance  [phone]   Thank you for choosing [facility] for your care.    Sincerely, Your [facility] Healthcare Manager is,  Martha Black RN [facility] [phone]

## 2019-02-12 NOTE — PROGRESS NOTES
TN sent secure chat message to med surg nurse Angelica, to inform that patient is cleared for [facility]..Martha Black RN, BSN, [facility]  2/12/2019    Angelica is at lunch so TN informed med surg nurse Lisa as well.Martha Black RN, BSN, [facility]  2/12/2019

## 2019-02-12 NOTE — PROGRESS NOTES
Midline incision approximated. Clear transparent film to site. Umbilicus area with small blisters, intact. Will cont to monitor.

## 2019-02-12 NOTE — CONSULTS
Food & Nutrition  Education    Diet Education: Post gastrectomy diet  Time Spent: 10 minutes  Learners: Pt's family member      Nutrition Education provided with handouts: Gastric Surgery Nutrition Therapy      Comments: Pt would not participate in diet education & never removed the blanker from over her head the entire time I was there. A family member was present though & was receptive to information provided. Once diet is advanced beyond CL, I discussed the importance of eating small meals throughout the day vs a normal sized meal, avoiding liquids w/ meals/snacks, avoiding sugary foods/beverages, avoiding high fiber foods, & to eat a serving of protein w/ each meal/snack. A list of foods recommended/foods not recommended during her recovery period was also provided. Family member verbalized understanding of info provided.      All questions and concerns answered. Dietitian's contact information provided.       Follow-Up: 2/18/19    Please Re-consult as needed        Thanks!

## 2019-02-12 NOTE — PROGRESS NOTES
Discharge instructions given. Patient and niece verbalized understanding of discharge instructions. IV removed, intact.Pressure dressing applied. Patient and Niece given the opportunity for questions. RX's given.

## 2019-02-12 NOTE — PLAN OF CARE
02/12/19 1357   Final Note   Assessment Type Final Discharge Note   What phone number can be called within the next 1-3 days to see how you are doing after discharge? (see chart)   Hospital Follow Up  Appt(s) scheduled? Yes   Discharge plans and expectations educations in teach back method with documentation complete? Yes   Right Care Referral Info   Referral Type NO CARE

## 2019-02-12 NOTE — DISCHARGE SUMMARY
[facility]  Discharge Summary      Admit Date: 2/8/2019    Discharge Date and Time:  02/12/2019 11:27 AM    Attending Physician: Jesus Tripp MD     Reason for Admission: Gastrectomy for gastric cancer    Procedures Performed: Procedure(s) (LRB):  OPEN GASTRECTOMY- radical subtotal (N/A)    Hospital Course (synopsis of major diagnoses, care, treatment, and services provided during the course of the hospital stay): Ms Duncan is a 59 year old woman who presented for elective subtotal gastrectomy for resection of gastric adenocarcinoma. She tolerated the procedure well. NG tube was maintained until she had return of bowel function, at which time it was removed her her diet advanced to a post gastrectomy diet. Nutrition was consulted for education on this diet.      Consults: dietary    Significant Diagnostic Studies: none    Final Diagnoses:    Principal Problem: Malignant neoplasm of stomach   Secondary Diagnoses:   Active Hospital Problems    Diagnosis  POA    *Malignant neoplasm of stomach [C16.9]  Yes      Resolved Hospital Problems   No resolved problems to display.       Discharged Condition: good    Disposition: Home or Self Care    Follow Up/Patient Instructions:     Medications:  Reconciled Home Medications:      Medication List      START taking these medications    chlorzoxazone 500 mg Tab  Commonly known as:  PARAFON FORTE  TAKE 1 TABLET (500 MG TOTAL) BY MOUTH 4 (FOUR) TIMES DAILY AS NEEDED.     oxyCODONE 5 MG immediate release tablet  Commonly known as:  ROXICODONE  Take 1 tablet (5 mg total) by mouth every 4 (four) hours as needed.        CONTINUE taking these medications    ALPRAZolam 0.25 MG tablet  Commonly known as:  XANAX  Take 1 tablet (0.25 mg total) by mouth 2 (two) times daily as needed for Anxiety.     cloNIDine 0.1 MG tablet  Commonly known as:  CATAPRES  Take 1 tablet (0.1 mg total) by mouth 2 (two) times daily.     diclofenac 75 MG EC tablet  Commonly known as:   VOLTAREN  Take 75 mg by mouth 2 (two) times daily.     estrogen (conjugated)-medroxyprogesterone 0.45-1.5 mg per tablet  Commonly known as:  PREMPRO  Take 1 tablet by mouth once daily.     gabapentin 100 MG capsule  Commonly known as:  NEURONTIN  Take 100 mg by mouth 3 (three) times daily.     hydroCHLOROthiazide 25 MG tablet  Commonly known as:  HYDRODIURIL  Take 1 tablet (25 mg total) by mouth once daily.     oxybutynin 5 MG Tab  Commonly known as:  DITROPAN  TAKE 1 TABLET (5 MG TOTAL) BY MOUTH 2 (TWO) TIMES DAILY.     pantoprazole 40 MG tablet  Commonly known as:  PROTONIX  Take 1 tablet (40 mg total) by mouth once daily.     STIOLTO RESPIMAT 2.5-2.5 mcg/actuation Mist  Generic drug:  tiotropium-olodaterol  INHALE 1 INHALATION INTO THE LUNGS ONCE DAILY. CONTROLLER        STOP taking these medications    albuterol 2.5 mg /3 mL (0.083 %) nebulizer solution  Commonly known as:  PROVENTIL     lactulose 10 gram/15 mL solution  Commonly known as:  CHRONULAC        ASK your doctor about these medications    ANORO ELLIPTA 62.5-25 mcg/actuation Dsdv  Generic drug:  umeclidinium-vilanterol  INHALE 1 PUFF INTO THE LUNGS ONCE DAILY.     atorvastatin 40 MG tablet  Commonly known as:  LIPITOR  Take 1 tablet (40 mg total) by mouth every evening.     FLUoxetine 20 MG capsule  1 CAPSULE(S) ORALLY 1 TIMES A DAY (IN THE MORNING)          No discharge procedures on file.  Follow-up Information     Arnie Monson MD.    Specialty:  Family Medicine  Contact information:  [address] [place] [address]  [phone]             Jesus Tripp MD.    Specialties:  General Surgery, Oncology  Contact information:  [address]  [address]  [place] [address] [phone]

## 2019-02-12 NOTE — PROGRESS NOTES
[facility]  General Surgery  Progress Note    Subjective:     Interval History: no acute events, tolerated liquids without issue, denies nausea. +flatus and BM. Ambulating. Pain well managed    Post-Op Info:  Procedure(s) (LRB):  OPEN GASTRECTOMY- radical subtotal (N/A)   4 Days Post-Op      Medications:  Continuous Infusions:   dextrose 5% lactated ringers 75 mL/hr at 02/12/19 0739     Scheduled Meds:   alvimopan  12 mg Oral BID    enoxaparin  30 mg Subcutaneous Daily    lactated ringers  500 mL Intravenous Once     PRN Meds:morphine, promethazine (PHENERGAN) IVPB     Objective:     Vital Signs (Most Recent):  Temp: 98.8 °F (37.1 °C) (02/12/19 0805)  Pulse: 89 (02/12/19 0805)  Resp: 18 (02/12/19 0805)  BP: (!) 156/73 (02/12/19 0805)  SpO2: (!) 93 % (02/12/19 0805) Vital Signs (24h Range):  Temp:  [98.1 °F (36.7 °C)-98.8 °F (37.1 °C)] 98.8 °F (37.1 °C)  Pulse:  [80-89] 89  Resp:  [18] 18  SpO2:  [93 %-96 %] 93 %  BP: (130-156)/(60-73) 156/73       Intake/Output Summary (Last 24 hours) at 2/12/2019 0805  Last data filed at 2/12/2019 0736  Gross per 24 hour   Intake 1001.3 ml   Output --   Net 1001.3 ml       Physical Exam  Awake, alert  RRR  No increased work of breathing  Abd incision healing very well, soft, mildly tender, non distended    Significant Labs:  BMP:   Recent Labs   Lab 02/12/19  0507   GLU 99      K 3.3*      CO2 27   BUN 4*   CREATININE 0.6   CALCIUM 8.2*   MG 1.5*     CBC:   Recent Labs   Lab 02/12/19  0507   WBC 12.79*   RBC 2.96*   HGB 7.8*   HCT 25.0*   *   MCV 85   MCH 26.4*   MCHC 31.2*       Significant Diagnostics:  None    Assessment/Plan:   Ms Duncan is a 59 year old woman with a history of gastric cancer status post radical subtotal gastrectomy on 2/8/19, POD#4     Dc IVF, advance diet  PT, OOB  Lovenox  Likely dc home today      Active Diagnoses:    Diagnosis Date Noted POA    PRINCIPAL PROBLEM:  Malignant neoplasm of stomach [C16.9] 01/16/2019  Yes      Problems Resolved During this Admission:         Rose Clarke MD  General Surgery  [facility]

## 2019-02-15 LAB — BACTERIA BLD CULT: NORMAL

## 2019-02-17 RX ORDER — TIOTROPIUM BROMIDE AND OLODATEROL 3.124; 2.736 UG/1; UG/1
SPRAY, METERED RESPIRATORY (INHALATION)
Qty: 4 G | Refills: 2 | Status: SHIPPED | OUTPATIENT
Start: 2019-02-17 | End: 2019-02-20

## 2019-02-19 ENCOUNTER — OFFICE VISIT (OUTPATIENT)
Dept: FAMILY MEDICINE | Facility: CLINIC | Age: 59
End: 2019-02-19
Payer: MEDICAID

## 2019-02-19 VITALS
SYSTOLIC BLOOD PRESSURE: 156 MMHG | WEIGHT: 145 LBS | DIASTOLIC BLOOD PRESSURE: 70 MMHG | HEART RATE: 68 BPM | OXYGEN SATURATION: 96 % | HEIGHT: 63 IN | BODY MASS INDEX: 25.69 KG/M2 | TEMPERATURE: 99 F | RESPIRATION RATE: 18 BRPM

## 2019-02-19 DIAGNOSIS — C16.3 CARCINOMA OF ANTRUM OF STOMACH: ICD-10-CM

## 2019-02-19 DIAGNOSIS — C16.9 ADENOCARCINOMA OF STOMACH: ICD-10-CM

## 2019-02-19 DIAGNOSIS — G89.18 POST-OP PAIN: Primary | ICD-10-CM

## 2019-02-19 DIAGNOSIS — I10 ESSENTIAL HYPERTENSION: ICD-10-CM

## 2019-02-19 PROCEDURE — 99999 PR PBB SHADOW E&M-EST. PATIENT-LVL III: CPT | Mod: PBBFAC,,, | Performed by: FAMILY MEDICINE

## 2019-02-19 PROCEDURE — 99214 OFFICE O/P EST MOD 30 MIN: CPT | Mod: S$PBB,,, | Performed by: FAMILY MEDICINE

## 2019-02-19 PROCEDURE — 99213 OFFICE O/P EST LOW 20 MIN: CPT | Mod: PBBFAC | Performed by: FAMILY MEDICINE

## 2019-02-19 PROCEDURE — 99999 PR PBB SHADOW E&M-EST. PATIENT-LVL III: ICD-10-PCS | Mod: PBBFAC,,, | Performed by: FAMILY MEDICINE

## 2019-02-19 PROCEDURE — 99214 PR OFFICE/OUTPT VISIT, EST, LEVL IV, 30-39 MIN: ICD-10-PCS | Mod: S$PBB,,, | Performed by: FAMILY MEDICINE

## 2019-02-19 RX ORDER — HYDROCODONE BITARTRATE AND ACETAMINOPHEN 7.5; 325 MG/1; MG/1
1 TABLET ORAL EVERY 6 HOURS PRN
Qty: 30 TABLET | Refills: 0 | Status: SHIPPED | OUTPATIENT
Start: 2019-02-19 | End: 2019-02-20

## 2019-02-19 NOTE — PROGRESS NOTES
Subjective:       Patient ID: Xiomy Duncan is a 59 y.o. female.    Chief Complaint: Follow-up (er folllow up)    Patient s/p gastrectomy for adenocarcinoma of stomach on 2/8/19.  Having significant post op pain.  Ran out of pain meds.    She is scheduled to see surgeon tomorrow      Review of Systems   Constitutional: Negative for activity change, chills, fatigue, fever and unexpected weight change.   HENT: Negative for sore throat and trouble swallowing.    Respiratory: Negative for cough, chest tightness and shortness of breath.    Cardiovascular: Negative for chest pain and leg swelling.   Gastrointestinal: Positive for abdominal pain.   Endocrine: Negative for cold intolerance and heat intolerance.   Genitourinary: Negative for difficulty urinating.   Musculoskeletal: Negative for back pain and joint swelling.   Skin: Negative for rash.   Neurological: Negative for numbness.   Hematological: Negative for adenopathy.   Psychiatric/Behavioral: Negative for decreased concentration.       Objective:      Vitals:    02/19/19 1550   BP: (!) 156/70   Pulse: 68   Resp: 18   Temp: 98.7 °F (37.1 °C)     Physical Exam   Constitutional: She is oriented to person, place, and time. She appears well-developed and well-nourished.   HENT:   Head: Normocephalic and atraumatic.   Eyes: EOM are normal. Pupils are equal, round, and reactive to light.   Neck: Normal range of motion. Neck supple. No JVD present. No thyromegaly present.   Cardiovascular: Normal rate, regular rhythm, normal heart sounds and intact distal pulses. Exam reveals no gallop and no friction rub.   No murmur heard.  Pulmonary/Chest: Effort normal and breath sounds normal.   Abdominal: Soft. Bowel sounds are normal. She exhibits no distension and no mass. There is tenderness. There is no guarding.   Midline cut healing well.    Patient has tenderness in all quadrants.  Left upper and left lower quadrant seems to be the worse.  No rebound   Neurological: She  is alert and oriented to person, place, and time. No cranial nerve deficit.   Skin: Skin is warm.   Psychiatric: She has a normal mood and affect. Her behavior is normal. Judgment and thought content normal.       Lab Results   Component Value Date    WBC 12.79 (H) 02/12/2019    HGB 7.8 (L) 02/12/2019    HCT 25.0 (L) 02/12/2019    MCV 85 02/12/2019     (H) 02/12/2019     BMP  Lab Results   Component Value Date     02/12/2019    K 3.3 (L) 02/12/2019     02/12/2019    CO2 27 02/12/2019    BUN 4 (L) 02/12/2019    CREATININE 0.6 02/12/2019    CALCIUM 8.2 (L) 02/12/2019    ANIONGAP 6 (L) 02/12/2019    ESTGFRAFRICA >60 02/12/2019    EGFRNONAA >60 02/12/2019     Lab Results   Component Value Date    HGBA1C 5.6 12/15/2018       Assessment:       1. Post-op pain    2. Adenocarcinoma of stomach    3. Carcinoma of antrum of stomach    4. Essential hypertension        Plan:   Xiomy was seen today for hospital follow up.    Diagnoses and all orders for this visit:    Post-op pain  -     CBC auto differential; Future; Expected date: 02/19/2019  -     Comprehensive metabolic panel; Future; Expected date: 02/19/2019  -     Discontinue: HYDROcodone-acetaminophen (NORCO) 7.5-325 mg per tablet; Take 1 tablet by mouth every 6 (six) hours as needed for Pain.  Dispense: 30 tablet; Refill: 0    Adenocarcinoma of stomach  -     CBC auto differential; Future; Expected date: 02/19/2019  -     Comprehensive metabolic panel; Future; Expected date: 02/19/2019  -     Discontinue: HYDROcodone-acetaminophen (NORCO) 7.5-325 mg per tablet; Take 1 tablet by mouth every 6 (six) hours as needed for Pain.  Dispense: 30 tablet; Refill: 0    Carcinoma of antrum of stomach  Keep appt with surgery.  Probably will not need chemo    Essential hypertension  Continue HCTZ and Catapres    Lumbar pain  -     ALPRAZolam (XANAX) 0.25 MG tablet; Take 1 tablet (0.25 mg total) by mouth 2 (two) times daily as needed for Anxiety.    RTC in one  month

## 2019-02-19 NOTE — PHYSICIAN QUERY
PT Name: Xiomy Duncan  MR #: [id number]     Physician Query Form - Documentation Clarification      CDS/: Brandy E Capley               Contact information: Spectralink:  [phone]    This form is a permanent document in the medical record.     Query Date: February 19, 2019    By submitting this query, we are merely seeking further clarification of documentation. Please utilize your independent clinical judgment when addressing the question(s) below.    The Medical record reflects the following:    Supporting Clinical Findings Location in Medical Record     DATE OF PROCEDURE:  02/08/2019.     POSTOPERATIVE DIAGNOSIS:  Gastric carcinoma.     PROCEDURE PERFORMED:  Radical subtotal gastrectomy.     DESCRIPTION OF OPERATION:  The patient then had a generous Kocher   maneuver to the great vessels and then the omentum was taken down from the   transverse colon and down along to the pancreas through the pancreas to the   gastroepiploic lymph nodes and all of these were taken.  Frozen section was done   on the top of the pancreas to the left gastric nose and coronary artery on that   side and all of these lymph nodes were dissected above the common hepatic and   splenic artery.  As these were taken off, the duodenum was then divided   approximately 1 to 2 cm distal to the pylorus and some of the lorna hepatis   lymph nodes were taken as well and swept all the way up with the wrist.       Op note 2/8, filed 2/11                                                                            Doctor, Please specify diagnosis or diagnoses associated with above clinical findings.  Please further specify procedure.      Provider Use Only     (  )  Removal of sentinel, single, or several lymph nodes     (  )  Removal of entire lymph node chain/level     (x  )   Other (please specify):  No change in diagnosis this is part of the procedure__________________________                                                                                                              [  ] Clinically Undetermined

## 2019-02-20 ENCOUNTER — OFFICE VISIT (OUTPATIENT)
Dept: SURGERY | Facility: CLINIC | Age: 59
End: 2019-02-20
Payer: MEDICAID

## 2019-02-20 VITALS
HEIGHT: 63 IN | BODY MASS INDEX: 25.69 KG/M2 | SYSTOLIC BLOOD PRESSURE: 145 MMHG | DIASTOLIC BLOOD PRESSURE: 81 MMHG | WEIGHT: 145 LBS | HEART RATE: 101 BPM

## 2019-02-20 DIAGNOSIS — C16.9 MALIGNANT NEOPLASM OF STOMACH, UNSPECIFIED LOCATION: Primary | ICD-10-CM

## 2019-02-20 PROCEDURE — 99024 PR POST-OP FOLLOW-UP VISIT: ICD-10-PCS | Mod: S$GLB,,, | Performed by: SURGERY

## 2019-02-20 PROCEDURE — 99024 POSTOP FOLLOW-UP VISIT: CPT | Mod: S$GLB,,, | Performed by: SURGERY

## 2019-02-20 NOTE — PROGRESS NOTES
Subjective:       Patient ID: Xiomy Duncan is a 59 y.o. female.    Chief Complaint: Post-op Evaluation    HPI 58 yo female gastric cancer s/p resection without complaints  Review of Systems   Constitutional: Negative.    HENT: Negative.    Eyes: Negative.    Respiratory: Negative.    Cardiovascular: Negative.    Gastrointestinal: Negative.    Endocrine: Negative.    Musculoskeletal: Negative.    Skin: Negative.    Allergic/Immunologic: Negative.    Neurological: Negative.    Hematological: Negative.    Psychiatric/Behavioral: Negative.    All other systems reviewed and are negative.      Objective:      Physical Exam   Constitutional: She is oriented to person, place, and time. She appears well-developed and well-nourished.   HENT:   Head: Normocephalic and atraumatic.   Right Ear: External ear normal.   Left Ear: External ear normal.   Nose: Nose normal.   Mouth/Throat: Oropharynx is clear and moist.   Eyes: Conjunctivae and EOM are normal. Pupils are equal, round, and reactive to light.   Neck: Normal range of motion. Neck supple.   Cardiovascular: Normal rate, regular rhythm, normal heart sounds and intact distal pulses.   Pulmonary/Chest: Effort normal and breath sounds normal.   Abdominal: Soft. Bowel sounds are normal.   Musculoskeletal: Normal range of motion.   Neurological: She is alert and oriented to person, place, and time. She has normal reflexes.   Skin: Skin is warm and dry.   Psychiatric: She has a normal mood and affect. Her behavior is normal. Thought content normal.   Vitals reviewed.      Assessment:       1. Malignant neoplasm of stomach, unspecified location        Plan:       I will see her back in 3 weeks and she will see Med Onc and possibly rad onc

## 2019-02-21 ENCOUNTER — TELEPHONE (OUTPATIENT)
Dept: SURGERY | Facility: CLINIC | Age: 59
End: 2019-02-21

## 2019-02-21 NOTE — TELEPHONE ENCOUNTER
Sara notified  is out today and I will have him do query tomorrow morning        ----- Message from Farzana Zarco sent at 2/21/2019  9:43 AM CST -----  Contact: [facility]  Sara called because an outstanding query needs to be completed for listed patient. Please call at [phone].

## 2019-02-27 ENCOUNTER — OFFICE VISIT (OUTPATIENT)
Dept: HEMATOLOGY/ONCOLOGY | Facility: CLINIC | Age: 59
DRG: 392 | End: 2019-02-27
Payer: MEDICAID

## 2019-02-27 VITALS
DIASTOLIC BLOOD PRESSURE: 63 MMHG | OXYGEN SATURATION: 97 % | HEIGHT: 63 IN | BODY MASS INDEX: 23.48 KG/M2 | TEMPERATURE: 99 F | WEIGHT: 132.5 LBS | SYSTOLIC BLOOD PRESSURE: 135 MMHG | HEART RATE: 92 BPM

## 2019-02-27 DIAGNOSIS — C16.3 CARCINOMA OF ANTRUM OF STOMACH: Primary | ICD-10-CM

## 2019-02-27 DIAGNOSIS — Z90.3 S/P SUBTOTAL GASTRECTOMY: ICD-10-CM

## 2019-02-27 PROCEDURE — 99213 PR OFFICE/OUTPT VISIT, EST, LEVL III, 20-29 MIN: ICD-10-PCS | Mod: S$PBB,,, | Performed by: INTERNAL MEDICINE

## 2019-02-27 PROCEDURE — 99999 PR PBB SHADOW E&M-EST. PATIENT-LVL III: CPT | Mod: PBBFAC,,, | Performed by: INTERNAL MEDICINE

## 2019-02-27 PROCEDURE — 99213 OFFICE O/P EST LOW 20 MIN: CPT | Mod: PBBFAC | Performed by: INTERNAL MEDICINE

## 2019-02-27 PROCEDURE — 99999 PR PBB SHADOW E&M-EST. PATIENT-LVL III: ICD-10-PCS | Mod: PBBFAC,,, | Performed by: INTERNAL MEDICINE

## 2019-02-27 PROCEDURE — 99213 OFFICE O/P EST LOW 20 MIN: CPT | Mod: S$PBB,,, | Performed by: INTERNAL MEDICINE

## 2019-02-27 RX ORDER — PANTOPRAZOLE SODIUM 40 MG/1
40 TABLET, DELAYED RELEASE ORAL DAILY
COMMUNITY
Start: 2018-12-21 | End: 2019-03-15 | Stop reason: SDUPTHER

## 2019-02-27 RX ORDER — GABAPENTIN 100 MG/1
100 CAPSULE ORAL 3 TIMES DAILY
COMMUNITY
End: 2020-03-02 | Stop reason: SDUPTHER

## 2019-02-27 RX ORDER — CLONIDINE HYDROCHLORIDE 0.1 MG/1
0.1 TABLET ORAL 2 TIMES DAILY
COMMUNITY
Start: 2017-12-18 | End: 2019-03-15 | Stop reason: SDUPTHER

## 2019-02-27 NOTE — PROGRESS NOTES
Chief Complaint :   Gastric Cancer    Hx of Present illness :  Patient is a 59 y.o. year old female who presents to the clinic today for   Oncology evaluation. Developed sx of Upper GI bleeding. Further studies done. EGD revealed ulcer in gastric antrum Bx positive for malignancy. Had EUS and bx by . Had Bx of liver lesion. No malignancy. Underwent Subtotal gastrectomy on 19.  Results pending.      Allergies :    Review of patient's allergies indicates:   Allergen Reactions    Penicillins Rash       Occupation :  Caregiver    Transfusion :  none    Menstrual & obstetric Hx :  4; para 4  Age of menarche:  18  Age of first pregnancy:  22  Lactation history:  tried  Age of menopause: 52  HRT: Yes    Present Meds :   Medication List with Changes/Refills   Current Medications    CLONIDINE (CATAPRES) 0.1 MG TABLET    Take 1 tablet (0.1 mg total) by mouth 2 (two) times daily.    DICLOFENAC (VOLTAREN) 75 MG EC TABLET    Take 75 mg by mouth 2 (two) times daily.    ESTROGEN, CONJUGATED,-MEDROXYPROGESTERONE (PREMPRO) 0.45-1.5 MG PER TABLET    Take 1 tablet by mouth once daily.    GABAPENTIN (NEURONTIN) 100 MG CAPSULE    Take 100 mg by mouth 3 (three) times daily.    HYDROCHLOROTHIAZIDE (HYDRODIURIL) 25 MG TABLET    Take 1 tablet (25 mg total) by mouth once daily.    ONDANSETRON (ZOFRAN-ODT) 4 MG TBDL    Take 1 tablet (4 mg total) by mouth every 6 (six) hours as needed.    OXYBUTYNIN (DITROPAN) 5 MG TAB    TAKE 1 TABLET (5 MG TOTAL) BY MOUTH 2 (TWO) TIMES DAILY.    PANTOPRAZOLE (PROTONIX) 40 MG TABLET    Take 1 tablet (40 mg total) by mouth once daily.       Past Medical Hx :  Recent Dx of carcinoma stomach. Known to have GERD, Hypertension, thyroid nodule, seizures, hyperlipidemiaColon polyps and asthma.  No hx of DVT or PE    Past Medical Hx :  Past Medical History:   Diagnosis Date    Anemia     Asthma     Cancer determined by biopsy of stomach 2018    Cannabis abuse, daily use     Colon polyps      GERD (gastroesophageal reflux disease) 2014    Hoarseness 2014    Hypertension     Seizures     Thyroid nodule     Ulcer     Vaginal delivery     x4       Travel Hx :  N/A    Immunization :  Immunization History   Administered Date(s) Administered    Influenza - Quadrivalent - PF 2018    Tdap 2018       Family Hx :  Family History   Problem Relation Age of Onset    Bone cancer Mother     Hypertension Brother     Hypertension Maternal Uncle     Diabetes Maternal Uncle     Hypertension Maternal Grandmother     Stomach cancer Maternal Grandfather     Breast cancer Neg Hx     Colon cancer Neg Hx     Ovarian cancer Neg Hx        Social Hx :  Social History     Socioeconomic History    Marital status: Single     Spouse name: Not on file    Number of children: Not on file    Years of education: Not on file    Highest education level: Not on file   Social Needs    Financial resource strain: Not on file    Food insecurity - worry: Not on file    Food insecurity - inability: Not on file    Transportation needs - medical: Not on file    Transportation needs - non-medical: Not on file   Occupational History    Occupation:      Employer: [employer]     Comment: [employer]   Tobacco Use    Smoking status: Former Smoker     Packs/day: 2.00     Years: 46.00     Pack years: 92.00     Last attempt to quit: 10/15/2018     Years since quittin.3    Smokeless tobacco: Never Used   Substance and Sexual Activity    Alcohol use: No    Drug use: Yes     Types: Marijuana     Comment: every day    Sexual activity: No     Birth control/protection: None, Post-menopausal     Comment: single   Other Topics Concern    Not on file   Social History Narrative    Not on file       Surgery : EGD and Bx : colonoscopy    Symptoms :    Review of Systems   Constitutional: Positive for weight loss. Negative for chills, diaphoresis, fever and malaise/fatigue.        Poor appetite. Eleven  pound weight loss in one months   HENT: Negative for congestion, hearing loss, nosebleeds, sore throat and tinnitus.    Eyes: Negative for blurred vision, double vision and photophobia.   Respiratory: Negative for cough, hemoptysis, sputum production and shortness of breath.         Dx of asthma   Cardiovascular: Negative for chest pain, palpitations, orthopnea and claudication.   Gastrointestinal: Positive for constipation and nausea. Negative for abdominal pain, heartburn and vomiting.   Genitourinary: Negative.  Negative for dysuria, flank pain, frequency, hematuria and urgency.   Musculoskeletal: Negative.  Negative for back pain, joint pain, myalgias and neck pain.   Skin: Negative for itching and rash.   Neurological: Positive for weakness. Negative for dizziness, tingling, tremors and headaches.   Endo/Heme/Allergies: Negative for environmental allergies. Does not bruise/bleed easily.   Psychiatric/Behavioral: Positive for depression. The patient is nervous/anxious. The patient does not have insomnia.        Physical Exam :  Niece present  Physical Exam   Constitutional: She is oriented to person, place, and time and well-developed, well-nourished, and in no distress.   HENT:   Head: Normocephalic and atraumatic.   Right Ear: External ear normal.   Left Ear: External ear normal.   Nose: Nose normal.   Mouth/Throat: Oropharynx is clear and moist. No oropharyngeal exudate.   Eyes: Conjunctivae and EOM are normal. Pupils are equal, round, and reactive to light. Right eye exhibits no discharge. Left eye exhibits no discharge. No scleral icterus.   Neck: Normal range of motion. Neck supple. No JVD present. No tracheal deviation present. No thyromegaly present.   Cardiovascular: Normal rate, regular rhythm, normal heart sounds and intact distal pulses. PMI is not displaced. Exam reveals no gallop and no friction rub.   No murmur heard.  Pulmonary/Chest: Effort normal and breath sounds normal. No stridor. No apnea.    Abdominal: Soft. Normal appearance, normal aorta and bowel sounds are normal. She exhibits no distension, no ascites and no mass. There is no hepatosplenomegaly. There is no tenderness. There is no rebound, no guarding and no CVA tenderness. No hernia.       Musculoskeletal: Normal range of motion. She exhibits no edema, tenderness or deformity.   Lymphadenopathy:        Head (right side): No submental, no submandibular, no tonsillar, no preauricular, no posterior auricular and no occipital adenopathy present.        Head (left side): No submental, no submandibular, no tonsillar, no preauricular, no posterior auricular and no occipital adenopathy present.     She has no cervical adenopathy.     She has no axillary adenopathy.        Right: No inguinal, no supraclavicular and no epitrochlear adenopathy present.        Left: Supraclavicular adenopathy present. No inguinal and no epitrochlear adenopathy present.   Small tender Left supraclavicular nodes palpable   Neurological: She is alert and oriented to person, place, and time. She has normal motor skills, normal sensation, normal strength, normal reflexes and intact cranial nerves. No cranial nerve deficit. She exhibits normal muscle tone. Gait normal. Coordination normal. GCS score is 15.   Skin: Skin is warm, dry and intact. No rash noted. No cyanosis or erythema. No pallor. Nails show no clubbing.   Psychiatric: Mood, memory, affect and judgment normal.         Labs & Imaging : 12/15/18 : Bx of ulcer gastric antrum : poorly differentiated adenocarcinoma; Liver Bx on 1/15/19 : negative for malignancy.  02/08/19 :  Subtotal gastrectomy.  Grade 3 adenocarcinoma, located in the distal, lesser curvature of the stomach; 4.5x 3.7xx 1.1 Cms. Margins of resection were clear. There was no lumphovascular invasion. 12/12 nodes negative for mets. PT3,pN0.      Dx :  Poorly differentiated carcinoma Gastric antrum.  S/P subtota gastrectomy . Stage 2 a      Assessment & Plan:   Reviewed with patient & Niece.  No indication for adjuvant chemotherapy. CBC,CMP, B12 and Iron today and before next visit. Patient understands and verbalised

## 2019-02-28 ENCOUNTER — HOSPITAL ENCOUNTER (INPATIENT)
Facility: HOSPITAL | Age: 59
LOS: 6 days | Discharge: HOME | DRG: 392 | End: 2019-03-06
Attending: EMERGENCY MEDICINE | Admitting: SURGERY
Payer: MEDICAID

## 2019-02-28 ENCOUNTER — PATIENT MESSAGE (OUTPATIENT)
Dept: SURGERY | Facility: CLINIC | Age: 59
End: 2019-02-28

## 2019-02-28 DIAGNOSIS — R10.13 ABDOMINAL PAIN, ACUTE, EPIGASTRIC: ICD-10-CM

## 2019-02-28 DIAGNOSIS — R11.2 NAUSEA AND VOMITING, INTRACTABILITY OF VOMITING NOT SPECIFIED, UNSPECIFIED VOMITING TYPE: ICD-10-CM

## 2019-02-28 DIAGNOSIS — Z01.89 ENCOUNTER FOR IMAGING STUDY TO CONFIRM NASOGASTRIC (NG) TUBE PLACEMENT: ICD-10-CM

## 2019-02-28 DIAGNOSIS — Z90.3 S/P SUBTOTAL GASTRECTOMY: Primary | ICD-10-CM

## 2019-02-28 DIAGNOSIS — R07.9 CHEST PAIN: ICD-10-CM

## 2019-02-28 LAB
ABO + RH BLD: NORMAL
ALBUMIN SERPL BCP-MCNC: 3 G/DL
ALP SERPL-CCNC: 101 U/L
ALT SERPL W/O P-5'-P-CCNC: 18 U/L
AMORPH CRY URNS QL MICRO: ABNORMAL
ANION GAP SERPL CALC-SCNC: 12 MMOL/L
AST SERPL-CCNC: 21 U/L
BACTERIA #/AREA URNS HPF: ABNORMAL /HPF
BASOPHILS # BLD AUTO: 0.03 K/UL
BASOPHILS NFR BLD: 0.2 %
BILIRUB SERPL-MCNC: 0.5 MG/DL
BILIRUB UR QL STRIP: NEGATIVE
BLD GP AB SCN CELLS X3 SERPL QL: NORMAL
BUN SERPL-MCNC: 16 MG/DL
CALCIUM SERPL-MCNC: 9.3 MG/DL
CHLORIDE SERPL-SCNC: 96 MMOL/L
CLARITY UR: ABNORMAL
CO2 SERPL-SCNC: 27 MMOL/L
COLOR UR: ABNORMAL
CREAT SERPL-MCNC: 0.8 MG/DL
CTP QC/QA: YES
DIFFERENTIAL METHOD: ABNORMAL
EOSINOPHIL # BLD AUTO: 0 K/UL
EOSINOPHIL NFR BLD: 0.1 %
ERYTHROCYTE [DISTWIDTH] IN BLOOD BY AUTOMATED COUNT: 18.2 %
EST. GFR  (AFRICAN AMERICAN): >60 ML/MIN/1.73 M^2
EST. GFR  (NON AFRICAN AMERICAN): >60 ML/MIN/1.73 M^2
FLUAV AG NPH QL: NEGATIVE
FLUBV AG NPH QL: NEGATIVE
GLUCOSE SERPL-MCNC: 111 MG/DL
GLUCOSE UR QL STRIP: NEGATIVE
HCT VFR BLD AUTO: 31.1 %
HGB BLD-MCNC: 9.6 G/DL
HGB UR QL STRIP: NEGATIVE
HYALINE CASTS #/AREA URNS LPF: 0 /LPF
KETONES UR QL STRIP: ABNORMAL
LACTATE SERPL-SCNC: 0.9 MMOL/L
LEUKOCYTE ESTERASE UR QL STRIP: NEGATIVE
LIPASE SERPL-CCNC: 23 U/L
LYMPHOCYTES # BLD AUTO: 0.9 K/UL
LYMPHOCYTES NFR BLD: 4.7 %
MAGNESIUM SERPL-MCNC: 1.9 MG/DL
MCH RBC QN AUTO: 25.2 PG
MCHC RBC AUTO-ENTMCNC: 30.9 G/DL
MCV RBC AUTO: 82 FL
MICROSCOPIC COMMENT: ABNORMAL
MONOCYTES # BLD AUTO: 2.1 K/UL
MONOCYTES NFR BLD: 10.7 %
NEUTROPHILS # BLD AUTO: 16.3 K/UL
NEUTROPHILS NFR BLD: 84.6 %
NITRITE UR QL STRIP: NEGATIVE
PH UR STRIP: 5 [PH] (ref 5–8)
PLATELET # BLD AUTO: 792 K/UL
PMV BLD AUTO: 10.4 FL
POTASSIUM SERPL-SCNC: 3.6 MMOL/L
PROT SERPL-MCNC: 7.6 G/DL
PROT UR QL STRIP: ABNORMAL
RBC # BLD AUTO: 3.81 M/UL
RBC #/AREA URNS HPF: 1 /HPF (ref 0–4)
SODIUM SERPL-SCNC: 135 MMOL/L
SP GR UR STRIP: 1.03 (ref 1–1.03)
SQUAMOUS #/AREA URNS HPF: 5 /HPF
TOXIC GRANULES BLD QL SMEAR: PRESENT
URN SPEC COLLECT METH UR: ABNORMAL
UROBILINOGEN UR STRIP-ACNC: ABNORMAL EU/DL
WBC # BLD AUTO: 19.38 K/UL
WBC #/AREA URNS HPF: 2 /HPF (ref 0–5)

## 2019-02-28 PROCEDURE — 96361 HYDRATE IV INFUSION ADD-ON: CPT

## 2019-02-28 PROCEDURE — 86920 COMPATIBILITY TEST SPIN: CPT

## 2019-02-28 PROCEDURE — 93005 ELECTROCARDIOGRAM TRACING: CPT

## 2019-02-28 PROCEDURE — 85025 COMPLETE CBC W/AUTO DIFF WBC: CPT

## 2019-02-28 PROCEDURE — 96366 THER/PROPH/DIAG IV INF ADDON: CPT | Performed by: EMERGENCY MEDICINE

## 2019-02-28 PROCEDURE — 96367 TX/PROPH/DG ADDL SEQ IV INF: CPT | Performed by: EMERGENCY MEDICINE

## 2019-02-28 PROCEDURE — 11000001 HC ACUTE MED/SURG PRIVATE ROOM

## 2019-02-28 PROCEDURE — 96375 TX/PRO/DX INJ NEW DRUG ADDON: CPT

## 2019-02-28 PROCEDURE — 83690 ASSAY OF LIPASE: CPT

## 2019-02-28 PROCEDURE — S0030 INJECTION, METRONIDAZOLE: HCPCS | Performed by: STUDENT IN AN ORGANIZED HEALTH CARE EDUCATION/TRAINING PROGRAM

## 2019-02-28 PROCEDURE — 25000003 PHARM REV CODE 250: Performed by: STUDENT IN AN ORGANIZED HEALTH CARE EDUCATION/TRAINING PROGRAM

## 2019-02-28 PROCEDURE — 96367 TX/PROPH/DG ADDL SEQ IV INF: CPT

## 2019-02-28 PROCEDURE — 81000 URINALYSIS NONAUTO W/SCOPE: CPT

## 2019-02-28 PROCEDURE — G0378 HOSPITAL OBSERVATION PER HR: HCPCS

## 2019-02-28 PROCEDURE — 93010 EKG 12-LEAD: ICD-10-PCS | Mod: ,,, | Performed by: INTERNAL MEDICINE

## 2019-02-28 PROCEDURE — 63600175 PHARM REV CODE 636 W HCPCS: Performed by: STUDENT IN AN ORGANIZED HEALTH CARE EDUCATION/TRAINING PROGRAM

## 2019-02-28 PROCEDURE — 83605 ASSAY OF LACTIC ACID: CPT

## 2019-02-28 PROCEDURE — 99024 PR POST-OP FOLLOW-UP VISIT: ICD-10-PCS | Mod: ,,, | Performed by: SURGERY

## 2019-02-28 PROCEDURE — 93010 ELECTROCARDIOGRAM REPORT: CPT | Mod: ,,, | Performed by: INTERNAL MEDICINE

## 2019-02-28 PROCEDURE — 63600175 PHARM REV CODE 636 W HCPCS: Performed by: EMERGENCY MEDICINE

## 2019-02-28 PROCEDURE — 86901 BLOOD TYPING SEROLOGIC RH(D): CPT

## 2019-02-28 PROCEDURE — 96375 TX/PRO/DX INJ NEW DRUG ADDON: CPT | Performed by: EMERGENCY MEDICINE

## 2019-02-28 PROCEDURE — 25500020 PHARM REV CODE 255: Performed by: EMERGENCY MEDICINE

## 2019-02-28 PROCEDURE — S0030 INJECTION, METRONIDAZOLE: HCPCS | Performed by: EMERGENCY MEDICINE

## 2019-02-28 PROCEDURE — 87040 BLOOD CULTURE FOR BACTERIA: CPT | Mod: 59

## 2019-02-28 PROCEDURE — 99285 EMERGENCY DEPT VISIT HI MDM: CPT | Mod: 25

## 2019-02-28 PROCEDURE — 96365 THER/PROPH/DIAG IV INF INIT: CPT

## 2019-02-28 PROCEDURE — 25000003 PHARM REV CODE 250: Performed by: EMERGENCY MEDICINE

## 2019-02-28 PROCEDURE — 80053 COMPREHEN METABOLIC PANEL: CPT

## 2019-02-28 PROCEDURE — 96376 TX/PRO/DX INJ SAME DRUG ADON: CPT | Performed by: EMERGENCY MEDICINE

## 2019-02-28 PROCEDURE — 99024 POSTOP FOLLOW-UP VISIT: CPT | Mod: ,,, | Performed by: SURGERY

## 2019-02-28 PROCEDURE — 83735 ASSAY OF MAGNESIUM: CPT

## 2019-02-28 RX ORDER — ENOXAPARIN SODIUM 100 MG/ML
40 INJECTION SUBCUTANEOUS EVERY 24 HOURS
Status: DISCONTINUED | OUTPATIENT
Start: 2019-03-01 | End: 2019-03-06 | Stop reason: HOSPADM

## 2019-02-28 RX ORDER — LIDOCAINE HYDROCHLORIDE 20 MG/ML
5 SOLUTION OROPHARYNGEAL
Status: ACTIVE | OUTPATIENT
Start: 2019-02-28 | End: 2019-03-01

## 2019-02-28 RX ORDER — METOCLOPRAMIDE HYDROCHLORIDE 5 MG/ML
5 INJECTION INTRAMUSCULAR; INTRAVENOUS EVERY 6 HOURS PRN
Status: DISCONTINUED | OUTPATIENT
Start: 2019-02-28 | End: 2019-03-04

## 2019-02-28 RX ORDER — DEXTROSE MONOHYDRATE, SODIUM CHLORIDE, AND POTASSIUM CHLORIDE 50; 1.49; 9 G/1000ML; G/1000ML; G/1000ML
INJECTION, SOLUTION INTRAVENOUS CONTINUOUS
Status: DISCONTINUED | OUTPATIENT
Start: 2019-02-28 | End: 2019-03-01

## 2019-02-28 RX ORDER — METRONIDAZOLE 500 MG/100ML
500 INJECTION, SOLUTION INTRAVENOUS
Status: COMPLETED | OUTPATIENT
Start: 2019-02-28 | End: 2019-02-28

## 2019-02-28 RX ORDER — ONDANSETRON 2 MG/ML
4 INJECTION INTRAMUSCULAR; INTRAVENOUS
Status: COMPLETED | OUTPATIENT
Start: 2019-02-28 | End: 2019-02-28

## 2019-02-28 RX ORDER — CIPROFLOXACIN 2 MG/ML
400 INJECTION, SOLUTION INTRAVENOUS
Status: DISCONTINUED | OUTPATIENT
Start: 2019-02-28 | End: 2019-03-04

## 2019-02-28 RX ORDER — ACETAMINOPHEN 650 MG/1
650 SUPPOSITORY RECTAL EVERY 4 HOURS PRN
Status: DISCONTINUED | OUTPATIENT
Start: 2019-02-28 | End: 2019-03-06 | Stop reason: HOSPADM

## 2019-02-28 RX ORDER — GLUCAGON 1 MG
1 KIT INJECTION
Status: DISCONTINUED | OUTPATIENT
Start: 2019-02-28 | End: 2019-03-06 | Stop reason: HOSPADM

## 2019-02-28 RX ORDER — MIDAZOLAM HYDROCHLORIDE 1 MG/ML
INJECTION, SOLUTION INTRAMUSCULAR; INTRAVENOUS
Status: DISCONTINUED | OUTPATIENT
Start: 2019-02-08 | End: 2019-02-28

## 2019-02-28 RX ORDER — METRONIDAZOLE 500 MG/100ML
500 INJECTION, SOLUTION INTRAVENOUS
Status: DISCONTINUED | OUTPATIENT
Start: 2019-02-28 | End: 2019-03-04

## 2019-02-28 RX ORDER — DIPHENHYDRAMINE HYDROCHLORIDE 50 MG/ML
25 INJECTION INTRAMUSCULAR; INTRAVENOUS EVERY 4 HOURS PRN
Status: DISCONTINUED | OUTPATIENT
Start: 2019-02-28 | End: 2019-03-06 | Stop reason: HOSPADM

## 2019-02-28 RX ORDER — IBUPROFEN 200 MG
16 TABLET ORAL
Status: DISCONTINUED | OUTPATIENT
Start: 2019-02-28 | End: 2019-03-06 | Stop reason: HOSPADM

## 2019-02-28 RX ORDER — HYDROMORPHONE HYDROCHLORIDE 2 MG/ML
1 INJECTION, SOLUTION INTRAMUSCULAR; INTRAVENOUS; SUBCUTANEOUS
Status: DISCONTINUED | OUTPATIENT
Start: 2019-02-28 | End: 2019-03-04

## 2019-02-28 RX ORDER — SODIUM CHLORIDE 0.9 % (FLUSH) 0.9 %
3 SYRINGE (ML) INJECTION
Status: DISCONTINUED | OUTPATIENT
Start: 2019-02-28 | End: 2019-03-06 | Stop reason: HOSPADM

## 2019-02-28 RX ORDER — METOPROLOL TARTRATE 1 MG/ML
5 INJECTION, SOLUTION INTRAVENOUS EVERY 4 HOURS PRN
Status: DISCONTINUED | OUTPATIENT
Start: 2019-02-28 | End: 2019-03-06 | Stop reason: HOSPADM

## 2019-02-28 RX ORDER — IBUPROFEN 200 MG
24 TABLET ORAL
Status: DISCONTINUED | OUTPATIENT
Start: 2019-02-28 | End: 2019-03-06 | Stop reason: HOSPADM

## 2019-02-28 RX ADMIN — BENZOCAINE 1 CAN: 200 SPRAY DENTAL; ORAL; PERIODONTAL at 04:02

## 2019-02-28 RX ADMIN — IOHEXOL 75 ML: 350 INJECTION, SOLUTION INTRAVENOUS at 02:02

## 2019-02-28 RX ADMIN — METRONIDAZOLE 500 MG: 500 INJECTION, SOLUTION INTRAVENOUS at 02:02

## 2019-02-28 RX ADMIN — METOPROLOL TARTRATE 5 MG: 1 INJECTION, SOLUTION INTRAVENOUS at 08:02

## 2019-02-28 RX ADMIN — HYDROMORPHONE HYDROCHLORIDE 1 MG: 2 INJECTION, SOLUTION INTRAMUSCULAR; INTRAVENOUS; SUBCUTANEOUS at 08:02

## 2019-02-28 RX ADMIN — METOCLOPRAMIDE 5 MG: 5 INJECTION, SOLUTION INTRAMUSCULAR; INTRAVENOUS at 06:02

## 2019-02-28 RX ADMIN — ONDANSETRON 4 MG: 2 INJECTION INTRAMUSCULAR; INTRAVENOUS at 03:02

## 2019-02-28 RX ADMIN — POTASSIUM CHLORIDE, DEXTROSE MONOHYDRATE AND SODIUM CHLORIDE 20 ML: 150; 5; 900 INJECTION, SOLUTION INTRAVENOUS at 04:02

## 2019-02-28 RX ADMIN — SODIUM CHLORIDE 1000 ML: 0.9 INJECTION, SOLUTION INTRAVENOUS at 02:02

## 2019-02-28 RX ADMIN — METRONIDAZOLE 500 MG: 500 INJECTION, SOLUTION INTRAVENOUS at 10:02

## 2019-02-28 RX ADMIN — CIPROFLOXACIN 400 MG: 2 INJECTION, SOLUTION INTRAVENOUS at 02:02

## 2019-02-28 RX ADMIN — SODIUM CHLORIDE 2000 ML: 0.9 INJECTION, SOLUTION INTRAVENOUS at 01:02

## 2019-02-28 NOTE — ED TRIAGE NOTES
"Pt arrived via personal transportation with c/o n/v and lower abd pain since Tuesday; lack of appetite and not being able to keep anything down; pt's daughter states pt recently had abd sx for cancer and has not felt better since then; denies fever, chills, cp, sob or bloody urine, bloody bm; bm are "normal" denies diarrhea  "

## 2019-02-28 NOTE — ADDENDUM NOTE
Addendum  created 02/28/19 1004 by Kamron Oneill CRNA    Intraprocedure Meds edited, Orders acknowledged in Narrator

## 2019-02-28 NOTE — ED PROVIDER NOTES
"Encounter Date: 2/28/2019    SCRIBE #1 NOTE: I, Mak Gonzales, am scribing for, and in the presence of,  Kamron Omer MD. I have scribed the following portions of the note - Other sections scribed: HPI, ROS.       History     Chief Complaint   Patient presents with    Post-op Problem     removed cancerous mass in stomach recently on Feb 9; today complains of abdominal pain and emesis; symtoms since Thursday    Abdominal Pain     CC: Abdominal Pain    HPI: This 59 y.o. Female with anemia, hyperlipidemia, HTN, seizures and thyroid nodule presents to the ED for an evaluation of periumbilical abdominal pain, nausea, emesis every time she drinks, fever, chills, frontal headaches and light-headedness for the past week. Her symptoms worsened 2 days ago. Pt reports having stomach surgery 2/9/19 in which a cancerous mass was removed from her stomach.  Her dressing from her incision site has not been changed since surgery and states "it was supposed to melt off." Palpations exacerbate her pain near her incision site. She can not keep anything down. Pt was discharged with prescriptions for hydrocodone with no relief. She complies with at home meds and admits zofran does not help with nausea. Pt denies chest pain or sore throat.    She went to her oncologist yesterday and was told she would not need chemotherapy. Pt is scheduled to see oncology 3/14/19.      The history is provided by the patient. No  was used.     Review of patient's allergies indicates:   Allergen Reactions    Penicillins Rash     Past Medical History:   Diagnosis Date    Anemia     Asthma     Cancer determined by biopsy of stomach 12/2018    Cannabis abuse, daily use     Colon polyps     GERD (gastroesophageal reflux disease) 8/7/2014    Hoarseness 8/7/2014    Hypertension     Seizures     Thyroid nodule     Ulcer     Vaginal delivery     x4     Past Surgical History:   Procedure Laterality Date    CHOLECYSTECTOMY      " COLONOSCOPY N/A 6/10/2014    Performed by GREG [facility]man [facility])    ESOPHAGOGASTRODUODENOSCOPY (EGD) N/A 12/15/2018    Performed by Alisson Villagomez [facility] at [facility]    OPEN GASTRECTOMY- radical subtotal N/A 2019    Performed [facility]pp [facility] at [facility]    TONSILLECTOMY      TUBAL LIGATION       Family History   Problem Relation Age of Onset    Bone cancer Mother     Hypertension Brother     Hypertension Maternal Uncle     Diabetes Maternal Uncle     Hypertension Maternal Grandmother     Stomach cancer Maternal Grandfather     Breast cancer Neg Hx     Colon cancer Neg Hx     Ovarian cancer Neg Hx      Social History     Tobacco Use    Smoking status: Former Smoker     Packs/day: 2.00     Years: 46.00     Pack years: 92.00     Last attempt to quit: 10/15/2018     Years since quittin.3    Smokeless tobacco: Never Used   Substance Use Topics    Alcohol use: No    Drug use: Yes     Types: Marijuana     Comment: every day     Review of Systems   Constitutional: Positive for chills and fever.   HENT: Negative for ear pain, rhinorrhea and sore throat.    Eyes: Negative for redness.   Respiratory: Negative for shortness of breath.    Cardiovascular: Negative for chest pain.   Gastrointestinal: Positive for abdominal pain, nausea and vomiting. Negative for diarrhea.   Genitourinary: Negative for dysuria and hematuria.   Musculoskeletal: Negative for back pain and neck pain.   Skin: Negative for rash.   Neurological: Positive for light-headedness and headaches (frontal). Negative for weakness and numbness.   Hematological: Does not bruise/bleed easily.   Psychiatric/Behavioral: The patient is not nervous/anxious.        Physical Exam     Initial Vitals [19 1054]   BP Pulse Resp Temp SpO2   (!) 168/74 98 18 99 °F (37.2 °C) 97 %      MAP       --         Physical Exam  The patient was examined specifically for the following:   General:No significant distress, Good  color, Warm and dry. Head and neck:Scalp atraumatic, Neck supple. Neurological:Appropriate conversation, Gross motor deficits. Eyes:Conjugate gaze, Clear corneas. ENT: No epistaxis. Cardiac: Regular rate and rhythm, Grossly normal heart tones. Pulmonary: Wheezing, Rales. Gastrointestinal: Abdominal tenderness, Abdominal distention. Musculoskeletal: Extremity deformity, Apparent pain with range of motion of the joints. Skin: Rash.   The findings on examination were normal except for the following:  The patient has moderate diffuse high abdominal tenderness.  The patient is slightly warm to touch.  The lungs are clear.  The heart tones are normal. There is no low abdominal tenderness.  ED Course   Procedures  Labs Reviewed   COMPREHENSIVE METABOLIC PANEL - Abnormal; Notable for the following components:       Result Value    Sodium 135 (*)     Glucose 111 (*)     Albumin 3.0 (*)     All other components within normal limits   CBC W/ AUTO DIFFERENTIAL - Abnormal; Notable for the following components:    WBC 19.38 (*)     RBC 3.81 (*)     Hemoglobin 9.6 (*)     Hematocrit 31.1 (*)     MCH 25.2 (*)     MCHC 30.9 (*)     RDW 18.2 (*)     Platelets 792 (*)     Gran # (ANC) 16.3 (*)     Lymph # 0.9 (*)     Mono # 2.1 (*)     Gran% 84.6 (*)     Lymph% 4.7 (*)     All other components within normal limits   URINALYSIS, REFLEX TO URINE CULTURE - Abnormal; Notable for the following components:    Appearance, UA Hazy (*)     Protein, UA 2+ (*)     Ketones, UA 2+ (*)     Urobilinogen, UA 4.0-6.0 (*)     All other components within normal limits    Narrative:     Preferred Collection Type->Urine, Clean Catch   URINALYSIS MICROSCOPIC - Abnormal; Notable for the following components:    Bacteria, UA Few (*)     All other components within normal limits    Narrative:     Preferred Collection Type->Urine, Clean Catch   CULTURE, BLOOD   CULTURE, BLOOD   LACTIC ACID, PLASMA   LIPASE   MAGNESIUM   POCT INFLUENZA A/B     EKG Readings:  (Independently Interpreted)   This patient is in a normal sinus rhythm with a heart rate of 90.  The p.r. QRS and QT intervals are normal.  There is no definite evidence of acute myocardial infarction or malignant arrhythmia.     ECG Results          EKG 12-lead (In process)  Result time 02/28/19 14:45:38    In process by Interface, Lab In [facility] (02/28/19 14:45:38)                 Narrative:    Test Reason :     Vent. Rate : 090 BPM     Atrial Rate : 090 BPM     P-R Int : 150 ms          QRS Dur : 074 ms      QT Int : 364 ms       P-R-T Axes : 075 070 059 degrees     QTc Int : 445 ms    Normal sinus rhythm  Normal ECG  When compared with ECG of 14-DEC-2018 16:30,  Significant changes have occurred    Referred By: [email or web address]   SELF           Confirmed By:                   In process by Interface, Lab In [facility] (02/28/19 14:22:47)                 Narrative:    Test Reason :     Vent. Rate : 090 BPM     Atrial Rate : 090 BPM     P-R Int : 150 ms          QRS Dur : 074 ms      QT Int : 364 ms       P-R-T Axes : 075 070 059 degrees     QTc Int : 445 ms    Normal sinus rhythm  Normal ECG  When compared with ECG of 14-DEC-2018 16:30,  Significant changes have occurred    Referred By: [email or web address]   SELF           Confirmed By:                             Imaging Results          CT Abdomen Pelvis With Contrast (Final result)  Result time 02/28/19 15:05:16    Final result by Shravan Hanna Jr., MD (02/28/19 15:05:16)                 Impression:      Postoperative change of subtotal gastrectomy.  There is some heterogeneous soft tissue density abutting the lesser curvature of the stomach extending toward the jejunal anastomosis.  This may be adjacent non-opacified bowel or hematoma.    There is some heterogeneous fluid density in the lorna hepatis extending inferiorly to abut the hepatic flexure of the colon.  No definite wall or air to confirm the presence of an abscess.  There is adjacent wall thickening of the hepatic  flexure and some adjacent pericolonic stranding.    Previously described hypodensity inferior medial right lower lobe incompletely visualized on this study.    2.8 cm hypodensity uterine fundus of uncertain significance.  Nonemergent pelvic ultrasound would seem reasonable.      Electronically signed by: Shravan Hanna MD  Date:    02/28/2019  Time:    15:05             Narrative:    EXAMINATION:  CT ABDOMEN PELVIS WITH CONTRAST    CLINICAL HISTORY:  Abd pain, fever, abscess suspected;    TECHNIQUE:  Low dose axial images, sagittal and coronal reformations were obtained from the lung bases to the pubic symphysis following the IV administration of 75 mL of Omnipaque 350 oral contrast was not administered.    COMPARISON:  Noncontrast CT abdomen pelvis February 28, 2019 at 12:57 and CT contrast abdomen pelvis December 14, 2018.    FINDINGS:  In the chest partially visualized hypodensity right inferior medial lung.    Somewhat heterogeneous enhancement of the liver.  No focal liver masses.  Portal vein is patent.  Gallbladder is been removed.  Spleen and pancreas appear unremarkable.  No adrenal masses.  Hypodensity lower pole left kidney similar.    Aorta tapers normally.  No significant para-aortic adenopathy.  Atherosclerotic plaque noted.  No pelvic adenopathy.  2.8 cm vague hypodensity in the uterine fundus of uncertain significance.  No adnexal masses..  Bladder is not distended.    Postoperative change of subtotal gastrectomy.  Gastrojejunostomy noted.  There is some heterogeneous soft tissue density abutting the anastomosis.  Additionally there is hypodensity in the lorna hepatis extending anteriorly and inferiorly abutting the hepatic flexure of the colon.  Persistent pericolonic stranding and wall thickening of the adjacent colon.  No air or definite wall development about this fluid.  Otherwise remainder of the bowel appears unremarkable.  No free pelvic fluid.    Bone windows demonstrate no lytic or blastic  lesions.                               CT Abdomen Pelvis  Without Contrast (Final result)  Result time 02/28/19 13:33:29    Final result by Shravan Hanna Jr., MD (02/28/19 13:33:29)                 Impression:      Patient status post subtotal gastrectomy with significant vague soft tissue about the lorna hepatis with some wall thickening and inflammatory changes about the hepatic flexure of the colon.  The density is higher than typical simple fluid though complex fluid collection or postop change would be difficult to differentiate without oral or intravenous contrast.  Gastrojejunostomy noted and appears intact.  No convincing free air.    1.5 cm hypodensity inferior medial aspect of the right lung may represent a small mediastinal cyst, similar to prior.      Electronically signed by: Shravan Hanna MD  Date:    02/28/2019  Time:    13:33             Narrative:    EXAMINATION:  CT ABDOMEN PELVIS WITHOUT CONTRAST    CLINICAL HISTORY:  Abdominal pain, unspecified;    TECHNIQUE:  Low dose axial images, sagittal and coronal reformations were obtained from the lung bases to the pubic symphysis.    COMPARISON:  December 14, 2018.    FINDINGS:  In the chest no significant pleural or pericardial fluid.  No confluent consolidation.  There is a 1.5 cm water density inferior medial aspect of the right lung similar.  Remainder the lungs are clear.    In the abdomen liver is enlarged measuring 18.7 cm.  No focal mass lesions seen.  Postop change in the stomach.  Gallbladder has been removed.  There is significant soft tissue stranding about the right colon as it abuts the right lorna hepatis region.  Pancreatic head difficult to visualized as is obscured by the adjacent soft tissue stranding.  Body and tail of the pancreas appear unremarkable.  Spleen is normal.    No adrenal nodule.  Hypodensity in the left kidney better defined on the recent contrast study.  No renal calcifications or dilatation of the collecting  systems.    Aorta tapers normally.  No significant para-aortic adenopathy.    In the pelvis no pelvic adenopathy.  Uterus and adnexa are unremarkable for age.  Bladder is not distended.  Multiple pelvic calcifications presumably phleboliths similar.    Evaluation of the bowel demonstrates postop changes in the stomach.  Gastrojejunostomy noted.  Borders are difficult to visualize without oral or intravenous contrast.  As stated inflammatory changes about the right hepatic flexure with significant increased soft tissue about the lorna hepatis.  This is very nonspecific in appearance in this postop patient.  No significant free intraperitoneal air seen.    Bone windows demonstrate no lytic or blastic lesions.  Degenerative changes are present in the spine.                               X-Ray Chest AP Portable (Final result)  Result time 02/28/19 12:22:02    Final result by Darnell Spencer MD (02/28/19 12:22:02)                 Impression:      No detrimental change or radiographic acute intrathoracic process seen.      Electronically signed by: Darnell Spencer MD  Date:    02/28/2019  Time:    12:22             Narrative:    EXAMINATION:  XR CHEST AP PORTABLE    CLINICAL HISTORY:  chest pain;    TECHNIQUE:  Single frontal view of the chest was performed.    COMPARISON:  CT abdomen and pelvis 12/14/2018 and chest radiograph 02/18/2017    FINDINGS:  Monitoring leads overlie the chest.  No detrimental change.The lungs are clear, with normal appearance of pulmonary vasculature and no pleural effusion or pneumothorax.    The cardiac silhouette is normal in size. The hilar and mediastinal contours are unremarkable.    Cholecystectomy clips.  Osseous structures appear intact.                              Medical decision making:  Given the above this patient presents to the emergency room with epigastric abdominal pain nausea vomiting a leukocytosis a low-grade fever.  The patient has a normal lactate.  Vital signs have been  stable. The patient has epigastric abdominal pain and is just status post gastrectomy.  The general surgery resident came to the emergency room and saw the patient.  The patient will be admitted to the surgery service.  The patient was treated with Cipro and Flagyl in the emergency room.  There is no evidence of livia peritonitis.  The patient was treated with Cipro and Flagyl.  She has had continued nausea.                Scribe Attestation:   Scribe #1: I performed the above scribed service and the documentation accurately describes the services I performed. I attest to the accuracy of the note.    Attending Attestation:           Physician Attestation for Scribe:  Physician Attestation Statement for Scribe #1: I, Kamron Omer MD, reviewed documentation, as scribed by Mak Gonzales in my presence, and it is both accurate and complete.                    Clinical Impression:       ICD-10-CM ICD-9-CM   1. S/P subtotal gastrectomy Z90.3 V45.89   2. Abdominal pain, acute, epigastric R10.13 789.06     338.19   3. Nausea and vomiting, intractability of vomiting not specified, unspecified vomiting type R11.2 787.01                                Kamron Omer MD  02/28/19 1536

## 2019-02-28 NOTE — H&P
[facility] [place]  [facility]      Subjective:     Chief Complaint/Reason for Admission: Vomiting    History of Present Illness: 58 y/o F underwent an open radical subtotal gastrectomy on 2/8/19 for gastric cancer (pT3 pN0 Stage IIa, does not need adjuvant chemo). Pateint reports tolerating eggs, roast beef etc after discharge, however developed nausea with emesis 2 days ago. Non bloody, non bilious, unable to keep anything down.  A/w nausea, vague diffuse abdominal pain, subjective fever at home with chills, burping and belching.  Last BM yesterday, no blood.  Voiding without difficulty.    No jaundice, discharge from incisions, chest pain, SOB.    Of note does not need to be anti seizure medications.      (Not in a hospital admission)    Review of patient's allergies indicates:   Allergen Reactions    Penicillins Rash       Past Medical History:   Diagnosis Date    Anemia     Asthma     Cancer determined by biopsy of stomach 12/2018    Cannabis abuse, daily use     Colon polyps     GERD (gastroesophageal reflux disease) 8/7/2014    Hoarseness 8/7/2014    Hypertension     Seizures     Thyroid nodule     Ulcer     Vaginal delivery     x4     Past Surgical History:   Procedure Laterality Date    CHOLECYSTECTOMY      COLONOSCOPY N/A 6/10/2014    Performed by GREG [facility]man [facility] at [facility] (4TH FLR)    ESOPHAGOGASTRODUODENOSCOPY (EGD) N/A 12/15/2018    Performed by Alisson Villagomez [facility] at [facility] ENDO    OPEN GASTRECTOMY- radical subtotal N/A 2/8/2019    [facility]pp [facility] at [facility] OR    TONSILLECTOMY      TUBAL LIGATION       Family History     Problem Relation (Age of Onset)    Bone cancer Mother    Diabetes Maternal Uncle    Hypertension Brother, Maternal Uncle, Maternal Grandmother    Stomach cancer Maternal Grandfather        Tobacco Use    Smoking status: Former Smoker     Packs/day: 2.00     Years: 46.00     Pack years: 92.00     Last attempt to quit:  10/15/2018     Years since quittin.3    Smokeless tobacco: Never Used   Substance and Sexual Activity    Alcohol use: No    Drug use: Yes     Types: Marijuana     Comment: every day    Sexual activity: No     Birth control/protection: None, Post-menopausal     Comment: single     Review of Systems   Negative except as noted in HPI  Objective:     Weight: 59.9 kg (132 lb)  Body mass index is 23.38 kg/m².  Vital Signs (Most Recent):  Temp: (!) 101.1 °F (38.4 °C) (19 1524)  Pulse: 99 (19 1438)  Resp: (!) 23 (19 1438)  BP: (!) 183/85 (19 1438)  SpO2: 100 % (19 1438) Vital Signs (24h Range):  Temp:  [99 °F (37.2 °C)-101.1 °F (38.4 °C)] 101.1 °F (38.4 °C)  Pulse:  [83-99] 99  Resp:  [18-23] 23  SpO2:  [97 %-100 %] 100 %  BP: (164-193)/(72-90) 183/85     Date 19 0700 - 19 0659   Shift 2276-9092 4573-4851 7512-5162 24 Hour Total   INTAKE   IV Piggyback 200 3500  3700   Shift Total(mL/kg) 200(3.3) 3500(58.5)  3700(61.8)   OUTPUT   Shift Total(mL/kg)       Weight (kg) 59.9 59.9 59.9 59.9     AAO, NAD  Anicteric  Sinus tachycardia, no murmur  Areating well, no distress  Healing epigastric midline incision without signs of infection or hernia, obese abdomen, diffuse tenderness in upper abdomen, no guarding or rigidity.  No hernia  No pedal edema, good pulses    Significant Labs:  Recent Labs   Lab 19  1211 19  1124   * 111*    135*   K 3.7 3.6   CL 99 96   CO2 29 27   BUN 15 16   CREATININE 0.9 0.8   CALCIUM 9.7 9.3   MG  --  1.9     Recent Labs   Lab 19  1211 19  1124   WBC 18.04* 19.38*   HGB 9.6* 9.6*   HCT 30.9* 31.1*   * 792*     No results for input(s): LABPT, INR, APTT in the last 48 hours.  Microbiology Results (last 7 days)     Procedure Component Value Units Date/Time    Blood culture [[id number]] Collected:  19 1244    Order Status:  Sent Specimen:  Blood from Peripheral, Hand, Right Updated:  19 1304    Blood  culture [[id number]] Collected:  02/28/19 1248    Order Status:  Sent Specimen:  Blood from Peripheral, Hand, Left Updated:  02/28/19 1303        Recent Lab Results       02/28/19  1405   02/28/19  1357   02/28/19  1248   02/28/19  1124        Albumin       3.0     Alkaline Phosphatase       101     ALT       18     Amorphous, UA Few           Anion Gap       12     Appearance, UA Hazy           AST       21     Bacteria, UA Few           Baso #       0.03     Basophil%       0.2     Bilirubin (UA) Negative           Total Bilirubin       0.5  Comment:  For infants and newborns, interpretation of results should be based  on gestational age, weight and in agreement with clinical  observations.  Premature Infant recommended reference ranges:  Up to 24 hours.............<8.0 mg/dL  Up to 48 hours............<12.0 mg/dL  3-5 days..................<15.0 mg/dL  6-29 days.................<15.0 mg/dL       BUN, Bld       16     Calcium       9.3     Chloride       96     CO2       27     Color, UA Rosamaria           Creatinine       0.8     Differential Method       Automated     eGFR if        >60     eGFR if non        >60  Comment:  Calculation used to obtain the estimated glomerular filtration  rate (eGFR) is the CKD-EPI equation.        Eos #       0.0     Eosinophil%       0.1     Glucose       111     Glucose, UA Negative           Gran # (ANC)       16.3     Gran%       84.6     Hematocrit       31.1     Hemoglobin       9.6     Hyaline Casts, UA 0           Ketones, UA 2+           Lactate, Silver     0.9  Comment:  Falsely low lactic acid results can be found in samples   containing >=13.0 mg/dL total bilirubin and/or >=3.5 mg/dL   direct bilirubin.         Leukocytes, UA Negative           Lipase       23     Lymph #       0.9     Lymph%       4.7     Magnesium       1.9     MCH       25.2     MCHC       30.9     MCV       82     Microscopic Comment SEE COMMENT  Comment:  Other formed  elements not mentioned in the report are not   present in the microscopic examination.              Mono #       2.1     Mono%       10.7     MPV       10.4     Nitrite, UA Negative           Occult Blood UA Negative           pH, UA 5.0           Platelets       792     Potassium       3.6     Total Protein       7.6     Protein, UA 2+  Comment:  Recommend a 24 hour urine protein or a urine   protein/creatinine ratio if globulin induced proteinuria is  clinically suspected.              Acceptable   Yes         Rapid Influenza A Ag   Negative         Rapid Influenza B Ag   Negative         RBC       3.81     RBC, UA 1           RDW       18.2     Sodium       135     Specific Kennedy, UA 1.030           Specimen UA Urine, Clean Catch           Squam Epithel, UA 5           Toxic Granulation       Present     Urobilinogen, UA 4.0-6.0           WBC, UA 2           WBC       19.38         Significant Diagnostics:  Imaging Results          CT Abdomen Pelvis With Contrast (Final result)  Result time 02/28/19 15:05:16    Final result by Shravan Hanna Jr., MD (02/28/19 15:05:16)                 Impression:      Postoperative change of subtotal gastrectomy.  There is some heterogeneous soft tissue density abutting the lesser curvature of the stomach extending toward the jejunal anastomosis.  This may be adjacent non-opacified bowel or hematoma.    There is some heterogeneous fluid density in the lorna hepatis extending inferiorly to abut the hepatic flexure of the colon.  No definite wall or air to confirm the presence of an abscess.  There is adjacent wall thickening of the hepatic flexure and some adjacent pericolonic stranding.    Previously described hypodensity inferior medial right lower lobe incompletely visualized on this study.    2.8 cm hypodensity uterine fundus of uncertain significance.  Nonemergent pelvic ultrasound would seem reasonable.      Electronically signed by: Shravan Hanna  MD  Date:    02/28/2019  Time:    15:05             Narrative:    EXAMINATION:  CT ABDOMEN PELVIS WITH CONTRAST    CLINICAL HISTORY:  Abd pain, fever, abscess suspected;    TECHNIQUE:  Low dose axial images, sagittal and coronal reformations were obtained from the lung bases to the pubic symphysis following the IV administration of 75 mL of Omnipaque 350 oral contrast was not administered.    COMPARISON:  Noncontrast CT abdomen pelvis February 28, 2019 at 12:57 and CT contrast abdomen pelvis December 14, 2018.    FINDINGS:  In the chest partially visualized hypodensity right inferior medial lung.    Somewhat heterogeneous enhancement of the liver.  No focal liver masses.  Portal vein is patent.  Gallbladder is been removed.  Spleen and pancreas appear unremarkable.  No adrenal masses.  Hypodensity lower pole left kidney similar.    Aorta tapers normally.  No significant para-aortic adenopathy.  Atherosclerotic plaque noted.  No pelvic adenopathy.  2.8 cm vague hypodensity in the uterine fundus of uncertain significance.  No adnexal masses..  Bladder is not distended.    Postoperative change of subtotal gastrectomy.  Gastrojejunostomy noted.  There is some heterogeneous soft tissue density abutting the anastomosis.  Additionally there is hypodensity in the lorna hepatis extending anteriorly and inferiorly abutting the hepatic flexure of the colon.  Persistent pericolonic stranding and wall thickening of the adjacent colon.  No air or definite wall development about this fluid.  Otherwise remainder of the bowel appears unremarkable.  No free pelvic fluid.    Bone windows demonstrate no lytic or blastic lesions.                               CT Abdomen Pelvis  Without Contrast (Final result)  Result time 02/28/19 13:33:29    Final result by Shravan Hanna Jr., MD (02/28/19 13:33:29)                 Impression:      Patient status post subtotal gastrectomy with significant vague soft tissue about the lorna hepatis with  some wall thickening and inflammatory changes about the hepatic flexure of the colon.  The density is higher than typical simple fluid though complex fluid collection or postop change would be difficult to differentiate without oral or intravenous contrast.  Gastrojejunostomy noted and appears intact.  No convincing free air.    1.5 cm hypodensity inferior medial aspect of the right lung may represent a small mediastinal cyst, similar to prior.      Electronically signed by: Shravan Hanna MD  Date:    02/28/2019  Time:    13:33             Narrative:    EXAMINATION:  CT ABDOMEN PELVIS WITHOUT CONTRAST    CLINICAL HISTORY:  Abdominal pain, unspecified;    TECHNIQUE:  Low dose axial images, sagittal and coronal reformations were obtained from the lung bases to the pubic symphysis.    COMPARISON:  December 14, 2018.    FINDINGS:  In the chest no significant pleural or pericardial fluid.  No confluent consolidation.  There is a 1.5 cm water density inferior medial aspect of the right lung similar.  Remainder the lungs are clear.    In the abdomen liver is enlarged measuring 18.7 cm.  No focal mass lesions seen.  Postop change in the stomach.  Gallbladder has been removed.  There is significant soft tissue stranding about the right colon as it abuts the right lorna hepatis region.  Pancreatic head difficult to visualized as is obscured by the adjacent soft tissue stranding.  Body and tail of the pancreas appear unremarkable.  Spleen is normal.    No adrenal nodule.  Hypodensity in the left kidney better defined on the recent contrast study.  No renal calcifications or dilatation of the collecting systems.    Aorta tapers normally.  No significant para-aortic adenopathy.    In the pelvis no pelvic adenopathy.  Uterus and adnexa are unremarkable for age.  Bladder is not distended.  Multiple pelvic calcifications presumably phleboliths similar.    Evaluation of the bowel demonstrates postop changes in the stomach.   Gastrojejunostomy noted.  Borders are difficult to visualize without oral or intravenous contrast.  As stated inflammatory changes about the right hepatic flexure with significant increased soft tissue about the lorna hepatis.  This is very nonspecific in appearance in this postop patient.  No significant free intraperitoneal air seen.    Bone windows demonstrate no lytic or blastic lesions.  Degenerative changes are present in the spine.                               X-Ray Chest AP Portable (Final result)  Result time 02/28/19 12:22:02    Final result by Darnell Spencer MD (02/28/19 12:22:02)                 Impression:      No detrimental change or radiographic acute intrathoracic process seen.      Electronically signed by: Darnell Spencer MD  Date:    02/28/2019  Time:    12:22             Narrative:    EXAMINATION:  XR CHEST AP PORTABLE    CLINICAL HISTORY:  chest pain;    TECHNIQUE:  Single frontal view of the chest was performed.    COMPARISON:  CT abdomen and pelvis 12/14/2018 and chest radiograph 02/18/2017    FINDINGS:  Monitoring leads overlie the chest.  No detrimental change.The lungs are clear, with normal appearance of pulmonary vasculature and no pleural effusion or pneumothorax.    The cardiac silhouette is normal in size. The hilar and mediastinal contours are unremarkable.    Cholecystectomy clips.  Osseous structures appear intact.                                I have reviewed all pertinent imaging results/findings within the past 24 hours.    Assessment/Plan:     Active Diagnoses:    Diagnosis Date Noted POA    PRINCIPAL PROBLEM:  S/P subtotal gastrectomy [Z90.3] 02/27/2019 Not Applicable      Problems Resolved During this Admission:     # Abdominal pain and vomiting, s/p open subtotal radical gastrectomy  No evidence of abscess or dehiscence on CT    -Admit for obs  -NPO  -NG tube to LIWS  -Rehydrate  -KUB in the AM  -Cipro, Flagyl  -Monitor exam      Lizeth Krishnamurthy MD  General Surgery  [facility] [place]

## 2019-03-01 PROBLEM — R10.13 ABDOMINAL PAIN, ACUTE, EPIGASTRIC: Status: ACTIVE | Noted: 2019-03-01

## 2019-03-01 LAB
ALBUMIN SERPL BCP-MCNC: 2.4 G/DL
ALP SERPL-CCNC: 93 U/L
ALT SERPL W/O P-5'-P-CCNC: 16 U/L
ANION GAP SERPL CALC-SCNC: 8 MMOL/L
AST SERPL-CCNC: 15 U/L
BASOPHILS # BLD AUTO: 0.02 K/UL
BASOPHILS NFR BLD: 0.1 %
BILIRUB SERPL-MCNC: 0.3 MG/DL
BLD PROD TYP BPU: NORMAL
BLD PROD TYP BPU: NORMAL
BLOOD UNIT EXPIRATION DATE: NORMAL
BLOOD UNIT EXPIRATION DATE: NORMAL
BLOOD UNIT TYPE CODE: 6200
BLOOD UNIT TYPE CODE: 6200
BLOOD UNIT TYPE: NORMAL
BLOOD UNIT TYPE: NORMAL
BUN SERPL-MCNC: 6 MG/DL
CALCIUM SERPL-MCNC: 8.6 MG/DL
CHLORIDE SERPL-SCNC: 106 MMOL/L
CO2 SERPL-SCNC: 25 MMOL/L
CODING SYSTEM: NORMAL
CODING SYSTEM: NORMAL
CREAT SERPL-MCNC: 0.7 MG/DL
CRP SERPL-MCNC: 178.8 MG/L
DIFFERENTIAL METHOD: ABNORMAL
DISPENSE STATUS: NORMAL
DISPENSE STATUS: NORMAL
EOSINOPHIL # BLD AUTO: 0 K/UL
EOSINOPHIL NFR BLD: 0.1 %
ERYTHROCYTE [DISTWIDTH] IN BLOOD BY AUTOMATED COUNT: 18.3 %
EST. GFR  (AFRICAN AMERICAN): >60 ML/MIN/1.73 M^2
EST. GFR  (NON AFRICAN AMERICAN): >60 ML/MIN/1.73 M^2
GLUCOSE SERPL-MCNC: 124 MG/DL
HCT VFR BLD AUTO: 25.4 %
HGB BLD-MCNC: 7.8 G/DL
INR PPP: 1.2
LYMPHOCYTES # BLD AUTO: 1.4 K/UL
LYMPHOCYTES NFR BLD: 8.2 %
MAGNESIUM SERPL-MCNC: 1.7 MG/DL
MCH RBC QN AUTO: 25.2 PG
MCHC RBC AUTO-ENTMCNC: 30.7 G/DL
MCV RBC AUTO: 82 FL
MONOCYTES # BLD AUTO: 1.9 K/UL
MONOCYTES NFR BLD: 11.1 %
NEUTROPHILS # BLD AUTO: 14 K/UL
NEUTROPHILS NFR BLD: 80.5 %
PHOSPHATE SERPL-MCNC: 2.9 MG/DL
PLATELET # BLD AUTO: 610 K/UL
PMV BLD AUTO: 10.4 FL
POCT GLUCOSE: 113 MG/DL (ref 70–110)
POCT GLUCOSE: 122 MG/DL (ref 70–110)
POCT GLUCOSE: 147 MG/DL (ref 70–110)
POTASSIUM SERPL-SCNC: 3.4 MMOL/L
PREALB SERPL-MCNC: 6 MG/DL
PROT SERPL-MCNC: 6.1 G/DL
PROTHROMBIN TIME: 12.7 SEC
RBC # BLD AUTO: 3.09 M/UL
SODIUM SERPL-SCNC: 139 MMOL/L
TRANS ERYTHROCYTES VOL PATIENT: NORMAL ML
TRANS ERYTHROCYTES VOL PATIENT: NORMAL ML
WBC # BLD AUTO: 17.32 K/UL

## 2019-03-01 PROCEDURE — 25500020 PHARM REV CODE 255: Performed by: SURGERY

## 2019-03-01 PROCEDURE — 84134 ASSAY OF PREALBUMIN: CPT

## 2019-03-01 PROCEDURE — 11000001 HC ACUTE MED/SURG PRIVATE ROOM

## 2019-03-01 PROCEDURE — 96376 TX/PRO/DX INJ SAME DRUG ADON: CPT | Performed by: EMERGENCY MEDICINE

## 2019-03-01 PROCEDURE — 36430 TRANSFUSION BLD/BLD COMPNT: CPT

## 2019-03-01 PROCEDURE — 25000003 PHARM REV CODE 250: Performed by: STUDENT IN AN ORGANIZED HEALTH CARE EDUCATION/TRAINING PROGRAM

## 2019-03-01 PROCEDURE — P9021 RED BLOOD CELLS UNIT: HCPCS

## 2019-03-01 PROCEDURE — 96366 THER/PROPH/DIAG IV INF ADDON: CPT | Performed by: EMERGENCY MEDICINE

## 2019-03-01 PROCEDURE — 63600175 PHARM REV CODE 636 W HCPCS: Performed by: EMERGENCY MEDICINE

## 2019-03-01 PROCEDURE — 63600175 PHARM REV CODE 636 W HCPCS: Performed by: STUDENT IN AN ORGANIZED HEALTH CARE EDUCATION/TRAINING PROGRAM

## 2019-03-01 PROCEDURE — 84100 ASSAY OF PHOSPHORUS: CPT

## 2019-03-01 PROCEDURE — 85025 COMPLETE CBC W/AUTO DIFF WBC: CPT

## 2019-03-01 PROCEDURE — 86140 C-REACTIVE PROTEIN: CPT

## 2019-03-01 PROCEDURE — 83735 ASSAY OF MAGNESIUM: CPT

## 2019-03-01 PROCEDURE — 85610 PROTHROMBIN TIME: CPT

## 2019-03-01 PROCEDURE — S0030 INJECTION, METRONIDAZOLE: HCPCS | Performed by: STUDENT IN AN ORGANIZED HEALTH CARE EDUCATION/TRAINING PROGRAM

## 2019-03-01 PROCEDURE — 36415 COLL VENOUS BLD VENIPUNCTURE: CPT

## 2019-03-01 PROCEDURE — 96365 THER/PROPH/DIAG IV INF INIT: CPT | Mod: 59 | Performed by: EMERGENCY MEDICINE

## 2019-03-01 PROCEDURE — 80053 COMPREHEN METABOLIC PANEL: CPT

## 2019-03-01 RX ORDER — HYDROCODONE BITARTRATE AND ACETAMINOPHEN 500; 5 MG/1; MG/1
TABLET ORAL
Status: DISCONTINUED | OUTPATIENT
Start: 2019-03-01 | End: 2019-03-06 | Stop reason: HOSPADM

## 2019-03-01 RX ORDER — DEXTROSE MONOHYDRATE, SODIUM CHLORIDE, AND POTASSIUM CHLORIDE 50; 1.49; 9 G/1000ML; G/1000ML; G/1000ML
INJECTION, SOLUTION INTRAVENOUS CONTINUOUS
Status: DISCONTINUED | OUTPATIENT
Start: 2019-03-01 | End: 2019-03-04

## 2019-03-01 RX ORDER — POTASSIUM CHLORIDE 7.45 MG/ML
10 INJECTION INTRAVENOUS
Status: COMPLETED | OUTPATIENT
Start: 2019-03-01 | End: 2019-03-01

## 2019-03-01 RX ADMIN — DIPHENHYDRAMINE HYDROCHLORIDE 25 MG: 50 INJECTION INTRAMUSCULAR; INTRAVENOUS at 06:03

## 2019-03-01 RX ADMIN — METOCLOPRAMIDE 5 MG: 5 INJECTION, SOLUTION INTRAMUSCULAR; INTRAVENOUS at 04:03

## 2019-03-01 RX ADMIN — HYDROMORPHONE HYDROCHLORIDE 1 MG: 2 INJECTION, SOLUTION INTRAMUSCULAR; INTRAVENOUS; SUBCUTANEOUS at 09:03

## 2019-03-01 RX ADMIN — METRONIDAZOLE 500 MG: 500 INJECTION, SOLUTION INTRAVENOUS at 05:03

## 2019-03-01 RX ADMIN — HYDROMORPHONE HYDROCHLORIDE 1 MG: 2 INJECTION, SOLUTION INTRAMUSCULAR; INTRAVENOUS; SUBCUTANEOUS at 12:03

## 2019-03-01 RX ADMIN — POTASSIUM CHLORIDE, DEXTROSE MONOHYDRATE AND SODIUM CHLORIDE: 150; 5; 900 INJECTION, SOLUTION INTRAVENOUS at 02:03

## 2019-03-01 RX ADMIN — POTASSIUM CHLORIDE, DEXTROSE MONOHYDRATE AND SODIUM CHLORIDE: 150; 5; 900 INJECTION, SOLUTION INTRAVENOUS at 09:03

## 2019-03-01 RX ADMIN — POTASSIUM CHLORIDE 10 MEQ: 7.46 INJECTION, SOLUTION INTRAVENOUS at 02:03

## 2019-03-01 RX ADMIN — HYDROMORPHONE HYDROCHLORIDE 1 MG: 2 INJECTION, SOLUTION INTRAMUSCULAR; INTRAVENOUS; SUBCUTANEOUS at 05:03

## 2019-03-01 RX ADMIN — POTASSIUM CHLORIDE 10 MEQ: 7.46 INJECTION, SOLUTION INTRAVENOUS at 01:03

## 2019-03-01 RX ADMIN — DIATRIZOATE MEGLUMINE AND DIATRIZOATE SODIUM 240 ML: 660; 100 LIQUID ORAL; RECTAL at 10:03

## 2019-03-01 RX ADMIN — CIPROFLOXACIN 400 MG: 2 INJECTION, SOLUTION INTRAVENOUS at 02:03

## 2019-03-01 RX ADMIN — POTASSIUM CHLORIDE 10 MEQ: 7.46 INJECTION, SOLUTION INTRAVENOUS at 11:03

## 2019-03-01 RX ADMIN — ACETAMINOPHEN 650 MG: 650 SUPPOSITORY RECTAL at 12:03

## 2019-03-01 RX ADMIN — METOCLOPRAMIDE 5 MG: 5 INJECTION, SOLUTION INTRAMUSCULAR; INTRAVENOUS at 09:03

## 2019-03-01 RX ADMIN — ENOXAPARIN SODIUM 40 MG: 100 INJECTION SUBCUTANEOUS at 09:03

## 2019-03-01 RX ADMIN — POTASSIUM CHLORIDE 10 MEQ: 7.46 INJECTION, SOLUTION INTRAVENOUS at 12:03

## 2019-03-01 RX ADMIN — HYDROMORPHONE HYDROCHLORIDE 1 MG: 2 INJECTION, SOLUTION INTRAMUSCULAR; INTRAVENOUS; SUBCUTANEOUS at 01:03

## 2019-03-01 RX ADMIN — ACETAMINOPHEN 650 MG: 650 SUPPOSITORY RECTAL at 06:03

## 2019-03-01 RX ADMIN — METRONIDAZOLE 500 MG: 500 INJECTION, SOLUTION INTRAVENOUS at 01:03

## 2019-03-01 NOTE — PLAN OF CARE
Problem: Adult Inpatient Plan of Care  Goal: Plan of Care Review  Outcome: Ongoing (interventions implemented as appropriate)   03/01/19 1512   Plan of Care Review   Plan of Care Reviewed With patient   Progress improving     Patient oriented x3 and ambulatory in room. NGT to LWIS w/ brownish green output.  IVF held for electrolyte repletion, IV abx, and blood transfusion.  Voiding w/o difficulty.  Pain and nausea managed w/ prn medication.  SCD in place.  Family at bedside.  2LNC therapy in place. Will continue to monitor.

## 2019-03-01 NOTE — PLAN OF CARE
Problem: Adult Inpatient Plan of Care  Goal: Plan of Care Review  Outcome: Ongoing (interventions implemented as appropriate)  Able to address needs, IVF cont, IV abx per order, medicated x2 Dilaudid, x1 Metoprolol, ambulates per self, NGT LIWS draining light green, safety maintained, bed low locked and in position, will cont plan of care

## 2019-03-01 NOTE — NURSING
Patient arrived on unit calm and alert; ambulatory in room.  IVF continued.  NGT clamped.  Will continue to monitor.

## 2019-03-01 NOTE — PROGRESS NOTES
NGT advanced 5 cm per MD order, pt w/light green drainage noted to canister, pH result 5-6, LIWS initiated per Gen sx

## 2019-03-01 NOTE — PLAN OF CARE
"TN introduced herself and explained the 's role. TN utilized 2 patient identifiers: Name & .  TN placed Name and spectralink number on whiteboard.  TN provided and reviewed with patient "Blue My Health Packet". TN discussed what Help At Home means to the patient and the name of the person son, Ciro, who helps at home. TN discussed with patient the things the patient is responsible for to HELP manage patient's  healthcare at home.      19 0946   Discharge Assessment   Assessment Type Discharge Planning Assessment   Confirmed/corrected address and phone number on facesheet? Yes   Assessment information obtained from? Patient   Communicated expected length of stay with patient/caregiver no   Prior to hospitilization cognitive status: Alert/Oriented   Prior to hospitalization functional status: Independent   Current cognitive status: Alert/Oriented   Current Functional Status: Independent   Lives With child(cliff), adult   Able to Return to Prior Arrangements yes   Is patient able to care for self after discharge? Yes   Who are your caregiver(s) and their phone number(s)? (loli Tanner)   Patient's perception of discharge disposition admitted as an inpatient   Readmission Within the Last 30 Days previous discharge plan unsuccessful   Patient currently being followed by outpatient case management? No   Patient currently receives any other outside agency services? No   Equipment Currently Used at Home none   Do you have any problems affording any of your prescribed medications? No   Is the patient taking medications as prescribed? yes   Does the patient have transportation home? Yes   Transportation Anticipated family or friend will provide;car, drives self   Does the patient receive services at the [facility]? No   Discharge Plan A Home with family   Discharge Plan B Home with family   DME Needed Upon Discharge  none   Patient/Family in Agreement with Plan yes   Readmission Questionnaire   At the " time of your discharge, did someone talk to you about what your health problems were? Yes   At the time of discharge, did someone talk to you about what to watch out for regarding worsening of your health problem? Yes   At the time of discharge, did someone talk to you about what to do if you experienced worsening of your health problem? Yes   At the time of discharge, did someone talk to you about which medication to take when you left the hospital and which ones to stop taking? Yes   At the time of discharge, did someone talk to you about when and where to follow up with a doctor after you left the hospital? Yes   What do you believe caused you to be sick enough to be re-admitted? (Patient feels she was discharged too soon.)   How often do you need to have someone help you when you read instructions, pamphlets, or other written material from your doctor or pharmacy? Sometimes   Do you have problems taking your medications as prescribed? No   Do you have any problems affording any of  your prescribed medications? Yes   Do you have problems obtaining/receiving your medications? No   Does the patient have transportation to healthcare appointments? Yes   Living Arrangements house   Does the patient have family/friends to help with healtcare needs after discharge? yes   Does your caregiver provide all the help you need? Yes   Are you currently feeling confused? No   Are you currently having problems thinking? No   Are you currently having memory problems? No   Have you felt down, depressed, or hopeless? 0   Have you felt little interest or pleasure in doing things? 1   In the last 7 days, my sleep quality was: fair       [facility] #[id number] - [place] - [address]  [address]  [place] [id number]  Phone: [phone] Fax: [phone]

## 2019-03-01 NOTE — PROGRESS NOTES
[facility]  General Surgery  Progress Note    Subjective:     Interval History: fever overnight, notes vague abdominal pain, denies nausea.     Post-Op Info:  * No surgery found *          Medications:  Continuous Infusions:   dextrose 5 % and 0.9 % NaCl with KCl 20 mEq 100 mL/hr at 03/01/19 0252     Scheduled Meds:   ciprofloxacin  400 mg Intravenous Q12H    enoxaparin  40 mg Subcutaneous Daily    metronidazole  500 mg Intravenous Q8H     PRN Meds:acetaminophen, dextrose 50%, dextrose 50%, diphenhydrAMINE, glucagon (human recombinant), glucose, glucose, HYDROmorphone, metoclopramide HCl, metoprolol, promethazine (PHENERGAN) IVPB, sodium chloride 0.9%     Objective:     Vital Signs (Most Recent):  Temp: 98.5 °F (36.9 °C) (03/01/19 0433)  Pulse: 73 (03/01/19 0433)  Resp: 18 (03/01/19 0433)  BP: (!) 155/77 (03/01/19 0433)  SpO2: 100 % (03/01/19 0433) Vital Signs (24h Range):  Temp:  [98.5 °F (36.9 °C)-101.4 °F (38.6 °C)] 98.5 °F (36.9 °C)  Pulse:  [] 73  Resp:  [18-23] 18  SpO2:  [90 %-100 %] 100 %  BP: (129-193)/(62-90) 155/77       Intake/Output Summary (Last 24 hours) at 3/1/2019 0708  Last data filed at 3/1/2019 0544  Gross per 24 hour   Intake 4813 ml   Output 700 ml   Net 4113 ml       Physical Exam  Awake, alert, no distress  NG in place with thin bilious output  Abd soft, non distended, mild tenderness mid abdomen, no guarding    Significant Labs:  BMP:   Recent Labs   Lab 03/01/19  0455   *      K 3.4*      CO2 25   BUN 6   CREATININE 0.7   CALCIUM 8.6*   MG 1.7     CBC:   Recent Labs   Lab 03/01/19 0455   WBC 17.32*   RBC 3.09*   HGB 7.8*   HCT 25.4*   *   MCV 82   MCH 25.2*   MCHC 30.7*       Significant Diagnostics:  I have reviewed all pertinent imaging results/findings within the past 24 hours.    Assessment/Plan:   59 year old woman s/p subtotal gastrectomy 2/8/19 for gastric cancer admitted with PO intolerance and fever for the last 1 week. Vague  post operative changes on CT scan without clear abscess or leak. Upper GI SBFT performed and no leak    Continue NPO, IVF, NGT  Nutrition labs  ABX, follow up cultures  Symptomatic anemia, likely dilutional component. Will transfuse 2uPRBC      Active Diagnoses:    Diagnosis Date Noted POA    PRINCIPAL PROBLEM:  S/P subtotal gastrectomy [Z90.3] 02/27/2019 Not Applicable      Problems Resolved During this Admission:         Rose Clarke MD  General Surgery  [facility]

## 2019-03-02 LAB
ANION GAP SERPL CALC-SCNC: 10 MMOL/L
BASOPHILS # BLD AUTO: 0.04 K/UL
BASOPHILS NFR BLD: 0.3 %
BUN SERPL-MCNC: 6 MG/DL
CALCIUM SERPL-MCNC: 9.1 MG/DL
CHLORIDE SERPL-SCNC: 106 MMOL/L
CO2 SERPL-SCNC: 22 MMOL/L
CREAT SERPL-MCNC: 0.7 MG/DL
DIFFERENTIAL METHOD: ABNORMAL
EOSINOPHIL # BLD AUTO: 0.1 K/UL
EOSINOPHIL NFR BLD: 0.6 %
ERYTHROCYTE [DISTWIDTH] IN BLOOD BY AUTOMATED COUNT: 17 %
EST. GFR  (AFRICAN AMERICAN): >60 ML/MIN/1.73 M^2
EST. GFR  (NON AFRICAN AMERICAN): >60 ML/MIN/1.73 M^2
GLUCOSE SERPL-MCNC: 108 MG/DL
HCT VFR BLD AUTO: 33.7 %
HGB BLD-MCNC: 10.6 G/DL
LYMPHOCYTES # BLD AUTO: 2.5 K/UL
LYMPHOCYTES NFR BLD: 16.2 %
MCH RBC QN AUTO: 26.2 PG
MCHC RBC AUTO-ENTMCNC: 31.5 G/DL
MCV RBC AUTO: 83 FL
MONOCYTES # BLD AUTO: 1.6 K/UL
MONOCYTES NFR BLD: 10.4 %
NEUTROPHILS # BLD AUTO: 11 K/UL
NEUTROPHILS NFR BLD: 72.5 %
PLATELET # BLD AUTO: 535 K/UL
PMV BLD AUTO: 10.5 FL
POCT GLUCOSE: 117 MG/DL (ref 70–110)
POCT GLUCOSE: 126 MG/DL (ref 70–110)
POCT GLUCOSE: 131 MG/DL (ref 70–110)
POCT GLUCOSE: 132 MG/DL (ref 70–110)
POCT GLUCOSE: 137 MG/DL (ref 70–110)
POTASSIUM SERPL-SCNC: 4.7 MMOL/L
RBC # BLD AUTO: 4.04 M/UL
SODIUM SERPL-SCNC: 138 MMOL/L
TROPONIN I SERPL DL<=0.01 NG/ML-MCNC: <0.006 NG/ML
TROPONIN I SERPL DL<=0.01 NG/ML-MCNC: <0.006 NG/ML
WBC # BLD AUTO: 15.14 K/UL

## 2019-03-02 PROCEDURE — 36415 COLL VENOUS BLD VENIPUNCTURE: CPT

## 2019-03-02 PROCEDURE — 85025 COMPLETE CBC W/AUTO DIFF WBC: CPT

## 2019-03-02 PROCEDURE — 11000001 HC ACUTE MED/SURG PRIVATE ROOM

## 2019-03-02 PROCEDURE — 25000003 PHARM REV CODE 250: Performed by: STUDENT IN AN ORGANIZED HEALTH CARE EDUCATION/TRAINING PROGRAM

## 2019-03-02 PROCEDURE — 63600175 PHARM REV CODE 636 W HCPCS: Performed by: EMERGENCY MEDICINE

## 2019-03-02 PROCEDURE — 80048 BASIC METABOLIC PNL TOTAL CA: CPT

## 2019-03-02 PROCEDURE — 84484 ASSAY OF TROPONIN QUANT: CPT | Mod: 91

## 2019-03-02 PROCEDURE — 63600175 PHARM REV CODE 636 W HCPCS: Performed by: STUDENT IN AN ORGANIZED HEALTH CARE EDUCATION/TRAINING PROGRAM

## 2019-03-02 PROCEDURE — S0030 INJECTION, METRONIDAZOLE: HCPCS | Performed by: STUDENT IN AN ORGANIZED HEALTH CARE EDUCATION/TRAINING PROGRAM

## 2019-03-02 RX ORDER — CLONIDINE HYDROCHLORIDE 0.1 MG/1
0.1 TABLET ORAL DAILY
Status: DISCONTINUED | OUTPATIENT
Start: 2019-03-02 | End: 2019-03-06 | Stop reason: HOSPADM

## 2019-03-02 RX ORDER — HYDROCHLOROTHIAZIDE 25 MG/1
25 TABLET ORAL DAILY
Status: DISCONTINUED | OUTPATIENT
Start: 2019-03-02 | End: 2019-03-06 | Stop reason: HOSPADM

## 2019-03-02 RX ADMIN — ENOXAPARIN SODIUM 40 MG: 100 INJECTION SUBCUTANEOUS at 05:03

## 2019-03-02 RX ADMIN — HYDROMORPHONE HYDROCHLORIDE 1 MG: 2 INJECTION, SOLUTION INTRAMUSCULAR; INTRAVENOUS; SUBCUTANEOUS at 05:03

## 2019-03-02 RX ADMIN — METOCLOPRAMIDE 5 MG: 5 INJECTION, SOLUTION INTRAMUSCULAR; INTRAVENOUS at 01:03

## 2019-03-02 RX ADMIN — POTASSIUM CHLORIDE, DEXTROSE MONOHYDRATE AND SODIUM CHLORIDE: 150; 5; 900 INJECTION, SOLUTION INTRAVENOUS at 09:03

## 2019-03-02 RX ADMIN — CLONIDINE HYDROCHLORIDE 0.1 MG: 0.1 TABLET ORAL at 12:03

## 2019-03-02 RX ADMIN — METRONIDAZOLE 500 MG: 500 INJECTION, SOLUTION INTRAVENOUS at 02:03

## 2019-03-02 RX ADMIN — METRONIDAZOLE 500 MG: 500 INJECTION, SOLUTION INTRAVENOUS at 06:03

## 2019-03-02 RX ADMIN — METOCLOPRAMIDE 5 MG: 5 INJECTION, SOLUTION INTRAMUSCULAR; INTRAVENOUS at 09:03

## 2019-03-02 RX ADMIN — PROMETHAZINE HYDROCHLORIDE 6.25 MG: 25 INJECTION INTRAMUSCULAR; INTRAVENOUS at 06:03

## 2019-03-02 RX ADMIN — METOCLOPRAMIDE 5 MG: 5 INJECTION, SOLUTION INTRAMUSCULAR; INTRAVENOUS at 05:03

## 2019-03-02 RX ADMIN — METRONIDAZOLE 500 MG: 500 INJECTION, SOLUTION INTRAVENOUS at 09:03

## 2019-03-02 RX ADMIN — HYDROCHLOROTHIAZIDE 25 MG: 25 TABLET ORAL at 12:03

## 2019-03-02 RX ADMIN — HYDROMORPHONE HYDROCHLORIDE 1 MG: 2 INJECTION, SOLUTION INTRAMUSCULAR; INTRAVENOUS; SUBCUTANEOUS at 02:03

## 2019-03-02 RX ADMIN — HYDROMORPHONE HYDROCHLORIDE 1 MG: 2 INJECTION, SOLUTION INTRAMUSCULAR; INTRAVENOUS; SUBCUTANEOUS at 06:03

## 2019-03-02 RX ADMIN — CIPROFLOXACIN 400 MG: 2 INJECTION, SOLUTION INTRAVENOUS at 03:03

## 2019-03-02 RX ADMIN — POTASSIUM CHLORIDE, DEXTROSE MONOHYDRATE AND SODIUM CHLORIDE: 150; 5; 900 INJECTION, SOLUTION INTRAVENOUS at 10:03

## 2019-03-02 NOTE — PROGRESS NOTES
[facility]  General Surgery  Progress Note    Subjective:     Interval History:  Transfused two units for symptomatic anemia  Feels well  Reports passing gas      Post-Op Info:  * No surgery found *          Medications:  Continuous Infusions:   dextrose 5 % and 0.9 % NaCl with KCl 20 mEq 100 mL/hr at 03/01/19 2135     Scheduled Meds:   ciprofloxacin  400 mg Intravenous Q12H    enoxaparin  40 mg Subcutaneous Daily    metronidazole  500 mg Intravenous Q8H     PRN Meds:sodium chloride, acetaminophen, dextrose 50%, dextrose 50%, diphenhydrAMINE, glucagon (human recombinant), glucose, glucose, HYDROmorphone, metoclopramide HCl, metoprolol, promethazine (PHENERGAN) IVPB, sodium chloride 0.9%     Objective:     Vital Signs (Most Recent):  Temp: 98.7 °F (37.1 °C) (03/02/19 0801)  Pulse: 70 (03/02/19 0801)  Resp: 18 (03/02/19 0801)  BP: (!) 147/75 (03/02/19 0801)  SpO2: (!) 90 % (03/02/19 0801) Vital Signs (24h Range):  Temp:  [98 °F (36.7 °C)-100 °F (37.8 °C)] 98.7 °F (37.1 °C)  Pulse:  [70-90] 70  Resp:  [17-19] 18  SpO2:  [90 %-100 %] 90 %  BP: (120-169)/(63-88) 147/75       Intake/Output Summary (Last 24 hours) at 3/2/2019 1002  Last data filed at 3/2/2019 0500  Gross per 24 hour   Intake 811 ml   Output 650 ml   Net 161 ml       Physical Exam  Awake, alert, no distress  NG in place with thin bilious output  Abd soft, non distended, mild tenderness mid abdomen, no guarding    Significant Labs:  BMP:   Recent Labs   Lab 03/01/19  0455   *      K 3.4*      CO2 25   BUN 6   CREATININE 0.7   CALCIUM 8.6*   MG 1.7     CBC:   Recent Labs   Lab 03/01/19  0455   WBC 17.32*   RBC 3.09*   HGB 7.8*   HCT 25.4*   *   MCV 82   MCH 25.2*   MCHC 30.7*       Significant Diagnostics:  I have reviewed all pertinent imaging results/findings within the past 24 hours.    Assessment/Plan:   59 year old woman s/p subtotal gastrectomy 2/8/19 for gastric cancer admitted with PO intolerance and  fever for the last 1 week. Vague post operative changes on CT scan without clear abscess or leak. Upper GI SBFT performed and no leak, transfused 2 units for symptomatic amenia.  Starting passing flatus on 3/2    Continue NPO and MIVF  Clamp NG. If no return of nausea will remove   Follow up on labs   ABX, follow up cultures        Active Diagnoses:    Diagnosis Date Noted POA    PRINCIPAL PROBLEM:  S/P subtotal gastrectomy [Z90.3] 02/27/2019 Not Applicable    Abdominal pain, acute, epigastric [R10.13] 03/01/2019 Yes      Problems Resolved During this Admission:         Chente Calvo MD  General Surgery  [facility]

## 2019-03-02 NOTE — NURSING
MD notified that pt is still having nausea. MD stated restart LIWS. Pt now stating she is having chest pain. BP elevated. New orders given for PRN medication, CXR and EKG. Notify Md if abnormal. Will cont to monitor.

## 2019-03-02 NOTE — PLAN OF CARE
Problem: Adult Inpatient Plan of Care  Goal: Plan of Care Review  Outcome: Ongoing (interventions implemented as appropriate)   03/02/19 0742   Plan of Care Review   Plan of Care Reviewed With patient   AOx4. C/o abdominal pain. IV meds given with moderate relief. Nausea reported; IV meds given Reglan and Promethazine; Reglan given c mild relief. Promethazine given with no relief. NG tube remains in place draining dark green drainage to continuous wall suction. 650 mL noted from NGT during shift. SCDs in place throughout shift. IV abx given. Tolerated 2nd unit of blood well. Uses bedside commode. No distress noted.

## 2019-03-02 NOTE — NURSING
EKG shows NSR, inferior infarct, age undetermined. CXR shows possible atelectasis. MD notified. BP still evelated @ 171/80 HR 65. New orders given to restart home BP medications, administer via NGT and clamp for 30 minutes. Will cont to monitor.

## 2019-03-02 NOTE — PLAN OF CARE
Problem: Adult Inpatient Plan of Care  Goal: Plan of Care Review  Outcome: Ongoing (interventions implemented as appropriate)  Pt free from falls, injury or any further trauma throughout shift. Pt AAOx4. Continued medications as ordered. Complaints of pain to L side during shift, controlled with medications. NGT to LIWS. Nausea during shift, PRN medication given. PT in no distress. Will cont to monitor.    03/02/19 3641   Plan of Care Review   Plan of Care Reviewed With patient

## 2019-03-02 NOTE — NURSING
NGT clamped prior to PO BP medications administered, @ 1220. NGT restarted @1251. Will cont to monitor.

## 2019-03-02 NOTE — NURSING
"Contacted on call MD Dr. Calvo who advise of T100 post transfusion.  MD ordered, "given prn benadryl and tylenol.  Ok to transfuse 2nd unit of PRBC if temp comes down WNL."  "

## 2019-03-03 LAB
ANION GAP SERPL CALC-SCNC: 8 MMOL/L
BASOPHILS # BLD AUTO: 0.03 K/UL
BASOPHILS NFR BLD: 0.2 %
BUN SERPL-MCNC: 5 MG/DL
CALCIUM SERPL-MCNC: 8.9 MG/DL
CHLORIDE SERPL-SCNC: 106 MMOL/L
CO2 SERPL-SCNC: 25 MMOL/L
CREAT SERPL-MCNC: 0.7 MG/DL
DIFFERENTIAL METHOD: ABNORMAL
EOSINOPHIL # BLD AUTO: 0.1 K/UL
EOSINOPHIL NFR BLD: 0.8 %
ERYTHROCYTE [DISTWIDTH] IN BLOOD BY AUTOMATED COUNT: 17.5 %
EST. GFR  (AFRICAN AMERICAN): >60 ML/MIN/1.73 M^2
EST. GFR  (NON AFRICAN AMERICAN): >60 ML/MIN/1.73 M^2
GLUCOSE SERPL-MCNC: 116 MG/DL
HCT VFR BLD AUTO: 33.8 %
HGB BLD-MCNC: 10.5 G/DL
LYMPHOCYTES # BLD AUTO: 1.8 K/UL
LYMPHOCYTES NFR BLD: 14 %
MCH RBC QN AUTO: 25.7 PG
MCHC RBC AUTO-ENTMCNC: 31.1 G/DL
MCV RBC AUTO: 83 FL
MONOCYTES # BLD AUTO: 1.3 K/UL
MONOCYTES NFR BLD: 10.4 %
NEUTROPHILS # BLD AUTO: 9.4 K/UL
NEUTROPHILS NFR BLD: 74.6 %
PLATELET # BLD AUTO: 550 K/UL
PMV BLD AUTO: 10.5 FL
POCT GLUCOSE: 131 MG/DL (ref 70–110)
POCT GLUCOSE: 132 MG/DL (ref 70–110)
POCT GLUCOSE: 139 MG/DL (ref 70–110)
POCT GLUCOSE: 155 MG/DL (ref 70–110)
POTASSIUM SERPL-SCNC: 3.8 MMOL/L
RBC # BLD AUTO: 4.08 M/UL
SODIUM SERPL-SCNC: 139 MMOL/L
TROPONIN I SERPL DL<=0.01 NG/ML-MCNC: 0.01 NG/ML
WBC # BLD AUTO: 12.54 K/UL

## 2019-03-03 PROCEDURE — 11000001 HC ACUTE MED/SURG PRIVATE ROOM

## 2019-03-03 PROCEDURE — S0030 INJECTION, METRONIDAZOLE: HCPCS | Performed by: STUDENT IN AN ORGANIZED HEALTH CARE EDUCATION/TRAINING PROGRAM

## 2019-03-03 PROCEDURE — 25000003 PHARM REV CODE 250: Performed by: STUDENT IN AN ORGANIZED HEALTH CARE EDUCATION/TRAINING PROGRAM

## 2019-03-03 PROCEDURE — 63600175 PHARM REV CODE 636 W HCPCS: Performed by: EMERGENCY MEDICINE

## 2019-03-03 PROCEDURE — 85025 COMPLETE CBC W/AUTO DIFF WBC: CPT

## 2019-03-03 PROCEDURE — 80048 BASIC METABOLIC PNL TOTAL CA: CPT

## 2019-03-03 PROCEDURE — 63600175 PHARM REV CODE 636 W HCPCS: Performed by: STUDENT IN AN ORGANIZED HEALTH CARE EDUCATION/TRAINING PROGRAM

## 2019-03-03 PROCEDURE — 36415 COLL VENOUS BLD VENIPUNCTURE: CPT

## 2019-03-03 RX ORDER — HYDRALAZINE HYDROCHLORIDE 20 MG/ML
10 INJECTION INTRAMUSCULAR; INTRAVENOUS ONCE
Status: COMPLETED | OUTPATIENT
Start: 2019-03-03 | End: 2019-03-03

## 2019-03-03 RX ADMIN — CIPROFLOXACIN 400 MG: 2 INJECTION, SOLUTION INTRAVENOUS at 04:03

## 2019-03-03 RX ADMIN — CLONIDINE HYDROCHLORIDE 0.1 MG: 0.1 TABLET ORAL at 09:03

## 2019-03-03 RX ADMIN — ENOXAPARIN SODIUM 40 MG: 100 INJECTION SUBCUTANEOUS at 05:03

## 2019-03-03 RX ADMIN — METOCLOPRAMIDE 5 MG: 5 INJECTION, SOLUTION INTRAMUSCULAR; INTRAVENOUS at 09:03

## 2019-03-03 RX ADMIN — HYDRALAZINE HYDROCHLORIDE 10 MG: 20 INJECTION INTRAMUSCULAR; INTRAVENOUS at 06:03

## 2019-03-03 RX ADMIN — PROMETHAZINE HYDROCHLORIDE 6.25 MG: 25 INJECTION INTRAMUSCULAR; INTRAVENOUS at 05:03

## 2019-03-03 RX ADMIN — METOCLOPRAMIDE 5 MG: 5 INJECTION, SOLUTION INTRAMUSCULAR; INTRAVENOUS at 04:03

## 2019-03-03 RX ADMIN — METRONIDAZOLE 500 MG: 500 INJECTION, SOLUTION INTRAVENOUS at 09:03

## 2019-03-03 RX ADMIN — METRONIDAZOLE 500 MG: 500 INJECTION, SOLUTION INTRAVENOUS at 06:03

## 2019-03-03 RX ADMIN — CIPROFLOXACIN 400 MG: 2 INJECTION, SOLUTION INTRAVENOUS at 02:03

## 2019-03-03 RX ADMIN — METRONIDAZOLE 500 MG: 500 INJECTION, SOLUTION INTRAVENOUS at 02:03

## 2019-03-03 RX ADMIN — POTASSIUM CHLORIDE, DEXTROSE MONOHYDRATE AND SODIUM CHLORIDE: 150; 5; 900 INJECTION, SOLUTION INTRAVENOUS at 12:03

## 2019-03-03 RX ADMIN — HYDROCHLOROTHIAZIDE 25 MG: 25 TABLET ORAL at 09:03

## 2019-03-03 RX ADMIN — METOCLOPRAMIDE 5 MG: 5 INJECTION, SOLUTION INTRAMUSCULAR; INTRAVENOUS at 02:03

## 2019-03-03 RX ADMIN — PROMETHAZINE HYDROCHLORIDE 6.25 MG: 25 INJECTION INTRAMUSCULAR; INTRAVENOUS at 06:03

## 2019-03-03 NOTE — PLAN OF CARE
Pt with no injuries or acute events overnight. IV fluids and IV abx maintained per MD order with nausea medicine given twice. NG tube to low intermittent suction with good amount of bile colored output. Bed in locked and low position with call bell within reach, will continue with current plan of care.

## 2019-03-03 NOTE — NURSING
PT educated on importance of having the lights on and blinds up during that day and sitting up in the chair. Pt stated understanding however keeps saying she is waiting for her family.

## 2019-03-03 NOTE — PLAN OF CARE
Problem: Adult Inpatient Plan of Care  Goal: Plan of Care Review  Outcome: Ongoing (interventions implemented as appropriate)  Pt free from falls, injury or any further trauma throughout shift. Pt AAOx4. Continued medications as ordered. Complaints of pain to L side during shift, controlled with medications. NGT to LIWS. Nausea during shift, PRN medication given. One episode of vomiting. PT in no distress. Will cont to monitor.   03/03/19 1847   Plan of Care Review   Plan of Care Reviewed With patient           Monitor symptoms  tylenol 15ml every 4hrs as needed . last at 330am    Return for headache not controlled by medicine, not acting like self, worsening of symptoms

## 2019-03-03 NOTE — PROGRESS NOTES
[facility]  General Surgery  Progress Note    Subjective:     Interval History:  Nausea with clamping of NG tube  1600 out NG last 24 hours  Reports passing flatus       Chest pain yesterday for short period of time--> CXR, EKG and trop without concerning findings. Resolution of symptoms.       Post-Op Info:  * No surgery found *          Medications:  Continuous Infusions:   dextrose 5 % and 0.9 % NaCl with KCl 20 mEq 100 mL/hr at 03/02/19 2143     Scheduled Meds:   ciprofloxacin  400 mg Intravenous Q12H    cloNIDine  0.1 mg Oral Daily    enoxaparin  40 mg Subcutaneous Daily    hydroCHLOROthiazide  25 mg Oral Daily    metronidazole  500 mg Intravenous Q8H     PRN Meds:sodium chloride, acetaminophen, dextrose 50%, dextrose 50%, diphenhydrAMINE, glucagon (human recombinant), glucose, glucose, HYDROmorphone, metoclopramide HCl, metoprolol, promethazine (PHENERGAN) IVPB, sodium chloride 0.9%     Objective:     Vital Signs (Most Recent):  Temp: 98.1 °F (36.7 °C) (03/03/19 0740)  Pulse: 77 (03/03/19 0740)  Resp: 18 (03/03/19 0740)  BP: (!) 148/71 (03/03/19 0740)  SpO2: 95 % (03/03/19 0740) Vital Signs (24h Range):  Temp:  [98.1 °F (36.7 °C)-98.8 °F (37.1 °C)] 98.1 °F (36.7 °C)  Pulse:  [65-78] 77  Resp:  [18] 18  SpO2:  [91 %-96 %] 95 %  BP: (134-175)/(71-85) 148/71       Intake/Output Summary (Last 24 hours) at 3/3/2019 0929  Last data filed at 3/3/2019 0618  Gross per 24 hour   Intake 1530 ml   Output 1400 ml   Net 130 ml       Physical Exam  Awake, alert, no distress, depressed affect   NG in place with thin bilious output  Abd soft, non distended, mild tenderness mid abdomen, no guarding    Significant Labs:  BMP:   Recent Labs   Lab 03/03/19  0535   *      K 3.8      CO2 25   BUN 5*   CREATININE 0.7   CALCIUM 8.9     CBC:   Recent Labs   Lab 03/03/19  0535   WBC 12.54   RBC 4.08   HGB 10.5*   HCT 33.8*   *   MCV 83   MCH 25.7*   MCHC 31.1*       Significant  Diagnostics:  I have reviewed all pertinent imaging results/findings within the past 24 hours.    Assessment/Plan:   59 year old woman s/p subtotal gastrectomy 2/8/19 for gastric cancer admitted with PO intolerance and fever for the last 1 week. Vague post operative changes on CT scan without clear abscess or leak. Upper GI SBFT performed and no leak, transfused 2 units for symptomatic amenia.  Starting passing flatus on 3/2, clamp trial with return of nausea and LIWS resumed     Continue NPO and MIVF  Continue NG to LIWS --> ok to clamp for meds   Blood cultures NGTD   If need for continual NPO will consider TPN in coming days         Active Diagnoses:    Diagnosis Date Noted POA    PRINCIPAL PROBLEM:  S/P subtotal gastrectomy [Z90.3] 02/27/2019 Not Applicable    Abdominal pain, acute, epigastric [R10.13] 03/01/2019 Yes      Problems Resolved During this Admission:         Chente Calvo MD  General Surgery  [facility]

## 2019-03-04 LAB
ANION GAP SERPL CALC-SCNC: 10 MMOL/L
BASOPHILS # BLD AUTO: 0.09 K/UL
BASOPHILS NFR BLD: 0.8 %
BUN SERPL-MCNC: 3 MG/DL
CALCIUM SERPL-MCNC: 9.2 MG/DL
CHLORIDE SERPL-SCNC: 102 MMOL/L
CO2 SERPL-SCNC: 24 MMOL/L
CREAT SERPL-MCNC: 0.7 MG/DL
DIFFERENTIAL METHOD: ABNORMAL
EOSINOPHIL # BLD AUTO: 0.3 K/UL
EOSINOPHIL NFR BLD: 2.3 %
ERYTHROCYTE [DISTWIDTH] IN BLOOD BY AUTOMATED COUNT: 19 %
EST. GFR  (AFRICAN AMERICAN): >60 ML/MIN/1.73 M^2
EST. GFR  (NON AFRICAN AMERICAN): >60 ML/MIN/1.73 M^2
GLUCOSE SERPL-MCNC: 129 MG/DL
HCT VFR BLD AUTO: 38.6 %
HGB BLD-MCNC: 12 G/DL
LYMPHOCYTES # BLD AUTO: 2.7 K/UL
LYMPHOCYTES NFR BLD: 22.7 %
MCH RBC QN AUTO: 25.2 PG
MCHC RBC AUTO-ENTMCNC: 31.1 G/DL
MCV RBC AUTO: 81 FL
MONOCYTES # BLD AUTO: 1.4 K/UL
MONOCYTES NFR BLD: 11.8 %
NEUTROPHILS # BLD AUTO: 7.3 K/UL
NEUTROPHILS NFR BLD: 62.4 %
PLATELET # BLD AUTO: 541 K/UL
PLATELET BLD QL SMEAR: ABNORMAL
PMV BLD AUTO: 11.3 FL
POCT GLUCOSE: 109 MG/DL (ref 70–110)
POCT GLUCOSE: 120 MG/DL (ref 70–110)
POCT GLUCOSE: 146 MG/DL (ref 70–110)
POTASSIUM SERPL-SCNC: 3.4 MMOL/L
RBC # BLD AUTO: 4.76 M/UL
SODIUM SERPL-SCNC: 136 MMOL/L
WBC # BLD AUTO: 11.72 K/UL

## 2019-03-04 PROCEDURE — 63600175 PHARM REV CODE 636 W HCPCS: Performed by: STUDENT IN AN ORGANIZED HEALTH CARE EDUCATION/TRAINING PROGRAM

## 2019-03-04 PROCEDURE — 25000003 PHARM REV CODE 250: Performed by: STUDENT IN AN ORGANIZED HEALTH CARE EDUCATION/TRAINING PROGRAM

## 2019-03-04 PROCEDURE — S0030 INJECTION, METRONIDAZOLE: HCPCS | Performed by: STUDENT IN AN ORGANIZED HEALTH CARE EDUCATION/TRAINING PROGRAM

## 2019-03-04 PROCEDURE — 80048 BASIC METABOLIC PNL TOTAL CA: CPT

## 2019-03-04 PROCEDURE — 85025 COMPLETE CBC W/AUTO DIFF WBC: CPT

## 2019-03-04 PROCEDURE — 25500020 PHARM REV CODE 255: Performed by: SURGERY

## 2019-03-04 PROCEDURE — 63600175 PHARM REV CODE 636 W HCPCS: Performed by: EMERGENCY MEDICINE

## 2019-03-04 PROCEDURE — 11000001 HC ACUTE MED/SURG PRIVATE ROOM

## 2019-03-04 PROCEDURE — 36415 COLL VENOUS BLD VENIPUNCTURE: CPT

## 2019-03-04 RX ORDER — POTASSIUM CHLORIDE 20 MEQ/15ML
40 SOLUTION ORAL ONCE
Status: COMPLETED | OUTPATIENT
Start: 2019-03-04 | End: 2019-03-04

## 2019-03-04 RX ORDER — OXYCODONE HYDROCHLORIDE 5 MG/1
5 TABLET ORAL EVERY 6 HOURS PRN
Status: DISCONTINUED | OUTPATIENT
Start: 2019-03-04 | End: 2019-03-06 | Stop reason: HOSPADM

## 2019-03-04 RX ORDER — METOCLOPRAMIDE HYDROCHLORIDE 5 MG/ML
10 INJECTION INTRAMUSCULAR; INTRAVENOUS EVERY 6 HOURS
Status: DISCONTINUED | OUTPATIENT
Start: 2019-03-04 | End: 2019-03-06 | Stop reason: HOSPADM

## 2019-03-04 RX ADMIN — CIPROFLOXACIN 400 MG: 2 INJECTION, SOLUTION INTRAVENOUS at 02:03

## 2019-03-04 RX ADMIN — HYDROCHLOROTHIAZIDE 25 MG: 25 TABLET ORAL at 09:03

## 2019-03-04 RX ADMIN — IOHEXOL 15 ML: 300 INJECTION, SOLUTION INTRAVENOUS at 08:03

## 2019-03-04 RX ADMIN — POTASSIUM CHLORIDE, DEXTROSE MONOHYDRATE AND SODIUM CHLORIDE: 150; 5; 900 INJECTION, SOLUTION INTRAVENOUS at 02:03

## 2019-03-04 RX ADMIN — PROMETHAZINE HYDROCHLORIDE 6.25 MG: 25 INJECTION INTRAMUSCULAR; INTRAVENOUS at 02:03

## 2019-03-04 RX ADMIN — METRONIDAZOLE 500 MG: 500 INJECTION, SOLUTION INTRAVENOUS at 05:03

## 2019-03-04 RX ADMIN — POTASSIUM CHLORIDE 40 MEQ: 1.5 SOLUTION ORAL at 02:03

## 2019-03-04 RX ADMIN — CLONIDINE HYDROCHLORIDE 0.1 MG: 0.1 TABLET ORAL at 09:03

## 2019-03-04 RX ADMIN — METOCLOPRAMIDE 10 MG: 5 INJECTION, SOLUTION INTRAMUSCULAR; INTRAVENOUS at 01:03

## 2019-03-04 RX ADMIN — IOHEXOL 75 ML: 350 INJECTION, SOLUTION INTRAVENOUS at 08:03

## 2019-03-04 RX ADMIN — METOCLOPRAMIDE 5 MG: 5 INJECTION, SOLUTION INTRAMUSCULAR; INTRAVENOUS at 09:03

## 2019-03-04 RX ADMIN — ENOXAPARIN SODIUM 40 MG: 100 INJECTION SUBCUTANEOUS at 05:03

## 2019-03-04 RX ADMIN — METRONIDAZOLE 500 MG: 500 INJECTION, SOLUTION INTRAVENOUS at 02:03

## 2019-03-04 RX ADMIN — POTASSIUM CHLORIDE, DEXTROSE MONOHYDRATE AND SODIUM CHLORIDE: 150; 5; 900 INJECTION, SOLUTION INTRAVENOUS at 12:03

## 2019-03-04 RX ADMIN — METOCLOPRAMIDE 10 MG: 5 INJECTION, SOLUTION INTRAMUSCULAR; INTRAVENOUS at 11:03

## 2019-03-04 RX ADMIN — METOCLOPRAMIDE 10 MG: 5 INJECTION, SOLUTION INTRAMUSCULAR; INTRAVENOUS at 05:03

## 2019-03-04 NOTE — PLAN OF CARE
Pt remained free of any injuries this shift. IV fluids and abx continued and PRN nausea meds given per MD order. NG tube maintained at low intermittent suction with no issues. Bed in locked and low position with call bell within reach, will continue with current plan of care.

## 2019-03-04 NOTE — PLAN OF CARE
Problem: Adult Inpatient Plan of Care  Goal: Plan of Care Review  Outcome: Ongoing (interventions implemented as appropriate)  Pt's free of accidental injury during shift. No s/s of distress noted. Denies pain at present. Able to make needs known. Denies nausea. Tolerating clear liquid diet well. IVF and antibiotics infusing as ordered. Safety measures maintained. Call bell within reach. Will continue to monitor

## 2019-03-04 NOTE — PROGRESS NOTES
[facility]  General Surgery  Progress Note    Subjective:     Interval History: complains of Ng tube irritation, denies nausea currently, +flatus, denies abdominal pain    Post-Op Info:  * No surgery found *          Medications:  Continuous Infusions:   dextrose 5 % and 0.9 % NaCl with KCl 20 mEq 100 mL/hr at 03/04/19 0057     Scheduled Meds:   ciprofloxacin  400 mg Intravenous Q12H    cloNIDine  0.1 mg Oral Daily    enoxaparin  40 mg Subcutaneous Daily    hydroCHLOROthiazide  25 mg Oral Daily    metoclopramide HCl  10 mg Intravenous Q6H    metronidazole  500 mg Intravenous Q8H     PRN Meds:sodium chloride, acetaminophen, dextrose 50%, dextrose 50%, diphenhydrAMINE, glucagon (human recombinant), glucose, glucose, HYDROmorphone, metoclopramide HCl, metoprolol, promethazine (PHENERGAN) IVPB, sodium chloride 0.9%     Objective:     Vital Signs (Most Recent):  Temp: 98.3 °F (36.8 °C) (03/03/19 2321)  Pulse: 85 (03/03/19 2321)  Resp: 18 (03/03/19 2321)  BP: (!) 156/86 (03/03/19 2330)  SpO2: 97 % (03/03/19 2321) Vital Signs (24h Range):  Temp:  [98.3 °F (36.8 °C)-98.9 °F (37.2 °C)] 98.3 °F (36.8 °C)  Pulse:  [60-85] 85  Resp:  [18] 18  SpO2:  [93 %-97 %] 97 %  BP: (143-175)/(75-97) 156/86       Intake/Output Summary (Last 24 hours) at 3/4/2019 0817  Last data filed at 3/4/2019 0637  Gross per 24 hour   Intake 1465 ml   Output 2050 ml   Net -585 ml       Physical Exam  Awake, alert, no distress  NG in place with thin bilious output  Abd soft, non distended, non tender      Significant Labs:  BMP:   Recent Labs   Lab 03/04/19  0446   *      K 3.4*      CO2 24   BUN 3*   CREATININE 0.7   CALCIUM 9.2     CBC:   Recent Labs   Lab 03/03/19  0535   WBC 12.54   RBC 4.08   HGB 10.5*   HCT 33.8*   *   MCV 83   MCH 25.7*   MCHC 31.1*       Significant Diagnostics:  None    Assessment/Plan:   59 year old woman s/p subtotal gastrectomy 2/8/19 for gastric cancer admitted with PO  intolerance and fever for the last 1 week. Vague post operative changes on CT scan without clear abscess or leak. Upper GI SBFT performed and no leak, transfused 2 units for symptomatic amenia.  Starting passing flatus on 3/2, clamp trial with return of nausea and LIWS resumed   Repeat CT abd with PO and IV contrast  NPO and NGT in interim     Addendum: CT performed, nonspecific fluid collection is smaller   WBC continues to downtrend  Will dc NG tube, give clears  DC of ABX pending normal metrics while taking in PO    Active Diagnoses:    Diagnosis Date Noted POA    PRINCIPAL PROBLEM:  S/P subtotal gastrectomy [Z90.3] 02/27/2019 Not Applicable    Abdominal pain, acute, epigastric [R10.13] 03/01/2019 Yes      Problems Resolved During this Admission:         Rose Clarke MD  General Surgery  [facility]

## 2019-03-05 LAB
ANION GAP SERPL CALC-SCNC: 8 MMOL/L
BACTERIA BLD CULT: NORMAL
BACTERIA BLD CULT: NORMAL
BASOPHILS # BLD AUTO: 0.06 K/UL
BASOPHILS NFR BLD: 0.4 %
BUN SERPL-MCNC: 5 MG/DL
CALCIUM SERPL-MCNC: 9.1 MG/DL
CHLORIDE SERPL-SCNC: 102 MMOL/L
CO2 SERPL-SCNC: 27 MMOL/L
CREAT SERPL-MCNC: 0.8 MG/DL
DIFFERENTIAL METHOD: ABNORMAL
EOSINOPHIL # BLD AUTO: 0.1 K/UL
EOSINOPHIL NFR BLD: 1 %
ERYTHROCYTE [DISTWIDTH] IN BLOOD BY AUTOMATED COUNT: 18.4 %
EST. GFR  (AFRICAN AMERICAN): >60 ML/MIN/1.73 M^2
EST. GFR  (NON AFRICAN AMERICAN): >60 ML/MIN/1.73 M^2
GLUCOSE SERPL-MCNC: 87 MG/DL
HCT VFR BLD AUTO: 38.2 %
HGB BLD-MCNC: 11.9 G/DL
LYMPHOCYTES # BLD AUTO: 3.5 K/UL
LYMPHOCYTES NFR BLD: 24.6 %
MCH RBC QN AUTO: 25.8 PG
MCHC RBC AUTO-ENTMCNC: 31.2 G/DL
MCV RBC AUTO: 83 FL
MONOCYTES # BLD AUTO: 1.6 K/UL
MONOCYTES NFR BLD: 11.6 %
NEUTROPHILS # BLD AUTO: 8.8 K/UL
NEUTROPHILS NFR BLD: 62.8 %
PLATELET # BLD AUTO: 522 K/UL
PMV BLD AUTO: 10.8 FL
POTASSIUM SERPL-SCNC: 3.5 MMOL/L
RBC # BLD AUTO: 4.61 M/UL
SODIUM SERPL-SCNC: 137 MMOL/L
WBC # BLD AUTO: 14.04 K/UL

## 2019-03-05 PROCEDURE — 63600175 PHARM REV CODE 636 W HCPCS: Performed by: STUDENT IN AN ORGANIZED HEALTH CARE EDUCATION/TRAINING PROGRAM

## 2019-03-05 PROCEDURE — 85025 COMPLETE CBC W/AUTO DIFF WBC: CPT

## 2019-03-05 PROCEDURE — 80048 BASIC METABOLIC PNL TOTAL CA: CPT

## 2019-03-05 PROCEDURE — 36415 COLL VENOUS BLD VENIPUNCTURE: CPT

## 2019-03-05 PROCEDURE — 11000001 HC ACUTE MED/SURG PRIVATE ROOM

## 2019-03-05 PROCEDURE — 25000003 PHARM REV CODE 250: Performed by: STUDENT IN AN ORGANIZED HEALTH CARE EDUCATION/TRAINING PROGRAM

## 2019-03-05 RX ADMIN — METOCLOPRAMIDE 10 MG: 5 INJECTION, SOLUTION INTRAMUSCULAR; INTRAVENOUS at 12:03

## 2019-03-05 RX ADMIN — CLONIDINE HYDROCHLORIDE 0.1 MG: 0.1 TABLET ORAL at 10:03

## 2019-03-05 RX ADMIN — METOCLOPRAMIDE 10 MG: 5 INJECTION, SOLUTION INTRAMUSCULAR; INTRAVENOUS at 05:03

## 2019-03-05 RX ADMIN — ENOXAPARIN SODIUM 40 MG: 100 INJECTION SUBCUTANEOUS at 05:03

## 2019-03-05 RX ADMIN — HYDROCHLOROTHIAZIDE 25 MG: 25 TABLET ORAL at 10:03

## 2019-03-05 NOTE — PROGRESS NOTES
[facility]  General Surgery  Progress Note    Subjective:     Interval History:     NG removed  Tolerating diet  Denies nausea or vomiting      Post-Op Info:  * No surgery found *          Medications:  Continuous Infusions:    Scheduled Meds:   cloNIDine  0.1 mg Oral Daily    enoxaparin  40 mg Subcutaneous Daily    hydroCHLOROthiazide  25 mg Oral Daily    metoclopramide HCl  10 mg Intravenous Q6H     PRN Meds:sodium chloride, acetaminophen, dextrose 50%, dextrose 50%, diphenhydrAMINE, glucagon (human recombinant), glucose, glucose, metoprolol, oxyCODONE, promethazine (PHENERGAN) IVPB, sodium chloride 0.9%     Objective:     Vital Signs (Most Recent):  Temp: 98.7 °F (37.1 °C) (03/05/19 0746)  Pulse: 70 (03/05/19 0746)  Resp: 17 (03/05/19 0746)  BP: (!) 162/84 (03/05/19 0746)  SpO2: 98 % (03/05/19 0746) Vital Signs (24h Range):  Temp:  [98.3 °F (36.8 °C)-99.1 °F (37.3 °C)] 98.7 °F (37.1 °C)  Pulse:  [66-70] 70  Resp:  [17-18] 17  SpO2:  [94 %-98 %] 98 %  BP: (129-162)/(68-84) 162/84       Intake/Output Summary (Last 24 hours) at 3/5/2019 1028  Last data filed at 3/4/2019 1730  Gross per 24 hour   Intake 240 ml   Output --   Net 240 ml       Physical Exam  Awake, alert, no distress  Abd soft, non distended, non tender      Significant Labs:  BMP:   Recent Labs   Lab 03/05/19 0452   GLU 87      K 3.5      CO2 27   BUN 5*   CREATININE 0.8   CALCIUM 9.1     CBC:   Recent Labs   Lab 03/05/19 0452   WBC 14.04*   RBC 4.61   HGB 11.9*   HCT 38.2   *   MCV 83   MCH 25.8*   MCHC 31.2*       Significant Diagnostics:  None     CT abd/pelvis 3/4     1. Mild left pleural effusion, new from recent exam.  2. Postoperative changes from recent partial gastrectomy and gastrojejunostomy.  3. Decreasing size of nonspecific fluid collection in the operative region; possibilities discussed above.  4. No GI obstruction identified.  Redemonstration some wall thickening of hepatic flexure of colon,  could be primary or secondary inflammation as it is in the operative area and adjacent to the aforementioned fluid collection.  5. Cholecystectomy, aortic atherosclerosis, left renal cyst, and other findings as above.  6.  This report was flagged in Epic as abnormal.    Assessment/Plan:   59 year old woman s/p subtotal gastrectomy 2/8/19 for gastric cancer admitted with PO intolerance and fever for the last 1 week. Vague post operative changes on CT scan without clear abscess or leak. Upper GI SBFT performed and no leak, transfused 2 units for symptomatic amenia.  Starting passing flatus on 3/2, clamp trial with return of nausea. On 3/4 had return of bowel function, NG removed and tolerated CLD.      Post gastrectomy diet started- 6 small meals a day  Likely discharge tomorrow       Active Diagnoses:    Diagnosis Date Noted POA    PRINCIPAL PROBLEM:  S/P subtotal gastrectomy [Z90.3] 02/27/2019 Not Applicable    Abdominal pain, acute, epigastric [R10.13] 03/01/2019 Yes      Problems Resolved During this Admission:         Chente Calvo MD  General Surgery  [facility]

## 2019-03-05 NOTE — NURSING
Patient able to eat most of her breakfast,  With only slight nausea.   At present time,  No further nausea.

## 2019-03-05 NOTE — CONSULTS
Food & Nutrition  Education    Diet Education: Post-gastrectomy   Time Spent: 5 minutes  Learners: Pt      Nutrition Education provided with handouts: Gastric Surgery Nutrition Therapy      Comments: Pt is familiar to me from prior admission when sx was performed. Diet educ completed at that time also. Pt seen yesterday & today. She is tolerating diet advancement well. Pt reports she no longer has the handout I gave her during prior admission & would like another copy, which I provided. Reviewed pertinent info w/ pt.      All questions and concerns answered. Dietitian's contact information provided.       Follow-Up: 3/8/19    Please Re-consult as needed        Thanks!

## 2019-03-05 NOTE — PLAN OF CARE
Pt with no complaints or injuries this shift. IV reglan Q6H given per MD order with no other issues of nausea this shift. Bed in locked and low position with call bell within reach, will continue with current plan of care.

## 2019-03-06 VITALS
SYSTOLIC BLOOD PRESSURE: 130 MMHG | RESPIRATION RATE: 18 BRPM | DIASTOLIC BLOOD PRESSURE: 71 MMHG | BODY MASS INDEX: 23.39 KG/M2 | HEIGHT: 63 IN | TEMPERATURE: 98 F | HEART RATE: 66 BPM | OXYGEN SATURATION: 99 % | WEIGHT: 132 LBS

## 2019-03-06 PROBLEM — R10.13 ABDOMINAL PAIN, ACUTE, EPIGASTRIC: Status: RESOLVED | Noted: 2019-03-01 | Resolved: 2019-03-06

## 2019-03-06 LAB
ANION GAP SERPL CALC-SCNC: 8 MMOL/L
BASOPHILS # BLD AUTO: 0.04 K/UL
BASOPHILS NFR BLD: 0.3 %
BUN SERPL-MCNC: 10 MG/DL
CALCIUM SERPL-MCNC: 9 MG/DL
CHLORIDE SERPL-SCNC: 99 MMOL/L
CO2 SERPL-SCNC: 29 MMOL/L
CREAT SERPL-MCNC: 0.9 MG/DL
DIFFERENTIAL METHOD: ABNORMAL
EOSINOPHIL # BLD AUTO: 0.1 K/UL
EOSINOPHIL NFR BLD: 0.9 %
ERYTHROCYTE [DISTWIDTH] IN BLOOD BY AUTOMATED COUNT: 18.5 %
EST. GFR  (AFRICAN AMERICAN): >60 ML/MIN/1.73 M^2
EST. GFR  (NON AFRICAN AMERICAN): >60 ML/MIN/1.73 M^2
GLUCOSE SERPL-MCNC: 120 MG/DL
HCT VFR BLD AUTO: 37.1 %
HGB BLD-MCNC: 11.7 G/DL
LYMPHOCYTES # BLD AUTO: 3 K/UL
LYMPHOCYTES NFR BLD: 25.4 %
MCH RBC QN AUTO: 26 PG
MCHC RBC AUTO-ENTMCNC: 31.5 G/DL
MCV RBC AUTO: 82 FL
MONOCYTES # BLD AUTO: 1.4 K/UL
MONOCYTES NFR BLD: 11.7 %
NEUTROPHILS # BLD AUTO: 7.2 K/UL
NEUTROPHILS NFR BLD: 62.2 %
PLATELET # BLD AUTO: 485 K/UL
PLATELET BLD QL SMEAR: ABNORMAL
PMV BLD AUTO: 10.4 FL
POTASSIUM SERPL-SCNC: 3.4 MMOL/L
RBC # BLD AUTO: 4.5 M/UL
SODIUM SERPL-SCNC: 136 MMOL/L
WBC # BLD AUTO: 11.73 K/UL

## 2019-03-06 PROCEDURE — 80048 BASIC METABOLIC PNL TOTAL CA: CPT

## 2019-03-06 PROCEDURE — 25000003 PHARM REV CODE 250: Performed by: STUDENT IN AN ORGANIZED HEALTH CARE EDUCATION/TRAINING PROGRAM

## 2019-03-06 PROCEDURE — 63600175 PHARM REV CODE 636 W HCPCS: Performed by: STUDENT IN AN ORGANIZED HEALTH CARE EDUCATION/TRAINING PROGRAM

## 2019-03-06 PROCEDURE — 36415 COLL VENOUS BLD VENIPUNCTURE: CPT

## 2019-03-06 PROCEDURE — 85025 COMPLETE CBC W/AUTO DIFF WBC: CPT

## 2019-03-06 RX ORDER — METOCLOPRAMIDE 5 MG/1
5 TABLET ORAL
Qty: 30 TABLET | Refills: 0 | Status: SHIPPED | OUTPATIENT
Start: 2019-03-06 | End: 2019-03-15 | Stop reason: SDUPTHER

## 2019-03-06 RX ADMIN — METOCLOPRAMIDE 10 MG: 5 INJECTION, SOLUTION INTRAMUSCULAR; INTRAVENOUS at 11:03

## 2019-03-06 RX ADMIN — METOCLOPRAMIDE 10 MG: 5 INJECTION, SOLUTION INTRAMUSCULAR; INTRAVENOUS at 12:03

## 2019-03-06 RX ADMIN — CLONIDINE HYDROCHLORIDE 0.1 MG: 0.1 TABLET ORAL at 09:03

## 2019-03-06 RX ADMIN — METOCLOPRAMIDE 10 MG: 5 INJECTION, SOLUTION INTRAMUSCULAR; INTRAVENOUS at 05:03

## 2019-03-06 RX ADMIN — HYDROCHLOROTHIAZIDE 25 MG: 25 TABLET ORAL at 09:03

## 2019-03-06 NOTE — PLAN OF CARE
03/06/19 1229   Post-Acute Status   Post-Acute Authorization Other  (appt made)   Other Status Awaiting f/u Appts  (appt done)   Discharge Delays None

## 2019-03-06 NOTE — PROGRESS NOTES
TN informed med surg nurse Lisa that patient is cleared for discharge from [facility]..Martha Black RN, BSN, STN [facility]  3/6/2019

## 2019-03-06 NOTE — PROGRESS NOTES
WRITTEN DISCHARGE INFORMATION:  Follow-up Information     Arnie Monson MD On 3/15/2019.    Specialty:  Family Medicine  Why:  out patient services: 2:30pm follow up from the  hospital.   Contact information:  [address]   [place] [address]  [phone]             Jesus Tripp MD On 3/14/2019.    Specialties:  General Surgery, Oncology  Why:  out patient services: 10:30AM follow up from the hospital  Contact information:  [address]  [address]  [place] [address]  [phone]                Things that YOU are responsible for, to HELP YOU, Manage Your Care At Home:  1. Getting your prescriptions filled.  2. Taking you medications as directed. DO NOT MISS ANY DOSES!  3. Going to your follow-up doctor appointments. This is important because it allows the doctor to monitor your progress and to determine if any changes need to be made to your treatment plan.                                                         Help at Home  After discharge for assistance [facility] 24/7 assistance  [phone]   Thank you for choosing [facility] for your care.    Sincerely, Your [facility] is,  Martha Black RN [facility] [phone]

## 2019-03-06 NOTE — PROGRESS NOTES
TN taught Symptoms and Problems for GI home care with pt and with teach back:  1. Vomiting , 2.Belly pain. TN placed education sheet in [employer]..     Help at home will be from my  Son, Ciro, assisting in pt's recovery.     TN taught patient about things she is responsible for when discharged TO HELP WITH her RECOVERY:  Particularly on how to Manage her Care At Home:  1. Getting his prescriptions filled.  2. Taking his medications as directed. DO NOT MISS ANY DOSES!  3. Going to his follow-up doctor appointments.   .  Martha Black RN, BSN, STN [facility]

## 2019-03-06 NOTE — DISCHARGE SUMMARY
[facility]  Discharge Summary      Admit Date: 2/28/2019    Discharge Date and Time:  03/06/2019 8:08 AM    Attending Physician: Jesus Tripp MD     Reason for Admission: nausea, vomiting after gastrectomy    Procedures Performed: * No surgery found *    Hospital Course (synopsis of major diagnoses, care, treatment, and services provided during the course of the hospital stay): Ms Duncan is a 59 year old woman s/p subtotal gastrectomy 2/8/19 for gastric cancer who presented to the ED with abdominal pain, nausea, fever, and leukocytosis. CT demonstrated a small fluid collection adjacent to the staple line without evidence of leak of UGI study. During her admission, she was transfused 2 units for symptomatic amenia.  She slowly had return of bowel function, and NG tube was removed after repeat CT scan, and her diet was advanced. She was discharged home having met all milestones.     Consults: dietary    Significant Diagnostic Studies: CT, UGI SBFT    Final Diagnoses:    Principal Problem: S/P subtotal gastrectomy   Secondary Diagnoses:   Active Hospital Problems    Diagnosis  POA    *S/P subtotal gastrectomy [Z90.3]  Not Applicable      Resolved Hospital Problems    Diagnosis Date Resolved POA    Abdominal pain, acute, epigastric [R10.13] 03/06/2019 Yes       Discharged Condition: good    Disposition: Home or Self Care    Follow Up/Patient Instructions:     Medications:  Reconciled Home Medications:      Medication List      START taking these medications    metoclopramide HCl 5 MG tablet  Commonly known as:  REGLAN  Take 1 tablet (5 mg total) by mouth 3 (three) times daily before meals.        CONTINUE taking these medications    cloNIDine 0.1 MG tablet  Commonly known as:  CATAPRES  Take 0.1 mg by mouth.     diclofenac 75 MG EC tablet  Commonly known as:  VOLTAREN  Take 75 mg by mouth 2 (two) times daily.     estrogen (conjugated)-medroxyprogesterone 0.45-1.5 mg per tablet  Commonly known  as:  PREMPRO  Take 1 tablet by mouth once daily.     gabapentin 100 MG capsule  Commonly known as:  NEURONTIN  Take 100 mg by mouth.     hydroCHLOROthiazide 25 MG tablet  Commonly known as:  HYDRODIURIL  Take 1 tablet (25 mg total) by mouth once daily.     ondansetron 4 MG Tbdl  Commonly known as:  ZOFRAN-ODT  Take 1 tablet (4 mg total) by mouth every 6 (six) hours as needed.     oxybutynin 5 MG Tab  Commonly known as:  DITROPAN  TAKE 1 TABLET (5 MG TOTAL) BY MOUTH 2 (TWO) TIMES DAILY.     pantoprazole 40 MG tablet  Commonly known as:  PROTONIX  Take 40 mg by mouth.          No discharge procedures on file.  Follow-up Information     Arnie Monson MD.    Specialty:  Family Medicine  Contact information:  [address] [place] [address] [place] [address] [phone]

## 2019-03-06 NOTE — NURSING
Discharge instructions given to patient both written and verbally.   Script x 1 for Reglan also given.   Patient instructed of side effects of taking Reglan for an extended period of time and to check with doctor before discontinuing it.    Patient voiced understanding of instructions.   Transport to take patient to her niece's car in a wheelchair,  And niece to drive patient home.

## 2019-03-06 NOTE — PLAN OF CARE
03/06/19 1231   Final Note   Assessment Type Final Discharge Note   Anticipated Discharge Disposition Home   What phone number can be called within the next 1-3 days to see how you are doing after discharge? (see chart)   Hospital Follow Up  Appt(s) scheduled? Yes   Discharge plans and expectations educations in teach back method with documentation complete? Yes   Right Care Referral Info   Referral Type NO CARE

## 2019-03-06 NOTE — PLAN OF CARE
Problem: Adult Inpatient Plan of Care  Goal: Plan of Care Review  Pt sx site intact. No s/s of bleeding. Pt had no nausea/ vomiting during shift. AOx4, fever free. Voids

## 2019-03-06 NOTE — PROGRESS NOTES
[facility]  General Surgery  Progress Note    Subjective:     Interval History: doing well, tolerating diet, denies nausea, denies pain. +bowel function. Had education on post gastrectomy diet.     Post-Op Info:  * No surgery found *          Medications:  Continuous Infusions:  Scheduled Meds:   cloNIDine  0.1 mg Oral Daily    enoxaparin  40 mg Subcutaneous Daily    hydroCHLOROthiazide  25 mg Oral Daily    metoclopramide HCl  10 mg Intravenous Q6H     PRN Meds:sodium chloride, acetaminophen, dextrose 50%, dextrose 50%, diphenhydrAMINE, glucagon (human recombinant), glucose, glucose, metoprolol, oxyCODONE, promethazine (PHENERGAN) IVPB, sodium chloride 0.9%     Objective:     Vital Signs (Most Recent):  Temp: 98.2 °F (36.8 °C) (03/05/19 2251)  Pulse: 71 (03/05/19 2251)  Resp: 18 (03/05/19 2251)  BP: 112/78 (03/05/19 2251)  SpO2: 97 % (03/05/19 2251) Vital Signs (24h Range):  Temp:  [98.2 °F (36.8 °C)-99 °F (37.2 °C)] 98.2 °F (36.8 °C)  Pulse:  [68-75] 71  Resp:  [17-18] 18  SpO2:  [95 %-98 %] 97 %  BP: (112-162)/(75-84) 112/78       Intake/Output Summary (Last 24 hours) at 3/6/2019 0725  Last data filed at 3/6/2019 0625  Gross per 24 hour   Intake 600 ml   Output --   Net 600 ml       Physical Exam  Awake, alert, no distress  Abd soft, non distended, non tender    Significant Labs:  BMP:   Recent Labs   Lab 03/06/19  0527   *      K 3.4*   CL 99   CO2 29   BUN 10   CREATININE 0.9   CALCIUM 9.0     CBC:   Recent Labs   Lab 03/06/19  0526   WBC 11.73   RBC 4.50   HGB 11.7*   HCT 37.1   *   MCV 82   MCH 26.0*   MCHC 31.5*       Significant Diagnostics:  None    Assessment/Plan:   59 year old woman s/p subtotal gastrectomy 2/8/19 for gastric cancer admitted with PO intolerance and fever for the last 1 week. Vague post operative changes on CT scan without clear abscess or leak. Upper GI SBFT performed and no leak, transfused 2 units for symptomatic amenia.  Starting passing  flatus on 3/2, clamp trial with return of nausea. On 3/4 had return of bowel function, NG removed and tolerated CLD. Tolerating regular food.      Home today      Active Diagnoses:    Diagnosis Date Noted POA    PRINCIPAL PROBLEM:  S/P subtotal gastrectomy [Z90.3] 02/27/2019 Not Applicable    Abdominal pain, acute, epigastric [R10.13] 03/01/2019 Yes      Problems Resolved During this Admission:         Rose Clarke MD  General Surgery  [facility]

## 2019-03-07 ENCOUNTER — PATIENT OUTREACH (OUTPATIENT)
Dept: ADMINISTRATIVE | Facility: CLINIC | Age: 59
End: 2019-03-07

## 2019-03-07 NOTE — PROGRESS NOTES
C3 nurse attempted to contact patient. No answer. The following message was left for the patient to return the call:  Good afternoon  I am a nurse calling on behalf of [facility] from the [facility] Coordination [facility].  This is a Transitional Care Call for Xiomy Duncan . When you have a moment please contact us at [phone] or [phone] Monday through Friday, between the hours of 8 am to 4 pm. We look forward to speaking with you. On behalf of [facility] have a nice day.    The patient has a scheduled HOSFU appointment with Arnie Monson MD  on 3/15 @ 230. Message sent to Physician staff.

## 2019-03-07 NOTE — PATIENT INSTRUCTIONS
Discharge Instructions: Caring for Your Incision  You are going home with sutures (stitches) or surgical staples in place. Or you may have special strips of tape called Steri-Strips. One of these items was used to close your incision, help stop bleeding, and speed healing. Follow the tips on this sheet to help your incision heal.  Home Care  Always wash your hands before touching your incision.  Keep your incision clean and dry.  Avoid doing things that could cause dirt or sweat to get on your incision.  Dont pick at scabs. They help protect the wound.  Keep your incision out of water.  Bathe or shower only as directed.  To keep the incision dry when around water, cover it with a plastic bag or plastic wrap.  Pat sutures dry if they get wet. Dont rub.  Leave the dressing (bandage) in place until you are told to remove it or change it. Change it only as directed, using clean hands.  After the first 12 hours, change your dressing every 24 hours.  Change your dressing if it gets wet or soiled.  Follow-Up  Make a follow-up appointment as directed by our staff.    When to Call Your Doctor  Call your doctor right away if you have any of the following:  Increased pain, bleeding, redness, swelling, or foul-smelling discharge around the incision area  Fever of 100.4°F or higher  Shaking chills  Vomiting or nausea that doesnt go away  Numbness, coldness, or tingling around the incision area  Opening of the sutures or wound   © 4460-7751 [employer] [address], [place] [address]. All rights reserved. This information is not intended as a substitute for professional medical care. Always follow your healthcare professional's instructions.

## 2019-03-14 ENCOUNTER — OFFICE VISIT (OUTPATIENT)
Dept: SURGERY | Facility: CLINIC | Age: 59
End: 2019-03-14
Payer: MEDICAID

## 2019-03-14 VITALS
HEIGHT: 63 IN | BODY MASS INDEX: 24.34 KG/M2 | SYSTOLIC BLOOD PRESSURE: 112 MMHG | DIASTOLIC BLOOD PRESSURE: 74 MMHG | HEART RATE: 81 BPM | WEIGHT: 137.38 LBS

## 2019-03-14 DIAGNOSIS — C16.9 MALIGNANT NEOPLASM OF STOMACH, UNSPECIFIED LOCATION: Primary | ICD-10-CM

## 2019-03-14 PROCEDURE — 99024 PR POST-OP FOLLOW-UP VISIT: ICD-10-PCS | Mod: S$GLB,,, | Performed by: SURGERY

## 2019-03-14 PROCEDURE — 99024 POSTOP FOLLOW-UP VISIT: CPT | Mod: S$GLB,,, | Performed by: SURGERY

## 2019-03-14 RX ORDER — NYSTATIN 100000 [USP'U]/ML
4 SUSPENSION ORAL 4 TIMES DAILY
Qty: 160 ML | Refills: 0 | Status: SHIPPED | OUTPATIENT
Start: 2019-03-14 | End: 2019-03-24

## 2019-03-14 NOTE — PROGRESS NOTES
Subjective:       Patient ID: Xiomy Duncan is a 59 y.o. female.    Chief Complaint: Post-op Evaluation    HPI 58 yo female with gastric cancer s/p resection and needed post op resection and is tolerating diet but still having some nausea  Review of Systems   Constitutional: Negative.    HENT: Negative.    Eyes: Negative.    Respiratory: Negative.    Cardiovascular: Negative.    Gastrointestinal: Negative.    Endocrine: Negative.    Musculoskeletal: Negative.    Skin: Negative.    Allergic/Immunologic: Negative.    Neurological: Negative.    Hematological: Negative.    Psychiatric/Behavioral: Negative.    All other systems reviewed and are negative.      Objective:      Physical Exam   Constitutional: She is oriented to person, place, and time. She appears well-developed and well-nourished.   HENT:   Head: Normocephalic and atraumatic.   Right Ear: External ear normal.   Left Ear: External ear normal.   Nose: Nose normal.   Mouth/Throat: Oropharynx is clear and moist.   Eyes: Conjunctivae and EOM are normal. Pupils are equal, round, and reactive to light.   Neck: Normal range of motion. Neck supple.   Cardiovascular: Normal rate, regular rhythm, normal heart sounds and intact distal pulses.   Pulmonary/Chest: Effort normal and breath sounds normal.   Abdominal: Soft. Bowel sounds are normal.   Musculoskeletal: Normal range of motion.   Neurological: She is alert and oriented to person, place, and time. She has normal reflexes.   Skin: Skin is warm and dry.   Psychiatric: She has a normal mood and affect. Her behavior is normal. Thought content normal.   Vitals reviewed.      Assessment:       1. Malignant neoplasm of stomach, unspecified location      better oral candidiasis  Plan:       I will see her back in 3 weeks and I will start her on nystatin and send her back to oncology and to radiation

## 2019-03-15 ENCOUNTER — TELEPHONE (OUTPATIENT)
Dept: SURGERY | Facility: CLINIC | Age: 59
End: 2019-03-15

## 2019-03-15 ENCOUNTER — OFFICE VISIT (OUTPATIENT)
Dept: FAMILY MEDICINE | Facility: CLINIC | Age: 59
End: 2019-03-15
Payer: MEDICAID

## 2019-03-15 VITALS
SYSTOLIC BLOOD PRESSURE: 118 MMHG | HEIGHT: 63 IN | RESPIRATION RATE: 14 BRPM | DIASTOLIC BLOOD PRESSURE: 66 MMHG | BODY MASS INDEX: 24.98 KG/M2 | HEART RATE: 72 BPM | WEIGHT: 141 LBS

## 2019-03-15 DIAGNOSIS — I10 ESSENTIAL HYPERTENSION: Primary | ICD-10-CM

## 2019-03-15 DIAGNOSIS — G89.29 CHRONIC LOW BACK PAIN WITHOUT SCIATICA, UNSPECIFIED BACK PAIN LATERALITY: ICD-10-CM

## 2019-03-15 DIAGNOSIS — M54.50 CHRONIC LOW BACK PAIN WITHOUT SCIATICA, UNSPECIFIED BACK PAIN LATERALITY: ICD-10-CM

## 2019-03-15 DIAGNOSIS — C16.2 MALIGNANT NEOPLASM OF BODY OF STOMACH: ICD-10-CM

## 2019-03-15 DIAGNOSIS — K21.9 GASTROESOPHAGEAL REFLUX DISEASE, ESOPHAGITIS PRESENCE NOT SPECIFIED: ICD-10-CM

## 2019-03-15 DIAGNOSIS — M54.50 LUMBAR PAIN: ICD-10-CM

## 2019-03-15 PROCEDURE — 99214 PR OFFICE/OUTPT VISIT, EST, LEVL IV, 30-39 MIN: ICD-10-PCS | Mod: S$PBB,,, | Performed by: FAMILY MEDICINE

## 2019-03-15 PROCEDURE — 99999 PR PBB SHADOW E&M-EST. PATIENT-LVL III: CPT | Mod: PBBFAC,,, | Performed by: FAMILY MEDICINE

## 2019-03-15 PROCEDURE — 99214 OFFICE O/P EST MOD 30 MIN: CPT | Mod: S$PBB,,, | Performed by: FAMILY MEDICINE

## 2019-03-15 PROCEDURE — 99213 OFFICE O/P EST LOW 20 MIN: CPT | Mod: PBBFAC | Performed by: FAMILY MEDICINE

## 2019-03-15 PROCEDURE — 99999 PR PBB SHADOW E&M-EST. PATIENT-LVL III: ICD-10-PCS | Mod: PBBFAC,,, | Performed by: FAMILY MEDICINE

## 2019-03-15 RX ORDER — ONDANSETRON 4 MG/1
4 TABLET, ORALLY DISINTEGRATING ORAL EVERY 12 HOURS PRN
Qty: 40 TABLET | Refills: 2 | Status: SHIPPED | OUTPATIENT
Start: 2019-03-15 | End: 2019-06-13

## 2019-03-15 RX ORDER — CLONIDINE HYDROCHLORIDE 0.1 MG/1
0.1 TABLET ORAL 2 TIMES DAILY
Qty: 60 TABLET | Refills: 5 | Status: SHIPPED | OUTPATIENT
Start: 2019-03-15 | End: 2019-06-13 | Stop reason: SDUPTHER

## 2019-03-15 RX ORDER — METOCLOPRAMIDE 5 MG/1
5 TABLET ORAL
Qty: 90 TABLET | Refills: 2 | Status: SHIPPED | OUTPATIENT
Start: 2019-03-15 | End: 2019-06-13

## 2019-03-15 RX ORDER — PANTOPRAZOLE SODIUM 40 MG/1
40 TABLET, DELAYED RELEASE ORAL DAILY
Qty: 30 TABLET | Refills: 5 | Status: SHIPPED | OUTPATIENT
Start: 2019-03-15 | End: 2019-06-13 | Stop reason: SDUPTHER

## 2019-03-15 NOTE — PROGRESS NOTES
Subjective:       Patient ID: Xiomy Duncan is a 59 y.o. female.    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  She saw her surgeon yesterday.  Was admitted to hospital for ilious at last visit.  Doing much better today.   Starting to gain weight.  Patient complains of frequent heartburn.  I do not think she is taking her medication correctly.  Today her blood pressure is a little low probably from the weight loss.  Patient is still taking the diclofenac for back pain. She has been nauseated.  She is scheduled to see the back specialist in a few weeks.  She received epidural steroid injections few months ago.      Review of Systems   Constitutional: Negative for activity change, chills, fatigue, fever and unexpected weight change.   HENT: Negative for sore throat and trouble swallowing.    Respiratory: Negative for cough, chest tightness and shortness of breath.    Cardiovascular: Negative for chest pain and leg swelling.   Gastrointestinal: Positive for abdominal pain.   Endocrine: Negative for cold intolerance and heat intolerance.   Genitourinary: Negative for difficulty urinating.   Musculoskeletal: Positive for back pain. Negative for joint swelling.   Skin: Negative for rash.   Neurological: Negative for numbness.   Hematological: Negative for adenopathy.   Psychiatric/Behavioral: Negative for decreased concentration.       Objective:      Vitals:    03/15/19 1417   BP: 118/66   Pulse: 72   Resp: 14     Physical Exam   Constitutional: She is oriented to person, place, and time. She appears well-developed and well-nourished.   HENT:   Head: Normocephalic and atraumatic.   Eyes: EOM are normal. Pupils are equal, round, and reactive to light.   Neck: Normal range of motion. Neck supple. No JVD present. No thyromegaly present.   Cardiovascular: Normal rate, regular rhythm, normal heart sounds and intact distal pulses. Exam reveals  no gallop and no friction rub.   No murmur heard.  Pulmonary/Chest: Effort normal and breath sounds normal.   Abdominal: Soft. Bowel sounds are normal. She exhibits no distension and no mass. There is tenderness. There is no rebound and no guarding. No hernia.   Midline cut healing well.    Patient has tenderness in all quadrants.  Left upper and left lower quadrant seems to be the worse.  No rebound   Musculoskeletal: She exhibits tenderness.   Neurological: She is alert and oriented to person, place, and time. No cranial nerve deficit.   Skin: Skin is warm.   Psychiatric: She has a normal mood and affect. Her behavior is normal. Judgment and thought content normal.       Lab Results   Component Value Date    WBC 11.73 03/06/2019    HGB 11.7 (L) 03/06/2019    HCT 37.1 03/06/2019    MCV 82 03/06/2019     (H) 03/06/2019     BMP  Lab Results   Component Value Date     03/06/2019    K 3.4 (L) 03/06/2019    CL 99 03/06/2019    CO2 29 03/06/2019    BUN 10 03/06/2019    CREATININE 0.9 03/06/2019    CALCIUM 9.0 03/06/2019    ANIONGAP 8 03/06/2019    ESTGFRAFRICA >60 03/06/2019    EGFRNONAA >60 03/06/2019     Lab Results   Component Value Date    HGBA1C 5.6 12/15/2018       Assessment:       1. Essential hypertension    2. Malignant neoplasm of body of stomach    3. Gastroesophageal reflux disease, esophagitis presence not specified    4. Lumbar pain    5. Chronic low back pain without sciatica, unspecified back pain laterality        Plan:   Xiomy was seen today for hospital follow up.    Diagnoses and all orders for this visit:    Essential hypertension  -     cloNIDine (CATAPRES) 0.1 MG tablet; Take 1 tablet (0.1 mg total) by mouth 2 (two) times daily.  Dispense: 60 tablet; Refill: 5    Gastroesophageal reflux disease, esophagitis presence not specified  -     ondansetron (ZOFRAN-ODT) 4 MG TbDL; Take 1 tablet (4 mg total) by mouth every 12 (twelve) hours as needed (nausea).  Dispense: 40 tablet; Refill:  2  -     metoclopramide HCl (REGLAN) 5 MG tablet; Take 1 tablet (5 mg total) by mouth 3 (three) times daily before meals.  Dispense: 90 tablet; Refill: 2  -     pantoprazole (PROTONIX) 40 MG tablet; Take 1 tablet (40 mg total) by mouth once daily.  Dispense: 30 tablet; Refill: 5    Lumbar pain/Chronic low back pain without sciatica, unspecified back pain laterality  Continue gabapentin  Stop Voltaren  Keep appointment with pain management    Carcinoma of antrum of stomach  Keep appt with surgery.  Probably will not need chemo    Essential hypertension  Continue  Catapres  Stop HCTZ    Lumbar pain  -     ALPRAZolam (XANAX) 0.25 MG tablet; Take 1 tablet (0.25 mg total) by mouth 2 (two) times daily as needed for Anxiety.    Essential hypertension  -     cloNIDine (CATAPRES) 0.1 MG tablet; Take 1 tablet (0.1 mg total) by mouth 2 (two) times daily.  Dispense: 60 tablet; Refill: 5    Gastroesophageal reflux disease, esophagitis presence not specified  -     ondansetron (ZOFRAN-ODT) 4 MG TbDL; Take 1 tablet (4 mg total) by mouth every 12 (twelve) hours as needed (nausea).  Dispense: 40 tablet; Refill: 2  -     metoclopramide HCl (REGLAN) 5 MG tablet; Take 1 tablet (5 mg total) by mouth 3 (three) times daily before meals.  Dispense: 90 tablet; Refill: 2  -     pantoprazole (PROTONIX) 40 MG tablet; Take 1 tablet (40 mg total) by mouth once daily.  Dispense: 30 tablet; Refill: 5    RTC in one month

## 2019-03-29 ENCOUNTER — TELEPHONE (OUTPATIENT)
Dept: FAMILY MEDICINE | Facility: CLINIC | Age: 59
End: 2019-03-29

## 2019-03-29 DIAGNOSIS — C16.2 MALIGNANT NEOPLASM OF BODY OF STOMACH: Primary | ICD-10-CM

## 2019-03-29 NOTE — TELEPHONE ENCOUNTER
----- Message from Chente Copeland sent at 3/29/2019  8:44 AM CDT -----  Contact: Patient  Xiomy Duncan  MRN: [id number]  : 1960  PCP: Arnie Monson  Home Phone      [phone]  Work Phone      Not on file.  Mobile          [phone]      MESSAGE: requesting referral to Dr Edwin Andujar (surgeon) - for port placement -- appt is scheduled for 19 @ 3:30 pm -- fax # [phone]    Call Xiomy @ [phone]    PCP: Ermias

## 2019-04-04 ENCOUNTER — OFFICE VISIT (OUTPATIENT)
Dept: SURGERY | Facility: CLINIC | Age: 59
End: 2019-04-04
Payer: MEDICAID

## 2019-04-04 VITALS
SYSTOLIC BLOOD PRESSURE: 158 MMHG | HEART RATE: 60 BPM | BODY MASS INDEX: 24.98 KG/M2 | WEIGHT: 141 LBS | HEIGHT: 63 IN | DIASTOLIC BLOOD PRESSURE: 91 MMHG

## 2019-04-04 DIAGNOSIS — D49.0 GASTRIC NEOPLASM: Primary | ICD-10-CM

## 2019-04-04 PROCEDURE — 99024 POSTOP FOLLOW-UP VISIT: CPT | Mod: S$GLB,,, | Performed by: SURGERY

## 2019-04-04 PROCEDURE — 99024 PR POST-OP FOLLOW-UP VISIT: ICD-10-PCS | Mod: S$GLB,,, | Performed by: SURGERY

## 2019-04-04 RX ORDER — PROCHLORPERAZINE MALEATE 10 MG
10 TABLET ORAL EVERY 6 HOURS PRN
COMMUNITY
End: 2019-06-13 | Stop reason: SDUPTHER

## 2019-04-04 NOTE — PROGRESS NOTES
Subjective:       Patient ID: Xiomy Duncan is a 59 y.o. female.    Chief Complaint: Follow-up    HPI 60 yo  Female with gastric cancer s/p gastrectomy without complaints  Review of Systems   Constitutional: Negative.    HENT: Negative.    Eyes: Negative.    Respiratory: Negative.    Cardiovascular: Negative.    Gastrointestinal: Negative.    Endocrine: Negative.    Musculoskeletal: Negative.    Skin: Negative.    Allergic/Immunologic: Negative.    Neurological: Negative.    Hematological: Negative.    Psychiatric/Behavioral: Negative.    All other systems reviewed and are negative.      Objective:      Physical Exam   Constitutional: She is oriented to person, place, and time. She appears well-developed and well-nourished.   HENT:   Head: Normocephalic and atraumatic.   Right Ear: External ear normal.   Left Ear: External ear normal.   Nose: Nose normal.   Mouth/Throat: Oropharynx is clear and moist.   Eyes: Pupils are equal, round, and reactive to light. Conjunctivae and EOM are normal.   Neck: Normal range of motion. Neck supple.   Cardiovascular: Normal rate, regular rhythm, normal heart sounds and intact distal pulses.   Pulmonary/Chest: Effort normal and breath sounds normal.   Abdominal: Soft. Bowel sounds are normal.   Musculoskeletal: Normal range of motion.   Neurological: She is alert and oriented to person, place, and time. She has normal reflexes.   Skin: Skin is warm and dry.   Psychiatric: She has a normal mood and affect. Her behavior is normal. Thought content normal.   Vitals reviewed.      Assessment:       1. Gastric neoplasm      SYLVAIN  Plan:       She is to start chem soon and I will see her in 3 months

## 2019-04-22 ENCOUNTER — TELEPHONE (OUTPATIENT)
Dept: FAMILY MEDICINE | Facility: CLINIC | Age: 59
End: 2019-04-22

## 2019-04-22 DIAGNOSIS — G89.18 POST-OP PAIN: ICD-10-CM

## 2019-04-22 DIAGNOSIS — C16.9 ADENOCARCINOMA OF STOMACH: ICD-10-CM

## 2019-04-22 RX ORDER — HYDROCODONE BITARTRATE AND ACETAMINOPHEN 7.5; 325 MG/1; MG/1
1 TABLET ORAL EVERY 6 HOURS PRN
Qty: 30 TABLET | Refills: 0 | Status: SHIPPED | OUTPATIENT
Start: 2019-04-22 | End: 2019-06-13

## 2019-04-22 NOTE — TELEPHONE ENCOUNTER
Post-op pain  -     HYDROcodone-acetaminophen (NORCO) 7.5-325 mg per tablet; Take 1 tablet by mouth every 6 (six) hours as needed for Pain.  Dispense: 30 tablet; Refill: 0    Adenocarcinoma of stomach  -     HYDROcodone-acetaminophen (NORCO) 7.5-325 mg per tablet; Take 1 tablet by mouth every 6 (six) hours as needed for Pain.  Dispense: 30 tablet; Refill: 0

## 2019-04-22 NOTE — TELEPHONE ENCOUNTER
----- Message from Brittney Correa sent at 2019  3:43 PM CDT -----  Contact: silvestre Duncan  MRN: [id number]  : 1960  PCP: Arnie Monson  Home Phone      [phone]  Work Phone      Not on file.  Mobile          [phone]      MESSAGE:    Pt is experiencing some pain in her stomach area while receiving treatment, her Chemo doctor will not be in until . Their office told her to call her to see if she can have something for pain until they can see/talk with her Chemo drAgustin     [phone]

## 2019-04-24 ENCOUNTER — TELEPHONE (OUTPATIENT)
Dept: FAMILY MEDICINE | Facility: CLINIC | Age: 59
End: 2019-04-24

## 2019-04-24 RX ORDER — OXYCODONE AND ACETAMINOPHEN 5; 325 MG/1; MG/1
1 TABLET ORAL EVERY 12 HOURS PRN
Qty: 20 TABLET | Refills: 0 | Status: SHIPPED | OUTPATIENT
Start: 2019-04-24 | End: 2019-06-13

## 2019-04-24 NOTE — TELEPHONE ENCOUNTER
I saw Ms. Stauffer's grand daughter today. She says that you went up on her pain meds, but they are not helping.  I stopped her norco 7.5 and gave her a few percocet 5mg.   Just wanted to let you know.  MH

## 2019-05-29 RX ORDER — TIOTROPIUM BROMIDE AND OLODATEROL 3.124; 2.736 UG/1; UG/1
SPRAY, METERED RESPIRATORY (INHALATION)
Qty: 4 G | Refills: 2 | Status: SHIPPED | OUTPATIENT
Start: 2019-05-29 | End: 2019-09-03 | Stop reason: SDUPTHER

## 2019-06-11 ENCOUNTER — TELEPHONE (OUTPATIENT)
Dept: FAMILY MEDICINE | Facility: CLINIC | Age: 59
End: 2019-06-11

## 2019-06-11 DIAGNOSIS — Z12.31 ENCOUNTER FOR SCREENING MAMMOGRAM FOR BREAST CANCER: Primary | ICD-10-CM

## 2019-06-11 NOTE — TELEPHONE ENCOUNTER
----- Message from Chente Copeland sent at 2019  9:26 AM CDT -----  Contact: Sister - Donna Duncan  MRN: [id number]  : 1960  PCP: Arnie Monson  Home Phone      [phone]  Work Phone      Not on file.  Mobile          [phone]      MESSAGE: requesting order & schedule for MMG -- any day in the afternoon    Call Donna @ [phone]    PCP: Arnold

## 2019-06-13 ENCOUNTER — OFFICE VISIT (OUTPATIENT)
Dept: FAMILY MEDICINE | Facility: CLINIC | Age: 59
End: 2019-06-13
Payer: MEDICAID

## 2019-06-13 VITALS
DIASTOLIC BLOOD PRESSURE: 84 MMHG | BODY MASS INDEX: 24.11 KG/M2 | WEIGHT: 131 LBS | SYSTOLIC BLOOD PRESSURE: 134 MMHG | HEIGHT: 62 IN | TEMPERATURE: 99 F | HEART RATE: 82 BPM | RESPIRATION RATE: 16 BRPM

## 2019-06-13 DIAGNOSIS — T45.1X5A CHEMOTHERAPY-INDUCED NAUSEA: ICD-10-CM

## 2019-06-13 DIAGNOSIS — Z90.3 S/P SUBTOTAL GASTRECTOMY: ICD-10-CM

## 2019-06-13 DIAGNOSIS — K92.2 UPPER GI BLEED: ICD-10-CM

## 2019-06-13 DIAGNOSIS — I10 ESSENTIAL HYPERTENSION: ICD-10-CM

## 2019-06-13 DIAGNOSIS — C16.3 CARCINOMA OF ANTRUM OF STOMACH: Primary | ICD-10-CM

## 2019-06-13 DIAGNOSIS — K21.9 GASTROESOPHAGEAL REFLUX DISEASE, ESOPHAGITIS PRESENCE NOT SPECIFIED: ICD-10-CM

## 2019-06-13 DIAGNOSIS — C16.2 MALIGNANT NEOPLASM OF BODY OF STOMACH: ICD-10-CM

## 2019-06-13 DIAGNOSIS — R11.0 CHEMOTHERAPY-INDUCED NAUSEA: ICD-10-CM

## 2019-06-13 PROCEDURE — 99214 PR OFFICE/OUTPT VISIT, EST, LEVL IV, 30-39 MIN: ICD-10-PCS | Mod: S$PBB,,, | Performed by: FAMILY MEDICINE

## 2019-06-13 PROCEDURE — 99999 PR PBB SHADOW E&M-EST. PATIENT-LVL III: CPT | Mod: PBBFAC,,, | Performed by: FAMILY MEDICINE

## 2019-06-13 PROCEDURE — 99999 PR PBB SHADOW E&M-EST. PATIENT-LVL III: ICD-10-PCS | Mod: PBBFAC,,, | Performed by: FAMILY MEDICINE

## 2019-06-13 PROCEDURE — 99214 OFFICE O/P EST MOD 30 MIN: CPT | Mod: S$PBB,,, | Performed by: FAMILY MEDICINE

## 2019-06-13 PROCEDURE — 99213 OFFICE O/P EST LOW 20 MIN: CPT | Mod: PBBFAC | Performed by: FAMILY MEDICINE

## 2019-06-13 RX ORDER — MEGESTROL ACETATE 40 MG/ML
SUSPENSION ORAL
Refills: 0 | COMMUNITY
Start: 2019-05-09 | End: 2020-06-01 | Stop reason: SDUPTHER

## 2019-06-13 RX ORDER — MIRTAZAPINE 15 MG/1
15 TABLET, FILM COATED ORAL DAILY
Refills: 1 | COMMUNITY
Start: 2019-06-04 | End: 2020-03-02 | Stop reason: SDUPTHER

## 2019-06-13 RX ORDER — GRANISETRON 3.1 MG/24H
PATCH TRANSDERMAL
Refills: 1 | COMMUNITY
Start: 2019-06-05 | End: 2020-01-08

## 2019-06-13 RX ORDER — CLONIDINE HYDROCHLORIDE 0.1 MG/1
0.1 TABLET ORAL 2 TIMES DAILY
Qty: 60 TABLET | Refills: 5 | Status: SHIPPED | OUTPATIENT
Start: 2019-06-13 | End: 2019-12-06 | Stop reason: SDUPTHER

## 2019-06-13 RX ORDER — OXYCODONE AND ACETAMINOPHEN 7.5; 325 MG/1; MG/1
1 TABLET ORAL EVERY 12 HOURS PRN
Qty: 60 TABLET | Refills: 0 | Status: SHIPPED | OUTPATIENT
Start: 2019-06-13 | End: 2019-07-09 | Stop reason: SDUPTHER

## 2019-06-13 RX ORDER — DIPHENOXYLATE HYDROCHLORIDE AND ATROPINE SULFATE 2.5; .025 MG/1; MG/1
TABLET ORAL
Refills: 1 | COMMUNITY
Start: 2019-06-04 | End: 2020-03-02 | Stop reason: SDUPTHER

## 2019-06-13 RX ORDER — CHLORZOXAZONE 500 MG/1
TABLET ORAL
COMMUNITY
Start: 2019-02-10 | End: 2022-01-24

## 2019-06-13 RX ORDER — OXYCODONE AND ACETAMINOPHEN 7.5; 325 MG/1; MG/1
1 TABLET ORAL EVERY 12 HOURS PRN
Qty: 60 TABLET | Refills: 0 | Status: SHIPPED | OUTPATIENT
Start: 2019-06-13 | End: 2019-06-13

## 2019-06-13 RX ORDER — ALPRAZOLAM 0.25 MG/1
0.25 TABLET ORAL 2 TIMES DAILY PRN
Qty: 60 TABLET | Refills: 2 | Status: SHIPPED | OUTPATIENT
Start: 2019-06-13 | End: 2019-11-11 | Stop reason: SDUPTHER

## 2019-06-13 RX ORDER — PANTOPRAZOLE SODIUM 40 MG/1
40 TABLET, DELAYED RELEASE ORAL DAILY
Qty: 30 TABLET | Refills: 5 | Status: SHIPPED | OUTPATIENT
Start: 2019-06-13 | End: 2019-08-12 | Stop reason: SDUPTHER

## 2019-06-13 RX ORDER — DICLOFENAC SODIUM 75 MG/1
75 TABLET, DELAYED RELEASE ORAL 2 TIMES DAILY
Refills: 1 | COMMUNITY
Start: 2019-06-04 | End: 2019-06-13

## 2019-06-13 RX ORDER — PROCHLORPERAZINE MALEATE 10 MG
10 TABLET ORAL 3 TIMES DAILY PRN
Qty: 60 TABLET | Refills: 2 | Status: SHIPPED | OUTPATIENT
Start: 2019-06-13 | End: 2020-03-02 | Stop reason: SDUPTHER

## 2019-06-13 NOTE — PROGRESS NOTES
Subjective:       Patient ID: Xiomy Duncan is a 59 y.o. female.    Chief Complaint: Follow-up (3 month check up)    HPI  59 year old female with gastric carcinoma currently taking chemo and radiation with Dr. Eli and Dr. Young comes in with c/o continued pain. She says that she has tried a couple of different medications from the pain she is having after radiation without much help. She was given oxycodone recently but says percocet 5s helped more. Vicodin was not helpful. She is having a lot of nausea as well. She says she is taking zofran and using a patch - which helps on the first day, but then she is nauseated more. Her pain and emesis are worse on Thursday after starting chemo on Monday. She also says that she feels really bad reflux. She has asked if she can take protonix, but she was told to 'see her pcp.' She says she doesn't know why it was stopped.    PMH, PSH, ALLERGIES, SH, FH reviewed in nurse's notes above  Medications reconciled in the nurse's notes    Review of Systems   Constitutional: Positive for unexpected weight change. Negative for chills and fever.   HENT: Negative for congestion, ear pain, postnasal drip, rhinorrhea, sore throat and trouble swallowing.    Eyes: Negative for redness and itching.   Respiratory: Negative for cough, shortness of breath and wheezing.    Cardiovascular: Negative for chest pain and palpitations.   Gastrointestinal: Positive for abdominal pain, nausea and vomiting. Negative for diarrhea.   Genitourinary: Negative for dysuria and frequency.   Skin: Negative for rash.   Neurological: Negative for weakness and headaches.       Objective:      Physical Exam   Constitutional: She is oriented to person, place, and time. She appears well-developed. No distress.   HENT:   Head: Normocephalic and atraumatic.   Eyes: Pupils are equal, round, and reactive to light. Conjunctivae are normal.   Neck: Normal range of motion. Neck supple. No thyromegaly present.    Cardiovascular: Normal rate, regular rhythm, normal heart sounds and intact distal pulses.   Pulmonary/Chest: Effort normal and breath sounds normal. No respiratory distress. She has no wheezes.   Abdominal: Soft. Bowel sounds are normal. There is tenderness (to 'radiation' sticker spots).   Left upper chest wall with port   Musculoskeletal: Normal range of motion. She exhibits no edema.   Lymphadenopathy:     She has no cervical adenopathy.   Neurological: She is alert and oriented to person, place, and time.   Skin: Skin is warm and dry. No rash noted.   Psychiatric: She has a normal mood and affect. Her behavior is normal.   Nursing note and vitals reviewed.       Assessment/Plan:       Problem List Items Addressed This Visit        Cardiac/Vascular    HTN (hypertension)    Relevant Medications    cloNIDine (CATAPRES) 0.1 MG tablet       Oncology    Carcinoma of antrum of stomach - Primary    Relevant Medications    diphenoxylate-atropine 2.5-0.025 mg (LOMOTIL) 2.5-0.025 mg per tablet    oxyCODONE-acetaminophen (PERCOCET) 7.5-325 mg per tablet    ALPRAZolam (XANAX) 0.25 MG tablet    Malignant neoplasm of stomach    Relevant Medications    diphenoxylate-atropine 2.5-0.025 mg (LOMOTIL) 2.5-0.025 mg per tablet    oxyCODONE-acetaminophen (PERCOCET) 7.5-325 mg per tablet    ALPRAZolam (XANAX) 0.25 MG tablet       GI    GERD (gastroesophageal reflux disease)    Relevant Medications    pantoprazole (PROTONIX) 40 MG tablet    Upper GI bleed    S/P subtotal gastrectomy      Other Visit Diagnoses     Chemotherapy-induced nausea        Relevant Medications    SANCUSO 3.1 mg/24 hour    megestrol (MEGACE) 400 mg/10 mL (40 mg/mL) Susp    prochlorperazine (COMPAZINE) 10 MG tablet      RTC if condition acutely worsens or any other concerns, otherwise RTC as scheduled

## 2019-06-24 RX ORDER — METOCLOPRAMIDE 5 MG/1
TABLET ORAL
Qty: 90 TABLET | Refills: 2 | Status: SHIPPED | OUTPATIENT
Start: 2019-06-24 | End: 2019-09-23 | Stop reason: SDUPTHER

## 2019-07-09 DIAGNOSIS — C16.3 CARCINOMA OF ANTRUM OF STOMACH: ICD-10-CM

## 2019-07-09 DIAGNOSIS — C16.2 MALIGNANT NEOPLASM OF BODY OF STOMACH: ICD-10-CM

## 2019-07-09 RX ORDER — OXYCODONE AND ACETAMINOPHEN 7.5; 325 MG/1; MG/1
1 TABLET ORAL EVERY 12 HOURS PRN
Qty: 60 TABLET | Refills: 0 | Status: SHIPPED | OUTPATIENT
Start: 2019-07-09 | End: 2019-08-08

## 2019-07-09 NOTE — TELEPHONE ENCOUNTER
----- Message from Chente Copeland sent at 2019 10:03 AM CDT -----  Contact: Patient  Xiomy Duncan  MRN: [id number]  : 1960  PCP: Arnie Monson  Home Phone      [phone]  Work Phone      Not on file.  Mobile          [phone]      MESSAGE:   Pt requesting refill or new Rx.   Is this a refill or new RX:  Refill  RX name and strength: Oxycodone 7.5-325 mg  Last office visit: 19  Is this a 30-day or 90-day RX:  30 day  [facility] [place]  Comments:      Phone:  [phone]    PCP: Arnold

## 2019-07-16 ENCOUNTER — TELEPHONE (OUTPATIENT)
Dept: SMOKING CESSATION | Facility: CLINIC | Age: 59
End: 2019-07-16

## 2019-07-18 ENCOUNTER — HOSPITAL ENCOUNTER (OUTPATIENT)
Dept: RADIOLOGY | Facility: HOSPITAL | Age: 59
Discharge: HOME | End: 2019-07-18
Attending: FAMILY MEDICINE
Payer: MEDICAID

## 2019-07-18 VITALS — WEIGHT: 131 LBS | HEIGHT: 62 IN | BODY MASS INDEX: 24.11 KG/M2

## 2019-07-18 DIAGNOSIS — Z12.31 ENCOUNTER FOR SCREENING MAMMOGRAM FOR BREAST CANCER: ICD-10-CM

## 2019-07-18 PROCEDURE — 77063 MAMMO DIGITAL SCREENING BILAT WITH TOMOSYNTHESIS_CAD: ICD-10-PCS | Mod: 26,,, | Performed by: RADIOLOGY

## 2019-07-18 PROCEDURE — 77063 BREAST TOMOSYNTHESIS BI: CPT | Mod: 26,,, | Performed by: RADIOLOGY

## 2019-07-18 PROCEDURE — 77067 SCR MAMMO BI INCL CAD: CPT | Mod: 26,,, | Performed by: RADIOLOGY

## 2019-07-18 PROCEDURE — 77067 MAMMO DIGITAL SCREENING BILAT WITH TOMOSYNTHESIS_CAD: ICD-10-PCS | Mod: 26,,, | Performed by: RADIOLOGY

## 2019-07-18 PROCEDURE — 77067 SCR MAMMO BI INCL CAD: CPT | Mod: TC

## 2019-08-12 DIAGNOSIS — K21.9 GASTROESOPHAGEAL REFLUX DISEASE, ESOPHAGITIS PRESENCE NOT SPECIFIED: ICD-10-CM

## 2019-08-12 RX ORDER — OXYCODONE AND ACETAMINOPHEN 7.5; 325 MG/1; MG/1
1 TABLET ORAL EVERY 12 HOURS PRN
COMMUNITY
End: 2019-08-12 | Stop reason: SDUPTHER

## 2019-08-12 NOTE — TELEPHONE ENCOUNTER
Pt requesting refills of pantoprazole and percocet.  LOV: 6/13/2019  Percocet last filled 7/9/2019 for #60. I pended both of these.    She is also asking for a refill of promethazine, which is not on her current med list.    Please advise.

## 2019-08-12 NOTE — TELEPHONE ENCOUNTER
----- Message from Chente Copeland sent at 2019  4:12 PM CDT -----  Contact: Patient  Xiomy Duncan  MRN: [id number]  : 1960  PCP: Arnie Monson  Home Phone      [phone]  Work Phone      Not on file.  [employer]          [phone]      MESSAGE: requesting refills on pain medication (Percocet) -- Pantoprazole - Promethazine -- uses [facility] in [place]     Call [phone]    PCP: Arnold

## 2019-08-13 RX ORDER — PANTOPRAZOLE SODIUM 40 MG/1
40 TABLET, DELAYED RELEASE ORAL DAILY
Qty: 30 TABLET | Refills: 5 | Status: SHIPPED | OUTPATIENT
Start: 2019-08-13 | End: 2019-12-06 | Stop reason: SDUPTHER

## 2019-08-13 RX ORDER — OXYCODONE AND ACETAMINOPHEN 7.5; 325 MG/1; MG/1
1 TABLET ORAL EVERY 12 HOURS PRN
Qty: 60 TABLET | Refills: 0 | Status: SHIPPED | OUTPATIENT
Start: 2019-08-13 | End: 2019-09-13 | Stop reason: SDUPTHER

## 2019-09-03 RX ORDER — TIOTROPIUM BROMIDE AND OLODATEROL 3.124; 2.736 UG/1; UG/1
SPRAY, METERED RESPIRATORY (INHALATION)
Qty: 4 G | Refills: 2 | Status: SHIPPED | OUTPATIENT
Start: 2019-09-03 | End: 2019-11-23 | Stop reason: SDUPTHER

## 2019-09-13 NOTE — TELEPHONE ENCOUNTER
----- Message from Annie Rich sent at 2019  9:18 AM CDT -----  Contact: Self  Xiomy Duncan  MRN: [id number]  : 1960  PCP: Arnie Monson  Home Phone      [phone]  Work Phone      Not on file.  Mobile          [phone]      MESSAGE:   Pt requesting refill or new Rx.   Is this a refill or new RX:  refill  RX name and strength: oxycodone   Last office visit:   Is this a 30-day or 90-day RX:  30  [facility] [place]  Comments:      Phone:  [phone]

## 2019-09-16 ENCOUNTER — TELEPHONE (OUTPATIENT)
Dept: FAMILY MEDICINE | Facility: CLINIC | Age: 59
End: 2019-09-16

## 2019-09-16 NOTE — TELEPHONE ENCOUNTER
----- Message from Brittney Correa sent at 2019  8:44 AM CDT -----  Contact: pt  Xiomy Duncan  MRN: [id number]  : 1960  PCP: Arnie Monson  Home Phone      [phone]  Work Phone      Not on file.  Mobile          [phone]      MESSAGE:     pt asking why her medication has not been sent in yet?  Said she called in for a refill of her oxyCODONE-acetaminophen (PERCOCET) 7.5-325 mg per tablet last week.     [phone]

## 2019-09-17 RX ORDER — OXYCODONE AND ACETAMINOPHEN 7.5; 325 MG/1; MG/1
1 TABLET ORAL EVERY 12 HOURS PRN
Qty: 60 TABLET | Refills: 0 | Status: SHIPPED | OUTPATIENT
Start: 2019-09-17 | End: 2019-10-15 | Stop reason: SDUPTHER

## 2019-09-23 RX ORDER — METOCLOPRAMIDE 5 MG/1
TABLET ORAL
Qty: 90 TABLET | Refills: 2 | Status: SHIPPED | OUTPATIENT
Start: 2019-09-23 | End: 2019-12-30

## 2019-10-15 ENCOUNTER — TELEPHONE (OUTPATIENT)
Dept: NEUROLOGY | Facility: CLINIC | Age: 59
End: 2019-10-15

## 2019-10-15 RX ORDER — OXYCODONE AND ACETAMINOPHEN 7.5; 325 MG/1; MG/1
1 TABLET ORAL EVERY 12 HOURS PRN
Qty: 60 TABLET | Refills: 0 | Status: SHIPPED | OUTPATIENT
Start: 2019-10-15 | End: 2019-11-11 | Stop reason: SDUPTHER

## 2019-10-15 NOTE — TELEPHONE ENCOUNTER
----- Message from Sandra Larios sent at 10/15/2019 10:28 AM CDT -----  Contact: self  Xiomy Duncan  MRN: [id number]  : 1960  PCP: Arnie Monson  Home Phone      [phone]  Work Phone      Not on file.  Mobile          [phone]      MESSAGE:   Patient got 4 teeth pulled today and would like to see about getting her dosage for Oxycodone higher and sent in today to [facility] in [place].    [phone]

## 2019-10-28 ENCOUNTER — TELEPHONE (OUTPATIENT)
Dept: FAMILY MEDICINE | Facility: CLINIC | Age: 59
End: 2019-10-28

## 2019-10-28 NOTE — TELEPHONE ENCOUNTER
----- Message from Sandra Larios sent at 10/28/2019 12:33 PM CDT -----  Contact: self  Xiomy Duncan  MRN: [id number]  : 1960  PCP: Arnie Monson  Home Phone      [phone]  Work Phone      Not on file.  Mobile          [phone]      MESSAGE:    Patient would like to see if she can get the dosage increased on ALPRAZolam (XANAX).    [facility] [place]    [phone]

## 2019-10-31 ENCOUNTER — TELEPHONE (OUTPATIENT)
Dept: FAMILY MEDICINE | Facility: CLINIC | Age: 59
End: 2019-10-31

## 2019-10-31 NOTE — TELEPHONE ENCOUNTER
----- Message from Sandra Larios sent at 10/31/2019 10:20 AM CDT -----  Contact: self  Xiomy Duncan  MRN: [id number]  : 1960  PCP: Arnie Monson  Home Phone      [phone]  Work Phone      Not on file.  Mobile          [phone]      MESSAGE:   Pt requests a sooner appointment than the  can schedule.  Does patient feel like they need to be seen today:  yes  What is the nature of the appointment:  Discuss xanax  What visit type: est  Did you check other providers/department schedules for availability:   Only zoe samson  Comments:     Phone:  [phone]

## 2019-11-11 DIAGNOSIS — C16.2 MALIGNANT NEOPLASM OF BODY OF STOMACH: ICD-10-CM

## 2019-11-11 DIAGNOSIS — C16.3 CARCINOMA OF ANTRUM OF STOMACH: ICD-10-CM

## 2019-11-11 RX ORDER — ALPRAZOLAM 0.25 MG/1
0.25 TABLET ORAL 2 TIMES DAILY PRN
Qty: 60 TABLET | Refills: 2 | Status: SHIPPED | OUTPATIENT
Start: 2019-11-11 | End: 2019-12-06 | Stop reason: SDUPTHER

## 2019-11-11 RX ORDER — OXYCODONE AND ACETAMINOPHEN 7.5; 325 MG/1; MG/1
1 TABLET ORAL EVERY 12 HOURS PRN
Qty: 60 TABLET | Refills: 0 | Status: SHIPPED | OUTPATIENT
Start: 2019-11-11 | End: 2019-12-06 | Stop reason: SDUPTHER

## 2019-11-11 NOTE — TELEPHONE ENCOUNTER
LOV (With Dr. Gaitan): 03/15/2019   LOV (With Dr. Barrett): 06/13/2019    Called pt to notify her that it is time to come in for an appointment with Dr Gaitan. Scheduled her for 12/6. Pt notified of appointment and verbalized understanding. I stressed to her that it is vital she shows for that appointment.     Pt requesting a refill on:  1.) Percocet 7.5-325mg ever 12 hours prn  Last fill: 10/15/2019 #60 with 0 refills    2.) Xanax 0.25 mg 2 po q day prn  Last fill: 6/13/2019 #60 with 2 refills.    Pharmacy: [facility] [place]

## 2019-11-11 NOTE — TELEPHONE ENCOUNTER
----- Message from Vannesa Monzon sent at 2019  9:14 AM CST -----  Contact: Self  Xiomy Duncan  MRN: [id number]  : 1960  PCP: Arnie Monson  Home Phone      [phone]  Work Phone      Not on file.  Mobile          [phone]      MESSAGE:   Pt requesting refill or new Rx.   Is this a refill or new RX:  efill  RX name and strength: oxyCODONE-acetaminophen (PERCOCET) 7.5-325 mg per tablet  Last office visit: 2019  Is this a 30-day or 90-day RX:  30-day  [facility] [place]  Comments:      Phone:  [phone]

## 2019-11-21 ENCOUNTER — TELEPHONE (OUTPATIENT)
Dept: SURGERY | Facility: CLINIC | Age: 59
End: 2019-11-21

## 2019-11-21 NOTE — TELEPHONE ENCOUNTER
Spoke with pt's Niece and scheduled her a sooner appt with         ----- Message from Esha Overton sent at 11/21/2019 11:18 AM CST -----  Contact: self  Type: Patient Call Back    What is the request in detail: Pt requesting a sooner appt.     Can the clinic reply by [facility]? No    Would the patient rather a call back or a response via [facility]? Call back     Best call back number: [phone]

## 2019-12-02 RX ORDER — TIOTROPIUM BROMIDE AND OLODATEROL 3.124; 2.736 UG/1; UG/1
SPRAY, METERED RESPIRATORY (INHALATION)
Qty: 4 G | Refills: 2 | Status: SHIPPED | OUTPATIENT
Start: 2019-12-02 | End: 2019-12-04 | Stop reason: SDUPTHER

## 2019-12-04 NOTE — TELEPHONE ENCOUNTER
LOV: 06/13/2019    Patient requesting a refill on:  1.) Cecilio Respimat     Pharmacy: [facility]

## 2019-12-04 NOTE — TELEPHONE ENCOUNTER
----- Message from Liane Velarde sent at 2019 10:02 AM CST -----  Contact: Fax  Xiomy Duncan  MRN: [id number]  : 1960  PCP: Arnie Monson  Home Phone      [phone]  Work Phone      Not on file.  Mobile          [phone]      MESSAGE:   Pt requesting refill or new Rx.   Is this a refill or new RX:  refill  RX name and strength: STIOLTO RESPIMAT 2.5-2.5 mcg/actuation Mist  Last office visit: 19  Is this a 30-day or 90-day RX:  n/a  [facility] [place]  Comments:      Phone:  [phone]

## 2019-12-05 ENCOUNTER — OFFICE VISIT (OUTPATIENT)
Dept: SURGERY | Facility: CLINIC | Age: 59
End: 2019-12-05
Payer: MEDICAID

## 2019-12-05 VITALS
WEIGHT: 125.31 LBS | SYSTOLIC BLOOD PRESSURE: 156 MMHG | HEART RATE: 76 BPM | HEIGHT: 62 IN | BODY MASS INDEX: 23.06 KG/M2 | DIASTOLIC BLOOD PRESSURE: 81 MMHG

## 2019-12-05 DIAGNOSIS — D49.0 GASTRIC NEOPLASM: Primary | ICD-10-CM

## 2019-12-05 DIAGNOSIS — C16.9 MALIGNANT NEOPLASM OF STOMACH, UNSPECIFIED LOCATION: ICD-10-CM

## 2019-12-05 PROCEDURE — 99214 OFFICE O/P EST MOD 30 MIN: CPT | Mod: S$GLB,,, | Performed by: SURGERY

## 2019-12-05 PROCEDURE — 99214 PR OFFICE/OUTPT VISIT, EST, LEVL IV, 30-39 MIN: ICD-10-PCS | Mod: S$GLB,,, | Performed by: SURGERY

## 2019-12-05 NOTE — PROGRESS NOTES
Subjective:       Patient ID: Xioym Duncan is a 59 y.o. female.    Chief Complaint: Consult (Knot in abdomen ) and Abdominal Pain    HPI 60 yo female with an incisional hernia with symptoms s/p gastric resection for cancer  Review of Systems   Constitutional: Negative.    HENT: Negative.    Eyes: Negative.    Respiratory: Negative.    Cardiovascular: Negative.    Gastrointestinal: Negative.    Endocrine: Negative.    Musculoskeletal: Negative.    Skin: Negative.    Allergic/Immunologic: Negative.    Neurological: Negative.    Hematological: Negative.    Psychiatric/Behavioral: Negative.    All other systems reviewed and are negative.      Objective:      Physical Exam   Constitutional: She is oriented to person, place, and time. She appears well-developed and well-nourished.   HENT:   Head: Normocephalic and atraumatic.   Right Ear: External ear normal.   Left Ear: External ear normal.   Nose: Nose normal.   Mouth/Throat: Oropharynx is clear and moist.   Eyes: Pupils are equal, round, and reactive to light. Conjunctivae and EOM are normal.   Neck: Normal range of motion. Neck supple.   Cardiovascular: Normal rate, regular rhythm, normal heart sounds and intact distal pulses.   Pulmonary/Chest: Effort normal and breath sounds normal.   Abdominal: Soft. Bowel sounds are normal.   Musculoskeletal: Normal range of motion.   Neurological: She is alert and oriented to person, place, and time. She has normal reflexes.   Skin: Skin is warm and dry.   Psychiatric: She has a normal mood and affect. Her behavior is normal. Thought content normal.   Vitals reviewed.      Assessment:       1. Gastric neoplasm    2. Malignant neoplasm of stomach, unspecified location        Plan:       I will get a CT scan and then have her follow up

## 2019-12-06 ENCOUNTER — OFFICE VISIT (OUTPATIENT)
Dept: FAMILY MEDICINE | Facility: CLINIC | Age: 59
End: 2019-12-06
Payer: MEDICAID

## 2019-12-06 VITALS
WEIGHT: 127.63 LBS | SYSTOLIC BLOOD PRESSURE: 128 MMHG | RESPIRATION RATE: 18 BRPM | HEART RATE: 76 BPM | BODY MASS INDEX: 23.49 KG/M2 | DIASTOLIC BLOOD PRESSURE: 72 MMHG | HEIGHT: 62 IN

## 2019-12-06 DIAGNOSIS — C16.3 CARCINOMA OF ANTRUM OF STOMACH: Primary | ICD-10-CM

## 2019-12-06 DIAGNOSIS — Z90.3 S/P SUBTOTAL GASTRECTOMY: ICD-10-CM

## 2019-12-06 DIAGNOSIS — K43.9 ABDOMINAL WALL HERNIA: ICD-10-CM

## 2019-12-06 DIAGNOSIS — C16.2 MALIGNANT NEOPLASM OF BODY OF STOMACH: ICD-10-CM

## 2019-12-06 DIAGNOSIS — K21.9 GASTROESOPHAGEAL REFLUX DISEASE, ESOPHAGITIS PRESENCE NOT SPECIFIED: ICD-10-CM

## 2019-12-06 DIAGNOSIS — Z23 IMMUNIZATION DUE: ICD-10-CM

## 2019-12-06 DIAGNOSIS — I10 ESSENTIAL HYPERTENSION: ICD-10-CM

## 2019-12-06 PROCEDURE — 99213 OFFICE O/P EST LOW 20 MIN: CPT | Mod: PBBFAC | Performed by: FAMILY MEDICINE

## 2019-12-06 PROCEDURE — 90670 PCV13 VACCINE IM: CPT | Mod: PBBFAC

## 2019-12-06 PROCEDURE — 99999 PR PBB SHADOW E&M-EST. PATIENT-LVL III: CPT | Mod: PBBFAC,,, | Performed by: FAMILY MEDICINE

## 2019-12-06 PROCEDURE — 99214 PR OFFICE/OUTPT VISIT, EST, LEVL IV, 30-39 MIN: ICD-10-PCS | Mod: S$PBB,,, | Performed by: FAMILY MEDICINE

## 2019-12-06 PROCEDURE — 99999 PR PBB SHADOW E&M-EST. PATIENT-LVL III: ICD-10-PCS | Mod: PBBFAC,,, | Performed by: FAMILY MEDICINE

## 2019-12-06 PROCEDURE — 90686 IIV4 VACC NO PRSV 0.5 ML IM: CPT | Mod: PBBFAC

## 2019-12-06 PROCEDURE — 99214 OFFICE O/P EST MOD 30 MIN: CPT | Mod: S$PBB,,, | Performed by: FAMILY MEDICINE

## 2019-12-06 RX ORDER — ALPRAZOLAM 0.5 MG/1
0.5 TABLET ORAL 2 TIMES DAILY PRN
Qty: 60 TABLET | Refills: 2 | Status: SHIPPED | OUTPATIENT
Start: 2019-12-06 | End: 2020-01-10 | Stop reason: SDUPTHER

## 2019-12-06 RX ORDER — CLONIDINE HYDROCHLORIDE 0.1 MG/1
0.1 TABLET ORAL 2 TIMES DAILY
Qty: 60 TABLET | Refills: 5 | Status: ON HOLD | OUTPATIENT
Start: 2019-12-06 | End: 2020-02-21 | Stop reason: HOSPADM

## 2019-12-06 RX ORDER — PANTOPRAZOLE SODIUM 40 MG/1
40 TABLET, DELAYED RELEASE ORAL DAILY
Qty: 30 TABLET | Refills: 5 | Status: SHIPPED | OUTPATIENT
Start: 2019-12-06 | End: 2020-02-28

## 2019-12-06 RX ORDER — OXYCODONE AND ACETAMINOPHEN 7.5; 325 MG/1; MG/1
1 TABLET ORAL EVERY 12 HOURS PRN
Qty: 60 TABLET | Refills: 0 | Status: SHIPPED | OUTPATIENT
Start: 2019-12-06 | End: 2020-01-10 | Stop reason: SDUPTHER

## 2019-12-06 NOTE — PROGRESS NOTES
Subjective:       Patient ID: Xiomy Duncan is a 59 y.o. female.    Chief Complaint: Medication Management    HPI  59 year old female with gastric carcinoma currently taking chemo and radiation with Dr. Eli and Dr. Young comes in with c/o continued pain. She says that she has tried a couple of different medications from the pain she is having after radiation without much help. She was given oxycodone recently but says percocet 5s helped more. Vicodin was not helpful. She is having a lot of nausea as well. She says she is taking zofran and using a patch - which helps on the first day, but then she is nauseated more. Her pain and emesis are worse on Thursday after starting chemo on Monday. She also says that she feels really bad reflux.  She takes Protonix.  She says she doesn't know why it was stopped.  Lost a lot of weight.  Can only eat small amounts of food.  Ensure gives her diarrhea  [facility].  They would like her Xanax dose increased.    PMH, PSH, ALLERGIES, SH, FH reviewed in nurse's notes above  Medications reconciled in the nurse's notes    Review of Systems   Constitutional: Positive for unexpected weight change. Negative for chills and fever.   HENT: Negative for congestion, ear pain, postnasal drip, rhinorrhea, sore throat and trouble swallowing.    Eyes: Negative for redness and itching.   Respiratory: Negative for cough, shortness of breath and wheezing.    Cardiovascular: Negative for chest pain and palpitations.   Gastrointestinal: Positive for abdominal pain, nausea and vomiting. Negative for diarrhea.   Genitourinary: Negative for dysuria and frequency.   Skin: Negative for rash.   Neurological: Negative for weakness and headaches.       Objective:       Vitals:    12/06/19 0840   BP: 128/72   Pulse: 76   Resp: 18       Physical Exam   Constitutional: She is oriented to person, place, and time. She appears well-developed. No distress.   HENT:   Head:  Normocephalic and atraumatic.   Eyes: Pupils are equal, round, and reactive to light. Conjunctivae are normal.   Neck: Normal range of motion. Neck supple. No thyromegaly present.   Cardiovascular: Normal rate, regular rhythm, normal heart sounds and intact distal pulses.   Pulmonary/Chest: Effort normal and breath sounds normal. No respiratory distress. She has no wheezes.   Abdominal: Soft. Bowel sounds are normal. There is tenderness. A hernia (midline incisional hernia) is present.   Left upper chest wall with port   Musculoskeletal: Normal range of motion. She exhibits no edema.   Lymphadenopathy:     She has no cervical adenopathy.   Neurological: She is alert and oriented to person, place, and time.   Skin: Skin is warm and dry. No rash noted.   Psychiatric: She has a normal mood and affect. Her behavior is normal.   Nursing note and vitals reviewed.       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

## 2019-12-16 ENCOUNTER — OFFICE VISIT (OUTPATIENT)
Dept: SURGERY | Facility: CLINIC | Age: 59
End: 2019-12-16
Payer: MEDICAID

## 2019-12-16 VITALS
BODY MASS INDEX: 23.37 KG/M2 | WEIGHT: 127 LBS | DIASTOLIC BLOOD PRESSURE: 75 MMHG | HEART RATE: 93 BPM | HEIGHT: 62 IN | SYSTOLIC BLOOD PRESSURE: 136 MMHG

## 2019-12-16 DIAGNOSIS — D49.0 GASTRIC NEOPLASM: ICD-10-CM

## 2019-12-16 DIAGNOSIS — K43.2 INCISIONAL HERNIA, WITHOUT OBSTRUCTION OR GANGRENE: Primary | ICD-10-CM

## 2019-12-16 PROCEDURE — 99214 OFFICE O/P EST MOD 30 MIN: CPT | Mod: S$GLB,,, | Performed by: SURGERY

## 2019-12-16 PROCEDURE — 99214 PR OFFICE/OUTPT VISIT, EST, LEVL IV, 30-39 MIN: ICD-10-PCS | Mod: S$GLB,,, | Performed by: SURGERY

## 2019-12-16 RX ORDER — SODIUM CHLORIDE 9 MG/ML
INJECTION, SOLUTION INTRAVENOUS CONTINUOUS
Status: CANCELLED | OUTPATIENT
Start: 2019-12-16

## 2019-12-16 RX ORDER — LIDOCAINE HYDROCHLORIDE 10 MG/ML
1 INJECTION, SOLUTION EPIDURAL; INFILTRATION; INTRACAUDAL; PERINEURAL ONCE
Status: DISCONTINUED | OUTPATIENT
Start: 2019-12-16 | End: 2021-01-13 | Stop reason: ALTCHOICE

## 2019-12-16 RX ORDER — MUPIROCIN 20 MG/G
OINTMENT TOPICAL
Status: CANCELLED | OUTPATIENT
Start: 2019-12-16

## 2019-12-16 NOTE — H&P (VIEW-ONLY)
History & Physical    SUBJECTIVE:     History of Present Illness:  Patient is a 59 y.o. female presents with incisional hernia with mild symptoms.     Chief Complaint   Patient presents with    Follow-up     f/u w/ ct scan        Review of patient's allergies indicates:   Allergen Reactions    Penicillins Rash       Current Outpatient Medications   Medication Sig Dispense Refill    ALPRAZolam (XANAX) 0.5 MG tablet Take 1 tablet (0.5 mg total) by mouth 2 (two) times daily as needed for Anxiety. 60 tablet 2    chlorzoxazone (PARAFON FORTE) 500 mg Tab TAKE 1 TABLET (500 MG TOTAL) BY MOUTH 4 (FOUR) TIMES DAILY AS NEEDED.      cloNIDine (CATAPRES) 0.1 MG tablet Take 1 tablet (0.1 mg total) by mouth 2 (two) times daily. 60 tablet 5    diphenoxylate-atropine 2.5-0.025 mg (LOMOTIL) 2.5-0.025 mg per tablet TAKE 1 TO 2 TABLETS EVERY 4 TO 6 HOURS AS NEEDED DIARRHEA  1    estrogen, conjugated,-medroxyprogesterone (PREMPRO) 0.45-1.5 mg per tablet Take 1 tablet by mouth once daily. 28 tablet 11    gabapentin (NEURONTIN) 100 MG capsule Take 100 mg by mouth 3 (three) times daily.       megestrol (MEGACE) 400 mg/10 mL (40 mg/mL) Susp 10 MLS BY MOUTH TWICE DAILY FOR 30 DAYS  0    metoclopramide HCl (REGLAN) 5 MG tablet TAKE 1 TABLET BY MOUTH 3 TIMES DAILY BEFORE MEALS 90 tablet 2    mirtazapine (REMERON) 15 MG tablet Take 15 mg by mouth once daily.  1    oxyCODONE-acetaminophen (PERCOCET) 7.5-325 mg per tablet Take 1 tablet by mouth every 12 (twelve) hours as needed for Pain. GREATER THAN 7 DAY QUANTITY MEDICALLY NECESSARY 60 tablet 0    pantoprazole (PROTONIX) 40 MG tablet Take 1 tablet (40 mg total) by mouth once daily. 30 tablet 5    prochlorperazine (COMPAZINE) 10 MG tablet Take 1 tablet (10 mg total) by mouth 3 (three) times daily as needed. 60 tablet 2    SANCUSO 3.1 mg/24 hour APPLY 1 PATCH 24 HOURS PRIOR TO CHEMO TREATMENT.  1    tiotropium-olodaterol (STIOLTO RESPIMAT) 2.5-2.5 mcg/actuation Mist TAKE 1 PUFF  BY MOUTH EVERY DAY (RX CAN BE REFILLED EVERY 60 DAYS) 4 g 2    oxybutynin (DITROPAN) 5 MG Tab TAKE 1 TABLET (5 MG TOTAL) BY MOUTH 2 (TWO) TIMES DAILY. 60 tablet 2     No current facility-administered medications for this visit.        Past Medical History:   Diagnosis Date    Anemia     Asthma     Cancer determined by biopsy of stomach 2018    Cannabis abuse, daily use     Colon polyps     GERD (gastroesophageal reflux disease) 2014    Hoarseness 2014    Hypertension     S/P subtotal gastrectomy 2019    Seizures     Thyroid nodule     Ulcer     Vaginal delivery     x4     Past Surgical History:   Procedure Laterality Date    CHOLECYSTECTOMY      ESOPHAGOGASTRODUODENOSCOPY N/A 12/15/2018    Procedure: ESOPHAGOGASTRODUODENOSCOPY (EGD);  Surgeon: Alisson Villagomez MD;  Location: [facility] ENDO;  Service: Endoscopy;  Laterality: N/A;    GASTRECTOMY N/A 2019    Procedure: OPEN GASTRECTOMY- radical subtotal;  Surgeon: Jesus Tripp MD;  Location: [facility] OR;  Service: General;  Laterality: N/A;  OPEN  GASTRECTOMY  PER ABIEL ON 19  @ 302PM-  RN PRE OP 19    TONSILLECTOMY      TUBAL LIGATION       Family History   Problem Relation Age of Onset    Bone cancer Mother     Hypertension Brother     Hypertension Maternal Uncle     Diabetes Maternal Uncle     Hypertension Maternal Grandmother     Stomach cancer Maternal Grandfather     Breast cancer Neg Hx     Colon cancer Neg Hx     Ovarian cancer Neg Hx      Social History     Tobacco Use    Smoking status: Former Smoker     Packs/day: 2.00     Years: 46.00     Pack years: 92.00     Last attempt to quit: 10/15/2018     Years since quittin.1    Smokeless tobacco: Never Used   Substance Use Topics    Alcohol use: No    Drug use: Yes     Types: Marijuana     Comment: every day        Review of Systems:  Review of Systems   Constitutional: Negative.    HENT: Negative.    Eyes: Negative.    Respiratory: Negative.   "  Cardiovascular: Negative.    Gastrointestinal: Negative.    Endocrine: Negative.    Musculoskeletal: Negative.    Skin: Negative.    Allergic/Immunologic: Negative.    Neurological: Negative.    Hematological: Negative.    Psychiatric/Behavioral: Negative.    All other systems reviewed and are negative.      OBJECTIVE:     Vital Signs (Most Recent)  Pulse: 93 (12/16/19 1424)  BP: 136/75 (12/16/19 1424)  5' 2" (1.575 m)  57.6 kg (127 lb)     Physical Exam:  Physical Exam   Constitutional: She is oriented to person, place, and time. She appears well-developed and well-nourished.   HENT:   Head: Normocephalic and atraumatic.   Right Ear: External ear normal.   Left Ear: External ear normal.   Nose: Nose normal.   Mouth/Throat: Oropharynx is clear and moist.   Eyes: Pupils are equal, round, and reactive to light. Conjunctivae and EOM are normal.   Neck: Normal range of motion. Neck supple.   Cardiovascular: Normal rate, regular rhythm, normal heart sounds and intact distal pulses.   Pulmonary/Chest: Effort normal and breath sounds normal.   Abdominal: Soft. Bowel sounds are normal. A hernia is present. Hernia confirmed positive in the ventral area.       Musculoskeletal: Normal range of motion.   Neurological: She is alert and oriented to person, place, and time. She has normal reflexes.   Skin: Skin is warm and dry.   Psychiatric: She has a normal mood and affect. Her behavior is normal. Thought content normal.   Vitals reviewed.      Laboratory  none    Diagnostic Results:  incisional hernia    ASSESSMENT/PLAN:     Incisional hernia    PLAN:Plan     I will take her to the OR for repair with the risks and possible complications explained       "

## 2019-12-16 NOTE — PROGRESS NOTES
History & Physical    SUBJECTIVE:     History of Present Illness:  Patient is a 59 y.o. female presents with incisional hernia with mild symptoms.     Chief Complaint   Patient presents with    Follow-up     f/u w/ ct scan        Review of patient's allergies indicates:   Allergen Reactions    Penicillins Rash       Current Outpatient Medications   Medication Sig Dispense Refill    ALPRAZolam (XANAX) 0.5 MG tablet Take 1 tablet (0.5 mg total) by mouth 2 (two) times daily as needed for Anxiety. 60 tablet 2    chlorzoxazone (PARAFON FORTE) 500 mg Tab TAKE 1 TABLET (500 MG TOTAL) BY MOUTH 4 (FOUR) TIMES DAILY AS NEEDED.      cloNIDine (CATAPRES) 0.1 MG tablet Take 1 tablet (0.1 mg total) by mouth 2 (two) times daily. 60 tablet 5    diphenoxylate-atropine 2.5-0.025 mg (LOMOTIL) 2.5-0.025 mg per tablet TAKE 1 TO 2 TABLETS EVERY 4 TO 6 HOURS AS NEEDED DIARRHEA  1    estrogen, conjugated,-medroxyprogesterone (PREMPRO) 0.45-1.5 mg per tablet Take 1 tablet by mouth once daily. 28 tablet 11    gabapentin (NEURONTIN) 100 MG capsule Take 100 mg by mouth 3 (three) times daily.       megestrol (MEGACE) 400 mg/10 mL (40 mg/mL) Susp 10 MLS BY MOUTH TWICE DAILY FOR 30 DAYS  0    metoclopramide HCl (REGLAN) 5 MG tablet TAKE 1 TABLET BY MOUTH 3 TIMES DAILY BEFORE MEALS 90 tablet 2    mirtazapine (REMERON) 15 MG tablet Take 15 mg by mouth once daily.  1    oxyCODONE-acetaminophen (PERCOCET) 7.5-325 mg per tablet Take 1 tablet by mouth every 12 (twelve) hours as needed for Pain. GREATER THAN 7 DAY QUANTITY MEDICALLY NECESSARY 60 tablet 0    pantoprazole (PROTONIX) 40 MG tablet Take 1 tablet (40 mg total) by mouth once daily. 30 tablet 5    prochlorperazine (COMPAZINE) 10 MG tablet Take 1 tablet (10 mg total) by mouth 3 (three) times daily as needed. 60 tablet 2    SANCUSO 3.1 mg/24 hour APPLY 1 PATCH 24 HOURS PRIOR TO CHEMO TREATMENT.  1    tiotropium-olodaterol (STIOLTO RESPIMAT) 2.5-2.5 mcg/actuation Mist TAKE 1 PUFF  BY MOUTH EVERY DAY (RX CAN BE REFILLED EVERY 60 DAYS) 4 g 2    oxybutynin (DITROPAN) 5 MG Tab TAKE 1 TABLET (5 MG TOTAL) BY MOUTH 2 (TWO) TIMES DAILY. 60 tablet 2     No current facility-administered medications for this visit.        Past Medical History:   Diagnosis Date    Anemia     Asthma     Cancer determined by biopsy of stomach 2018    Cannabis abuse, daily use     Colon polyps     GERD (gastroesophageal reflux disease) 2014    Hoarseness 2014    Hypertension     S/P subtotal gastrectomy 2019    Seizures     Thyroid nodule     Ulcer     Vaginal delivery     x4     Past Surgical History:   Procedure Laterality Date    CHOLECYSTECTOMY      ESOPHAGOGASTRODUODENOSCOPY N/A 12/15/2018    Procedure: ESOPHAGOGASTRODUODENOSCOPY (EGD);  Surgeon: Alisson Villagomez MD;  Location: [facility] ENDO;  Service: Endoscopy;  Laterality: N/A;    GASTRECTOMY N/A 2019    Procedure: OPEN GASTRECTOMY- radical subtotal;  Surgeon: Jesus Tripp MD;  Location: [facility] OR;  Service: General;  Laterality: N/A;  OPEN  GASTRECTOMY  PER ABIEL ON 19  @ 302PM-  RN PRE OP 19    TONSILLECTOMY      TUBAL LIGATION       Family History   Problem Relation Age of Onset    Bone cancer Mother     Hypertension Brother     Hypertension Maternal Uncle     Diabetes Maternal Uncle     Hypertension Maternal Grandmother     Stomach cancer Maternal Grandfather     Breast cancer Neg Hx     Colon cancer Neg Hx     Ovarian cancer Neg Hx      Social History     Tobacco Use    Smoking status: Former Smoker     Packs/day: 2.00     Years: 46.00     Pack years: 92.00     Last attempt to quit: 10/15/2018     Years since quittin.1    Smokeless tobacco: Never Used   Substance Use Topics    Alcohol use: No    Drug use: Yes     Types: Marijuana     Comment: every day        Review of Systems:  Review of Systems   Constitutional: Negative.    HENT: Negative.    Eyes: Negative.    Respiratory: Negative.   "  Cardiovascular: Negative.    Gastrointestinal: Negative.    Endocrine: Negative.    Musculoskeletal: Negative.    Skin: Negative.    Allergic/Immunologic: Negative.    Neurological: Negative.    Hematological: Negative.    Psychiatric/Behavioral: Negative.    All other systems reviewed and are negative.      OBJECTIVE:     Vital Signs (Most Recent)  Pulse: 93 (12/16/19 1424)  BP: 136/75 (12/16/19 1424)  5' 2" (1.575 m)  57.6 kg (127 lb)     Physical Exam:  Physical Exam   Constitutional: She is oriented to person, place, and time. She appears well-developed and well-nourished.   HENT:   Head: Normocephalic and atraumatic.   Right Ear: External ear normal.   Left Ear: External ear normal.   Nose: Nose normal.   Mouth/Throat: Oropharynx is clear and moist.   Eyes: Pupils are equal, round, and reactive to light. Conjunctivae and EOM are normal.   Neck: Normal range of motion. Neck supple.   Cardiovascular: Normal rate, regular rhythm, normal heart sounds and intact distal pulses.   Pulmonary/Chest: Effort normal and breath sounds normal.   Abdominal: Soft. Bowel sounds are normal. A hernia is present. Hernia confirmed positive in the ventral area.       Musculoskeletal: Normal range of motion.   Neurological: She is alert and oriented to person, place, and time. She has normal reflexes.   Skin: Skin is warm and dry.   Psychiatric: She has a normal mood and affect. Her behavior is normal. Thought content normal.   Vitals reviewed.      Laboratory  none    Diagnostic Results:  incisional hernia    ASSESSMENT/PLAN:     Incisional hernia    PLAN:Plan     I will take her to the OR for repair with the risks and possible complications explained       "

## 2019-12-30 RX ORDER — METOCLOPRAMIDE 5 MG/1
TABLET ORAL
Qty: 90 TABLET | Refills: 2 | Status: SHIPPED | OUTPATIENT
Start: 2019-12-30 | End: 2020-03-02 | Stop reason: DRUGHIGH

## 2020-01-08 ENCOUNTER — HOSPITAL ENCOUNTER (OUTPATIENT)
Dept: PREADMISSION TESTING | Facility: HOSPITAL | Age: 60
Discharge: HOME | End: 2020-01-08
Attending: SURGERY
Payer: MEDICAID

## 2020-01-08 VITALS
SYSTOLIC BLOOD PRESSURE: 175 MMHG | HEART RATE: 61 BPM | OXYGEN SATURATION: 99 % | WEIGHT: 122.13 LBS | RESPIRATION RATE: 20 BRPM | HEIGHT: 63 IN | DIASTOLIC BLOOD PRESSURE: 79 MMHG | TEMPERATURE: 98 F | BODY MASS INDEX: 21.64 KG/M2

## 2020-01-08 DIAGNOSIS — K43.2 INCISIONAL HERNIA, WITHOUT OBSTRUCTION OR GANGRENE: ICD-10-CM

## 2020-01-08 LAB
ALBUMIN SERPL BCP-MCNC: 3.2 G/DL (ref 3.5–5.2)
ALP SERPL-CCNC: 71 U/L (ref 55–135)
ALT SERPL W/O P-5'-P-CCNC: 17 U/L (ref 10–44)
ANION GAP SERPL CALC-SCNC: 7 MMOL/L (ref 8–16)
AST SERPL-CCNC: 20 U/L (ref 10–40)
BASOPHILS # BLD AUTO: 0.05 K/UL (ref 0–0.2)
BASOPHILS NFR BLD: 0.6 % (ref 0–1.9)
BILIRUB SERPL-MCNC: 0.3 MG/DL (ref 0.1–1)
BUN SERPL-MCNC: 12 MG/DL (ref 6–20)
CALCIUM SERPL-MCNC: 8.7 MG/DL (ref 8.7–10.5)
CHLORIDE SERPL-SCNC: 108 MMOL/L (ref 95–110)
CO2 SERPL-SCNC: 24 MMOL/L (ref 23–29)
CREAT SERPL-MCNC: 0.8 MG/DL (ref 0.5–1.4)
DIFFERENTIAL METHOD: ABNORMAL
EOSINOPHIL # BLD AUTO: 0.1 K/UL (ref 0–0.5)
EOSINOPHIL NFR BLD: 1.1 % (ref 0–8)
ERYTHROCYTE [DISTWIDTH] IN BLOOD BY AUTOMATED COUNT: 15.1 % (ref 11.5–14.5)
EST. GFR  (AFRICAN AMERICAN): >60 ML/MIN/1.73 M^2
EST. GFR  (NON AFRICAN AMERICAN): >60 ML/MIN/1.73 M^2
GLUCOSE SERPL-MCNC: 81 MG/DL (ref 70–110)
HCT VFR BLD AUTO: 36 % (ref 37–48.5)
HGB BLD-MCNC: 11.7 G/DL (ref 12–16)
IMM GRANULOCYTES # BLD AUTO: 0.02 K/UL (ref 0–0.04)
IMM GRANULOCYTES NFR BLD AUTO: 0.2 % (ref 0–0.5)
LYMPHOCYTES # BLD AUTO: 1 K/UL (ref 1–4.8)
LYMPHOCYTES NFR BLD: 11.8 % (ref 18–48)
MCH RBC QN AUTO: 30.9 PG (ref 27–31)
MCHC RBC AUTO-ENTMCNC: 32.5 G/DL (ref 32–36)
MCV RBC AUTO: 95 FL (ref 82–98)
MONOCYTES # BLD AUTO: 0.8 K/UL (ref 0.3–1)
MONOCYTES NFR BLD: 10.1 % (ref 4–15)
NEUTROPHILS # BLD AUTO: 6.3 K/UL (ref 1.8–7.7)
NEUTROPHILS NFR BLD: 76.2 % (ref 38–73)
NRBC BLD-RTO: 0 /100 WBC
PLATELET # BLD AUTO: 229 K/UL (ref 150–350)
PMV BLD AUTO: 11.3 FL (ref 9.2–12.9)
POTASSIUM SERPL-SCNC: 3.3 MMOL/L (ref 3.5–5.1)
PROT SERPL-MCNC: 6.3 G/DL (ref 6–8.4)
RBC # BLD AUTO: 3.79 M/UL (ref 4–5.4)
SODIUM SERPL-SCNC: 139 MMOL/L (ref 136–145)
WBC # BLD AUTO: 8.21 K/UL (ref 3.9–12.7)

## 2020-01-08 PROCEDURE — 85025 COMPLETE CBC W/AUTO DIFF WBC: CPT

## 2020-01-08 PROCEDURE — 93005 ELECTROCARDIOGRAM TRACING: CPT

## 2020-01-08 PROCEDURE — 80053 COMPREHEN METABOLIC PANEL: CPT

## 2020-01-08 PROCEDURE — 93010 ELECTROCARDIOGRAM REPORT: CPT | Mod: ,,, | Performed by: INTERNAL MEDICINE

## 2020-01-08 PROCEDURE — 93010 EKG 12-LEAD: ICD-10-PCS | Mod: ,,, | Performed by: INTERNAL MEDICINE

## 2020-01-08 NOTE — DISCHARGE INSTRUCTIONS
"Your procedure  is scheduled for WED 1/15/2020__________.    Call [phone] between 2pm and 5pm on _Tuesday 1/14______to find out your arrival time for the day of surgery.    Report to Same Day Surgery Unit at ____ AM on the 2nd floor of the hospital.  Use the front entrance of the hospital.  The front doors of the hospital open promptly at 5:30am.  If you need wheelchair assistance, call [phone] from your cell phone, or call "0" from the courtesy phone in the lobby.    Important instructions:   Do not eat or drink after 12 midnight, including water.  It is okay to brush your teeth.  Do not have gum, candy or mints.     Take only these medications with a small swallow of water on the morning of your surgery _XANAX, CLONIDINE, GABAPENTIN, METOCLOPRAMIDE, PANTOPRAZOLE, PERCOCET IF NEEDED_____________        Stop taking Aspirin, Ibuprofen, Motrin and Aleve , Fish oil, and Vitamin E for at least 7 days before your surgery. You may use Tylenol unless otherwise instructed by your doctor.                                                    Prep instructions:     SHOWER   OTHER_____________     Please shower the night before and the morning of your surgery.        Use Hibiclens soap as instructed by your pre op nurse.   Please place clean linens on your bed the night before surgery. Please wear fresh clean clothing after each shower.       Do not wear make- up, including mascara.     You may wear deodorant only.      Do not wear powder, body lotion or perfume/cologne.     Do not wear any jewelry or have any metal on your body.         If you are going home on the same day of surgery, you must arrange for a family member or a friend to drive you home.  Public transportation is prohibited.  You will not be able to drive home if you were given anesthesia or sedation.     Children under 18 years of age require a parent/guardian present the entire time that they are here.     Wear loose fitting clothes allowing for " bandages.     Please leave money and valuables home.       You may bring your cell phone.     Call the doctor if fever or illness should occur before your surgery.    Call [phone] to contact us here if needed.--[facility]

## 2020-01-08 NOTE — PLAN OF CARE
Pre-operative instructions, medication directives and pain scales reviewed with patient  And her niece. All questions the patient had  were answered. Re-assurance about surgical procedure and day of surgery routine given as needed. The patient and her niece.  verbalized understanding of the pre-op instructions. Labs and EKG done. Ekg shown to Dr Thapa---ok per Dr Thapa.

## 2020-01-10 DIAGNOSIS — C16.2 MALIGNANT NEOPLASM OF BODY OF STOMACH: ICD-10-CM

## 2020-01-10 DIAGNOSIS — C16.3 CARCINOMA OF ANTRUM OF STOMACH: ICD-10-CM

## 2020-01-10 RX ORDER — OXYCODONE AND ACETAMINOPHEN 7.5; 325 MG/1; MG/1
1 TABLET ORAL EVERY 12 HOURS PRN
Qty: 60 TABLET | Refills: 0 | Status: SHIPPED | OUTPATIENT
Start: 2020-01-10 | End: 2020-02-07 | Stop reason: SDUPTHER

## 2020-01-10 RX ORDER — ALPRAZOLAM 0.5 MG/1
0.5 TABLET ORAL 2 TIMES DAILY PRN
Qty: 60 TABLET | Refills: 0 | Status: SHIPPED | OUTPATIENT
Start: 2020-01-10 | End: 2020-02-07 | Stop reason: SDUPTHER

## 2020-01-10 NOTE — TELEPHONE ENCOUNTER
----- Message from Chente Copeland sent at 1/10/2020 12:37 PM CST -----  Contact: Patient  Xiomy Duncan  MRN: [id number]  : 1960  PCP: Arnie Monson  Home Phone      [phone]  Work Phone      Not on file.  Mobile          [phone]      MESSAGE:   Pt requesting refill or new Rx.   Is this a refill or new RX:  Refills   RX name and strength: Oxycodone 7.5-325 mg  &  Xanax 1 mg  Last office visit: 19  Is this a 30-day or 90-day RX:  Both 30 day  Pharmacy name and location:  [facility] in [place]  Comments:      Phone:  [phone]    PCP: Arnold

## 2020-01-13 RX ORDER — ALPRAZOLAM 0.5 MG/1
0.5 TABLET ORAL 2 TIMES DAILY PRN
Qty: 60 TABLET | Refills: 2 | OUTPATIENT
Start: 2020-01-13 | End: 2020-02-12

## 2020-01-13 RX ORDER — OXYCODONE AND ACETAMINOPHEN 7.5; 325 MG/1; MG/1
1 TABLET ORAL EVERY 12 HOURS PRN
Qty: 60 TABLET | Refills: 0 | OUTPATIENT
Start: 2020-01-13

## 2020-01-14 ENCOUNTER — ANESTHESIA EVENT (OUTPATIENT)
Dept: SURGERY | Facility: HOSPITAL | Age: 60
End: 2020-01-14
Payer: MEDICAID

## 2020-01-15 ENCOUNTER — ANESTHESIA (OUTPATIENT)
Dept: SURGERY | Facility: HOSPITAL | Age: 60
End: 2020-01-15
Payer: MEDICAID

## 2020-01-15 ENCOUNTER — HOSPITAL ENCOUNTER (OUTPATIENT)
Facility: HOSPITAL | Age: 60
Discharge: HOME | End: 2020-01-15
Attending: SURGERY | Admitting: SURGERY
Payer: MEDICAID

## 2020-01-15 VITALS
WEIGHT: 122 LBS | DIASTOLIC BLOOD PRESSURE: 57 MMHG | BODY MASS INDEX: 21.62 KG/M2 | HEART RATE: 57 BPM | RESPIRATION RATE: 20 BRPM | TEMPERATURE: 98 F | HEIGHT: 63 IN | SYSTOLIC BLOOD PRESSURE: 118 MMHG | OXYGEN SATURATION: 97 %

## 2020-01-15 DIAGNOSIS — K43.2 INCISIONAL HERNIA, WITHOUT OBSTRUCTION OR GANGRENE: Primary | ICD-10-CM

## 2020-01-15 DIAGNOSIS — D49.0 GASTRIC NEOPLASM: ICD-10-CM

## 2020-01-15 LAB — POCT GLUCOSE: 104 MG/DL (ref 70–110)

## 2020-01-15 PROCEDURE — 63600175 PHARM REV CODE 636 W HCPCS: Performed by: SURGERY

## 2020-01-15 PROCEDURE — 82962 GLUCOSE BLOOD TEST: CPT | Performed by: SURGERY

## 2020-01-15 PROCEDURE — 00832 ANES HERNIA REPAIR VNT&INCAL: CPT | Performed by: SURGERY

## 2020-01-15 PROCEDURE — 63600175 PHARM REV CODE 636 W HCPCS: Performed by: ANESTHESIOLOGY

## 2020-01-15 PROCEDURE — C9290 INJ, BUPIVACAINE LIPOSOME: HCPCS | Performed by: SURGERY

## 2020-01-15 PROCEDURE — 49568 PR IMPLANT MESH HERNIA REPAIR/DEBRIDEMENT CLOSURE: CPT | Mod: ,,, | Performed by: SURGERY

## 2020-01-15 PROCEDURE — D9220A PRA ANESTHESIA: ICD-10-PCS | Mod: CRNA,,, | Performed by: NURSE ANESTHETIST, CERTIFIED REGISTERED

## 2020-01-15 PROCEDURE — 63600175 PHARM REV CODE 636 W HCPCS: Performed by: NURSE ANESTHETIST, CERTIFIED REGISTERED

## 2020-01-15 PROCEDURE — 25000003 PHARM REV CODE 250: Performed by: SURGERY

## 2020-01-15 PROCEDURE — 49560 PR REPAIR INCISIONAL HERNIA,REDUCIBLE: ICD-10-PCS | Mod: ,,, | Performed by: SURGERY

## 2020-01-15 PROCEDURE — 37000009 HC ANESTHESIA EA ADD 15 MINS: Performed by: SURGERY

## 2020-01-15 PROCEDURE — 36000706: Performed by: SURGERY

## 2020-01-15 PROCEDURE — 88302 TISSUE EXAM BY PATHOLOGIST: CPT | Mod: 26,,, | Performed by: PATHOLOGY

## 2020-01-15 PROCEDURE — 25000003 PHARM REV CODE 250: Performed by: NURSE ANESTHETIST, CERTIFIED REGISTERED

## 2020-01-15 PROCEDURE — 36000707: Performed by: SURGERY

## 2020-01-15 PROCEDURE — 71000016 HC POSTOP RECOV ADDL HR: Performed by: SURGERY

## 2020-01-15 PROCEDURE — 49560 PR REPAIR INCISIONAL HERNIA,REDUCIBLE: CPT | Mod: ,,, | Performed by: SURGERY

## 2020-01-15 PROCEDURE — C1781 MESH (IMPLANTABLE): HCPCS | Performed by: SURGERY

## 2020-01-15 PROCEDURE — D9220A PRA ANESTHESIA: Mod: ANES,,, | Performed by: ANESTHESIOLOGY

## 2020-01-15 PROCEDURE — 49568 PR IMPLANT MESH HERNIA REPAIR/DEBRIDEMENT CLOSURE: ICD-10-PCS | Mod: ,,, | Performed by: SURGERY

## 2020-01-15 PROCEDURE — 71000033 HC RECOVERY, INTIAL HOUR: Performed by: SURGERY

## 2020-01-15 PROCEDURE — D9220A PRA ANESTHESIA: ICD-10-PCS | Mod: ANES,,, | Performed by: ANESTHESIOLOGY

## 2020-01-15 PROCEDURE — 88302 TISSUE EXAM BY PATHOLOGIST: CPT | Performed by: PATHOLOGY

## 2020-01-15 PROCEDURE — D9220A PRA ANESTHESIA: Mod: CRNA,,, | Performed by: NURSE ANESTHETIST, CERTIFIED REGISTERED

## 2020-01-15 PROCEDURE — 37000008 HC ANESTHESIA 1ST 15 MINUTES: Performed by: SURGERY

## 2020-01-15 PROCEDURE — 71000015 HC POSTOP RECOV 1ST HR: Performed by: SURGERY

## 2020-01-15 PROCEDURE — 88302 PR  SURG PATH,LEVEL II: ICD-10-PCS | Mod: 26,,, | Performed by: PATHOLOGY

## 2020-01-15 DEVICE — PATCH HERNIA MESH VENTRALEX: Type: IMPLANTABLE DEVICE | Site: ABDOMEN | Status: FUNCTIONAL

## 2020-01-15 RX ORDER — FENTANYL CITRATE 50 UG/ML
INJECTION, SOLUTION INTRAMUSCULAR; INTRAVENOUS
Status: DISCONTINUED | OUTPATIENT
Start: 2020-01-15 | End: 2020-01-15

## 2020-01-15 RX ORDER — NEOSTIGMINE METHYLSULFATE 1 MG/ML
INJECTION, SOLUTION INTRAVENOUS
Status: DISCONTINUED | OUTPATIENT
Start: 2020-01-15 | End: 2020-01-15

## 2020-01-15 RX ORDER — OXYCODONE AND ACETAMINOPHEN 5; 325 MG/1; MG/1
1 TABLET ORAL ONCE
Status: COMPLETED | OUTPATIENT
Start: 2020-01-15 | End: 2020-01-15

## 2020-01-15 RX ORDER — HYDROCODONE BITARTRATE AND ACETAMINOPHEN 5; 325 MG/1; MG/1
1 TABLET ORAL EVERY 6 HOURS PRN
Qty: 30 TABLET | Refills: 0 | Status: SHIPPED | OUTPATIENT
Start: 2020-01-15 | End: 2021-02-10

## 2020-01-15 RX ORDER — ROCURONIUM BROMIDE 10 MG/ML
INJECTION, SOLUTION INTRAVENOUS
Status: DISCONTINUED | OUTPATIENT
Start: 2020-01-15 | End: 2020-01-15

## 2020-01-15 RX ORDER — BUPIVACAINE HYDROCHLORIDE 2.5 MG/ML
INJECTION, SOLUTION EPIDURAL; INFILTRATION; INTRACAUDAL
Status: DISCONTINUED | OUTPATIENT
Start: 2020-01-15 | End: 2020-01-15 | Stop reason: HOSPADM

## 2020-01-15 RX ORDER — MUPIROCIN 20 MG/G
OINTMENT TOPICAL
Status: DISCONTINUED | OUTPATIENT
Start: 2020-01-15 | End: 2020-01-15 | Stop reason: HOSPADM

## 2020-01-15 RX ORDER — LIDOCAINE HCL/PF 100 MG/5ML
SYRINGE (ML) INTRAVENOUS
Status: DISCONTINUED | OUTPATIENT
Start: 2020-01-15 | End: 2020-01-15

## 2020-01-15 RX ORDER — LABETALOL HYDROCHLORIDE 5 MG/ML
INJECTION, SOLUTION INTRAVENOUS
Status: DISCONTINUED | OUTPATIENT
Start: 2020-01-15 | End: 2020-01-15

## 2020-01-15 RX ORDER — EPHEDRINE SULFATE 50 MG/ML
INJECTION, SOLUTION INTRAVENOUS
Status: DISCONTINUED | OUTPATIENT
Start: 2020-01-15 | End: 2020-01-15

## 2020-01-15 RX ORDER — SODIUM CHLORIDE 9 MG/ML
INJECTION, SOLUTION INTRAVENOUS CONTINUOUS
Status: DISCONTINUED | OUTPATIENT
Start: 2020-01-15 | End: 2020-01-15 | Stop reason: HOSPADM

## 2020-01-15 RX ORDER — OXYCODONE AND ACETAMINOPHEN 5; 325 MG/1; MG/1
1 TABLET ORAL EVERY 4 HOURS PRN
Qty: 30 TABLET | Refills: 0 | Status: SHIPPED | OUTPATIENT
Start: 2020-01-15 | End: 2021-01-13

## 2020-01-15 RX ORDER — LIDOCAINE HYDROCHLORIDE 20 MG/ML
JELLY TOPICAL
Status: DISCONTINUED | OUTPATIENT
Start: 2020-01-15 | End: 2020-01-15

## 2020-01-15 RX ORDER — PROPOFOL 10 MG/ML
VIAL (ML) INTRAVENOUS
Status: DISCONTINUED | OUTPATIENT
Start: 2020-01-15 | End: 2020-01-15

## 2020-01-15 RX ORDER — ONDANSETRON 2 MG/ML
INJECTION INTRAMUSCULAR; INTRAVENOUS
Status: DISCONTINUED | OUTPATIENT
Start: 2020-01-15 | End: 2020-01-15

## 2020-01-15 RX ORDER — SUCCINYLCHOLINE CHLORIDE 20 MG/ML
INJECTION INTRAMUSCULAR; INTRAVENOUS
Status: DISCONTINUED | OUTPATIENT
Start: 2020-01-15 | End: 2020-01-15

## 2020-01-15 RX ORDER — ACETAMINOPHEN 10 MG/ML
1000 INJECTION, SOLUTION INTRAVENOUS ONCE
Status: COMPLETED | OUTPATIENT
Start: 2020-01-15 | End: 2020-01-15

## 2020-01-15 RX ORDER — GLYCOPYRROLATE 0.2 MG/ML
INJECTION INTRAMUSCULAR; INTRAVENOUS
Status: DISCONTINUED | OUTPATIENT
Start: 2020-01-15 | End: 2020-01-15

## 2020-01-15 RX ORDER — HYDROMORPHONE HYDROCHLORIDE 2 MG/ML
0.2 INJECTION, SOLUTION INTRAMUSCULAR; INTRAVENOUS; SUBCUTANEOUS EVERY 5 MIN PRN
Status: DISCONTINUED | OUTPATIENT
Start: 2020-01-15 | End: 2020-01-15 | Stop reason: HOSPADM

## 2020-01-15 RX ORDER — PHENYLEPHRINE HYDROCHLORIDE 10 MG/ML
INJECTION INTRAVENOUS
Status: DISCONTINUED | OUTPATIENT
Start: 2020-01-15 | End: 2020-01-15

## 2020-01-15 RX ORDER — CEFAZOLIN SODIUM 2 G/50ML
2 SOLUTION INTRAVENOUS
Status: COMPLETED | OUTPATIENT
Start: 2020-01-15 | End: 2020-01-15

## 2020-01-15 RX ORDER — SODIUM CHLORIDE 0.9 % (FLUSH) 0.9 %
10 SYRINGE (ML) INJECTION
Status: DISCONTINUED | OUTPATIENT
Start: 2020-01-15 | End: 2020-01-15 | Stop reason: HOSPADM

## 2020-01-15 RX ORDER — SODIUM CHLORIDE, SODIUM LACTATE, POTASSIUM CHLORIDE, CALCIUM CHLORIDE 600; 310; 30; 20 MG/100ML; MG/100ML; MG/100ML; MG/100ML
INJECTION, SOLUTION INTRAVENOUS CONTINUOUS
Status: DISCONTINUED | OUTPATIENT
Start: 2020-01-15 | End: 2020-01-15 | Stop reason: HOSPADM

## 2020-01-15 RX ORDER — MORPHINE SULFATE 10 MG/ML
3 INJECTION INTRAMUSCULAR; INTRAVENOUS; SUBCUTANEOUS
Status: DISCONTINUED | OUTPATIENT
Start: 2020-01-15 | End: 2020-01-15 | Stop reason: HOSPADM

## 2020-01-15 RX ADMIN — CEFAZOLIN SODIUM 1 G: 2 SOLUTION INTRAVENOUS at 09:01

## 2020-01-15 RX ADMIN — ACETAMINOPHEN 1000 MG: 10 INJECTION, SOLUTION INTRAVENOUS at 10:01

## 2020-01-15 RX ADMIN — HYDROMORPHONE HYDROCHLORIDE 0.2 MG: 2 INJECTION, SOLUTION INTRAMUSCULAR; INTRAVENOUS; SUBCUTANEOUS at 10:01

## 2020-01-15 RX ADMIN — NEOSTIGMINE METHYLSULFATE 5 MG: 1 INJECTION INTRAVENOUS at 10:01

## 2020-01-15 RX ADMIN — SUCCINYLCHOLINE CHLORIDE 110 MG: 20 INJECTION, SOLUTION INTRAMUSCULAR; INTRAVENOUS at 09:01

## 2020-01-15 RX ADMIN — FENTANYL CITRATE 50 MCG: 50 INJECTION INTRAMUSCULAR; INTRAVENOUS at 09:01

## 2020-01-15 RX ADMIN — PROPOFOL 30 MG: 10 INJECTION, EMULSION INTRAVENOUS at 09:01

## 2020-01-15 RX ADMIN — OXYCODONE HYDROCHLORIDE AND ACETAMINOPHEN 1 TABLET: 5; 325 TABLET ORAL at 11:01

## 2020-01-15 RX ADMIN — MIDAZOLAM HYDROCHLORIDE 2 MG: 1 INJECTION, SOLUTION INTRAMUSCULAR; INTRAVENOUS at 08:01

## 2020-01-15 RX ADMIN — LIDOCAINE HYDROCHLORIDE 50 MG: 20 INJECTION, SOLUTION INTRAVENOUS at 09:01

## 2020-01-15 RX ADMIN — EPHEDRINE SULFATE 10 MG: 50 INJECTION, SOLUTION INTRAMUSCULAR; INTRAVENOUS; SUBCUTANEOUS at 09:01

## 2020-01-15 RX ADMIN — ONDANSETRON 4 MG: 2 INJECTION, SOLUTION INTRAMUSCULAR; INTRAVENOUS at 09:01

## 2020-01-15 RX ADMIN — LABETALOL HYDROCHLORIDE 5 MG: 5 INJECTION, SOLUTION INTRAVENOUS at 10:01

## 2020-01-15 RX ADMIN — LIDOCAINE HYDROCHLORIDE 2 ML: 20 JELLY TOPICAL at 09:01

## 2020-01-15 RX ADMIN — CEFAZOLIN SODIUM 1 G: 2 SOLUTION INTRAVENOUS at 08:01

## 2020-01-15 RX ADMIN — GLYCOPYRROLATE 0.5 MG: 0.2 INJECTION, SOLUTION INTRAMUSCULAR; INTRAVENOUS at 10:01

## 2020-01-15 RX ADMIN — SODIUM CHLORIDE, SODIUM LACTATE, POTASSIUM CHLORIDE, AND CALCIUM CHLORIDE: .6; .31; .03; .02 INJECTION, SOLUTION INTRAVENOUS at 06:01

## 2020-01-15 RX ADMIN — MUPIROCIN: 20 OINTMENT TOPICAL at 03:01

## 2020-01-15 RX ADMIN — ROCURONIUM BROMIDE 25 MG: 10 INJECTION, SOLUTION INTRAVENOUS at 09:01

## 2020-01-15 RX ADMIN — PROPOFOL 150 MG: 10 INJECTION, EMULSION INTRAVENOUS at 09:01

## 2020-01-15 RX ADMIN — PHENYLEPHRINE HYDROCHLORIDE 100 MCG: 10 INJECTION INTRAVENOUS at 09:01

## 2020-01-15 NOTE — ANESTHESIA PREPROCEDURE EVALUATION
01/15/2020    Pre-operative evaluation for Procedure(s) (LRB):  OPEN GASTRECTOMY (N/A)    Xiomy Duncan is a 59 y.o. female     Patient Active Problem List   Diagnosis    HTN (hypertension)    Stable asthma    Thyroid nodule    Hyperlipidemia    Hx of colonic polyps    Overweight (BMI 25.0-29.9)    Pre-diabetes    Urge incontinence    GERD (gastroesophageal reflux disease)    Preoperative evaluation to rule out surgical contraindication    Upper GI bleed    Carcinoma of antrum of stomach    Malignant neoplasm of stomach    S/P subtotal gastrectomy    Gastric neoplasm       Review of patient's allergies indicates:   Allergen Reactions    Penicillins Rash       Current Facility-Administered Medications on File Prior to Visit   Medication Dose Route Frequency Provider Last Rate Last Dose    0.9%  NaCl infusion   Intravenous Continuous Jesus Tripp MD        cefazolin (ANCEF) 2 gram in dextrose 5% 50 mL IVPB (premix)  2 g Intravenous On Call Procedure Jesus Tripp MD        lactated ringers infusion   Intravenous Continuous Jose Archuleta MD 10 mL/hr at 01/15/20 0647      mupirocin 2 % ointment   Nasal On Call Procedure Jesus Tripp MD         Current Outpatient Medications on File Prior to Visit   Medication Sig Dispense Refill    ALPRAZolam (XANAX) 0.5 MG tablet Take 1 tablet (0.5 mg total) by mouth 2 (two) times daily as needed for Anxiety. 60 tablet 0    apixaban (ELIQUIS ORAL) Take 5 mg by mouth 2 (two) times daily.       chlorzoxazone (PARAFON FORTE) 500 mg Tab TAKE 1 TABLET (500 MG TOTAL) BY MOUTH 4 (FOUR) TIMES DAILY AS NEEDED.      cloNIDine (CATAPRES) 0.1 MG tablet Take 1 tablet (0.1 mg total) by mouth 2 (two) times daily. 60 tablet 5    diphenoxylate-atropine 2.5-0.025 mg (LOMOTIL) 2.5-0.025 mg per tablet TAKE 1 TO 2 TABLETS EVERY 4 TO 6 HOURS AS NEEDED DIARRHEA  1    estrogen, conjugated,-medroxyprogesterone (PREMPRO) 0.45-1.5 mg per tablet Take 1 tablet by mouth  once daily. 28 tablet 11    gabapentin (NEURONTIN) 100 MG capsule Take 100 mg by mouth 3 (three) times daily.       megestrol (MEGACE) 400 mg/10 mL (40 mg/mL) Susp 10 MLS BY MOUTH TWICE DAILY FOR 30 DAYS  0    metoclopramide HCl (REGLAN) 5 MG tablet TAKE 1 TABLET BY MOUTH 3 TIMES DAILY BEFORE MEALS 90 tablet 2    mirtazapine (REMERON) 15 MG tablet Take 15 mg by mouth once daily.  1    oxyCODONE-acetaminophen (PERCOCET) 7.5-325 mg per tablet Take 1 tablet by mouth every 12 (twelve) hours as needed for Pain. GREATER THAN 7 DAY QUANTITY MEDICALLY NECESSARY 60 tablet 0    pantoprazole (PROTONIX) 40 MG tablet Take 1 tablet (40 mg total) by mouth once daily. 30 tablet 5    prochlorperazine (COMPAZINE) 10 MG tablet Take 1 tablet (10 mg total) by mouth 3 (three) times daily as needed. 60 tablet 2    tiotropium-olodaterol (STIOLTO RESPIMAT) 2.5-2.5 mcg/actuation Mist TAKE 1 PUFF BY MOUTH EVERY DAY (RX CAN BE REFILLED EVERY 60 DAYS) 4 g 2       VITALS  There were no vitals filed for this visit.        LABS  BMP  Lab Results   Component Value Date     01/08/2020    K 3.3 (L) 01/08/2020     01/08/2020    CO2 24 01/08/2020    BUN 12 01/08/2020    CREATININE 0.8 01/08/2020    CALCIUM 8.7 01/08/2020    ANIONGAP 7 (L) 01/08/2020    ESTGFRAFRICA >60 01/08/2020    EGFRNONAA >60 01/08/2020     Lab Results   Component Value Date    WBC 8.21 01/08/2020    HGB 11.7 (L) 01/08/2020    HCT 36.0 (L) 01/08/2020    MCV 95 01/08/2020     01/08/2020         2D Echo:  Results for orders placed or performed during the hospital encounter of 06/11/14   2D echo with color flow doppler   Result Value Ref Range    QEF 65     Diastolic Dysfunction No          Pre-op Assessment    I have reviewed the Patient Summary Reports.     I have reviewed the Nursing Notes.   I have reviewed the Medications.     Review of Systems  Anesthesia Hx:  History of prior surgery of interest to airway management or planning:  Denies Personal Hx of  Anesthesia complications.   Social:  Non-Smoker, Former Smoker    Hematology/Oncology:        Current/Recent Cancer.   EENT/Dental:EENT/Dental Normal   Cardiovascular:   Hypertension    Pulmonary:   COPD Asthma    Hepatic/GI:   PUD, GERD    Musculoskeletal:  Spine Disorders: lumbar    Neurological:   Seizures    Endocrine:  Endocrine Normal    Psych:   anxiety            Physical Exam  General:  Well nourished    Airway/Jaw/Neck:  Airway Findings: Mouth Opening: Normal Tongue: Normal  General Airway Assessment: Adult  Mallampati: II  TM Distance: 4 - 6 cm  Jaw/Neck Findings:  Neck ROM: Normal ROM     Eyes/Ears/Nose:  EYES/EARS/NOSE FINDINGS: Normal   Dental:  Dental Findings:   Chest/Lungs:  Chest/Lungs Findings: Normal Respiratory Rate     Heart/Vascular:  Heart Findings: Normal       Mental Status:  Mental Status Findings:  Cooperative, Alert and Oriented         Anesthesia Plan  Type of Anesthesia, risks & benefits discussed:  Anesthesia Type:  general  Patient's Preference:   Intra-op Monitoring Plan: standard ASA monitors  Intra-op Monitoring Plan Comments:   Post Op Pain Control Plan: IV/PO Opioids PRN  Post Op Pain Control Plan Comments:   Induction:   IV  Beta Blocker:  Patient is not currently on a Beta-Blocker (No further documentation required).       Informed Consent: Patient understands risks and agrees with Anesthesia plan.  Questions answered. Anesthesia consent signed with patient.  ASA Score: 3     Day of Surgery Review of History & Physical: I have interviewed and examined the patient. I have reviewed the patient's H&P dated:  There are no significant changes.          Ready For Surgery From Anesthesia Perspective.

## 2020-01-15 NOTE — DISCHARGE INSTRUCTIONS
[facility]   Office # [phone]     Discharge Instructions for Same Day Surgery     Call the office for and appointment if one has not already been made.     Diet: Drink plenty of fluids the first 48 hours and you may resume your   usual diet.     Activity: No heavy lifting (over 10 pounds), pushing or pulling until your   post op visit. Your doctor's office may have told you to limit your lifting to less weight, or even no weight.  Be sure to follow those instructions.    Note: You may ride in a car and you may drive when comfortable.     Do not drive, drink alcohol, or sign legal documents for 24 hours, or if taking narcotic pain medication.    Dressings: Dermabond / Prineotape    or a material like it was used on your incision.   It is like a liquid glue.   Do not peel or try to remove it it will start to fall off in 7-10 days on its' own.   It is OK to shower, pat dry, do not apply any creams or lotions.    No tub baths, swimming pools, hot tubs or submersion of the incision until your surgeon says it's ok.   Medication: last dose of Percocet 11:27 am     Medical: Call the doctor for any of the following problems: fever above 101,   severe pain, bleeding, or abdominal distention (swelling).   If constipated you may take any stool softener you choose.     Occasionally small areas of skin numbness or an unpleasant skin sensation can result. Also, you may find that your incision is swollen and tender for a few days.  Some redness around sutures and staples is a normal reaction, but if the discomfort persists or worsens, call you doctor.                                              -Exparel information-  Do not remove Teal colored bracelet until Sunday, January 19  at 9:50 am    To help control your pain after surgery, your surgeon injected Exparel (bupicacaine liposome injectable suspension) into your surgical incision just before the end of the procedure.      Exparel is a local analgesic that  contains the local anesthetic bupivacaine..  Local anesthetics provide pain relief by numbing the tissue around the surgical site.    Exparel is specifically designed to release pain medication over time an can control pain for up to 72 hours.    In addition to Exparel, your surgeon may provide other pain medications to control your pain.    Each patient is different and responds differently to pain medication.  Depending on how you respond to Exparel, you may require less additional pain medication during your recovery.    Side effects can occur with any medication and it is important not to ignore anything you may be experiencing.  Some patients who received Exparel experienced nausea, vomiting, or constipation.  Rarely, patients who receive bupivacaine (the active ingredient in Exparel) have experienced numbness and tingling in their mouth or lips, lightheadedness, or anxiety.  Speak with your doctor right away if you think you may be experiencing any of these sensations, or if you have other questions regarding possible side effects.    Products that contain bupivacaine, like Exparel, may cause a temporary loss of sensation or the ability to move in the area where bupivacaine was injected.    Other formulations of bupivacaine should not be administered within 96 hours following administrations of Exparel.      Do not remove the teal colored bracelet that you have on for 96 hours.  (4 DAYS)    This bracelet will let other health care workers know that you have received Exparel, and not to give you bupivacaine during this 96 hours.            Fall Prevention  Millions of people fall every year and injure themselves. You may have had anesthesia or sedation which may increase your risk of falling. You may have health issues that put you at an increased risk of falling.     Here are ways to reduce your risk of falling.  ·   · Make your home safe by keeping walkways clear of objects you may trip over.  · Use non-slip  pads under rugs. Do not use area rugs or small throw rugs.  · Use non-slip mats in bathtubs and showers.  · Install handrails and lights on staircases.  · Do not walk in poorly lit areas.  · Do not stand on chairs or wobbly ladders.  · Use caution when reaching overhead or looking upward. This position can cause a loss of balance.  · Be sure your shoes fit properly, have non-slip bottoms and are in good condition.   · Wear shoes both inside and out. Avoid going barefoot or wearing slippers.  · Be cautious when going up and down stairs, curbs, and when walking on uneven sidewalks.  · If your balance is poor, consider using a cane or walker.  · If your fall was related to alcohol use, stop or limit alcohol intake.   · If your fall was related to use of sleeping medicines, talk to your doctor about this. You may need to reduce your dosage at bedtime if you awaken during the night to go to the bathroom.    · To reduce the need for nighttime bathroom trips:  ¨ Avoid drinking fluids for several hours before going to bed  ¨ Empty your bladder before going to bed  ¨ Men can keep a urinal at the bedside  · Stay as active as you can. Balance, flexibility, strength, and endurance all come from exercise. They all play a role in preventing falls. Ask your healthcare provider which types of activity are right for you.  · Get your vision checked on a regular basis.  · If you have pets, know where they are before you stand up or walk so you don't trip over them.  Use night lights.

## 2020-01-15 NOTE — TRANSFER OF CARE
"Anesthesia Transfer of Care Note    Patient: Xiomy Duncan    Procedure(s) Performed: Procedure(s) (LRB):  REPAIR, HERNIA, INCISIONAL  WITHOUT HISTORY OF PRIOR REPAIR WITH MESH (N/A)    Patient location: PACU    Anesthesia Type: general    Transport from OR: Transported from OR on room air with adequate spontaneous ventilation    Post pain: adequate analgesia    Post assessment: no apparent anesthetic complications and tolerated procedure well    Post vital signs: stable    Level of consciousness: awake, alert and oriented    Nausea/Vomiting: no nausea/vomiting    Complications: none    Transfer of care protocol was followed      Last vitals:   Visit Vitals  BP (!) 149/97   Pulse 89   Temp 36.7 °C (98 °F)   Resp 16   Ht 5' 3" (1.6 m)   Wt 55.3 kg (122 lb)   LMP  (LMP Unknown)   SpO2 100%   Breastfeeding? No   BMI 21.61 kg/m²     "

## 2020-01-15 NOTE — PLAN OF CARE
Pt vitals stable   Pt has midline incision closed with perineo tape with abd binder in place  Rates pain 3/10 to abd - experel band on left wrist   Denies N/V

## 2020-01-15 NOTE — INTERVAL H&P NOTE
The patient has been examined and the H&P has been reviewed:    I concur with the findings and no changes have occurred since H&P was written.    Anesthesia/Surgery risks, benefits and alternative options discussed and understood by patient/family.          Active Hospital Problems    Diagnosis  POA    Gastric neoplasm [D49.0]  Yes      Resolved Hospital Problems   No resolved problems to display.

## 2020-01-15 NOTE — OP NOTE
[facility]  General Surgery  Operative Note    SUMMARY     Date of Procedure: 1/15/2020     Procedure: Procedure(s) (LRB):  REPAIR, HERNIA, INCISIONAL  WITHOUT HISTORY OF PRIOR REPAIR WITH MESH (N/A)       Surgeon(s) and Role:     * Jesus Tripp MD - Primary    Assisting Surgeon: Eliseo De Leon MD    Pre-Operative Diagnosis: Incisional hernia, without obstruction or gangrene [K43.2]    Post-Operative Diagnosis: Post-Op Diagnosis Codes:     * Incisional hernia, without obstruction or gangrene [K43.2]    Anesthesia: General    Technical Procedures Used:  The patient was taken to the operating room placed on operating room table in supine position.  Under adequate general anesthesia prepped and draped around her abdomen in the usual sterile fashion.  Incision was made to warts the inferior aspect of her previous incision just above the umbilicus.  This is deepened to expose the hernia sac in a defect that measured approximately 2.5 cm in its greatest diameter.  Circumferential dissection was done the sac was removed sent to pathology for analysis and then the defect was closed using this Smead-Camacho technique with a 0 Vicryl in interrupted fashion.  Once this was done the area was injected with Exparel and then the wounds closed in layers with absorbable suture of Dermabond tape was placed as well as a binder she was awakened and transported to the recovery room in satisfactory condition.    Description of the Findings of the Procedure:  2.5 cm defect    Significant Surgical Tasks Conducted by the Assistant(s), if Applicable:  Greater than 50%    Complications: No    Estimated Blood Loss (EBL): minimal           Implants:   Implant Name Type Inv. Item Serial No.  Lot No. LRB No. Used   PATCH HERNIA MESH VENTRALEX - [id number]  PATCH HERNIA MESH VENTRALEX  [employer] [id number] N/A 1       Specimens:   Specimen (12h ago, onward)    None                  Condition: Good    Disposition: PACU -  hemodynamically stable.    Attestation: I was present and scrubbed for the entire procedure.

## 2020-01-15 NOTE — BRIEF OP NOTE
[facility]  Brief Operative Note    Surgery Date: 1/15/2020     Surgeon(s) and Role:     * Jesus Tripp MD - Primary    Assisting Surgeon: Eliseo De Leon MD    Pre-op Diagnosis:  Incisional hernia, without obstruction or gangrene [K43.2]    Post-op Diagnosis:  Post-Op Diagnosis Codes:     * Incisional hernia, without obstruction or gangrene [K43.2]    Procedure(s) (LRB):  REPAIR, HERNIA, INCISIONAL  WITHOUT HISTORY OF PRIOR REPAIR WITH MESH (N/A)    Anesthesia: General    Description of the findings of the procedure(s): 2.5 cm defect    Estimated Blood Loss: minimal         Specimens:   Specimen (12h ago, onward)    None            Discharge Note    OUTCOME: Patient tolerated treatment/procedure well without complication and is now ready for discharge.    DISPOSITION: Home or Self Care    FINAL DIAGNOSIS:  Gastric neoplasm    FOLLOWUP: In clinic

## 2020-01-16 LAB
FINAL PATHOLOGIC DIAGNOSIS: NORMAL
GROSS: NORMAL

## 2020-01-16 RX ORDER — MIDAZOLAM HYDROCHLORIDE 1 MG/ML
INJECTION, SOLUTION INTRAMUSCULAR; INTRAVENOUS
Status: DISCONTINUED | OUTPATIENT
Start: 2020-01-15 | End: 2020-01-16

## 2020-01-16 NOTE — ADDENDUM NOTE
Addendum  created 01/16/20 1455 by Kami Mosley, CRNA    Intraprocedure Meds edited, Orders acknowledged in Narrator

## 2020-01-16 NOTE — ANESTHESIA POSTPROCEDURE EVALUATION
Anesthesia Post Evaluation    Patient: Xiomy Duncan    Procedure(s) Performed: Procedure(s) (LRB):  REPAIR, HERNIA, INCISIONAL  WITHOUT HISTORY OF PRIOR REPAIR WITH MESH (N/A)    Final Anesthesia Type: general    Patient location during evaluation: PACU  Patient participation: Yes- Able to Participate  Level of consciousness: awake and alert, oriented and awake  Post-procedure vital signs: reviewed and stable  Airway patency: patent    PONV status at discharge: No PONV  Anesthetic complications: no      Cardiovascular status: blood pressure returned to baseline  Respiratory status: unassisted, spontaneous ventilation and room air  Hydration status: euvolemic  Follow-up not needed.          Vitals Value Taken Time   /57 1/15/2020  2:10 PM   Temp 36.5 °C (97.7 °F) 1/15/2020  2:10 PM   Pulse 57 1/15/2020  2:10 PM   Resp 20 1/15/2020  2:10 PM   SpO2 97 % 1/15/2020  2:10 PM         Event Time     Out of Recovery 10:56:00          Pain/Elodia Score: Pain Rating Prior to Med Admin: 7 (1/15/2020 11:27 AM)  Pain Rating Post Med Admin: 5 (1/15/2020 12:20 PM)  Elodia Score: 10 (1/15/2020  2:10 PM)

## 2020-01-18 NOTE — TELEPHONE ENCOUNTER
----- Message from Chente Copeland sent at 10/15/2019  8:34 AM CDT -----  Contact: Daughter - Effie Duncan  MRN: [id number]  : 1960  PCP: Arnie Monson  Home Phone      [phone]  Work Phone      Not on file.  Mobile          [phone]      MESSAGE:   Pt requesting refill or new Rx.   Is this a refill or new RX: refill  RX name and strength: Oxycodone 7.5-325 mg  Last office visit: 19  Is this a 30-day or 90-day RX: 30 day  Pharm[facility] name and location[facility] [place]  Comments:  Having some more teeth pulled -- in a lot of pain    Phone:  Effie @ [phone]    PCP: Arnold  
LOV 06/13/19  
None

## 2020-01-22 ENCOUNTER — OFFICE VISIT (OUTPATIENT)
Dept: SURGERY | Facility: CLINIC | Age: 60
End: 2020-01-22
Payer: MEDICAID

## 2020-01-22 VITALS
HEIGHT: 63 IN | WEIGHT: 120.25 LBS | HEART RATE: 65 BPM | DIASTOLIC BLOOD PRESSURE: 88 MMHG | SYSTOLIC BLOOD PRESSURE: 151 MMHG | BODY MASS INDEX: 21.3 KG/M2

## 2020-01-22 DIAGNOSIS — K43.2 INCISIONAL HERNIA, WITHOUT OBSTRUCTION OR GANGRENE: Primary | ICD-10-CM

## 2020-01-22 DIAGNOSIS — D49.0 GASTRIC NEOPLASM: ICD-10-CM

## 2020-01-22 PROCEDURE — 99024 POSTOP FOLLOW-UP VISIT: CPT | Mod: S$GLB,,, | Performed by: SURGERY

## 2020-01-22 PROCEDURE — 99024 PR POST-OP FOLLOW-UP VISIT: ICD-10-PCS | Mod: S$GLB,,, | Performed by: SURGERY

## 2020-01-22 NOTE — PROGRESS NOTES
Subjective:       Patient ID: Xiomy Duncan is a 60 y.o. female.    Chief Complaint: Post-op Evaluation    HPI  61 yo female with gastric cancer and an incisional hernia repair without complaints  Review of Systems   Constitutional: Negative.    HENT: Negative.    Eyes: Negative.    Respiratory: Negative.    Cardiovascular: Negative.    Gastrointestinal: Negative.    Endocrine: Negative.    Musculoskeletal: Negative.    Skin: Negative.    Allergic/Immunologic: Negative.    Neurological: Negative.    Hematological: Negative.    Psychiatric/Behavioral: Negative.    All other systems reviewed and are negative.      Objective:      Physical Exam   Constitutional: She is oriented to person, place, and time. She appears well-developed and well-nourished.   HENT:   Head: Normocephalic and atraumatic.   Right Ear: External ear normal.   Left Ear: External ear normal.   Nose: Nose normal.   Mouth/Throat: Oropharynx is clear and moist.   Eyes: Pupils are equal, round, and reactive to light. Conjunctivae and EOM are normal.   Neck: Normal range of motion. Neck supple.   Cardiovascular: Normal rate, regular rhythm, normal heart sounds and intact distal pulses.   Pulmonary/Chest: Effort normal and breath sounds normal.   Abdominal: Soft. Bowel sounds are normal.       Musculoskeletal: Normal range of motion.   Neurological: She is alert and oriented to person, place, and time. She has normal reflexes.   Skin: Skin is warm and dry.   Psychiatric: She has a normal mood and affect. Her behavior is normal. Thought content normal.   Vitals reviewed.      Assessment:       1. Incisional hernia, without obstruction or gangrene    2. Gastric neoplasm        Plan:       I will see back in 3 months with CEA

## 2020-02-07 DIAGNOSIS — C16.3 CARCINOMA OF ANTRUM OF STOMACH: ICD-10-CM

## 2020-02-07 DIAGNOSIS — C16.2 MALIGNANT NEOPLASM OF BODY OF STOMACH: ICD-10-CM

## 2020-02-07 RX ORDER — ALPRAZOLAM 0.5 MG/1
0.5 TABLET ORAL 2 TIMES DAILY PRN
Qty: 60 TABLET | Refills: 0 | Status: SHIPPED | OUTPATIENT
Start: 2020-02-07 | End: 2020-03-02 | Stop reason: SDUPTHER

## 2020-02-07 RX ORDER — OXYCODONE AND ACETAMINOPHEN 5; 325 MG/1; MG/1
1 TABLET ORAL EVERY 4 HOURS PRN
Qty: 30 TABLET | Refills: 0 | OUTPATIENT
Start: 2020-02-07

## 2020-02-07 RX ORDER — OXYCODONE AND ACETAMINOPHEN 7.5; 325 MG/1; MG/1
1 TABLET ORAL EVERY 12 HOURS PRN
Qty: 60 TABLET | Refills: 0 | Status: SHIPPED | OUTPATIENT
Start: 2020-02-07 | End: 2020-03-02 | Stop reason: SDUPTHER

## 2020-02-07 NOTE — TELEPHONE ENCOUNTER
"LOV: 12/06/2019  Patient given Norco 5mg to take q6h with quantity of 30 while in hospital 01/15, oxycodone was listed as "not taking" at the time.    She has two different directions on the oxycodone as well. One for q4h and one for q12h    Xanax last filled 01/10/2020  "

## 2020-02-07 NOTE — TELEPHONE ENCOUNTER
----- Message from Brenda Rivers sent at 2020  9:32 AM CST -----  Contact: Self  Xiomy Duncan  MRN: [id number]  : 1960  PCP: Arnie Monson  Home Phone      [phone]  Work Phone      Not on file.  Mobile          [phone]      MESSAGE:   Pt requesting refill or new Rx.   Is this a refill or new RX: refill  RX name and strength: Oxycodone, Xanax .05  Last office visit: 2019  Is this a 30-day or 90-day RX:  30 day  [facility] [place]  Comments:      Phone:  [phone]

## 2020-02-10 ENCOUNTER — TELEPHONE (OUTPATIENT)
Dept: FAMILY MEDICINE | Facility: CLINIC | Age: 60
End: 2020-02-10

## 2020-02-10 NOTE — TELEPHONE ENCOUNTER
LOV 12/6/19  ALPRAZolam (XANAX) 0.5 MG tablet 60 tablet 0 2/7/2020 3/8/2020 No   Sig - Route: Take 1 tablet (0.5 mg total) by mouth 2 (two) times daily as needed for Anxiety. - Oral     oxyCODONE-acetaminophen (PERCOCET) 7.5-325 mg per tablet 60 tablet 0 2/7/2020  No   Sig - Route: Take 1 tablet by mouth every 12 (twelve) hours as needed for Pain. GREATER THAN 7 DAY QUANTITY MEDICALLY NECESSARY - Oral     Both rx sent in on 2/7/2020

## 2020-02-17 RX ORDER — ONDANSETRON HYDROCHLORIDE 8 MG/1
8 TABLET, FILM COATED ORAL EVERY 8 HOURS PRN
COMMUNITY
End: 2020-02-17 | Stop reason: SDUPTHER

## 2020-02-17 RX ORDER — ONDANSETRON HYDROCHLORIDE 8 MG/1
8 TABLET, FILM COATED ORAL EVERY 8 HOURS PRN
Qty: 6 TABLET | Refills: 2 | Status: SHIPPED | OUTPATIENT
Start: 2020-02-17 | End: 2020-03-23

## 2020-02-18 ENCOUNTER — TELEPHONE (OUTPATIENT)
Dept: SURGERY | Facility: CLINIC | Age: 60
End: 2020-02-18

## 2020-02-18 ENCOUNTER — HOSPITAL ENCOUNTER (INPATIENT)
Facility: HOSPITAL | Age: 60
LOS: 3 days | Discharge: HOME | DRG: 280 | End: 2020-02-21
Attending: EMERGENCY MEDICINE | Admitting: INTERNAL MEDICINE
Payer: MEDICAID

## 2020-02-18 DIAGNOSIS — R00.0 TACHYCARDIA: ICD-10-CM

## 2020-02-18 DIAGNOSIS — I21.4 NSTEMI (NON-ST ELEVATED MYOCARDIAL INFARCTION): Primary | ICD-10-CM

## 2020-02-18 DIAGNOSIS — J90 PLEURAL EFFUSION: ICD-10-CM

## 2020-02-18 DIAGNOSIS — I50.9 CONGESTIVE HEART FAILURE, UNSPECIFIED HF CHRONICITY, UNSPECIFIED HEART FAILURE TYPE: ICD-10-CM

## 2020-02-18 DIAGNOSIS — K52.9 COLITIS: ICD-10-CM

## 2020-02-18 PROBLEM — R79.89 ELEVATED BRAIN NATRIURETIC PEPTIDE (BNP) LEVEL: Status: ACTIVE | Noted: 2020-02-18

## 2020-02-18 PROBLEM — R93.5 ABNORMAL CT OF THE ABDOMEN: Status: ACTIVE | Noted: 2020-02-18

## 2020-02-18 PROBLEM — A41.9 SEPSIS: Status: ACTIVE | Noted: 2020-02-18

## 2020-02-18 PROBLEM — R93.89 ABNORMAL CT OF THE CHEST: Status: ACTIVE | Noted: 2020-02-18

## 2020-02-18 LAB
ALBUMIN SERPL BCP-MCNC: 3.5 G/DL (ref 3.5–5.2)
ALP SERPL-CCNC: 86 U/L (ref 55–135)
ALT SERPL W/O P-5'-P-CCNC: 22 U/L (ref 10–44)
ANION GAP SERPL CALC-SCNC: 13 MMOL/L (ref 8–16)
APTT BLDCRRT: 23.9 SEC (ref 21–32)
APTT BLDCRRT: 58.3 SEC (ref 21–32)
AST SERPL-CCNC: 58 U/L (ref 10–40)
BACTERIA #/AREA URNS HPF: ABNORMAL /HPF
BASOPHILS # BLD AUTO: 0.02 K/UL (ref 0–0.2)
BASOPHILS NFR BLD: 0.1 % (ref 0–1.9)
BILIRUB SERPL-MCNC: 0.3 MG/DL (ref 0.1–1)
BILIRUB UR QL STRIP: NEGATIVE
BNP SERPL-MCNC: 2496 PG/ML (ref 0–99)
BUN SERPL-MCNC: 15 MG/DL (ref 6–20)
CALCIUM SERPL-MCNC: 9.6 MG/DL (ref 8.7–10.5)
CHLORIDE SERPL-SCNC: 104 MMOL/L (ref 95–110)
CK MB SERPL-MCNC: 30 NG/ML (ref 0.1–6.5)
CK MB SERPL-RTO: 7.1 % (ref 0–5)
CK SERPL-CCNC: 423 U/L (ref 20–180)
CLARITY UR: CLEAR
CO2 SERPL-SCNC: 22 MMOL/L (ref 23–29)
COLOR UR: ABNORMAL
CREAT SERPL-MCNC: 1.1 MG/DL (ref 0.5–1.4)
CTP QC/QA: YES
DIFFERENTIAL METHOD: ABNORMAL
EOSINOPHIL # BLD AUTO: 0 K/UL (ref 0–0.5)
EOSINOPHIL NFR BLD: 0 % (ref 0–8)
ERYTHROCYTE [DISTWIDTH] IN BLOOD BY AUTOMATED COUNT: 15.2 % (ref 11.5–14.5)
EST. GFR  (AFRICAN AMERICAN): >60 ML/MIN/1.73 M^2
EST. GFR  (NON AFRICAN AMERICAN): 55 ML/MIN/1.73 M^2
GLUCOSE SERPL-MCNC: 216 MG/DL (ref 70–110)
GLUCOSE UR QL STRIP: ABNORMAL
HCT VFR BLD AUTO: 41.9 % (ref 37–48.5)
HGB BLD-MCNC: 13.7 G/DL (ref 12–16)
HGB UR QL STRIP: NEGATIVE
HYALINE CASTS #/AREA URNS LPF: 4 /LPF
IMM GRANULOCYTES # BLD AUTO: 0.1 K/UL (ref 0–0.04)
IMM GRANULOCYTES NFR BLD AUTO: 0.7 % (ref 0–0.5)
INR PPP: 1 (ref 0.8–1.2)
KETONES UR QL STRIP: ABNORMAL
LACTATE SERPL-SCNC: 2.9 MMOL/L (ref 0.5–2.2)
LEUKOCYTE ESTERASE UR QL STRIP: NEGATIVE
LIPASE SERPL-CCNC: 3 U/L (ref 4–60)
LYMPHOCYTES # BLD AUTO: 0.9 K/UL (ref 1–4.8)
LYMPHOCYTES NFR BLD: 5.9 % (ref 18–48)
MCH RBC QN AUTO: 30.4 PG (ref 27–31)
MCHC RBC AUTO-ENTMCNC: 32.7 G/DL (ref 32–36)
MCV RBC AUTO: 93 FL (ref 82–98)
MICROSCOPIC COMMENT: ABNORMAL
MONOCYTES # BLD AUTO: 1.2 K/UL (ref 0.3–1)
MONOCYTES NFR BLD: 8.1 % (ref 4–15)
NEUTROPHILS # BLD AUTO: 12.9 K/UL (ref 1.8–7.7)
NEUTROPHILS NFR BLD: 85.2 % (ref 38–73)
NITRITE UR QL STRIP: NEGATIVE
NRBC BLD-RTO: 0 /100 WBC
PH UR STRIP: 5 [PH] (ref 5–8)
PLATELET # BLD AUTO: 291 K/UL (ref 150–350)
PMV BLD AUTO: 11.9 FL (ref 9.2–12.9)
POC MOLECULAR INFLUENZA A AGN: NEGATIVE
POC MOLECULAR INFLUENZA B AGN: NEGATIVE
POTASSIUM SERPL-SCNC: 3.7 MMOL/L (ref 3.5–5.1)
PROT SERPL-MCNC: 7.4 G/DL (ref 6–8.4)
PROT UR QL STRIP: ABNORMAL
PROTHROMBIN TIME: 11.2 SEC (ref 9–12.5)
RBC # BLD AUTO: 4.5 M/UL (ref 4–5.4)
RBC #/AREA URNS HPF: 1 /HPF (ref 0–4)
SODIUM SERPL-SCNC: 139 MMOL/L (ref 136–145)
SP GR UR STRIP: >1.03 (ref 1–1.03)
SQUAMOUS #/AREA URNS HPF: 2 /HPF
TROPONIN I SERPL DL<=0.01 NG/ML-MCNC: 4.57 NG/ML (ref 0–0.03)
TROPONIN I SERPL DL<=0.01 NG/ML-MCNC: 5.66 NG/ML (ref 0–0.03)
URN SPEC COLLECT METH UR: ABNORMAL
UROBILINOGEN UR STRIP-ACNC: NEGATIVE EU/DL
WBC # BLD AUTO: 15.15 K/UL (ref 3.9–12.7)
WBC #/AREA URNS HPF: 3 /HPF (ref 0–5)

## 2020-02-18 PROCEDURE — 83880 ASSAY OF NATRIURETIC PEPTIDE: CPT

## 2020-02-18 PROCEDURE — 63600175 PHARM REV CODE 636 W HCPCS: Performed by: EMERGENCY MEDICINE

## 2020-02-18 PROCEDURE — 96374 THER/PROPH/DIAG INJ IV PUSH: CPT | Mod: XS

## 2020-02-18 PROCEDURE — 82550 ASSAY OF CK (CPK): CPT

## 2020-02-18 PROCEDURE — 99291 CRITICAL CARE FIRST HOUR: CPT | Mod: 25

## 2020-02-18 PROCEDURE — C9113 INJ PANTOPRAZOLE SODIUM, VIA: HCPCS | Performed by: INTERNAL MEDICINE

## 2020-02-18 PROCEDURE — 83690 ASSAY OF LIPASE: CPT

## 2020-02-18 PROCEDURE — 96372 THER/PROPH/DIAG INJ SC/IM: CPT | Mod: 59

## 2020-02-18 PROCEDURE — 87040 BLOOD CULTURE FOR BACTERIA: CPT | Mod: 59

## 2020-02-18 PROCEDURE — 96375 TX/PRO/DX INJ NEW DRUG ADDON: CPT

## 2020-02-18 PROCEDURE — 96367 TX/PROPH/DG ADDL SEQ IV INF: CPT

## 2020-02-18 PROCEDURE — S0030 INJECTION, METRONIDAZOLE: HCPCS | Performed by: EMERGENCY MEDICINE

## 2020-02-18 PROCEDURE — 93005 ELECTROCARDIOGRAM TRACING: CPT

## 2020-02-18 PROCEDURE — 93010 EKG 12-LEAD: ICD-10-PCS | Mod: ,,, | Performed by: INTERNAL MEDICINE

## 2020-02-18 PROCEDURE — 80053 COMPREHEN METABOLIC PANEL: CPT

## 2020-02-18 PROCEDURE — 87502 INFLUENZA DNA AMP PROBE: CPT

## 2020-02-18 PROCEDURE — 21400001 HC TELEMETRY ROOM

## 2020-02-18 PROCEDURE — 96361 HYDRATE IV INFUSION ADD-ON: CPT

## 2020-02-18 PROCEDURE — 25000003 PHARM REV CODE 250: Performed by: EMERGENCY MEDICINE

## 2020-02-18 PROCEDURE — 84484 ASSAY OF TROPONIN QUANT: CPT

## 2020-02-18 PROCEDURE — 96365 THER/PROPH/DIAG IV INF INIT: CPT

## 2020-02-18 PROCEDURE — 85025 COMPLETE CBC W/AUTO DIFF WBC: CPT

## 2020-02-18 PROCEDURE — 25000003 PHARM REV CODE 250: Performed by: INTERNAL MEDICINE

## 2020-02-18 PROCEDURE — 36415 COLL VENOUS BLD VENIPUNCTURE: CPT

## 2020-02-18 PROCEDURE — 63600175 PHARM REV CODE 636 W HCPCS: Performed by: INTERNAL MEDICINE

## 2020-02-18 PROCEDURE — 83605 ASSAY OF LACTIC ACID: CPT

## 2020-02-18 PROCEDURE — 85610 PROTHROMBIN TIME: CPT

## 2020-02-18 PROCEDURE — 85730 THROMBOPLASTIN TIME PARTIAL: CPT

## 2020-02-18 PROCEDURE — 85730 THROMBOPLASTIN TIME PARTIAL: CPT | Mod: 91

## 2020-02-18 PROCEDURE — 82553 CREATINE MB FRACTION: CPT

## 2020-02-18 PROCEDURE — 25500020 PHARM REV CODE 255: Performed by: EMERGENCY MEDICINE

## 2020-02-18 PROCEDURE — 84484 ASSAY OF TROPONIN QUANT: CPT | Mod: 91

## 2020-02-18 PROCEDURE — P9612 CATHETERIZE FOR URINE SPEC: HCPCS

## 2020-02-18 PROCEDURE — 99222 1ST HOSP IP/OBS MODERATE 55: CPT | Mod: ,,, | Performed by: INTERNAL MEDICINE

## 2020-02-18 PROCEDURE — 93010 ELECTROCARDIOGRAM REPORT: CPT | Mod: ,,, | Performed by: INTERNAL MEDICINE

## 2020-02-18 PROCEDURE — 81000 URINALYSIS NONAUTO W/SCOPE: CPT

## 2020-02-18 PROCEDURE — 99222 PR INITIAL HOSPITAL CARE,LEVL II: ICD-10-PCS | Mod: ,,, | Performed by: INTERNAL MEDICINE

## 2020-02-18 RX ORDER — CIPROFLOXACIN 2 MG/ML
400 INJECTION, SOLUTION INTRAVENOUS
Status: COMPLETED | OUTPATIENT
Start: 2020-02-18 | End: 2020-02-18

## 2020-02-18 RX ORDER — ATORVASTATIN CALCIUM 40 MG/1
80 TABLET, FILM COATED ORAL DAILY
Status: DISCONTINUED | OUTPATIENT
Start: 2020-02-18 | End: 2020-02-21

## 2020-02-18 RX ORDER — ONDANSETRON 2 MG/ML
4 INJECTION INTRAMUSCULAR; INTRAVENOUS
Status: COMPLETED | OUTPATIENT
Start: 2020-02-18 | End: 2020-02-18

## 2020-02-18 RX ORDER — METOPROLOL TARTRATE 25 MG/1
25 TABLET, FILM COATED ORAL 2 TIMES DAILY
Status: DISCONTINUED | OUTPATIENT
Start: 2020-02-18 | End: 2020-02-19

## 2020-02-18 RX ORDER — LORAZEPAM 2 MG/ML
1 INJECTION INTRAMUSCULAR
Status: COMPLETED | OUTPATIENT
Start: 2020-02-18 | End: 2020-02-18

## 2020-02-18 RX ORDER — HYDROMORPHONE HYDROCHLORIDE 2 MG/ML
1 INJECTION, SOLUTION INTRAMUSCULAR; INTRAVENOUS; SUBCUTANEOUS EVERY 4 HOURS PRN
Status: DISCONTINUED | OUTPATIENT
Start: 2020-02-18 | End: 2020-02-21 | Stop reason: HOSPADM

## 2020-02-18 RX ORDER — METRONIDAZOLE 500 MG/100ML
500 INJECTION, SOLUTION INTRAVENOUS
Status: COMPLETED | OUTPATIENT
Start: 2020-02-18 | End: 2020-02-18

## 2020-02-18 RX ORDER — PANTOPRAZOLE SODIUM 40 MG/10ML
40 INJECTION, POWDER, LYOPHILIZED, FOR SOLUTION INTRAVENOUS DAILY
Status: DISCONTINUED | OUTPATIENT
Start: 2020-02-18 | End: 2020-02-21

## 2020-02-18 RX ORDER — CLOPIDOGREL BISULFATE 75 MG/1
75 TABLET ORAL DAILY
Status: DISCONTINUED | OUTPATIENT
Start: 2020-02-19 | End: 2020-02-21

## 2020-02-18 RX ORDER — ASPIRIN 325 MG
325 TABLET, DELAYED RELEASE (ENTERIC COATED) ORAL ONCE
Status: COMPLETED | OUTPATIENT
Start: 2020-02-18 | End: 2020-02-18

## 2020-02-18 RX ORDER — SODIUM CHLORIDE 0.9 % (FLUSH) 0.9 %
10 SYRINGE (ML) INJECTION
Status: DISCONTINUED | OUTPATIENT
Start: 2020-02-18 | End: 2020-02-21 | Stop reason: HOSPADM

## 2020-02-18 RX ORDER — HYDROMORPHONE HYDROCHLORIDE 2 MG/ML
1 INJECTION, SOLUTION INTRAMUSCULAR; INTRAVENOUS; SUBCUTANEOUS ONCE
Status: COMPLETED | OUTPATIENT
Start: 2020-02-18 | End: 2020-02-18

## 2020-02-18 RX ORDER — MORPHINE SULFATE 10 MG/ML
4 INJECTION INTRAMUSCULAR; INTRAVENOUS; SUBCUTANEOUS EVERY 4 HOURS PRN
Status: CANCELLED | OUTPATIENT
Start: 2020-02-18

## 2020-02-18 RX ORDER — MORPHINE SULFATE 10 MG/ML
2 INJECTION INTRAMUSCULAR; INTRAVENOUS; SUBCUTANEOUS EVERY 4 HOURS PRN
Status: DISCONTINUED | OUTPATIENT
Start: 2020-02-18 | End: 2020-02-21 | Stop reason: HOSPADM

## 2020-02-18 RX ORDER — ASPIRIN 81 MG/1
81 TABLET ORAL DAILY
Status: DISCONTINUED | OUTPATIENT
Start: 2020-02-19 | End: 2020-02-21

## 2020-02-18 RX ORDER — HALOPERIDOL 5 MG/ML
5 INJECTION INTRAMUSCULAR
Status: COMPLETED | OUTPATIENT
Start: 2020-02-18 | End: 2020-02-18

## 2020-02-18 RX ORDER — ONDANSETRON 2 MG/ML
4 INJECTION INTRAMUSCULAR; INTRAVENOUS EVERY 8 HOURS PRN
Status: DISCONTINUED | OUTPATIENT
Start: 2020-02-18 | End: 2020-02-19

## 2020-02-18 RX ORDER — CLOPIDOGREL 300 MG/1
300 TABLET, FILM COATED ORAL ONCE
Status: COMPLETED | OUTPATIENT
Start: 2020-02-18 | End: 2020-02-18

## 2020-02-18 RX ORDER — METOCLOPRAMIDE HYDROCHLORIDE 5 MG/ML
10 INJECTION INTRAMUSCULAR; INTRAVENOUS
Status: COMPLETED | OUTPATIENT
Start: 2020-02-18 | End: 2020-02-18

## 2020-02-18 RX ORDER — MORPHINE SULFATE 10 MG/ML
4 INJECTION INTRAMUSCULAR; INTRAVENOUS; SUBCUTANEOUS
Status: COMPLETED | OUTPATIENT
Start: 2020-02-18 | End: 2020-02-18

## 2020-02-18 RX ORDER — HEPARIN SODIUM,PORCINE/D5W 25000/250
12 INTRAVENOUS SOLUTION INTRAVENOUS CONTINUOUS
Status: DISCONTINUED | OUTPATIENT
Start: 2020-02-18 | End: 2020-02-20

## 2020-02-18 RX ADMIN — HYDROMORPHONE HYDROCHLORIDE 1 MG: 2 INJECTION, SOLUTION INTRAMUSCULAR; INTRAVENOUS; SUBCUTANEOUS at 03:02

## 2020-02-18 RX ADMIN — ASPIRIN 325 MG: 325 TABLET, DELAYED RELEASE ORAL at 05:02

## 2020-02-18 RX ADMIN — PIPERACILLIN AND TAZOBACTAM 4.5 G: 4; .5 INJECTION, POWDER, FOR SOLUTION INTRAVENOUS at 11:02

## 2020-02-18 RX ADMIN — HALOPERIDOL LACTATE 5 MG: 5 INJECTION, SOLUTION INTRAMUSCULAR at 10:02

## 2020-02-18 RX ADMIN — PANTOPRAZOLE SODIUM 40 MG: 40 INJECTION, POWDER, LYOPHILIZED, FOR SOLUTION INTRAVENOUS at 05:02

## 2020-02-18 RX ADMIN — CLOPIDOGREL BISULFATE 300 MG: 300 TABLET, FILM COATED ORAL at 05:02

## 2020-02-18 RX ADMIN — METRONIDAZOLE 500 MG: 500 INJECTION, SOLUTION INTRAVENOUS at 02:02

## 2020-02-18 RX ADMIN — MORPHINE SULFATE 4 MG: 10 INJECTION INTRAVENOUS at 10:02

## 2020-02-18 RX ADMIN — SODIUM CHLORIDE 1000 ML: 0.9 INJECTION, SOLUTION INTRAVENOUS at 01:02

## 2020-02-18 RX ADMIN — SODIUM CHLORIDE 1000 ML: 0.9 INJECTION, SOLUTION INTRAVENOUS at 10:02

## 2020-02-18 RX ADMIN — METOCLOPRAMIDE 10 MG: 5 INJECTION, SOLUTION INTRAMUSCULAR; INTRAVENOUS at 10:02

## 2020-02-18 RX ADMIN — LORAZEPAM 1 MG: 2 INJECTION INTRAMUSCULAR; INTRAVENOUS at 10:02

## 2020-02-18 RX ADMIN — VANCOMYCIN HYDROCHLORIDE 750 MG: 750 INJECTION, POWDER, LYOPHILIZED, FOR SOLUTION INTRAVENOUS at 04:02

## 2020-02-18 RX ADMIN — IOHEXOL 100 ML: 350 INJECTION, SOLUTION INTRAVENOUS at 12:02

## 2020-02-18 RX ADMIN — PROMETHAZINE HYDROCHLORIDE 12.5 MG: 25 INJECTION INTRAMUSCULAR; INTRAVENOUS at 01:02

## 2020-02-18 RX ADMIN — ATORVASTATIN CALCIUM 80 MG: 40 TABLET, FILM COATED ORAL at 05:02

## 2020-02-18 RX ADMIN — METOPROLOL TARTRATE 25 MG: 25 TABLET ORAL at 05:02

## 2020-02-18 RX ADMIN — PIPERACILLIN AND TAZOBACTAM 4.5 G: 4; .5 INJECTION, POWDER, LYOPHILIZED, FOR SOLUTION INTRAVENOUS; PARENTERAL at 03:02

## 2020-02-18 RX ADMIN — ONDANSETRON HYDROCHLORIDE 4 MG: 2 SOLUTION INTRAMUSCULAR; INTRAVENOUS at 01:02

## 2020-02-18 RX ADMIN — CIPROFLOXACIN 400 MG: 2 INJECTION, SOLUTION INTRAVENOUS at 01:02

## 2020-02-18 RX ADMIN — HEPARIN SODIUM 12 UNITS/KG/HR: 10000 INJECTION, SOLUTION INTRAVENOUS at 03:02

## 2020-02-18 RX ADMIN — ONDANSETRON HYDROCHLORIDE 4 MG: 2 SOLUTION INTRAMUSCULAR; INTRAVENOUS at 03:02

## 2020-02-18 NOTE — HPI
"60 year old female with gastric cancer s/p subtotal gastrectomy and former smoker who presented with abdominal pain of one day in evolution. During my evaluation, patient could not provide any history due to severe pain and laying in fetal position. She was alert and made eye contact. Per ED staff, patient complained of severe abdominal pain since yesterday. Associated symptoms include nausea, vomiting "bile", cough and inability to tolerate oral meds. When asked about chemotherapy patient could not answer question. Per ED staff, family member stated last chemoradiation therapy was in August/2019. Has a port in place. Also reported daily use of marijuana. No other reported symptoms.     In the ED, patient was tachycardic to 140s, with borderline hypotension and in severe pain. Was given haloperidol IV, lorazepam IV, morphine IV, zofran IV and 3L of fluids. Pain relived temporarily. Breathing stable at room air. A CTA of chest and CT of abdomen with contrast showed peribronchial thickening, septal interlobular thickening, felice hilar ground glass changes, scattered tree in bud nodules, small bilateral pleural effusions and wall thickening ascending colon/transverse colon. No pulmonary embolism, pneumothorax, acute pathology to liver/kidney, evidence of abscess or perforation. Troponin significant for level of 5 with no ST segment elevation. Apparently no chest pain. BNP very elevated 2,496. Lactic acid 2.9. Cardiology, pulm and GI consulted. IV abx started. Admitted to hospital medicine.  "

## 2020-02-18 NOTE — ED TRIAGE NOTES
Patient arrived in  ED with c/o nausea/vomiting, abdominal pain that started on Monday, patient verbalized trying to take medication but vomit it back up, daughter verbalized that patient has not taking any medications since Monday, patient completed chemotherapy in august 2019, denies burning or discomfort upon urination, c/o coughing, patient has port to left  Upper chest wall that was placed April 2019*, patient verbalized that she has medication for nausea and pain but unable to keep medication down, patient rate a pain of 8/10.

## 2020-02-18 NOTE — ASSESSMENT & PLAN NOTE
Troponin of 5 with no ST segment elevation very concerning for NSTEMI  Could not assess for chest pain as patient could not provide history due to severe abdominal pain. Did not verbalize chest pain however.   Unsure if history of cardiac disease. Elevated BNP and possible pulmonary edema highly suspicious for new onset heart failure   Patient septic and stable at room air therefore hold off on diuresis  On heparin infusion. Load with aspirin/plavix, high dose statin and BB  Discussed with Cardiology who will see patient today  Repeat troponin x 2  Echocardiogram STAT

## 2020-02-18 NOTE — ASSESSMENT & PLAN NOTE
With plenty of interstitial and peribronchial inflammation, per CT  Surprisingly patient is not hypoxic  2/2 radiation vs infection vs inflammatory  Agree with empiric IV abx and pulmonology consultation  Hold of on furosemide for now

## 2020-02-18 NOTE — TELEPHONE ENCOUNTER
"pts daughter called to let  know she is in the er owb for stomach pains.  Informed her I will let Dr. Tripp know.        ----- Message from Brittany Odell sent at 2/18/2020  9:47 AM CST -----  Contact:  Yara " daughter" [phone]  .Type: Patient Call Back    Who called: Yara " daughter"    What is the request in detail:   Called to inform  that the pt is in the hosp at [facility] for stomach pains     Can the clinic reply by [facility]? Call back   Would the patient rather a call back or a response via [facility]? call back     Best call back number: [phone]            "

## 2020-02-18 NOTE — HPI
Xiomy toribio is a 60-year-old female who presents to [facility] with complaints of abdominal pain.  Pain progressive over the past day.  Associated nausea vomiting.  She has a history of gastric cancer, radiation therapy and chemotherapy.  Patient was tachycardic with heart rate in the 140s.  A CTA of the chest and abdomen showed peribronchial thickening, ground-glass changes and small bilateral pleural effusions.  No pulmonary embolism.  Additional labs were ordered showing a troponin of 5 and an elevated BNP of 2500.  There is no documented history of coronary artery disease EKG shows sinus rhythm, T-wave abnormality laterally.  Most of her previous EKGs were normal sinus rhythm.  There was 1 EKG that showed an inferior abnormality.  She had an echocardiogram in 2014 that showed normal left ventricular systolic function.  She does have a history of apparently due to her gastric cancer. The patient is not verbal. Received pain medications.

## 2020-02-18 NOTE — H&P (VIEW-ONLY)
[facility]  Cardiology  Consult Note    Patient Name: Xiomy Duncan  MRN: [id number]  Admission Date: 2/18/2020  Hospital Length of Stay: 0 days  Code Status: Full Code   Attending Provider: Eulogio Wright MD   Consulting Provider: Juan Castellanos MD  Primary Care Physician: Arnie Monson MD  Principal Problem:NSTEMI (non-ST elevated myocardial infarction)    Patient information was obtained from past medical records and ER records.     Inpatient consult to Cardiology  Consult performed by: Juan Castellanos MD  Consult ordered by: Eulogio Wright MD        Subjective:     Chief Complaint:  Abdominal pain     HPI:   Xiomy toribio is a 60-year-old female who presents to [facility] with complaints of abdominal pain.  Pain progressive over the past day.  Associated nausea vomiting.  She has a history of gastric cancer, radiation therapy and chemotherapy.  Patient was tachycardic with heart rate in the 140s.  A CTA of the chest and abdomen showed peribronchial thickening, ground-glass changes and small bilateral pleural effusions.  No pulmonary embolism.  Additional labs were ordered showing a troponin of 5 and an elevated BNP of 2500.  There is no documented history of coronary artery disease EKG shows sinus rhythm, T-wave abnormality laterally.  Most of her previous EKGs were normal sinus rhythm.  There was 1 EKG that showed an inferior abnormality.  She had an echocardiogram in 2014 that showed normal left ventricular systolic function.  She does have a history of apparently due to her gastric cancer. The patient is not verbal. Received pain medications.      Past Medical History:   Diagnosis Date    Anemia     Anxiety     Asthma     Cancer determined by biopsy of stomach 12/2018    Cannabis abuse, daily use     Colon polyps     Encounter for blood transfusion     GERD (gastroesophageal reflux disease) 8/7/2014    Hoarseness 8/7/2014    Hypertension     S/P subtotal gastrectomy  02/08/2019    Seizures     LAST SEIZURE >6 YEARS AGO    Thyroid nodule     Ulcer     Vaginal delivery     x4       Past Surgical History:   Procedure Laterality Date    CHOLECYSTECTOMY      ESOPHAGOGASTRODUODENOSCOPY N/A 12/15/2018    Procedure: ESOPHAGOGASTRODUODENOSCOPY (EGD);  Surgeon: Alisson Villagomez MD;  Location: [facility] ENDO;  Service: Endoscopy;  Laterality: N/A;    GASTRECTOMY N/A 2/8/2019    Procedure: OPEN GASTRECTOMY- radical subtotal;  Surgeon: Jesus Tripp MD;  Location: [facility] OR;  Service: General;  Laterality: N/A;  OPEN  GASTRECTOMY  PER ABIEL ON 1/29/19  @ 302PM-LO  RN PRE OP 2-1-19    HERNIA REPAIR N/A 01/15/2020    incisional hernia repair    PORTACATH PLACEMENT Left 04/2019    TONSILLECTOMY      TUBAL LIGATION         Review of patient's allergies indicates:   Allergen Reactions    Penicillins Rash       Current Facility-Administered Medications on File Prior to Encounter   Medication    lidocaine (PF) 10 mg/ml (1%) injection 10 mg     Current Outpatient Medications on File Prior to Encounter   Medication Sig    ALPRAZolam (XANAX) 0.5 MG tablet Take 1 tablet (0.5 mg total) by mouth 2 (two) times daily as needed for Anxiety.    chlorzoxazone (PARAFON FORTE) 500 mg Tab TAKE 1 TABLET (500 MG TOTAL) BY MOUTH 4 (FOUR) TIMES DAILY AS NEEDED.    cloNIDine (CATAPRES) 0.1 MG tablet Take 1 tablet (0.1 mg total) by mouth 2 (two) times daily.    diphenoxylate-atropine 2.5-0.025 mg (LOMOTIL) 2.5-0.025 mg per tablet TAKE 1 TO 2 TABLETS EVERY 4 TO 6 HOURS AS NEEDED DIARRHEA    estrogen, conjugated,-medroxyprogesterone (PREMPRO) 0.45-1.5 mg per tablet Take 1 tablet by mouth once daily.    gabapentin (NEURONTIN) 100 MG capsule Take 100 mg by mouth 3 (three) times daily.     HYDROcodone-acetaminophen (NORCO) 5-325 mg per tablet Take 1 tablet by mouth every 6 (six) hours as needed for Pain.    megestrol (MEGACE) 400 mg/10 mL (40 mg/mL) Susp 10 MLS BY MOUTH TWICE DAILY FOR 30 DAYS     metoclopramide HCl (REGLAN) 5 MG tablet TAKE 1 TABLET BY MOUTH 3 TIMES DAILY BEFORE MEALS    mirtazapine (REMERON) 15 MG tablet Take 15 mg by mouth once daily.    ondansetron (ZOFRAN) 8 MG tablet Take 1 tablet (8 mg total) by mouth every 8 (eight) hours as needed for Nausea.    oxyCODONE-acetaminophen (PERCOCET) 5-325 mg per tablet Take 1 tablet by mouth every 4 (four) hours as needed for Pain.    oxyCODONE-acetaminophen (PERCOCET) 7.5-325 mg per tablet Take 1 tablet by mouth every 12 (twelve) hours as needed for Pain. GREATER THAN 7 DAY QUANTITY MEDICALLY NECESSARY    pantoprazole (PROTONIX) 40 MG tablet Take 1 tablet (40 mg total) by mouth once daily.    prochlorperazine (COMPAZINE) 10 MG tablet Take 1 tablet (10 mg total) by mouth 3 (three) times daily as needed.    tiotropium-olodaterol (STIOLTO RESPIMAT) 2.5-2.5 mcg/actuation Mist TAKE 1 PUFF BY MOUTH EVERY DAY (RX CAN BE REFILLED EVERY 60 DAYS)    apixaban (ELIQUIS ORAL) Take 5 mg by mouth 2 (two) times daily.      Family History     Problem Relation (Age of Onset)    Bone cancer Mother    Diabetes Maternal Uncle    Hypertension Brother, Maternal Uncle, Maternal Grandmother    Stomach cancer Maternal Grandfather        Tobacco Use    Smoking status: Former Smoker     Packs/day: 2.00     Years: 46.00     Pack years: 92.00     Last attempt to quit: 10/15/2018     Years since quittin.3    Smokeless tobacco: Never Used   Substance and Sexual Activity    Alcohol use: No    Drug use: Yes     Types: Marijuana     Comment: every day    Sexual activity: Not Currently     Birth control/protection: None, Post-menopausal     Comment: single     Review of Systems   Unable to perform ROS: acuity of condition     Objective:     Vital Signs (Most Recent):  Temp: 98.7 °F (37.1 °C) (20 1641)  Pulse: (!) 121 (20 1739)  Resp: 18 (20 1744)  BP: (!) 141/97 (20 1744)  SpO2: 100 % (20 1739) Vital Signs (24h Range):  Temp:  [98.3 °F (36.8  °C)-98.7 °F (37.1 °C)] 98.7 °F (37.1 °C)  Pulse:  [104-141] 121  Resp:  [14-20] 18  SpO2:  [96 %-100 %] 100 %  BP: (105-145)/() 141/97     Weight: 54 kg (119 lb)  Body mass index is 21.08 kg/m².    SpO2: 100 %  O2 Device (Oxygen Therapy): room air      Intake/Output Summary (Last 24 hours) at 2/18/2020 1751  Last data filed at 2/18/2020 1741  Gross per 24 hour   Intake 4100 ml   Output --   Net 4100 ml       Lines/Drains/Airways     Central Venous Catheter Line            Port A Cath Single Lumen 02/18/20 1008 left subclavian less than 1 day          Peripheral Intravenous Line                 Peripheral IV - Single Lumen 02/18/20 1158 20 G Right Antecubital less than 1 day         Peripheral IV - Single Lumen 02/18/20 1449 22 G Left Hand less than 1 day                Physical Exam   Constitutional: She appears distressed.   Cardiovascular: Regular rhythm, normal heart sounds and intact distal pulses. Tachycardia present.   Pulmonary/Chest: Effort normal and breath sounds normal.   Abdominal: Soft.   Musculoskeletal:        Right lower leg: She exhibits no edema.        Left lower leg: She exhibits no edema.   Vitals reviewed.      Significant Labs:   CMP   Recent Labs   Lab 02/18/20  1005      K 3.7      CO2 22*   *   BUN 15   CREATININE 1.1   CALCIUM 9.6   PROT 7.4   ALBUMIN 3.5   BILITOT 0.3   ALKPHOS 86   AST 58*   ALT 22   ANIONGAP 13   ESTGFRAFRICA >60   EGFRNONAA 55*   , CBC   Recent Labs   Lab 02/18/20  1005   WBC 15.15*   HGB 13.7   HCT 41.9      , Lipid Panel No results for input(s): CHOL, HDL, LDLCALC, TRIG, CHOLHDL in the last 48 hours. and Troponin   Recent Labs   Lab 02/18/20  1005   TROPONINI 5.664*       Significant Imaging: EKG: NSR, t wave abnormality laterally    Assessment and Plan:     * NSTEMI (non-ST elevated myocardial infarction)  EKG with t wave abnormality. Plavix load. Heparin gtt. FU echo.    Elevated brain natriuretic peptide (BNP) level  Echo pending.          VTE Risk Mitigation (From admission, onward)         Ordered     IP VTE HIGH RISK PATIENT  Once      02/18/20 1717     Place sequential compression device  Until discontinued      02/18/20 1717     Place RAMESH hose  Until discontinued      02/18/20 1717     heparin 25,000 units in dextrose 5% (100 units/ml) IV bolus from bag - ADDITIONAL PRN BOLUS - 60 units/kg (max bolus 4000 units)  As needed (PRN)     Question:  Heparin Infusion Adjustment (DO NOT MODIFY ANSWER)  Answer:  \\[email or web address]\epic\Images\Pharmacy\HeparinInfusions\heparin LOW INTENSITY nomogram for [facility] [id number].pdf    02/18/20 1422     heparin 25,000 units in dextrose 5% (100 units/ml) IV bolus from bag - ADDITIONAL PRN BOLUS - 30 units/kg (max bolus 4000 units)  As needed (PRN)     Question:  Heparin Infusion Adjustment (DO NOT MODIFY ANSWER)  Answer:  \\[email or web address]\epic\Images\Pharmacy\HeparinInfusions\heparin LOW INTENSITY nomogram for [facility] [id number].pdf    02/18/20 1422     heparin 25,000 units in dextrose 5% 250 mL (100 units/mL) infusion LOW INTENSITY nomogram - [facility]  Continuous     Question:  Heparin Infusion Adjustment (DO NOT MODIFY ANSWER)  Answer:  \\[email or web address]\epic\Images\Pharmacy\HeparinInfusions\heparin LOW INTENSITY nomogram for [facility] [id number].pdf    02/18/20 1422                Thank you for your consult. I will follow-up with patient. Please contact us if you have any additional questions.    Juan Castellanos MD  Cardiology   [facility]

## 2020-02-18 NOTE — CONSULTS
[facility]  Cardiology  Consult Note    Patient Name: Xiomy Duncan  MRN: [id number]  Admission Date: 2/18/2020  Hospital Length of Stay: 0 days  Code Status: Full Code   Attending Provider: Eulogio Wright MD   Consulting Provider: Juan Castellanos MD  Primary Care Physician: Arnie Monson MD  Principal Problem:NSTEMI (non-ST elevated myocardial infarction)    Patient information was obtained from past medical records and ER records.     Inpatient consult to Cardiology  Consult performed by: Juan Castellanos MD  Consult ordered by: Eulogio Wright MD        Subjective:     Chief Complaint:  Abdominal pain     HPI:   Xiomy toribio is a 60-year-old female who presents to [facility] with complaints of abdominal pain.  Pain progressive over the past day.  Associated nausea vomiting.  She has a history of gastric cancer, radiation therapy and chemotherapy.  Patient was tachycardic with heart rate in the 140s.  A CTA of the chest and abdomen showed peribronchial thickening, ground-glass changes and small bilateral pleural effusions.  No pulmonary embolism.  Additional labs were ordered showing a troponin of 5 and an elevated BNP of 2500.  There is no documented history of coronary artery disease EKG shows sinus rhythm, T-wave abnormality laterally.  Most of her previous EKGs were normal sinus rhythm.  There was 1 EKG that showed an inferior abnormality.  She had an echocardiogram in 2014 that showed normal left ventricular systolic function.  She does have a history of apparently due to her gastric cancer. The patient is not verbal. Received pain medications.      Past Medical History:   Diagnosis Date    Anemia     Anxiety     Asthma     Cancer determined by biopsy of stomach 12/2018    Cannabis abuse, daily use     Colon polyps     Encounter for blood transfusion     GERD (gastroesophageal reflux disease) 8/7/2014    Hoarseness 8/7/2014    Hypertension     S/P subtotal gastrectomy  02/08/2019    Seizures     LAST SEIZURE >6 YEARS AGO    Thyroid nodule     Ulcer     Vaginal delivery     x4       Past Surgical History:   Procedure Laterality Date    CHOLECYSTECTOMY      ESOPHAGOGASTRODUODENOSCOPY N/A 12/15/2018    Procedure: ESOPHAGOGASTRODUODENOSCOPY (EGD);  Surgeon: Alisson Villagomez MD;  Location: [facility] ENDO;  Service: Endoscopy;  Laterality: N/A;    GASTRECTOMY N/A 2/8/2019    Procedure: OPEN GASTRECTOMY- radical subtotal;  Surgeon: Jesus Tripp MD;  Location: [facility] OR;  Service: General;  Laterality: N/A;  OPEN  GASTRECTOMY  PER ABIEL ON 1/29/19  @ 302PM-LO  RN PRE OP 2-1-19    HERNIA REPAIR N/A 01/15/2020    incisional hernia repair    PORTACATH PLACEMENT Left 04/2019    TONSILLECTOMY      TUBAL LIGATION         Review of patient's allergies indicates:   Allergen Reactions    Penicillins Rash       Current Facility-Administered Medications on File Prior to Encounter   Medication    lidocaine (PF) 10 mg/ml (1%) injection 10 mg     Current Outpatient Medications on File Prior to Encounter   Medication Sig    ALPRAZolam (XANAX) 0.5 MG tablet Take 1 tablet (0.5 mg total) by mouth 2 (two) times daily as needed for Anxiety.    chlorzoxazone (PARAFON FORTE) 500 mg Tab TAKE 1 TABLET (500 MG TOTAL) BY MOUTH 4 (FOUR) TIMES DAILY AS NEEDED.    cloNIDine (CATAPRES) 0.1 MG tablet Take 1 tablet (0.1 mg total) by mouth 2 (two) times daily.    diphenoxylate-atropine 2.5-0.025 mg (LOMOTIL) 2.5-0.025 mg per tablet TAKE 1 TO 2 TABLETS EVERY 4 TO 6 HOURS AS NEEDED DIARRHEA    estrogen, conjugated,-medroxyprogesterone (PREMPRO) 0.45-1.5 mg per tablet Take 1 tablet by mouth once daily.    gabapentin (NEURONTIN) 100 MG capsule Take 100 mg by mouth 3 (three) times daily.     HYDROcodone-acetaminophen (NORCO) 5-325 mg per tablet Take 1 tablet by mouth every 6 (six) hours as needed for Pain.    megestrol (MEGACE) 400 mg/10 mL (40 mg/mL) Susp 10 MLS BY MOUTH TWICE DAILY FOR 30 DAYS     metoclopramide HCl (REGLAN) 5 MG tablet TAKE 1 TABLET BY MOUTH 3 TIMES DAILY BEFORE MEALS    mirtazapine (REMERON) 15 MG tablet Take 15 mg by mouth once daily.    ondansetron (ZOFRAN) 8 MG tablet Take 1 tablet (8 mg total) by mouth every 8 (eight) hours as needed for Nausea.    oxyCODONE-acetaminophen (PERCOCET) 5-325 mg per tablet Take 1 tablet by mouth every 4 (four) hours as needed for Pain.    oxyCODONE-acetaminophen (PERCOCET) 7.5-325 mg per tablet Take 1 tablet by mouth every 12 (twelve) hours as needed for Pain. GREATER THAN 7 DAY QUANTITY MEDICALLY NECESSARY    pantoprazole (PROTONIX) 40 MG tablet Take 1 tablet (40 mg total) by mouth once daily.    prochlorperazine (COMPAZINE) 10 MG tablet Take 1 tablet (10 mg total) by mouth 3 (three) times daily as needed.    tiotropium-olodaterol (STIOLTO RESPIMAT) 2.5-2.5 mcg/actuation Mist TAKE 1 PUFF BY MOUTH EVERY DAY (RX CAN BE REFILLED EVERY 60 DAYS)    apixaban (ELIQUIS ORAL) Take 5 mg by mouth 2 (two) times daily.      Family History     Problem Relation (Age of Onset)    Bone cancer Mother    Diabetes Maternal Uncle    Hypertension Brother, Maternal Uncle, Maternal Grandmother    Stomach cancer Maternal Grandfather        Tobacco Use    Smoking status: Former Smoker     Packs/day: 2.00     Years: 46.00     Pack years: 92.00     Last attempt to quit: 10/15/2018     Years since quittin.3    Smokeless tobacco: Never Used   Substance and Sexual Activity    Alcohol use: No    Drug use: Yes     Types: Marijuana     Comment: every day    Sexual activity: Not Currently     Birth control/protection: None, Post-menopausal     Comment: single     Review of Systems   Unable to perform ROS: acuity of condition     Objective:     Vital Signs (Most Recent):  Temp: 98.7 °F (37.1 °C) (20 1641)  Pulse: (!) 121 (20 1739)  Resp: 18 (20 1744)  BP: (!) 141/97 (20 1744)  SpO2: 100 % (20 1739) Vital Signs (24h Range):  Temp:  [98.3 °F (36.8  °C)-98.7 °F (37.1 °C)] 98.7 °F (37.1 °C)  Pulse:  [104-141] 121  Resp:  [14-20] 18  SpO2:  [96 %-100 %] 100 %  BP: (105-145)/() 141/97     Weight: 54 kg (119 lb)  Body mass index is 21.08 kg/m².    SpO2: 100 %  O2 Device (Oxygen Therapy): room air      Intake/Output Summary (Last 24 hours) at 2/18/2020 1751  Last data filed at 2/18/2020 1741  Gross per 24 hour   Intake 4100 ml   Output --   Net 4100 ml       Lines/Drains/Airways     Central Venous Catheter Line            Port A Cath Single Lumen 02/18/20 1008 left subclavian less than 1 day          Peripheral Intravenous Line                 Peripheral IV - Single Lumen 02/18/20 1158 20 G Right Antecubital less than 1 day         Peripheral IV - Single Lumen 02/18/20 1449 22 G Left Hand less than 1 day                Physical Exam   Constitutional: She appears distressed.   Cardiovascular: Regular rhythm, normal heart sounds and intact distal pulses. Tachycardia present.   Pulmonary/Chest: Effort normal and breath sounds normal.   Abdominal: Soft.   Musculoskeletal:        Right lower leg: She exhibits no edema.        Left lower leg: She exhibits no edema.   Vitals reviewed.      Significant Labs:   CMP   Recent Labs   Lab 02/18/20  1005      K 3.7      CO2 22*   *   BUN 15   CREATININE 1.1   CALCIUM 9.6   PROT 7.4   ALBUMIN 3.5   BILITOT 0.3   ALKPHOS 86   AST 58*   ALT 22   ANIONGAP 13   ESTGFRAFRICA >60   EGFRNONAA 55*   , CBC   Recent Labs   Lab 02/18/20  1005   WBC 15.15*   HGB 13.7   HCT 41.9      , Lipid Panel No results for input(s): CHOL, HDL, LDLCALC, TRIG, CHOLHDL in the last 48 hours. and Troponin   Recent Labs   Lab 02/18/20  1005   TROPONINI 5.664*       Significant Imaging: EKG: NSR, t wave abnormality laterally    Assessment and Plan:     * NSTEMI (non-ST elevated myocardial infarction)  EKG with t wave abnormality. Plavix load. Heparin gtt. FU echo.    Elevated brain natriuretic peptide (BNP) level  Echo pending.          VTE Risk Mitigation (From admission, onward)         Ordered     IP VTE HIGH RISK PATIENT  Once      02/18/20 1717     Place sequential compression device  Until discontinued      02/18/20 1717     Place RAMESH hose  Until discontinued      02/18/20 1717     heparin 25,000 units in dextrose 5% (100 units/ml) IV bolus from bag - ADDITIONAL PRN BOLUS - 60 units/kg (max bolus 4000 units)  As needed (PRN)     Question:  Heparin Infusion Adjustment (DO NOT MODIFY ANSWER)  Answer:  \\[email or web address]\epic\Images\Pharmacy\HeparinInfusions\heparin LOW INTENSITY nomogram for [facility] [id number].pdf    02/18/20 1422     heparin 25,000 units in dextrose 5% (100 units/ml) IV bolus from bag - ADDITIONAL PRN BOLUS - 30 units/kg (max bolus 4000 units)  As needed (PRN)     Question:  Heparin Infusion Adjustment (DO NOT MODIFY ANSWER)  Answer:  \\[email or web address]\epic\Images\Pharmacy\HeparinInfusions\heparin LOW INTENSITY nomogram for [facility] [id number].pdf    02/18/20 1422     heparin 25,000 units in dextrose 5% 250 mL (100 units/mL) infusion LOW INTENSITY nomogram - [facility]  Continuous     Question:  Heparin Infusion Adjustment (DO NOT MODIFY ANSWER)  Answer:  \\[email or web address]\epic\Images\Pharmacy\HeparinInfusions\heparin LOW INTENSITY nomogram for [facility] [id number].pdf    02/18/20 1422                Thank you for your consult. I will follow-up with patient. Please contact us if you have any additional questions.    Juan Castellanos MD  Cardiology   [facility]

## 2020-02-18 NOTE — ASSESSMENT & PLAN NOTE
Unsure if patient still undergoing chemoradiation  Will discuss with patient when able  Has port in place

## 2020-02-18 NOTE — PROGRESS NOTES
Pharmacokinetic Initial Assessment: IV Vancomycin    Assessment/Plan:    Initiate intravenous vancomycin with loading dose of 750 mg once followed by a maintenance dose of vancomycin 750 mg IV every 24 hours  Desired empiric serum trough concentration is 10 to 20 mcg/mL  Draw vancomycin trough level 30 min prior to fourth dose on 2/21/20 at approximately 15:30   Pharmacy will continue to follow and monitor vancomycin.      Please contact pharmacy at extension [phone] with any questions regarding this assessment.     Thank you for the consult,   Radha Truong       Patient brief summary:  Xiomy Duncan is a 60 y.o. female initiated on antimicrobial therapy with IV Vancomycin for treatment of suspected intra-abdominal infection    Drug Allergies:   Review of patient's allergies indicates:   Allergen Reactions    Penicillins Rash       Actual Body Weight:   54 kg    Renal Function:   Estimated Creatinine Clearance: 46.1 mL/min (based on SCr of 1.1 mg/dL).,     Dialysis Method (if applicable):  N/A    CBC (last 72 hours):  Recent Labs   Lab Result Units 02/18/20  1005   WBC K/uL 15.15*   Hemoglobin g/dL 13.7   Hematocrit % 41.9   Platelets K/uL 291   Gran% % 85.2*   Lymph% % 5.9*   Mono% % 8.1   Eosinophil% % 0.0   Basophil% % 0.1   Differential Method  Automated       Metabolic Panel (last 72 hours):  Recent Labs   Lab Result Units 02/18/20  1005 02/18/20  1035   Sodium mmol/L 139  --    Potassium mmol/L 3.7  --    Chloride mmol/L 104  --    CO2 mmol/L 22*  --    Glucose mg/dL 216*  --    Glucose, UA   --  1+*   BUN, Bld mg/dL 15  --    Creatinine mg/dL 1.1  --    Albumin g/dL 3.5  --    Total Bilirubin mg/dL 0.3  --    Alkaline Phosphatase U/L 86  --    AST U/L 58*  --    ALT U/L 22  --        Drug levels (last 3 results):  No results for input(s): VANCOMYCINRA, VANCOMYCINPE, VANCOMYCINTR in the last 72 hours.    Microbiologic Results:  Microbiology Results (last 7 days)       Procedure Component Value Units  Date/Time    Blood culture #2 **CANNOT BE ORDERED STAT** [[id number]] Collected:  02/18/20 1336    Order Status:  Sent Specimen:  Blood from Peripheral, Hand, Right Updated:  02/18/20 1358    Blood culture #1 **CANNOT BE ORDERED STAT** [[id number]] Collected:  02/18/20 1328    Order Status:  Sent Specimen:  Blood from Peripheral, Hand, Left Updated:  02/18/20 1355

## 2020-02-18 NOTE — SUBJECTIVE & OBJECTIVE
Past Medical History:   Diagnosis Date    Anemia     Anxiety     Asthma     Cancer determined by biopsy of stomach 12/2018    Cannabis abuse, daily use     Colon polyps     Encounter for blood transfusion     GERD (gastroesophageal reflux disease) 8/7/2014    Hoarseness 8/7/2014    Hypertension     S/P subtotal gastrectomy 02/08/2019    Seizures     LAST SEIZURE >6 YEARS AGO    Thyroid nodule     Ulcer     Vaginal delivery     x4       Past Surgical History:   Procedure Laterality Date    CHOLECYSTECTOMY      ESOPHAGOGASTRODUODENOSCOPY N/A 12/15/2018    Procedure: ESOPHAGOGASTRODUODENOSCOPY (EGD);  Surgeon: Alisson Villagomez MD;  Location: [facility] ENDO;  Service: Endoscopy;  Laterality: N/A;    GASTRECTOMY N/A 2/8/2019    Procedure: OPEN GASTRECTOMY- radical subtotal;  Surgeon: Jesus Tripp MD;  Location: [facility] OR;  Service: General;  Laterality: N/A;  OPEN  GASTRECTOMY  PER ABIEL ON 1/29/19  @ 302PM-LO  RN PRE OP 2-1-19    HERNIA REPAIR N/A 01/15/2020    incisional hernia repair    PORTACATH PLACEMENT Left 04/2019    TONSILLECTOMY      TUBAL LIGATION         Review of patient's allergies indicates:   Allergen Reactions    Penicillins Rash       Current Facility-Administered Medications on File Prior to Encounter   Medication    lidocaine (PF) 10 mg/ml (1%) injection 10 mg     Current Outpatient Medications on File Prior to Encounter   Medication Sig    ALPRAZolam (XANAX) 0.5 MG tablet Take 1 tablet (0.5 mg total) by mouth 2 (two) times daily as needed for Anxiety.    chlorzoxazone (PARAFON FORTE) 500 mg Tab TAKE 1 TABLET (500 MG TOTAL) BY MOUTH 4 (FOUR) TIMES DAILY AS NEEDED.    cloNIDine (CATAPRES) 0.1 MG tablet Take 1 tablet (0.1 mg total) by mouth 2 (two) times daily.    diphenoxylate-atropine 2.5-0.025 mg (LOMOTIL) 2.5-0.025 mg per tablet TAKE 1 TO 2 TABLETS EVERY 4 TO 6 HOURS AS NEEDED DIARRHEA    estrogen, conjugated,-medroxyprogesterone (PREMPRO) 0.45-1.5 mg per tablet  Take 1 tablet by mouth once daily.    gabapentin (NEURONTIN) 100 MG capsule Take 100 mg by mouth 3 (three) times daily.     HYDROcodone-acetaminophen (NORCO) 5-325 mg per tablet Take 1 tablet by mouth every 6 (six) hours as needed for Pain.    megestrol (MEGACE) 400 mg/10 mL (40 mg/mL) Susp 10 MLS BY MOUTH TWICE DAILY FOR 30 DAYS    metoclopramide HCl (REGLAN) 5 MG tablet TAKE 1 TABLET BY MOUTH 3 TIMES DAILY BEFORE MEALS    mirtazapine (REMERON) 15 MG tablet Take 15 mg by mouth once daily.    ondansetron (ZOFRAN) 8 MG tablet Take 1 tablet (8 mg total) by mouth every 8 (eight) hours as needed for Nausea.    oxyCODONE-acetaminophen (PERCOCET) 5-325 mg per tablet Take 1 tablet by mouth every 4 (four) hours as needed for Pain.    oxyCODONE-acetaminophen (PERCOCET) 7.5-325 mg per tablet Take 1 tablet by mouth every 12 (twelve) hours as needed for Pain. GREATER THAN 7 DAY QUANTITY MEDICALLY NECESSARY    pantoprazole (PROTONIX) 40 MG tablet Take 1 tablet (40 mg total) by mouth once daily.    prochlorperazine (COMPAZINE) 10 MG tablet Take 1 tablet (10 mg total) by mouth 3 (three) times daily as needed.    tiotropium-olodaterol (STIOLTO RESPIMAT) 2.5-2.5 mcg/actuation Mist TAKE 1 PUFF BY MOUTH EVERY DAY (RX CAN BE REFILLED EVERY 60 DAYS)    apixaban (ELIQUIS ORAL) Take 5 mg by mouth 2 (two) times daily.      Family History     Problem Relation (Age of Onset)    Bone cancer Mother    Diabetes Maternal Uncle    Hypertension Brother, Maternal Uncle, Maternal Grandmother    Stomach cancer Maternal Grandfather        Tobacco Use    Smoking status: Former Smoker     Packs/day: 2.00     Years: 46.00     Pack years: 92.00     Last attempt to quit: 10/15/2018     Years since quittin.3    Smokeless tobacco: Never Used   Substance and Sexual Activity    Alcohol use: No    Drug use: Yes     Types: Marijuana     Comment: every day    Sexual activity: Not Currently     Birth control/protection: None, Post-menopausal      Comment: single     Review of Systems   Unable to perform ROS: acuity of condition     Objective:     Vital Signs (Most Recent):  Temp: 98.7 °F (37.1 °C) (02/18/20 1641)  Pulse: (!) 121 (02/18/20 1739)  Resp: 18 (02/18/20 1744)  BP: (!) 141/97 (02/18/20 1744)  SpO2: 100 % (02/18/20 1739) Vital Signs (24h Range):  Temp:  [98.3 °F (36.8 °C)-98.7 °F (37.1 °C)] 98.7 °F (37.1 °C)  Pulse:  [104-141] 121  Resp:  [14-20] 18  SpO2:  [96 %-100 %] 100 %  BP: (105-145)/() 141/97     Weight: 54 kg (119 lb)  Body mass index is 21.08 kg/m².    SpO2: 100 %  O2 Device (Oxygen Therapy): room air      Intake/Output Summary (Last 24 hours) at 2/18/2020 1751  Last data filed at 2/18/2020 1741  Gross per 24 hour   Intake 4100 ml   Output --   Net 4100 ml       Lines/Drains/Airways     Central Venous Catheter Line            Port A Cath Single Lumen 02/18/20 1008 left subclavian less than 1 day          Peripheral Intravenous Line                 Peripheral IV - Single Lumen 02/18/20 1158 20 G Right Antecubital less than 1 day         Peripheral IV - Single Lumen 02/18/20 1449 22 G Left Hand less than 1 day                Physical Exam   Constitutional: She appears distressed.   Cardiovascular: Regular rhythm, normal heart sounds and intact distal pulses. Tachycardia present.   Pulmonary/Chest: Effort normal and breath sounds normal.   Abdominal: Soft.   Musculoskeletal:        Right lower leg: She exhibits no edema.        Left lower leg: She exhibits no edema.   Vitals reviewed.      Significant Labs:   CMP   Recent Labs   Lab 02/18/20  1005      K 3.7      CO2 22*   *   BUN 15   CREATININE 1.1   CALCIUM 9.6   PROT 7.4   ALBUMIN 3.5   BILITOT 0.3   ALKPHOS 86   AST 58*   ALT 22   ANIONGAP 13   ESTGFRAFRICA >60   EGFRNONAA 55*   , CBC   Recent Labs   Lab 02/18/20  1005   WBC 15.15*   HGB 13.7   HCT 41.9      , Lipid Panel No results for input(s): CHOL, HDL, LDLCALC, TRIG, CHOLHDL in the last 48 hours.  and Troponin   Recent Labs   Lab 02/18/20  1005   TROPONINI 5.664*       Significant Imaging: EKG: NSR, t wave abnormality laterally

## 2020-02-18 NOTE — H&P
"[facility] Hospital Medicine  History & Physical    Patient Name: Xiomy Duncan  MRN: [id number]  Admission Date: 2/18/2020  Attending Physician: Eulogio Wright MD   Primary Care Provider: Arnie Monson MD         Patient information was obtained from patient, past medical records and ER records.     Subjective:     Principal Problem:NSTEMI (non-ST elevated myocardial infarction)    Chief Complaint:   Chief Complaint   Patient presents with    Abdominal Pain     pt c/o abdominal pain and vomiting x 2 days. Seen at [facility] yesterday. Denies diarrhea         HPI: 60 year old female with gastric cancer s/p subtotal gastrectomy and former smoker who presented with abdominal pain of one day in evolution. During my evaluation, patient could not provide any history due to severe pain and laying in fetal position. She was alert and made eye contact. Per ED staff, patient complained of severe abdominal pain since yesterday. Associated symptoms include nausea, vomiting "bile", cough and inability to tolerate oral meds. When asked about chemotherapy patient could not answer question. Per ED staff, family member stated last chemoradiation therapy was in August/2019. Has a port in place. Also reported daily use of marijuana. No other reported symptoms.     In the ED, patient was tachycardic to 140s, with borderline hypotension and in severe pain. Was given haloperidol IV, lorazepam IV, morphine IV, zofran IV and 3L of fluids. Pain relived temporarily. Breathing stable at room air. A CTA of chest and CT of abdomen with contrast showed peribronchial thickening, septal interlobular thickening, felice hilar ground glass changes, scattered tree in bud nodules, small bilateral pleural effusions and wall thickening ascending colon/transverse colon. No pulmonary embolism, pneumothorax, acute pathology to liver/kidney, evidence of abscess or perforation. Troponin significant for level of 5 with no ST " segment elevation. Apparently no chest pain. BNP very elevated 2,496. Lactic acid 2.9. Cardiology, pulm and GI consulted. IV abx started. Admitted to hospital medicine.    Past Medical History:   Diagnosis Date    Anemia     Anxiety     Asthma     Cancer determined by biopsy of stomach 12/2018    Cannabis abuse, daily use     Colon polyps     Encounter for blood transfusion     GERD (gastroesophageal reflux disease) 8/7/2014    Hoarseness 8/7/2014    Hypertension     S/P subtotal gastrectomy 02/08/2019    Seizures     LAST SEIZURE >6 YEARS AGO    Thyroid nodule     Ulcer     Vaginal delivery     x4       Past Surgical History:   Procedure Laterality Date    CHOLECYSTECTOMY      ESOPHAGOGASTRODUODENOSCOPY N/A 12/15/2018    Procedure: ESOPHAGOGASTRODUODENOSCOPY (EGD);  Surgeon: Alisson Villagomez MD;  Location: [facility] ENDO;  Service: Endoscopy;  Laterality: N/A;    GASTRECTOMY N/A 2/8/2019    Procedure: OPEN GASTRECTOMY- radical subtotal;  Surgeon: Jesus Tripp MD;  Location: [facility] OR;  Service: General;  Laterality: N/A;  OPEN  GASTRECTOMY  PER ABIEL ON 1/29/19  @ 302PM-LO  RN PRE OP 2-1-19    HERNIA REPAIR N/A 01/15/2020    incisional hernia repair    PORTACATH PLACEMENT Left 04/2019    TONSILLECTOMY      TUBAL LIGATION         Review of patient's allergies indicates:   Allergen Reactions    Penicillins Rash       Current Facility-Administered Medications on File Prior to Encounter   Medication    lidocaine (PF) 10 mg/ml (1%) injection 10 mg     Current Outpatient Medications on File Prior to Encounter   Medication Sig    ALPRAZolam (XANAX) 0.5 MG tablet Take 1 tablet (0.5 mg total) by mouth 2 (two) times daily as needed for Anxiety.    chlorzoxazone (PARAFON FORTE) 500 mg Tab TAKE 1 TABLET (500 MG TOTAL) BY MOUTH 4 (FOUR) TIMES DAILY AS NEEDED.    cloNIDine (CATAPRES) 0.1 MG tablet Take 1 tablet (0.1 mg total) by mouth 2 (two) times daily.    diphenoxylate-atropine 2.5-0.025 mg  (LOMOTIL) 2.5-0.025 mg per tablet TAKE 1 TO 2 TABLETS EVERY 4 TO 6 HOURS AS NEEDED DIARRHEA    estrogen, conjugated,-medroxyprogesterone (PREMPRO) 0.45-1.5 mg per tablet Take 1 tablet by mouth once daily.    gabapentin (NEURONTIN) 100 MG capsule Take 100 mg by mouth 3 (three) times daily.     HYDROcodone-acetaminophen (NORCO) 5-325 mg per tablet Take 1 tablet by mouth every 6 (six) hours as needed for Pain.    megestrol (MEGACE) 400 mg/10 mL (40 mg/mL) Susp 10 MLS BY MOUTH TWICE DAILY FOR 30 DAYS    metoclopramide HCl (REGLAN) 5 MG tablet TAKE 1 TABLET BY MOUTH 3 TIMES DAILY BEFORE MEALS    mirtazapine (REMERON) 15 MG tablet Take 15 mg by mouth once daily.    ondansetron (ZOFRAN) 8 MG tablet Take 1 tablet (8 mg total) by mouth every 8 (eight) hours as needed for Nausea.    oxyCODONE-acetaminophen (PERCOCET) 5-325 mg per tablet Take 1 tablet by mouth every 4 (four) hours as needed for Pain.    oxyCODONE-acetaminophen (PERCOCET) 7.5-325 mg per tablet Take 1 tablet by mouth every 12 (twelve) hours as needed for Pain. GREATER THAN 7 DAY QUANTITY MEDICALLY NECESSARY    pantoprazole (PROTONIX) 40 MG tablet Take 1 tablet (40 mg total) by mouth once daily.    prochlorperazine (COMPAZINE) 10 MG tablet Take 1 tablet (10 mg total) by mouth 3 (three) times daily as needed.    tiotropium-olodaterol (STIOLTO RESPIMAT) 2.5-2.5 mcg/actuation Mist TAKE 1 PUFF BY MOUTH EVERY DAY (RX CAN BE REFILLED EVERY 60 DAYS)    apixaban (ELIQUIS ORAL) Take 5 mg by mouth 2 (two) times daily.      Family History     Problem Relation (Age of Onset)    Bone cancer Mother    Diabetes Maternal Uncle    Hypertension Brother, Maternal Uncle, Maternal Grandmother    Stomach cancer Maternal Grandfather        Tobacco Use    Smoking status: Former Smoker     Packs/day: 2.00     Years: 46.00     Pack years: 92.00     Last attempt to quit: 10/15/2018     Years since quittin.3    Smokeless tobacco: Never Used   Substance and Sexual Activity     Alcohol use: No    Drug use: Yes     Types: Marijuana     Comment: every day    Sexual activity: Not Currently     Birth control/protection: None, Post-menopausal     Comment: single     Review of Systems   Reason unable to perform ROS: in severe pain and unable to answer questions.     Objective:     Vital Signs (Most Recent):  Temp: 98.7 °F (37.1 °C) (02/18/20 1641)  Pulse: (!) 122 (02/18/20 1702)  Resp: 20 (02/18/20 1653)  BP: 125/78 (02/18/20 1701)  SpO2: 100 % (02/18/20 1701) Vital Signs (24h Range):  Temp:  [98.3 °F (36.8 °C)-98.7 °F (37.1 °C)] 98.7 °F (37.1 °C)  Pulse:  [104-141] 122  Resp:  [14-20] 20  SpO2:  [96 %-100 %] 100 %  BP: (105-145)/() 125/78     Weight: 54 kg (119 lb)  Body mass index is 21.08 kg/m².    Physical Exam   Constitutional: She appears well-developed. She appears distressed.   Holding onto abdomen in fetal position   Eyes: Conjunctivae and EOM are normal.   Neck: Normal range of motion.   Cardiovascular:   Sinus tachycardia   Pulmonary/Chest: Effort normal. She has wheezes (mild expiratory wheeze). She has no rales.   Abdominal: She exhibits no distension. There is tenderness.   Hypoactive bowel sounds   Musculoskeletal: She exhibits no edema.   Neurological: She is alert.   Skin: Skin is warm and dry. Capillary refill takes less than 2 seconds. She is not diaphoretic.   Nursing note and vitals reviewed.        CRANIAL NERVES     CN III, IV, VI   Extraocular motions are normal.        Significant Labs: All pertinent labs within the past 24 hours have been reviewed.    Significant Imaging: I have reviewed all pertinent imaging results/findings within the past 24 hours.  I have reviewed and interpreted all pertinent imaging results/findings within the past 24 hours.    Assessment/Plan:     * NSTEMI (non-ST elevated myocardial infarction)  Troponin of 5 with no ST segment elevation very concerning for NSTEMI  Could not assess for chest pain as patient could not provide history  due to severe abdominal pain. Did not verbalize chest pain however.   Unsure if history of cardiac disease. Elevated BNP and possible pulmonary edema highly suspicious for new onset heart failure   Patient septic and stable at room air therefore hold off on diuresis  On heparin infusion. Load with aspirin/plavix, high dose statin and BB  Discussed with Cardiology who will see patient today  Repeat troponin x 2  Echocardiogram STAT        Sepsis  Unsure if infection vs inflammatory or related to radiation therapy  Is afebrile but with leukocytosis, tachycardia, borderline hypotension and inflammation in lungs and colon  Agree with empiric broad spectrum IV antibiotics for now  Blood cultures sent. Will f/u results        Abnormal CT of the chest  With plenty of interstitial and peribronchial inflammation, per CT  Surprisingly patient is not hypoxic  2/2 radiation vs infection vs inflammatory  Agree with empiric IV abx and pulmonology consultation  Hold of on furosemide for now      Abnormal CT of the abdomen  With inflammation to ascending and transverse colon without clear evidence of abscess, obstruction or perforation  2/2 radiation vs infection vs inflammatory  Agree with broad spectrum IV antibiotics and gastroenterology consultation  NPO for now given nausea, vomiting and severe pain  Anti-emetic and narcotic pain management  S/p 3L isotonic fluids in the ED. Hold off on fluids for now as patient could have new onset heart failure      Elevated brain natriuretic peptide (BNP) level  Suspect new onset heart failure  Workup in progress      S/P subtotal gastrectomy  For mass removal      Carcinoma of antrum of stomach  Unsure if patient still undergoing chemoradiation  Will discuss with patient when able  Has port in place      GERD (gastroesophageal reflux disease)  History of gastrointestinal bleed secondary to gastric mass at the time. S/p subtotal gastrectomy  Pantoprazole IV for now as she is still high risk  for re-bleed and need ROYAL/heparin for NSTEMI        HTN (hypertension)  BP not elevated  Start antihypertensives when indicated        VTE Risk Mitigation (From admission, onward)         Ordered     IP VTE HIGH RISK PATIENT  Once      02/18/20 1717     Place sequential compression device  Until discontinued      02/18/20 1717     Place RAMESH hose  Until discontinued      02/18/20 1717     heparin 25,000 units in dextrose 5% (100 units/ml) IV bolus from bag - ADDITIONAL PRN BOLUS - 60 units/kg (max bolus 4000 units)  As needed (PRN)     Question:  Heparin Infusion Adjustment (DO NOT MODIFY ANSWER)  Answer:  \\[email or web address]\epic\Images\Pharmacy\HeparinInfusions\heparin LOW INTENSITY nomogram for [facility] [id number].pdf    02/18/20 1422     heparin 25,000 units in dextrose 5% (100 units/ml) IV bolus from bag - ADDITIONAL PRN BOLUS - 30 units/kg (max bolus 4000 units)  As needed (PRN)     Question:  Heparin Infusion Adjustment (DO NOT MODIFY ANSWER)  Answer:  \\[email or web address]\epic\Images\Pharmacy\HeparinInfusions\heparin LOW INTENSITY nomogram for [facility] [id number].pdf    02/18/20 1422     heparin 25,000 units in dextrose 5% 250 mL (100 units/mL) infusion LOW INTENSITY nomogram - [facility]  Continuous     Question:  Heparin Infusion Adjustment (DO NOT MODIFY ANSWER)  Answer:  \\[email or web address]\epic\Images\Pharmacy\HeparinInfusions\heparin LOW INTENSITY nomogram for [facility] [id number].pdf    02/18/20 1422                   Fadia Summers MD  Department of Hospital Medicine   [facility]

## 2020-02-18 NOTE — ASSESSMENT & PLAN NOTE
With inflammation to ascending and transverse colon without clear evidence of abscess, obstruction or perforation  2/2 radiation vs infection vs inflammatory  Agree with broad spectrum IV antibiotics and gastroenterology consultation  NPO for now given nausea, vomiting and severe pain  Anti-emetic and narcotic pain management  S/p 3L isotonic fluids in the ED. Hold off on fluids for now as patient could have new onset heart failure

## 2020-02-18 NOTE — Clinical Note
left ventricle. Angiography performed of the LV. Angiography performed via power injection with 20 mL contrast at 10 mL/s.

## 2020-02-18 NOTE — ASSESSMENT & PLAN NOTE
Unsure if infection vs inflammatory or related to radiation therapy  Is afebrile but with leukocytosis, tachycardia, borderline hypotension and inflammation in lungs and colon  Agree with empiric broad spectrum IV antibiotics for now  Blood cultures sent. Will f/u results

## 2020-02-18 NOTE — ASSESSMENT & PLAN NOTE
History of gastrointestinal bleed secondary to gastric mass at the time. S/p subtotal gastrectomy  Pantoprazole IV for now as she is still high risk for re-bleed and need ROYAL/heparin for NSTEMI

## 2020-02-18 NOTE — ED NOTES
Patient verbalized unable to void at this time aware of need for urine sample, patient verbalized she will try in aminute

## 2020-02-19 PROBLEM — R11.2 INTRACTABLE NAUSEA AND VOMITING: Status: ACTIVE | Noted: 2020-02-19

## 2020-02-19 PROBLEM — R11.0 NAUSEA: Status: ACTIVE | Noted: 2020-02-19

## 2020-02-19 LAB
ALBUMIN SERPL BCP-MCNC: 3.1 G/DL (ref 3.5–5.2)
ALP SERPL-CCNC: 73 U/L (ref 55–135)
ALT SERPL W/O P-5'-P-CCNC: 27 U/L (ref 10–44)
ANION GAP SERPL CALC-SCNC: 9 MMOL/L (ref 8–16)
AORTIC ROOT ANNULUS: 2.69 CM
AORTIC VALVE CUSP SEPERATION: 2.25 CM
APTT BLDCRRT: 47.5 SEC (ref 21–32)
ASCENDING AORTA: 2.27 CM
AST SERPL-CCNC: 53 U/L (ref 10–40)
AV INDEX (PROSTH): 0.87
AV MEAN GRADIENT: 1 MMHG
AV PEAK GRADIENT: 2 MMHG
AV VALVE AREA: 3.69 CM2
AV VELOCITY RATIO: 0.91
BASOPHILS # BLD AUTO: 0.02 K/UL (ref 0–0.2)
BASOPHILS NFR BLD: 0.1 % (ref 0–1.9)
BILIRUB SERPL-MCNC: 0.5 MG/DL (ref 0.1–1)
BSA FOR ECHO PROCEDURE: 1.56 M2
BUN SERPL-MCNC: 18 MG/DL (ref 6–20)
CALCIUM SERPL-MCNC: 9 MG/DL (ref 8.7–10.5)
CHLORIDE SERPL-SCNC: 107 MMOL/L (ref 95–110)
CO2 SERPL-SCNC: 23 MMOL/L (ref 23–29)
CREAT SERPL-MCNC: 1.1 MG/DL (ref 0.5–1.4)
CV ECHO LV RWT: 0.44 CM
DIFFERENTIAL METHOD: ABNORMAL
DOP CALC AO PEAK VEL: 0.79 M/S
DOP CALC AO VTI: 9.84 CM
DOP CALC LVOT AREA: 4.3 CM2
DOP CALC LVOT DIAMETER: 2.33 CM
DOP CALC LVOT PEAK VEL: 0.72 M/S
DOP CALC LVOT STROKE VOLUME: 36.31 CM3
DOP CALCLVOT PEAK VEL VTI: 8.52 CM
E WAVE DECELERATION TIME: 78.94 MSEC
E/A RATIO: 2.33
E/E' RATIO: 11.63 M/S
ECHO LV POSTERIOR WALL: 0.98 CM (ref 0.6–1.1)
EOSINOPHIL # BLD AUTO: 0 K/UL (ref 0–0.5)
EOSINOPHIL NFR BLD: 0 % (ref 0–8)
ERYTHROCYTE [DISTWIDTH] IN BLOOD BY AUTOMATED COUNT: 15.5 % (ref 11.5–14.5)
EST. GFR  (AFRICAN AMERICAN): >60 ML/MIN/1.73 M^2
EST. GFR  (NON AFRICAN AMERICAN): 55 ML/MIN/1.73 M^2
FRACTIONAL SHORTENING: 13 % (ref 28–44)
GLUCOSE SERPL-MCNC: 131 MG/DL (ref 70–110)
HCT VFR BLD AUTO: 40.3 % (ref 37–48.5)
HGB BLD-MCNC: 13.4 G/DL (ref 12–16)
IMM GRANULOCYTES # BLD AUTO: 0.11 K/UL (ref 0–0.04)
IMM GRANULOCYTES NFR BLD AUTO: 0.5 % (ref 0–0.5)
INTERVENTRICULAR SEPTUM: 0.95 CM (ref 0.6–1.1)
IVRT: 0.11 MSEC
LA MAJOR: 4.61 CM
LA MINOR: 4.89 CM
LA WIDTH: 3.59 CM
LEFT ATRIUM SIZE: 3.54 CM
LEFT ATRIUM VOLUME INDEX: 32.8 ML/M2
LEFT ATRIUM VOLUME: 51.27 CM3
LEFT INTERNAL DIMENSION IN SYSTOLE: 3.91 CM (ref 2.1–4)
LEFT VENTRICLE DIASTOLIC VOLUME INDEX: 58.75 ML/M2
LEFT VENTRICLE DIASTOLIC VOLUME: 91.92 ML
LEFT VENTRICLE MASS INDEX: 93 G/M2
LEFT VENTRICLE SYSTOLIC VOLUME INDEX: 42.4 ML/M2
LEFT VENTRICLE SYSTOLIC VOLUME: 66.37 ML
LEFT VENTRICULAR INTERNAL DIMENSION IN DIASTOLE: 4.49 CM (ref 3.5–6)
LEFT VENTRICULAR MASS: 145.44 G
LV LATERAL E/E' RATIO: 10.33 M/S
LV SEPTAL E/E' RATIO: 13.29 M/S
LYMPHOCYTES # BLD AUTO: 1.2 K/UL (ref 1–4.8)
LYMPHOCYTES NFR BLD: 6 % (ref 18–48)
MCH RBC QN AUTO: 31.2 PG (ref 27–31)
MCHC RBC AUTO-ENTMCNC: 33.3 G/DL (ref 32–36)
MCV RBC AUTO: 94 FL (ref 82–98)
MONOCYTES # BLD AUTO: 2.1 K/UL (ref 0.3–1)
MONOCYTES NFR BLD: 10.3 % (ref 4–15)
MV PEAK A VEL: 0.4 M/S
MV PEAK E VEL: 0.93 M/S
NEUTROPHILS # BLD AUTO: 16.9 K/UL (ref 1.8–7.7)
NEUTROPHILS NFR BLD: 83.1 % (ref 38–73)
NRBC BLD-RTO: 0 /100 WBC
PISA TR MAX VEL: 2.43 M/S
PLATELET # BLD AUTO: 235 K/UL (ref 150–350)
PMV BLD AUTO: 12 FL (ref 9.2–12.9)
POTASSIUM SERPL-SCNC: 4 MMOL/L (ref 3.5–5.1)
PROCALCITONIN SERPL IA-MCNC: 0.1 NG/ML
PROT SERPL-MCNC: 6.6 G/DL (ref 6–8.4)
PV PEAK VELOCITY: 0.5 CM/S
RA MAJOR: 3.37 CM
RA WIDTH: 2.8 CM
RBC # BLD AUTO: 4.3 M/UL (ref 4–5.4)
RIGHT VENTRICULAR END-DIASTOLIC DIMENSION: 3.14 CM
RV TISSUE DOPPLER FREE WALL SYSTOLIC VELOCITY 1 (APICAL 4 CHAMBER VIEW): 7.28 CM/S
SINUS: 2.58 CM
SODIUM SERPL-SCNC: 139 MMOL/L (ref 136–145)
STJ: 2.33 CM
TDI LATERAL: 0.09 M/S
TDI SEPTAL: 0.07 M/S
TDI: 0.08 M/S
TOXIC GRANULES BLD QL SMEAR: PRESENT
TR MAX PG: 24 MMHG
TRICUSPID ANNULAR PLANE SYSTOLIC EXCURSION: 1.11 CM
TROPONIN I SERPL DL<=0.01 NG/ML-MCNC: 4.48 NG/ML (ref 0–0.03)
WBC # BLD AUTO: 20.33 K/UL (ref 3.9–12.7)

## 2020-02-19 PROCEDURE — 84484 ASSAY OF TROPONIN QUANT: CPT

## 2020-02-19 PROCEDURE — 84145 PROCALCITONIN (PCT): CPT

## 2020-02-19 PROCEDURE — 99233 PR SUBSEQUENT HOSPITAL CARE,LEVL III: ICD-10-PCS | Mod: ,,, | Performed by: INTERNAL MEDICINE

## 2020-02-19 PROCEDURE — 21400001 HC TELEMETRY ROOM

## 2020-02-19 PROCEDURE — 99233 SBSQ HOSP IP/OBS HIGH 50: CPT | Mod: ,,, | Performed by: INTERNAL MEDICINE

## 2020-02-19 PROCEDURE — 80053 COMPREHEN METABOLIC PANEL: CPT

## 2020-02-19 PROCEDURE — C9113 INJ PANTOPRAZOLE SODIUM, VIA: HCPCS | Performed by: INTERNAL MEDICINE

## 2020-02-19 PROCEDURE — 36415 COLL VENOUS BLD VENIPUNCTURE: CPT

## 2020-02-19 PROCEDURE — 99232 SBSQ HOSP IP/OBS MODERATE 35: CPT | Mod: 25,,, | Performed by: INTERNAL MEDICINE

## 2020-02-19 PROCEDURE — 25000003 PHARM REV CODE 250: Performed by: INTERNAL MEDICINE

## 2020-02-19 PROCEDURE — 85025 COMPLETE CBC W/AUTO DIFF WBC: CPT

## 2020-02-19 PROCEDURE — 99232 PR SUBSEQUENT HOSPITAL CARE,LEVL II: ICD-10-PCS | Mod: 25,,, | Performed by: INTERNAL MEDICINE

## 2020-02-19 PROCEDURE — 63600175 PHARM REV CODE 636 W HCPCS: Performed by: INTERNAL MEDICINE

## 2020-02-19 PROCEDURE — 63600175 PHARM REV CODE 636 W HCPCS: Performed by: EMERGENCY MEDICINE

## 2020-02-19 PROCEDURE — 85730 THROMBOPLASTIN TIME PARTIAL: CPT

## 2020-02-19 RX ORDER — LORAZEPAM 2 MG/ML
1 INJECTION INTRAMUSCULAR ONCE
Status: COMPLETED | OUTPATIENT
Start: 2020-02-19 | End: 2020-02-19

## 2020-02-19 RX ORDER — METOPROLOL TARTRATE 50 MG/1
50 TABLET ORAL 2 TIMES DAILY
Status: DISCONTINUED | OUTPATIENT
Start: 2020-02-19 | End: 2020-02-21

## 2020-02-19 RX ORDER — ONDANSETRON 2 MG/ML
8 INJECTION INTRAMUSCULAR; INTRAVENOUS EVERY 8 HOURS PRN
Status: DISCONTINUED | OUTPATIENT
Start: 2020-02-19 | End: 2020-02-21 | Stop reason: HOSPADM

## 2020-02-19 RX ORDER — FUROSEMIDE 10 MG/ML
20 INJECTION INTRAMUSCULAR; INTRAVENOUS ONCE
Status: COMPLETED | OUTPATIENT
Start: 2020-02-19 | End: 2020-02-19

## 2020-02-19 RX ADMIN — LORAZEPAM 1 MG: 2 INJECTION INTRAMUSCULAR; INTRAVENOUS at 05:02

## 2020-02-19 RX ADMIN — PIPERACILLIN AND TAZOBACTAM 4.5 G: 4; .5 INJECTION, POWDER, FOR SOLUTION INTRAVENOUS at 05:02

## 2020-02-19 RX ADMIN — CLOPIDOGREL BISULFATE 75 MG: 75 TABLET ORAL at 09:02

## 2020-02-19 RX ADMIN — METOPROLOL TARTRATE 50 MG: 50 TABLET, FILM COATED ORAL at 09:02

## 2020-02-19 RX ADMIN — ASPIRIN 81 MG: 81 TABLET, COATED ORAL at 09:02

## 2020-02-19 RX ADMIN — VANCOMYCIN HYDROCHLORIDE 750 MG: 750 INJECTION, POWDER, LYOPHILIZED, FOR SOLUTION INTRAVENOUS at 03:02

## 2020-02-19 RX ADMIN — PIPERACILLIN AND TAZOBACTAM 4.5 G: 4; .5 INJECTION, POWDER, FOR SOLUTION INTRAVENOUS at 09:02

## 2020-02-19 RX ADMIN — ATORVASTATIN CALCIUM 80 MG: 40 TABLET, FILM COATED ORAL at 09:02

## 2020-02-19 RX ADMIN — FUROSEMIDE 20 MG: 10 INJECTION, SOLUTION INTRAVENOUS at 01:02

## 2020-02-19 RX ADMIN — ONDANSETRON HYDROCHLORIDE 4 MG: 2 SOLUTION INTRAMUSCULAR; INTRAVENOUS at 10:02

## 2020-02-19 RX ADMIN — METOPROLOL TARTRATE 25 MG: 25 TABLET ORAL at 09:02

## 2020-02-19 RX ADMIN — PROMETHAZINE HYDROCHLORIDE 12.5 MG: 25 INJECTION INTRAMUSCULAR; INTRAVENOUS at 12:02

## 2020-02-19 RX ADMIN — PANTOPRAZOLE SODIUM 40 MG: 40 INJECTION, POWDER, LYOPHILIZED, FOR SOLUTION INTRAVENOUS at 09:02

## 2020-02-19 NOTE — PROGRESS NOTES
[facility]  Cardiology  Progress Note    Patient Name: Xiomy Duncan  MRN: [id number]  Admission Date: 2/18/2020  Hospital Length of Stay: 1 days  Code Status: Full Code   Attending Physician: Fadia Wall MD   Primary Care Physician: Arnie Monson MD  Expected Discharge Date:   Principal Problem:NSTEMI (non-ST elevated myocardial infarction)    Subjective:     Hospital Course:   No notes on file        Review of Systems   Constitution: Positive for malaise/fatigue.   Cardiovascular: Negative for chest pain, irregular heartbeat, leg swelling, orthopnea and palpitations.   Respiratory: Negative for shortness of breath.    Gastrointestinal: Positive for abdominal pain, nausea and vomiting.     Objective:     Vital Signs (Most Recent):  Temp: 99.1 °F (37.3 °C) (02/19/20 0718)  Pulse: (!) 118 (02/19/20 0755)  Resp: 18 (02/19/20 0718)  BP: (!) 142/91 (02/19/20 0718)  SpO2: 95 % (02/19/20 0718) Vital Signs (24h Range):  Temp:  [97.5 °F (36.4 °C)-99.1 °F (37.3 °C)] 99.1 °F (37.3 °C)  Pulse:  [104-141] 118  Resp:  [14-20] 18  SpO2:  [84 %-100 %] 95 %  BP: (105-145)/() 142/91     Weight: 55.1 kg (121 lb 7.6 oz)  Body mass index is 21.52 kg/m².     SpO2: 95 %  O2 Device (Oxygen Therapy): room air      Intake/Output Summary (Last 24 hours) at 2/19/2020 0901  Last data filed at 2/18/2020 1741  Gross per 24 hour   Intake 4100 ml   Output --   Net 4100 ml       Lines/Drains/Airways     Central Venous Catheter Line            Port A Cath Single Lumen 02/18/20 1008 left subclavian less than 1 day          Peripheral Intravenous Line                 Peripheral IV - Single Lumen 02/18/20 1158 20 G Right Antecubital less than 1 day         Peripheral IV - Single Lumen 02/18/20 1449 22 G Left Hand less than 1 day                Physical Exam   Constitutional: She appears distressed.   Cardiovascular: Regular rhythm, normal heart sounds and intact distal pulses. Tachycardia present.    Pulmonary/Chest: Effort normal and breath sounds normal.   Abdominal: Soft.   Musculoskeletal:        Right lower leg: She exhibits no edema.        Left lower leg: She exhibits no edema.   Skin: She is not diaphoretic.   Vitals reviewed.      Significant Labs:   BMP:   Recent Labs   Lab 02/18/20  1005 02/19/20  0450   * 131*    139   K 3.7 4.0    107   CO2 22* 23   BUN 15 18   CREATININE 1.1 1.1   CALCIUM 9.6 9.0   , CBC   Recent Labs   Lab 02/18/20  1005 02/19/20  0450   WBC 15.15* 20.33*   HGB 13.7 13.4   HCT 41.9 40.3    235   , Lipid Panel No results for input(s): CHOL, HDL, LDLCALC, TRIG, CHOLHDL in the last 48 hours. and Troponin   Recent Labs   Lab 02/18/20  1005 02/18/20  1843 02/19/20  0013   TROPONINI 5.664* 4.569* 4.481*       Significant Imaging: tele: sinus tachycardia    Assessment and Plan:         * NSTEMI (non-ST elevated myocardial infarction)  Admit EKG with t wave abnormality. Plavix load. Heparin gtt. Statin added. Trending down. Echo pending.     Elevated brain natriuretic peptide (BNP) level  Echo pending.       VTE Risk Mitigation (From admission, onward)         Ordered     IP VTE HIGH RISK PATIENT  Once      02/18/20 1717     Place sequential compression device  Until discontinued      02/18/20 1717     Place RAMESH hose  Until discontinued      02/18/20 1717     heparin 25,000 units in dextrose 5% (100 units/ml) IV bolus from bag - ADDITIONAL PRN BOLUS - 60 units/kg (max bolus 4000 units)  As needed (PRN)     Question:  Heparin Infusion Adjustment (DO NOT MODIFY ANSWER)  Answer:  [email or web address]\epic\Images\Pharmacy\HeparinInfusions\heparin LOW INTENSITY nomogram for [facility] [id number].pdf    02/18/20 1422     heparin 25,000 units in dextrose 5% (100 units/ml) IV bolus from bag - ADDITIONAL PRN BOLUS - 30 units/kg (max bolus 4000 units)  As needed (PRN)     Question:  Heparin Infusion Adjustment (DO NOT MODIFY ANSWER)  Answer:   [email or web address]\epic\Images\Pharmacy\HeparinInfusions\heparin LOW INTENSITY nomogram for [facility] [id number].pdf    02/18/20 1422     heparin 25,000 units in dextrose 5% 250 mL (100 units/mL) infusion LOW INTENSITY nomogram - [facility]  Continuous     Question:  Heparin Infusion Adjustment (DO NOT MODIFY ANSWER)  Answer:  [email or web address]\epic\Images\Pharmacy\HeparinInfusions\heparin LOW INTENSITY nomogram for [facility] [id number].pdf    02/18/20 1422                Juan Castellanos MD  Cardiology  [facility] [place]

## 2020-02-19 NOTE — PLAN OF CARE
Problem: Fall Injury Risk  Goal: Absence of Fall and Fall-Related Injury  Outcome: Ongoing, Progressing  Intervention: Identify and Manage Contributors to Fall Injury Risk  Flowsheets (Taken 2/19/2020 0516)  Medication Review/Management: medications reviewed  Intervention: Promote Injury-Free Environment  Flowsheets (Taken 2/19/2020 0516)  Safety Promotion/Fall Prevention: instructed to call staff for mobility     Problem: Adult Inpatient Plan of Care  Goal: Plan of Care Review  Outcome: Ongoing, Progressing  Flowsheets (Taken 2/19/2020 0516)  Plan of Care Reviewed With: patient  Goal: Patient-Specific Goal (Individualization)  Outcome: Ongoing, Progressing  Goal: Absence of Hospital-Acquired Illness or Injury  Outcome: Ongoing, Progressing  Goal: Optimal Comfort and Wellbeing  Outcome: Ongoing, Progressing  Goal: Readiness for Transition of Care  Outcome: Ongoing, Progressing  Goal: Rounds/Family Conference  Outcome: Ongoing, Progressing     Problem: Infection  Goal: Infection Symptom Resolution  Outcome: Ongoing, Progressing

## 2020-02-19 NOTE — ASSESSMENT & PLAN NOTE
Troponin of 5 with no ST segment elevation very concerning for NSTEMI  Unsure if history of cardiac disease. Elevated BNP and possible pulmonary edema highly suspicious for new onset heart failure   On heparin infusion. Loaded with aspirin/plavix, high dose statin and BB  Echocardiogram significant for LVEF 25% and grade 3 diastolic dysfunction.   Continue with medical management (ROYAL, BB, statin and anticoagulation)  Will stop heparin tomorrow to complete 48 hours of anticoagulation  Appreciate further Cardiology

## 2020-02-19 NOTE — PLAN OF CARE
Patient arrived on unit from the ED per escort very drowsy but responds when touched and name called. Patient does not awaken enough to answer questions at present. Noted resting with eyes closed. Bed in lowest position and call light within reach. Bed alarm on. Heparin drip infusing per order. Assessment per flow sheets. Will continue to monitor.  Problem: Fall Injury Risk  Goal: Absence of Fall and Fall-Related Injury  Outcome: Ongoing, Progressing     Problem: Adult Inpatient Plan of Care  Goal: Plan of Care Review  Outcome: Ongoing, Progressing

## 2020-02-19 NOTE — ASSESSMENT & PLAN NOTE
Admit EKG with t wave abnormality. Plavix load. Heparin gtt. Statin added. Trending down. Echo pending.

## 2020-02-19 NOTE — PLAN OF CARE
02/19/20 0901   Discharge Assessment   Assessment Type Discharge Planning Assessment   Confirmed/corrected address and phone number on facesheet? Yes   Assessment information obtained from? Patient   Prior to hospitilization cognitive status: Alert/Oriented   Prior to hospitalization functional status: Independent   Current cognitive status: Alert/Oriented   Current Functional Status: Independent   Facility Arrived From: Home    Lives With alone   Able to Return to Prior Arrangements yes   Is patient able to care for self after discharge? Yes   Who are your caregiver(s) and their phone number(s)? Lakisha Relative     [phone]    Patient's perception of discharge disposition home or selfcare   Readmission Within the Last 30 Days no previous admission in last 30 days   Patient currently being followed by outpatient case management? No   Patient currently receives any other outside agency services? No   Equipment Currently Used at Home none   Do you have any problems affording any of your prescribed medications? No   Is the patient taking medications as prescribed? yes   Does the patient have transportation home? Yes   Transportation Anticipated family or friend will provide   Dialysis Name and Scheduled days N/A    Does the patient receive services at the [facility]? No   Discharge Plan A Home with family   DME Needed Upon Discharge  none   Patient/Family in Agreement with Plan yes   Does the patient have transportation to healthcare appointments? Yes     SW to patient's room to discuss Helping the patient manage care at home.   TN/SW role explained to pt.  Patient identified using two identifiers:  Name and date of birth.    SW's name and contact info placed on white board.     Person who will help at home if needed:  Lakisha, pt's daughter.     Preferred pharmacy:   [id number] - [place] - [address]  [address]  [place] [id number]  Phone: [phone] Fax: [phone]    [id number] -  [facility] - [address]  [address]  [place] [id number]  Phone: [phone] Fax: [phone]      "Help at home Questions" discussed and placed in "My Health Packet" and placed at bedside.     Preferred Appointment time: Morning appointments.         "

## 2020-02-19 NOTE — ASSESSMENT & PLAN NOTE
With inflammation to ascending and transverse colon without clear evidence of abscess, obstruction or perforation  2/2 radiation vs infection vs inflammatory vs edema  Agree with broad spectrum IV antibiotics and gastroenterology consultation  No longer with pain but with nausea and vomiting  Anti-emetics and trial of lasix given new diagnosis of heart failure  Of note, patient endorsed recent sick contacts. Could be viral

## 2020-02-19 NOTE — ASSESSMENT & PLAN NOTE
With plenty of interstitial and peribronchial inflammation vs edema, per CT  Surprisingly patient is not hypoxic  2/2 radiation vs infection vs inflammatory vs edema  Agree with empiric IV abx and pulmonology consultation  Echo with LVEF 25% and BNP very high. Likely cardiogenic edema  Agree with trial of lasix

## 2020-02-19 NOTE — HPI
Patient is 60 y.o. female  has a past medical history of Anemia, Anxiety, Asthma, Cancer determined by biopsy of stomach (12/2018), Cannabis abuse, daily use, Colon polyps, Encounter for blood transfusion, GERD (gastroesophageal reflux disease) (8/7/2014), Hoarseness (8/7/2014), Hypertension, S/P subtotal gastrectomy (02/08/2019), Seizures, Thyroid nodule, Ulcer, and Vaginal delivery. presented to [facility] on 2/18/20 with n/v x 2 days.  No fever/chill. No abdominal pain.  +bilous vomitus.  No chest pain.  No orthopnea.  No pnd.  No lower extremities edema.      In the ED, patient was tachycardic to 140s, with borderline hypotension and in severe pain. Was given haloperidol IV, lorazepam IV, morphine IV, zofran IV and 3L of fluids. Pain relived temporarily. Breathing stable at room air. A CTA of chest and CT of abdomen with contrast showed peribronchial thickening, septal interlobular thickening, felice hilar ground glass changes, scattered tree in bud nodules, small bilateral pleural effusions and wall thickening ascending colon/transverse colon.  Troponin significant for level of 5 with no ST segment elevation. Apparently no chest pain. BNP very elevated 2,496. Lactic acid 2.9. Cardiology, pulm and GI consulted. IV abx started.    Patient was diagnosed with NSTEM and started on heparin and plavix.  In addition patient was started on broad spectrum antibiotics.

## 2020-02-19 NOTE — SUBJECTIVE & OBJECTIVE
Review of Systems   Constitution: Positive for malaise/fatigue.   Cardiovascular: Negative for chest pain, irregular heartbeat, leg swelling, orthopnea and palpitations.   Respiratory: Negative for shortness of breath.    Gastrointestinal: Positive for abdominal pain, nausea and vomiting.     Objective:     Vital Signs (Most Recent):  Temp: 99.1 °F (37.3 °C) (02/19/20 0718)  Pulse: (!) 118 (02/19/20 0755)  Resp: 18 (02/19/20 0718)  BP: (!) 142/91 (02/19/20 0718)  SpO2: 95 % (02/19/20 0718) Vital Signs (24h Range):  Temp:  [97.5 °F (36.4 °C)-99.1 °F (37.3 °C)] 99.1 °F (37.3 °C)  Pulse:  [104-141] 118  Resp:  [14-20] 18  SpO2:  [84 %-100 %] 95 %  BP: (105-145)/() 142/91     Weight: 55.1 kg (121 lb 7.6 oz)  Body mass index is 21.52 kg/m².     SpO2: 95 %  O2 Device (Oxygen Therapy): room air      Intake/Output Summary (Last 24 hours) at 2/19/2020 0901  Last data filed at 2/18/2020 1741  Gross per 24 hour   Intake 4100 ml   Output --   Net 4100 ml       Lines/Drains/Airways     Central Venous Catheter Line            Port A Cath Single Lumen 02/18/20 1008 left subclavian less than 1 day          Peripheral Intravenous Line                 Peripheral IV - Single Lumen 02/18/20 1158 20 G Right Antecubital less than 1 day         Peripheral IV - Single Lumen 02/18/20 1449 22 G Left Hand less than 1 day                Physical Exam   Constitutional: She appears distressed.   Cardiovascular: Regular rhythm, normal heart sounds and intact distal pulses. Tachycardia present.   Pulmonary/Chest: Effort normal and breath sounds normal.   Abdominal: Soft.   Musculoskeletal:        Right lower leg: She exhibits no edema.        Left lower leg: She exhibits no edema.   Skin: She is not diaphoretic.   Vitals reviewed.      Significant Labs:   BMP:   Recent Labs   Lab 02/18/20  1005 02/19/20  0450   * 131*    139   K 3.7 4.0    107   CO2 22* 23   BUN 15 18   CREATININE 1.1 1.1   CALCIUM 9.6 9.0   , CBC    Recent Labs   Lab 02/18/20  1005 02/19/20  0450   WBC 15.15* 20.33*   HGB 13.7 13.4   HCT 41.9 40.3    235   , Lipid Panel No results for input(s): CHOL, HDL, LDLCALC, TRIG, CHOLHDL in the last 48 hours. and Troponin   Recent Labs   Lab 02/18/20  1005 02/18/20  1843 02/19/20  0013   TROPONINI 5.664* 4.569* 4.481*       Significant Imaging: tele: sinus tachycardia

## 2020-02-19 NOTE — CONSULTS
.[facility]  Gastroenterology  Consult Note    Patient Name: Xiomy Duncan  MRN: [id number]  Admission Date: 2/18/2020  Hospital Length of Stay: 1 days  Code Status: Full Code   Primary Care Physician: Arnie Monson MD  Principal Problem:NSTEMI (non-ST elevated myocardial infarction)    Consults  Subjective:     Chief complaint: abdominal pain    HPI: 61 yo woman with hx of abdominal pain and nausea and vomiting for last 2 days was admitted for evaluation and treatement. Patient has been known to hav ehx of gastric ulcer in 12/18 was treated in p[ast. Was on carafate at home. She denies of any melena or BRBPR or diarrhea. Last BM solid was two days prior to coming to hospital.Has been having nausea still no vomiting. Ct scan of abdomen showed questionable ascending colitis. Consulted for evaluation. No hx of colitis in past or has any diarrhea. Had colonoscopy on 6/14 found tywo small polyps removed. Even though patient did not metion any thing abdomut gastric cancer and surgery, I see in primary care note she had gastric  acncer resected and had Chemo and radiation.Presently getting treatment for NSTEMI.  Past medical history:  Past Medical History:   Diagnosis Date    Anemia     Anxiety     Asthma     Cancer determined by biopsy of stomach 12/2018    Cannabis abuse, daily use     Colon polyps     Encounter for blood transfusion     GERD (gastroesophageal reflux disease) 8/7/2014    Hoarseness 8/7/2014    Hypertension     S/P subtotal gastrectomy 02/08/2019    Seizures     LAST SEIZURE >6 YEARS AGO    Thyroid nodule     Ulcer     Vaginal delivery     x4       Past surgical history:  Past Surgical History:   Procedure Laterality Date    CHOLECYSTECTOMY      ESOPHAGOGASTRODUODENOSCOPY N/A 12/15/2018    Procedure: ESOPHAGOGASTRODUODENOSCOPY (EGD);  Surgeon: Alisson Villagomez MD;  [facility];  Service: Endoscopy;  Laterality: N/A;    GASTRECTOMY N/A 2/8/2019     Procedure: OPEN GASTRECTOMY- radical subtotal;  Surgeon: Jesus Tripp MD;  Location: [facility];  Service: General;  Laterality: N/A;  OPEN  GASTRECTOMY  PER ABIEL ON 19  @ 302PM-LO  RN PRE OP 19    HERNIA REPAIR N/A 01/15/2020    incisional hernia repair    PORTACATH PLACEMENT Left 2019    TONSILLECTOMY      TUBAL LIGATION         Social history:  Social History     Socioeconomic History    Marital status: Single     Spouse name: Not on file    Number of children: Not on file    Years of education: Not on file    Highest education level: Not on file   Occupational History    Occupation:      Employer: [employer]     Comment: [employer]   Social Needs    Financial resource strain: Not on file    Food insecurity:     Worry: Not on file     Inability: Not on file    Transportation needs:     Medical: Not on file     Non-medical: Not on file   Tobacco Use    Smoking status: Former Smoker     Packs/day: 2.00     Years: 46.00     Pack years: 92.00     Last attempt to quit: 10/15/2018     Years since quittin.3    Smokeless tobacco: Never Used   Substance and Sexual Activity    Alcohol use: No    Drug use: Yes     Types: Marijuana     Comment: every day    Sexual activity: Not Currently     Birth control/protection: None, Post-menopausal     Comment: single   Lifestyle    Physical activity:     Days per week: Not on file     Minutes per session: Not on file    Stress: Not on file   Relationships    Social connections:     Talks on phone: Not on file     Gets together: Not on file     Attends Hinduism service: Not on file     Active member of club or organization: Not on file     Attends meetings of clubs or organizations: Not on file     Relationship status: Not on file   Other Topics Concern    Not on file   Social History Narrative    Not on file       Family history:  Family History   Problem Relation Age of Onset    Bone cancer Mother     Hypertension Brother      Hypertension Maternal Uncle     Diabetes Maternal Uncle     Hypertension Maternal Grandmother     Stomach cancer Maternal Grandfather     Breast cancer Neg Hx     Colon cancer Neg Hx     Ovarian cancer Neg Hx        Medications:  Facility-Administered Medications Prior to Admission   Medication Dose Route Frequency Provider Last Rate Last Dose    lidocaine (PF) 10 mg/ml (1%) injection 10 mg  1 mL Intradermal Once Jesus Tripp MD         Medications Prior to Admission   Medication Sig Dispense Refill Last Dose    ALPRAZolam (XANAX) 0.5 MG tablet Take 1 tablet (0.5 mg total) by mouth 2 (two) times daily as needed for Anxiety. 60 tablet 0 Past Week    chlorzoxazone (PARAFON FORTE) 500 mg Tab TAKE 1 TABLET (500 MG TOTAL) BY MOUTH 4 (FOUR) TIMES DAILY AS NEEDED.   Past Week    cloNIDine (CATAPRES) 0.1 MG tablet Take 1 tablet (0.1 mg total) by mouth 2 (two) times daily. 60 tablet 5 Past Week    diphenoxylate-atropine 2.5-0.025 mg (LOMOTIL) 2.5-0.025 mg per tablet TAKE 1 TO 2 TABLETS EVERY 4 TO 6 HOURS AS NEEDED DIARRHEA  1 Past Week    estrogen, conjugated,-medroxyprogesterone (PREMPRO) 0.45-1.5 mg per tablet Take 1 tablet by mouth once daily. 28 tablet 11 Past Week    gabapentin (NEURONTIN) 100 MG capsule Take 100 mg by mouth 3 (three) times daily.    Past Week    HYDROcodone-acetaminophen (NORCO) 5-325 mg per tablet Take 1 tablet by mouth every 6 (six) hours as needed for Pain. 30 tablet 0 Past Week    megestrol (MEGACE) 400 mg/10 mL (40 mg/mL) Susp 10 MLS BY MOUTH TWICE DAILY FOR 30 DAYS  0 Past Week    metoclopramide HCl (REGLAN) 5 MG tablet TAKE 1 TABLET BY MOUTH 3 TIMES DAILY BEFORE MEALS 90 tablet 2 Past Week    mirtazapine (REMERON) 15 MG tablet Take 15 mg by mouth once daily.  1 Past Week    ondansetron (ZOFRAN) 8 MG tablet Take 1 tablet (8 mg total) by mouth every 8 (eight) hours as needed for Nausea. 6 tablet 2 Past Week    oxyCODONE-acetaminophen (PERCOCET) 5-325 mg per tablet Take 1  tablet by mouth every 4 (four) hours as needed for Pain. 30 tablet 0 Past Week    oxyCODONE-acetaminophen (PERCOCET) 7.5-325 mg per tablet Take 1 tablet by mouth every 12 (twelve) hours as needed for Pain. GREATER THAN 7 DAY QUANTITY MEDICALLY NECESSARY 60 tablet 0 Past Week    pantoprazole (PROTONIX) 40 MG tablet Take 1 tablet (40 mg total) by mouth once daily. 30 tablet 5 Past Week    prochlorperazine (COMPAZINE) 10 MG tablet Take 1 tablet (10 mg total) by mouth 3 (three) times daily as needed. 60 tablet 2 Past Week    tiotropium-olodaterol (STIOLTO RESPIMAT) 2.5-2.5 mcg/actuation Mist TAKE 1 PUFF BY MOUTH EVERY DAY (RX CAN BE REFILLED EVERY 60 DAYS) 4 g 2 Past Week    apixaban (ELIQUIS ORAL) Take 5 mg by mouth 2 (two) times daily.    Taking       Allergies:  Review of patient's allergies indicates:   Allergen Reactions    Penicillins Rash       Review of systems:  CONSTITUTIONAL: Negative for fever, chills, weakness, weight loss, weight gain.  HEENT: Negative for blurred vision, hearing loss, nasal congestion, dry mouth, sore throat.  CARDIOVASCULAR: Negative for chest pain or palpitations.  RESPIRATORY: Negative for SOB or cough.  GASTROINTESTINAL: See HPI  GENITOURINARY: Negative for dysuria or hematuria.  MUSCULOSKELETAL: Negative for osteoarthritis or muscle pain.  SKIN: Negative for rashes/lesions.  NEUROLOGIC: Negative for headaches, numbness/tingling.  ENDOCRINE: Negative for diabetes or thyroid abnormalities.  HEMATOLOGIC: Negative for anemia or blood dyscrasias.  Aside from above positives, complete 10 point review of systems negative.    Objective:     Vital Signs (Most Recent):  Temp: 99.1 °F (37.3 °C) (02/19/20 0718)  Pulse: (!) 118 (02/19/20 0755)  Resp: 18 (02/19/20 0718)  BP: (!) 142/91 (02/19/20 0718)  SpO2: 95 % (02/19/20 0718) Vital Signs (24h Range):  Temp:  [97.5 °F (36.4 °C)-99.1 °F (37.3 °C)] 99.1 °F (37.3 °C)  Pulse:  [104-141] 118  Resp:  [14-20] 18  SpO2:  [84 %-100 %] 95 %  BP:  (105-145)/() 142/91     Physical examination:  General: well developed, well nourished, mild apparent distress  HENT: NCAT, hearing grossly intact, no palpable or visible thyroid mass  Eyes: PERRL, EOMI, anicteric sclera  LYMH: No cervical, supraclavicular or axillary lymphadenopathy   Cardiovascular: Regular rate and rhythm. No murmurs appreciated.  Lungs: Non-labored respirations. Breath sounds equal.   Abdomen: soft, diffusely tender more so around umbilicus , non distendedD, normoactive BS  Extremities: No C/C/E, 2+ dorsalis pedis pulses bilaterally  Neuro: AA&O x 3, no asterixes or tremors  Psych: Appropriate mood and affect. No SI.  Skin: No jaundice, rashes or lesions  Musculoskeletal: 5/5 strength bilaterally    Labs:reviewed: elevated WBC    Imaging:reviewed, transverse and ascending colon wall thickening       Assessment:   1. Abdominal pain  2.abnormal CT scan of colon: doubt colitis with out diarrhea, most likely Ct artifact  3. Nausea  4.The patient has a history of of Gastric cancer  5.HTN    Plan:   Continue pantoprazole and antibiotics for now, Treat the cardiac problems, will follow with you, doubt any colitis at present with out diarrhea. No GI intervention needed at present  Will treat symptoms. Thanks for the consult.      Thank you for your consult.     Jose Carpenter MD  Gastroenterology  [facility]

## 2020-02-19 NOTE — CONSULTS
[facility]  Pulmonology  Consult Note    Patient Name: Xiomy Duncan  MRN: [id number]  Admission Date: 2/18/2020  Hospital Length of Stay: 1 days  Code Status: Full Code  Attending Physician: Fadia Wall MD  Primary Care Provider: Arnie Monson MD   Principal Problem: NSTEMI (non-ST elevated myocardial infarction)    Inpatient consult to Pulmonology  Consult performed by: Quentin Galloway MD  Consult ordered by: Eulogio Wright MD        Subjective:     HPI:  Patient is 60 y.o. female  has a past medical history of Anemia, Anxiety, Asthma, Cancer determined by biopsy of stomach (12/2018), Cannabis abuse, daily use, Colon polyps, Encounter for blood transfusion, GERD (gastroesophageal reflux disease) (8/7/2014), Hoarseness (8/7/2014), Hypertension, S/P subtotal gastrectomy (02/08/2019), Seizures, Thyroid nodule, Ulcer, and Vaginal delivery. presented to [facility] on 2/18/20 with n/v x 2 days.  No fever/chill. No abdominal pain.  +bilous vomitus.  No chest pain.  No orthopnea.  No pnd.  No lower extremities edema.      In the ED, patient was tachycardic to 140s, with borderline hypotension and in severe pain. Was given haloperidol IV, lorazepam IV, morphine IV, zofran IV and 3L of fluids. Pain relived temporarily. Breathing stable at room air. A CTA of chest and CT of abdomen with contrast showed peribronchial thickening, septal interlobular thickening, felice hilar ground glass changes, scattered tree in bud nodules, small bilateral pleural effusions and wall thickening ascending colon/transverse colon.  Troponin significant for level of 5 with no ST segment elevation. Apparently no chest pain. BNP very elevated 2,496. Lactic acid 2.9. Cardiology, pulm and GI consulted. IV abx started.    Patient was diagnosed with NSTEM and started on heparin and plavix.  In addition patient was started on broad spectrum antibiotics.        Past Medical History:   Diagnosis Date    Anemia      Anxiety     Asthma     Cancer determined by biopsy of stomach 2018    Cannabis abuse, daily use     Colon polyps     Encounter for blood transfusion     GERD (gastroesophageal reflux disease) 2014    Hoarseness 2014    Hypertension     S/P subtotal gastrectomy 2019    Seizures     LAST SEIZURE >6 YEARS AGO    Thyroid nodule     Ulcer     Vaginal delivery     x4       Past Surgical History:   Procedure Laterality Date    CHOLECYSTECTOMY      ESOPHAGOGASTRODUODENOSCOPY N/A 12/15/2018    Procedure: ESOPHAGOGASTRODUODENOSCOPY (EGD);  Surgeon: Alisson Villagomez MD;  Location: [facility] ENDO;  Service: Endoscopy;  Laterality: N/A;    GASTRECTOMY N/A 2019    Procedure: OPEN GASTRECTOMY- radical subtotal;  Surgeon: Jesus Tripp MD;  Location: [facility] OR;  Service: General;  Laterality: N/A;  OPEN  GASTRECTOMY  PER ABIEL ON 19  @ 302PM-  RN PRE OP 19    HERNIA REPAIR N/A 01/15/2020    incisional hernia repair    PORTACATH PLACEMENT Left 2019    TONSILLECTOMY      TUBAL LIGATION         Review of patient's allergies indicates:   Allergen Reactions    Penicillins Rash       Family History     Problem Relation (Age of Onset)    Bone cancer Mother    Diabetes Maternal Uncle    Hypertension Brother, Maternal Uncle, Maternal Grandmother    Stomach cancer Maternal Grandfather        Tobacco Use    Smoking status: Former Smoker     Packs/day: 2.00     Years: 46.00     Pack years: 92.00     Last attempt to quit: 10/15/2018     Years since quittin.3    Smokeless tobacco: Never Used   Substance and Sexual Activity    Alcohol use: No    Drug use: Yes     Types: Marijuana     Comment: every day    Sexual activity: Not Currently     Birth control/protection: None, Post-menopausal     Comment: single         Review of Systems  Objective:     Vital Signs (Most Recent):  Temp: 99.1 °F (37.3 °C) (20)  Pulse: (!) 118 (20 0755)  Resp: 18 (20)  BP:  (!) 142/91 (02/19/20 0718)  SpO2: 95 % (02/19/20 0718) Vital Signs (24h Range):  Temp:  [97.5 °F (36.4 °C)-99.1 °F (37.3 °C)] 99.1 °F (37.3 °C)  Pulse:  [104-132] 118  Resp:  [14-20] 18  SpO2:  [84 %-100 %] 95 %  BP: (105-144)/(70-97) 142/91     Weight: 55.1 kg (121 lb 7.6 oz)  Body mass index is 21.52 kg/m².      Intake/Output Summary (Last 24 hours) at 2/19/2020 1035  Last data filed at 2/18/2020 1741  Gross per 24 hour   Intake 4100 ml   Output --   Net 4100 ml       Physical Exam   Constitutional: She appears well-developed. She appears distressed.   Holding onto abdomen in fetal position   Eyes: Conjunctivae and EOM are normal.   Neck: Normal range of motion.   Cardiovascular:   Sinus tachycardia   Pulmonary/Chest: Effort normal. She has wheezes (mild expiratory wheeze). She has no rales.   Abdominal: She exhibits no distension. There is tenderness.   Hypoactive bowel sounds   Musculoskeletal: She exhibits no edema.   Neurological: She is alert.   Skin: Skin is warm and dry. Capillary refill takes less than 2 seconds. She is not diaphoretic.   Nursing note and vitals reviewed.      Vents:       Lines/Drains/Airways     Central Venous Catheter Line            Port A Cath Single Lumen 02/18/20 1008 left subclavian 1 day          Peripheral Intravenous Line                 Peripheral IV - Single Lumen 02/18/20 1158 20 G Right Antecubital less than 1 day         Peripheral IV - Single Lumen 02/18/20 1449 22 G Left Hand less than 1 day                Significant Labs:    CBC/Anemia Profile:  Recent Labs   Lab 02/18/20  1005 02/19/20  0450   WBC 15.15* 20.33*   HGB 13.7 13.4   HCT 41.9 40.3    235   MCV 93 94   RDW 15.2* 15.5*        Chemistries:  Recent Labs   Lab 02/18/20  1005 02/19/20  0450    139   K 3.7 4.0    107   CO2 22* 23   BUN 15 18   CREATININE 1.1 1.1   CALCIUM 9.6 9.0   ALBUMIN 3.5 3.1*   PROT 7.4 6.6   BILITOT 0.3 0.5   ALKPHOS 86 73   ALT 22 27   AST 58* 53*     BNP  Recent Labs    Lab 02/18/20  1005   BNP 2,496*         Significant Imaging:   CT: I have reviewed all pertinent results/findings within the past 24 hours and my personal findings are:  2/18/20 no pe; bilateral ggo predominantly in dependent region; bilateral small effusion; septal thickenening and engorgement of vascular markings at bases.  no lad.    Assessment/Plan:     * NSTEMI (non-ST elevated myocardial infarction)  Followed by Dr. Jesus.  Echo pending.  Plavix, asa and heparin.  Decision about lhc pending.      Nausea  Viral vs anginal equivalent.  Gi on board. Will monitor.      Abnormal CT of the chest  Constellation of ggo, bilateral effusion, vascular engorgement along with elevated bnp and troponin are c/w volume overload.  Cannot rule out infection, especially with leukocytosis.  Will start gentle diuresis and reassess.  Send for procal to help with decision about antibiotics.            Thank you for your consult. I will follow-up with patient. Please contact us if you have any additional questions.     Quentin Galloway MD  Pulmonology  [facility]

## 2020-02-19 NOTE — ASSESSMENT & PLAN NOTE
Constellation of ggo, bilateral effusion, vascular engorgement along with elevated bnp and troponin are c/w volume overload.  Cannot rule out infection, especially with leukocytosis.  Will start gentle diuresis and reassess.  Send for procal to help with decision about antibiotics.

## 2020-02-19 NOTE — SUBJECTIVE & OBJECTIVE
Interval History: no pain today but still with nausea and tachycardia. Patient denies chest pain or SOB. Stated she never felt chest pain.     Review of Systems   Constitutional: Positive for appetite change.   Respiratory: Negative.    Cardiovascular: Negative.    Gastrointestinal: Positive for nausea and vomiting. Negative for diarrhea.     Objective:     Vital Signs (Most Recent):  Temp: 98.2 °F (36.8 °C) (02/19/20 1248)  Pulse: (!) 121 (02/19/20 1248)  Resp: 16 (02/19/20 1248)  BP: 133/87 (02/19/20 1248)  SpO2: (!) 92 % (02/19/20 1248) Vital Signs (24h Range):  Temp:  [97.5 °F (36.4 °C)-99.1 °F (37.3 °C)] 98.2 °F (36.8 °C)  Pulse:  [104-124] 121  Resp:  [16-20] 16  SpO2:  [84 %-100 %] 92 %  BP: (108-144)/(70-97) 133/87     Weight: 55.1 kg (121 lb 7.6 oz)  Body mass index is 21.52 kg/m².    Intake/Output Summary (Last 24 hours) at 2/19/2020 1442  Last data filed at 2/18/2020 1741  Gross per 24 hour   Intake 2100 ml   Output --   Net 2100 ml      Physical Exam   Constitutional: She is oriented to person, place, and time. She appears well-developed. No distress.   Cardiovascular:   Sinus tachycardia     Pulmonary/Chest: Effort normal and breath sounds normal.   Abdominal: Soft. Bowel sounds are normal. She exhibits no distension. There is no tenderness.   Musculoskeletal: Normal range of motion. She exhibits no edema.   Neurological: She is alert and oriented to person, place, and time.   Skin: Skin is warm. Capillary refill takes less than 2 seconds. She is not diaphoretic.   Psychiatric: She has a normal mood and affect. Her behavior is normal. Judgment and thought content normal.   Nursing note and vitals reviewed.      Significant Labs: All pertinent labs within the past 24 hours have been reviewed.    Significant Imaging: I have reviewed all pertinent imaging results/findings within the past 24 hours.  I have reviewed and interpreted all pertinent imaging results/findings within the past 24 hours.

## 2020-02-19 NOTE — SUBJECTIVE & OBJECTIVE
Past Medical History:   Diagnosis Date    Anemia     Anxiety     Asthma     Cancer determined by biopsy of stomach 2018    Cannabis abuse, daily use     Colon polyps     Encounter for blood transfusion     GERD (gastroesophageal reflux disease) 2014    Hoarseness 2014    Hypertension     S/P subtotal gastrectomy 2019    Seizures     LAST SEIZURE >6 YEARS AGO    Thyroid nodule     Ulcer     Vaginal delivery     x4       Past Surgical History:   Procedure Laterality Date    CHOLECYSTECTOMY      ESOPHAGOGASTRODUODENOSCOPY N/A 12/15/2018    Procedure: ESOPHAGOGASTRODUODENOSCOPY (EGD);  Surgeon: Alisson Villagomez MD;  Location: [facility] ENDO;  Service: Endoscopy;  Laterality: N/A;    GASTRECTOMY N/A 2019    Procedure: OPEN GASTRECTOMY- radical subtotal;  Surgeon: Jesus Tripp MD;  Location: [facility] OR;  Service: General;  Laterality: N/A;  OPEN  GASTRECTOMY  PER ABIEL ON 19  @ 302PM-LO  RN PRE OP 19    HERNIA REPAIR N/A 01/15/2020    incisional hernia repair    PORTACATH PLACEMENT Left 2019    TONSILLECTOMY      TUBAL LIGATION         Review of patient's allergies indicates:   Allergen Reactions    Penicillins Rash       Family History     Problem Relation (Age of Onset)    Bone cancer Mother    Diabetes Maternal Uncle    Hypertension Brother, Maternal Uncle, Maternal Grandmother    Stomach cancer Maternal Grandfather        Tobacco Use    Smoking status: Former Smoker     Packs/day: 2.00     Years: 46.00     Pack years: 92.00     Last attempt to quit: 10/15/2018     Years since quittin.3    Smokeless tobacco: Never Used   Substance and Sexual Activity    Alcohol use: No    Drug use: Yes     Types: Marijuana     Comment: every day    Sexual activity: Not Currently     Birth control/protection: None, Post-menopausal     Comment: single         Review of Systems  Objective:     Vital Signs (Most Recent):  Temp: 99.1 °F (37.3 °C) (20 0718)  Pulse:  (!) 118 (02/19/20 0755)  Resp: 18 (02/19/20 0718)  BP: (!) 142/91 (02/19/20 0718)  SpO2: 95 % (02/19/20 0718) Vital Signs (24h Range):  Temp:  [97.5 °F (36.4 °C)-99.1 °F (37.3 °C)] 99.1 °F (37.3 °C)  Pulse:  [104-132] 118  Resp:  [14-20] 18  SpO2:  [84 %-100 %] 95 %  BP: (105-144)/(70-97) 142/91     Weight: 55.1 kg (121 lb 7.6 oz)  Body mass index is 21.52 kg/m².      Intake/Output Summary (Last 24 hours) at 2/19/2020 1035  Last data filed at 2/18/2020 1741  Gross per 24 hour   Intake 4100 ml   Output --   Net 4100 ml       Physical Exam   Constitutional: She appears well-developed. She appears distressed.   Holding onto abdomen in fetal position   Eyes: Conjunctivae and EOM are normal.   Neck: Normal range of motion.   Cardiovascular:   Sinus tachycardia   Pulmonary/Chest: Effort normal. She has wheezes (mild expiratory wheeze). She has no rales.   Abdominal: She exhibits no distension. There is tenderness.   Hypoactive bowel sounds   Musculoskeletal: She exhibits no edema.   Neurological: She is alert.   Skin: Skin is warm and dry. Capillary refill takes less than 2 seconds. She is not diaphoretic.   Nursing note and vitals reviewed.      Vents:       Lines/Drains/Airways     Central Venous Catheter Line            Port A Cath Single Lumen 02/18/20 1008 left subclavian 1 day          Peripheral Intravenous Line                 Peripheral IV - Single Lumen 02/18/20 1158 20 G Right Antecubital less than 1 day         Peripheral IV - Single Lumen 02/18/20 1449 22 G Left Hand less than 1 day                Significant Labs:    CBC/Anemia Profile:  Recent Labs   Lab 02/18/20  1005 02/19/20  0450   WBC 15.15* 20.33*   HGB 13.7 13.4   HCT 41.9 40.3    235   MCV 93 94   RDW 15.2* 15.5*        Chemistries:  Recent Labs   Lab 02/18/20  1005 02/19/20  0450    139   K 3.7 4.0    107   CO2 22* 23   BUN 15 18   CREATININE 1.1 1.1   CALCIUM 9.6 9.0   ALBUMIN 3.5 3.1*   PROT 7.4 6.6   BILITOT 0.3 0.5   ALKPHOS  86 73   ALT 22 27   AST 58* 53*     BNP  Recent Labs   Lab 02/18/20  1005   BNP 2,496*         Significant Imaging:   CT: I have reviewed all pertinent results/findings within the past 24 hours and my personal findings are:  2/18/20 no pe; bilateral ggo predominantly in dependent region; bilateral small effusion; septal thickenening and engorgement of vascular markings at bases.  no lad.

## 2020-02-19 NOTE — ASSESSMENT & PLAN NOTE
Unsure if infection vs inflammatory or related to radiation therapy  Was afebrile but with leukocytosis, tachycardia, borderline hypotension and inflammation in lungs and colon  Agree with empiric broad spectrum IV antibiotics for now  Per patient, she was exposed to her grandchildren who were ill with what sounds like viral illness  States she completed chemoradiation back in August therefore radiation induced inflammation unlikely  Blood cultures sent. So far no growth  Continue mackenzie

## 2020-02-19 NOTE — PROGRESS NOTES
"[facility] Medicine  Progress Note    Patient Name: Xiomy Duncan  MRN: [id number]  Patient Class: IP- Inpatient   Admission Date: 2/18/2020  Length of Stay: 1 days  Attending Physician: Fadia Wall MD  Primary Care Provider: Arnie Monson MD        Subjective:     Principal Problem:NSTEMI (non-ST elevated myocardial infarction)        HPI:  60 year old female with gastric cancer s/p subtotal gastrectomy and former smoker who presented with abdominal pain of one day in evolution. During my evaluation, patient could not provide any history due to severe pain and laying in fetal position. She was alert and made eye contact. Per ED staff, patient complained of severe abdominal pain since yesterday. Associated symptoms include nausea, vomiting "bile", cough and inability to tolerate oral meds. When asked about chemotherapy patient could not answer question. Per ED staff, family member stated last chemoradiation therapy was in August/2019. Has a port in place. Also reported daily use of marijuana. No other reported symptoms.     In the ED, patient was tachycardic to 140s, with borderline hypotension and in severe pain. Was given haloperidol IV, lorazepam IV, morphine IV, zofran IV and 3L of fluids. Pain relived temporarily. Breathing stable at room air. A CTA of chest and CT of abdomen with contrast showed peribronchial thickening, septal interlobular thickening, felice hilar ground glass changes, scattered tree in bud nodules, small bilateral pleural effusions and wall thickening ascending colon/transverse colon. No pulmonary embolism, pneumothorax, acute pathology to liver/kidney, evidence of abscess or perforation. Troponin significant for level of 5 with no ST segment elevation. Apparently no chest pain. BNP very elevated 2,496. Lactic acid 2.9. Cardiology, pulm and GI consulted. IV abx started. Admitted to hospital medicine.    Overview/Hospital Course:  No notes on " file    Interval History: no pain today but still with nausea and tachycardia. Patient denies chest pain or SOB. Stated she never felt chest pain.     Review of Systems   Constitutional: Positive for appetite change.   Respiratory: Negative.    Cardiovascular: Negative.    Gastrointestinal: Positive for nausea and vomiting. Negative for diarrhea.     Objective:     Vital Signs (Most Recent):  Temp: 98.2 °F (36.8 °C) (02/19/20 1248)  Pulse: (!) 121 (02/19/20 1248)  Resp: 16 (02/19/20 1248)  BP: 133/87 (02/19/20 1248)  SpO2: (!) 92 % (02/19/20 1248) Vital Signs (24h Range):  Temp:  [97.5 °F (36.4 °C)-99.1 °F (37.3 °C)] 98.2 °F (36.8 °C)  Pulse:  [104-124] 121  Resp:  [16-20] 16  SpO2:  [84 %-100 %] 92 %  BP: (108-144)/(70-97) 133/87     Weight: 55.1 kg (121 lb 7.6 oz)  Body mass index is 21.52 kg/m².    Intake/Output Summary (Last 24 hours) at 2/19/2020 1442  Last data filed at 2/18/2020 1741  Gross per 24 hour   Intake 2100 ml   Output --   Net 2100 ml      Physical Exam   Constitutional: She is oriented to person, place, and time. She appears well-developed. No distress.   Cardiovascular:   Sinus tachycardia     Pulmonary/Chest: Effort normal and breath sounds normal.   Abdominal: Soft. Bowel sounds are normal. She exhibits no distension. There is no tenderness.   Musculoskeletal: Normal range of motion. She exhibits no edema.   Neurological: She is alert and oriented to person, place, and time.   Skin: Skin is warm. Capillary refill takes less than 2 seconds. She is not diaphoretic.   Psychiatric: She has a normal mood and affect. Her behavior is normal. Judgment and thought content normal.   Nursing note and vitals reviewed.      Significant Labs: All pertinent labs within the past 24 hours have been reviewed.    Significant Imaging: I have reviewed all pertinent imaging results/findings within the past 24 hours.  I have reviewed and interpreted all pertinent imaging results/findings within the past 24  hours.      Assessment/Plan:      * NSTEMI (non-ST elevated myocardial infarction)  Troponin of 5 with no ST segment elevation very concerning for NSTEMI  Unsure if history of cardiac disease. Elevated BNP and possible pulmonary edema highly suspicious for new onset heart failure   On heparin infusion. Loaded with aspirin/plavix, high dose statin and BB  Echocardiogram significant for LVEF 25% and grade 3 diastolic dysfunction.   Continue with medical management (ROYAL, BB, statin and anticoagulation)  Will stop heparin tomorrow to complete 48 hours of anticoagulation  Appreciate further Cardiology        Sepsis  Unsure if infection vs inflammatory or related to radiation therapy  Was afebrile but with leukocytosis, tachycardia, borderline hypotension and inflammation in lungs and colon  Agree with empiric broad spectrum IV antibiotics for now  Per patient, she was exposed to her grandchildren who were ill with what sounds like viral illness  States she completed chemoradiation back in August therefore radiation induced inflammation unlikely  Blood cultures sent. So far no growth  Continue mackenzie        Abnormal CT of the chest  With plenty of interstitial and peribronchial inflammation vs edema, per CT  Surprisingly patient is not hypoxic  2/2 radiation vs infection vs inflammatory vs edema  Agree with empiric IV abx and pulmonology consultation  Echo with LVEF 25% and BNP very high. Likely cardiogenic edema  Agree with trial of lasix      Abnormal CT of the abdomen  With inflammation to ascending and transverse colon without clear evidence of abscess, obstruction or perforation  2/2 radiation vs infection vs inflammatory vs edema  Agree with broad spectrum IV antibiotics and gastroenterology consultation  No longer with pain but with nausea and vomiting  Anti-emetics and trial of lasix given new diagnosis of heart failure  Of note, patient endorsed recent sick contacts. Could be viral        Intractable nausea and  vomiting  2/2 inflammation vs bowel edema from HF  Increase doses of zofran and phenergan        Elevated brain natriuretic peptide (BNP) level  Suspect new onset heart failure  Trial of IV furosemide      S/P subtotal gastrectomy  For mass removal      Carcinoma of antrum of stomach  Per patient and daughter, complete chemoradiation therapy in August/2019  Has port in place      GERD (gastroesophageal reflux disease)  History of gastrointestinal bleed secondary to gastric mass at the time. S/p subtotal gastrectomy  Pantoprazole IV for now as she is still high risk for re-bleed and need ROYAL/heparin for NSTEMI        HTN (hypertension)  BP not elevated  Start antihypertensives when indicated      VTE Risk Mitigation (From admission, onward)         Ordered     IP VTE HIGH RISK PATIENT  Once      02/18/20 1717     Place sequential compression device  Until discontinued      02/18/20 1717     Place RAMESH hose  Until discontinued      02/18/20 1717     heparin 25,000 units in dextrose 5% (100 units/ml) IV bolus from bag - ADDITIONAL PRN BOLUS - 60 units/kg (max bolus 4000 units)  As needed (PRN)     Question:  Heparin Infusion Adjustment (DO NOT MODIFY ANSWER)  Answer:  [email or web address]\Images\Pharmacy\HeparinInfusions\heparin LOW INTENSITY nomogram for [facility] [id number]    02/18/20 1422     heparin 25,000 units in dextrose 5% (100 units/ml) IV bolus from bag - ADDITIONAL PRN BOLUS - 30 units/kg (max bolus 4000 units)  As needed (PRN)     Question:  Heparin Infusion Adjustment (DO NOT MODIFY ANSWER)  Answer:  [email or web address]\Images\Pharmacy\HeparinInfusions\heparin LOW INTENSITY nomogram for [facility] [id number]    02/18/20 1422     heparin 25,000 units in dextrose 5% 250 mL (100 units/mL) infusion LOW INTENSITY nomogram - [facility]  Continuous     Question:  Heparin Infusion Adjustment (DO NOT MODIFY ANSWER)  Answer:  [email or web address]\Images\Pharmacy\HeparinInfusions\heparin LOW INTENSITY nomogram for [facility] [id number]    02/18/20  [id number]                      Fadia Summers MD  Department of Hospital Medicine   [facility]

## 2020-02-20 PROBLEM — J90 PLEURAL EFFUSION: Status: ACTIVE | Noted: 2020-02-20

## 2020-02-20 PROBLEM — B34.9 ACUTE VIRAL SYNDROME: Status: ACTIVE | Noted: 2020-02-18

## 2020-02-20 LAB
ALBUMIN SERPL BCP-MCNC: 2.9 G/DL (ref 3.5–5.2)
ALP SERPL-CCNC: 75 U/L (ref 55–135)
ALT SERPL W/O P-5'-P-CCNC: 33 U/L (ref 10–44)
ANION GAP SERPL CALC-SCNC: 10 MMOL/L (ref 8–16)
APTT BLDCRRT: 39.8 SEC (ref 21–32)
AST SERPL-CCNC: 56 U/L (ref 10–40)
BASOPHILS # BLD AUTO: 0.01 K/UL (ref 0–0.2)
BASOPHILS NFR BLD: 0.1 % (ref 0–1.9)
BILIRUB SERPL-MCNC: 0.7 MG/DL (ref 0.1–1)
BUN SERPL-MCNC: 22 MG/DL (ref 6–20)
CALCIUM SERPL-MCNC: 8.9 MG/DL (ref 8.7–10.5)
CHLORIDE SERPL-SCNC: 106 MMOL/L (ref 95–110)
CO2 SERPL-SCNC: 25 MMOL/L (ref 23–29)
CREAT SERPL-MCNC: 1.1 MG/DL (ref 0.5–1.4)
DIFFERENTIAL METHOD: ABNORMAL
EOSINOPHIL # BLD AUTO: 0 K/UL (ref 0–0.5)
EOSINOPHIL NFR BLD: 0 % (ref 0–8)
ERYTHROCYTE [DISTWIDTH] IN BLOOD BY AUTOMATED COUNT: 15.8 % (ref 11.5–14.5)
EST. GFR  (AFRICAN AMERICAN): >60 ML/MIN/1.73 M^2
EST. GFR  (NON AFRICAN AMERICAN): 55 ML/MIN/1.73 M^2
GLUCOSE SERPL-MCNC: 111 MG/DL (ref 70–110)
HCT VFR BLD AUTO: 35.5 % (ref 37–48.5)
HGB BLD-MCNC: 11.7 G/DL (ref 12–16)
IMM GRANULOCYTES # BLD AUTO: 0.05 K/UL (ref 0–0.04)
IMM GRANULOCYTES NFR BLD AUTO: 0.4 % (ref 0–0.5)
LYMPHOCYTES # BLD AUTO: 0.8 K/UL (ref 1–4.8)
LYMPHOCYTES NFR BLD: 6.5 % (ref 18–48)
MCH RBC QN AUTO: 31 PG (ref 27–31)
MCHC RBC AUTO-ENTMCNC: 33 G/DL (ref 32–36)
MCV RBC AUTO: 94 FL (ref 82–98)
MONOCYTES # BLD AUTO: 1.4 K/UL (ref 0.3–1)
MONOCYTES NFR BLD: 10.9 % (ref 4–15)
NEUTROPHILS # BLD AUTO: 10.4 K/UL (ref 1.8–7.7)
NEUTROPHILS NFR BLD: 82.1 % (ref 38–73)
NRBC BLD-RTO: 0 /100 WBC
PLATELET # BLD AUTO: 197 K/UL (ref 150–350)
PMV BLD AUTO: 12.2 FL (ref 9.2–12.9)
POTASSIUM SERPL-SCNC: 3.3 MMOL/L (ref 3.5–5.1)
PROT SERPL-MCNC: 6.3 G/DL (ref 6–8.4)
RBC # BLD AUTO: 3.78 M/UL (ref 4–5.4)
SODIUM SERPL-SCNC: 141 MMOL/L (ref 136–145)
WBC # BLD AUTO: 12.63 K/UL (ref 3.9–12.7)

## 2020-02-20 PROCEDURE — 25000003 PHARM REV CODE 250: Performed by: INTERNAL MEDICINE

## 2020-02-20 PROCEDURE — 80053 COMPREHEN METABOLIC PANEL: CPT

## 2020-02-20 PROCEDURE — 99232 PR SUBSEQUENT HOSPITAL CARE,LEVL II: ICD-10-PCS | Mod: ,,, | Performed by: INTERNAL MEDICINE

## 2020-02-20 PROCEDURE — 63600175 PHARM REV CODE 636 W HCPCS: Performed by: INTERNAL MEDICINE

## 2020-02-20 PROCEDURE — 85730 THROMBOPLASTIN TIME PARTIAL: CPT

## 2020-02-20 PROCEDURE — 85025 COMPLETE CBC W/AUTO DIFF WBC: CPT

## 2020-02-20 PROCEDURE — 99233 PR SUBSEQUENT HOSPITAL CARE,LEVL III: ICD-10-PCS | Mod: ,,, | Performed by: INTERNAL MEDICINE

## 2020-02-20 PROCEDURE — 21400001 HC TELEMETRY ROOM

## 2020-02-20 PROCEDURE — 99232 SBSQ HOSP IP/OBS MODERATE 35: CPT | Mod: ,,, | Performed by: INTERNAL MEDICINE

## 2020-02-20 PROCEDURE — 99233 SBSQ HOSP IP/OBS HIGH 50: CPT | Mod: ,,, | Performed by: INTERNAL MEDICINE

## 2020-02-20 PROCEDURE — 36415 COLL VENOUS BLD VENIPUNCTURE: CPT

## 2020-02-20 PROCEDURE — 63600175 PHARM REV CODE 636 W HCPCS: Performed by: EMERGENCY MEDICINE

## 2020-02-20 PROCEDURE — C9113 INJ PANTOPRAZOLE SODIUM, VIA: HCPCS | Performed by: INTERNAL MEDICINE

## 2020-02-20 RX ORDER — SODIUM CHLORIDE 9 MG/ML
INJECTION, SOLUTION INTRAVENOUS ONCE
Status: COMPLETED | OUTPATIENT
Start: 2020-02-21 | End: 2020-02-20

## 2020-02-20 RX ORDER — POTASSIUM CHLORIDE 750 MG/1
30 TABLET, EXTENDED RELEASE ORAL ONCE
Status: COMPLETED | OUTPATIENT
Start: 2020-02-20 | End: 2020-02-20

## 2020-02-20 RX ORDER — FUROSEMIDE 10 MG/ML
40 INJECTION INTRAMUSCULAR; INTRAVENOUS ONCE
Status: COMPLETED | OUTPATIENT
Start: 2020-02-20 | End: 2020-02-20

## 2020-02-20 RX ORDER — SODIUM CHLORIDE 0.9 % (FLUSH) 0.9 %
3 SYRINGE (ML) INJECTION EVERY 8 HOURS PRN
Status: DISCONTINUED | OUTPATIENT
Start: 2020-02-21 | End: 2020-02-21 | Stop reason: HOSPADM

## 2020-02-20 RX ADMIN — ASPIRIN 81 MG: 81 TABLET, COATED ORAL at 09:02

## 2020-02-20 RX ADMIN — SODIUM CHLORIDE: 0.9 INJECTION, SOLUTION INTRAVENOUS at 11:02

## 2020-02-20 RX ADMIN — CLOPIDOGREL BISULFATE 75 MG: 75 TABLET ORAL at 09:02

## 2020-02-20 RX ADMIN — ATORVASTATIN CALCIUM 80 MG: 40 TABLET, FILM COATED ORAL at 09:02

## 2020-02-20 RX ADMIN — PROMETHAZINE HYDROCHLORIDE 12.5 MG: 25 INJECTION INTRAMUSCULAR; INTRAVENOUS at 04:02

## 2020-02-20 RX ADMIN — PROMETHAZINE HYDROCHLORIDE 12.5 MG: 25 INJECTION INTRAMUSCULAR; INTRAVENOUS at 09:02

## 2020-02-20 RX ADMIN — PROMETHAZINE HYDROCHLORIDE 12.5 MG: 25 INJECTION INTRAMUSCULAR; INTRAVENOUS at 10:02

## 2020-02-20 RX ADMIN — FUROSEMIDE 40 MG: 10 INJECTION, SOLUTION INTRAVENOUS at 12:02

## 2020-02-20 RX ADMIN — HEPARIN SODIUM 12 UNITS/KG/HR: 10000 INJECTION, SOLUTION INTRAVENOUS at 01:02

## 2020-02-20 RX ADMIN — POTASSIUM CHLORIDE 30 MEQ: 10 TABLET, EXTENDED RELEASE ORAL at 04:02

## 2020-02-20 RX ADMIN — PANTOPRAZOLE SODIUM 40 MG: 40 INJECTION, POWDER, LYOPHILIZED, FOR SOLUTION INTRAVENOUS at 09:02

## 2020-02-20 RX ADMIN — ONDANSETRON HYDROCHLORIDE 8 MG: 2 SOLUTION INTRAMUSCULAR; INTRAVENOUS at 09:02

## 2020-02-20 RX ADMIN — PIPERACILLIN AND TAZOBACTAM 4.5 G: 4; .5 INJECTION, POWDER, FOR SOLUTION INTRAVENOUS at 01:02

## 2020-02-20 RX ADMIN — PIPERACILLIN AND TAZOBACTAM 4.5 G: 4; .5 INJECTION, POWDER, FOR SOLUTION INTRAVENOUS at 09:02

## 2020-02-20 RX ADMIN — METOPROLOL TARTRATE 50 MG: 50 TABLET, FILM COATED ORAL at 09:02

## 2020-02-20 NOTE — PROGRESS NOTES
[facility]  Pulmonology  Progress Note    Patient Name: Xiomy Duncan  MRN: [id number]  Admission Date: 2/18/2020  Hospital Length of Stay: 2 days  Code Status: Full Code  Attending Provider: Fadia Wall MD  Primary Care Provider: Arnie Monson MD   Principal Problem: NSTEMI (non-ST elevated myocardial infarction)    Subjective:     Interval History: no acute issue.  Nausea some what better.  Good urine output with lasix    Objective:     Vital Signs (Most Recent):  Temp: 98.5 °F (36.9 °C) (02/20/20 0823)  Pulse: 105 (02/20/20 0823)  Resp: 18 (02/20/20 0823)  BP: 122/73 (02/20/20 0823)  SpO2: (!) 94 % (02/20/20 0823) Vital Signs (24h Range):  Temp:  [98.1 °F (36.7 °C)-99.7 °F (37.6 °C)] 98.5 °F (36.9 °C)  Pulse:  [] 105  Resp:  [16-18] 18  SpO2:  [91 %-98 %] 94 %  BP: (104-133)/(69-88) 122/73     Weight: 55.1 kg (121 lb 7.6 oz)  Body mass index is 21.52 kg/m².      Intake/Output Summary (Last 24 hours) at 2/20/2020 1010  Last data filed at 2/20/2020 0155  Gross per 24 hour   Intake 260 ml   Output 850 ml   Net -590 ml       Physical Exam   Constitutional: She appears well-developed. No distress.   Holding onto abdomen in fetal position   Eyes: Conjunctivae and EOM are normal.   Neck: Normal range of motion.   Cardiovascular:   Sinus tachycardia   Pulmonary/Chest: Effort normal. She has no wheezes (mild expiratory wheeze). She has rales.   Abdominal: She exhibits no distension. There is no tenderness.   Hypoactive bowel sounds   Musculoskeletal: She exhibits no edema.   Neurological: She is alert.   Skin: Skin is warm and dry. Capillary refill takes less than 2 seconds. She is not diaphoretic.   Nursing note and vitals reviewed.      Vents:       Lines/Drains/Airways     Central Venous Catheter Line            Port A Cath Single Lumen 02/18/20 1008 left subclavian 2 days          Peripheral Intravenous Line                 Peripheral IV - Single Lumen 02/18/20 1158 20 G Right  Antecubital 1 day         Peripheral IV - Single Lumen 02/18/20 1449 22 G Left Hand 1 day                Significant Labs:    CBC/Anemia Profile:  Recent Labs   Lab 02/19/20  0450 02/20/20  0425   WBC 20.33* 12.63   HGB 13.4 11.7*   HCT 40.3 35.5*    197   MCV 94 94   RDW 15.5* 15.8*        Chemistries:  Recent Labs   Lab 02/19/20  0450 02/20/20  0425    141   K 4.0 3.3*    106   CO2 23 25   BUN 18 22*   CREATININE 1.1 1.1   CALCIUM 9.0 8.9   ALBUMIN 3.1* 2.9*   PROT 6.6 6.3   BILITOT 0.5 0.7   ALKPHOS 73 75   ALT 27 33   AST 53* 56*       ABGs: No results for input(s): PH, PCO2, HCO3, POCSATURATED, BE in the last 48 hours.    Significant Imaging:  CXR: I have reviewed all pertinent results/findings within the past 24 hours and my personal findings are:  2/20/20 worsening left effusion   CT: I have reviewed all pertinent results 2/18/20 no pe; bilateral ggo predominantly in dependent region; bilateral small effusion; septal thickenening and engorgement of vascular markings at bases.  no lad.    Echo 2/19/20  · Severely decreased left ventricular systolic function. The estimated ejection fraction is 25%. Base moves, suggestive of takatsubo.  · Concentric left ventricular remodeling.  · Grade III (severe) left ventricular diastolic dysfunction consistent with restrictive physiology.  · Normal right ventricular systolic function.  · Global hypokinetic wall motion.  · No pulmonary hypertension present.  · There is a left pleural effusion.    Assessment/Plan:     * NSTEMI (non-ST elevated myocardial infarction)  Followed by Dr. Jesus.  Echo pending.  Plavix, asa and heparin.  lhc in am    Pleural effusion  Small on ct of chest.  Last cxr with progression.  Leukocytosis improving.  Suspect related to chf.  Will increase diuresis and reassess    Intractable nausea and vomiting  Viral vs anginal equivalent.  Gi on board. improving    Abnormal CT of the chest  Constellation of ggo, bilateral effusion,  vascular engorgement along with elevated bnp and troponin are c/w volume overload.  Cannot rule out infection, especially with leukocytosis.  Will start gentle diuresis and reassess.  procal is normal along with improving leukocytosis.  Recommend antibiotics de-escalation.             Quentin Galloway MD  Pulmonology  [facility] [place]

## 2020-02-20 NOTE — SUBJECTIVE & OBJECTIVE
Interval History: feels much better today. Asking for diet to be upgraded.     Review of Systems   Constitutional: Negative for appetite change.   Respiratory: Negative.    Cardiovascular: Negative.    Gastrointestinal: Negative for diarrhea, nausea and vomiting.     Objective:     Vital Signs (Most Recent):  Temp: 99.1 °F (37.3 °C) (02/20/20 1649)  Pulse: 90 (02/20/20 1649)  Resp: 18 (02/20/20 1649)  BP: (!) 103/57 (02/20/20 1649)  SpO2: 95 % (02/20/20 1649) Vital Signs (24h Range):  Temp:  [98 °F (36.7 °C)-99.1 °F (37.3 °C)] 99.1 °F (37.3 °C)  Pulse:  [] 90  Resp:  [17-18] 18  SpO2:  [94 %-99 %] 95 %  BP: (103-133)/(57-88) 103/57     Weight: 55.1 kg (121 lb 7.6 oz)  Body mass index is 21.52 kg/m².    Intake/Output Summary (Last 24 hours) at 2/20/2020 1659  Last data filed at 2/20/2020 0155  Gross per 24 hour   Intake 260 ml   Output --   Net 260 ml      Physical Exam   Constitutional: She is oriented to person, place, and time. She appears well-developed. No distress.   Cardiovascular: Normal rate and regular rhythm.        Pulmonary/Chest: Effort normal and breath sounds normal.   Abdominal: Soft. Bowel sounds are normal. She exhibits no distension. There is no tenderness.   Musculoskeletal: Normal range of motion. She exhibits no edema.   Neurological: She is alert and oriented to person, place, and time.   Skin: Skin is warm. Capillary refill takes less than 2 seconds. She is not diaphoretic.   Psychiatric: She has a normal mood and affect. Her behavior is normal. Judgment and thought content normal.   Nursing note and vitals reviewed.      Significant Labs: All pertinent labs within the past 24 hours have been reviewed.    Significant Imaging: I have reviewed all pertinent imaging results/findings within the past 24 hours.  I have reviewed and interpreted all pertinent imaging results/findings within the past 24 hours.

## 2020-02-20 NOTE — ASSESSMENT & PLAN NOTE
Constellation of ggo, bilateral effusion, vascular engorgement along with elevated bnp and troponin are c/w volume overload.  Cannot rule out infection, especially with leukocytosis.  Will start gentle diuresis and reassess.  procal is normal along with improving leukocytosis.  Recommend antibiotics de-escalation.

## 2020-02-20 NOTE — ASSESSMENT & PLAN NOTE
Per patient, she was exposed to her grandchildren who were ill with what sounds like viral illness  Unsure if acute heart failure is due to viral illness vs present prior to viral infection  Procalcitonin negative, blood cultures thus far negative and patient improved with IV diuresis  Stop antibiotics, continue supportive treatment and manage heart failure

## 2020-02-20 NOTE — ASSESSMENT & PLAN NOTE
2/2 inflammation vs bowel edema from HF  Likely due to bowel edemas as it resolved with IV diuresis

## 2020-02-20 NOTE — NURSING
Patient remained nauseated throughout shift despite zofran and phenergan administration. Zofran gave no relief. 2 hours post phenergan administration, patient was nauseated again,  Dr. Summers notified and ordered 1 mg ativan once to buy time until next dose of phenergan due. Patient had relief with the ativan.

## 2020-02-20 NOTE — ASSESSMENT & PLAN NOTE
Admit EKG with t wave abnormality. Plavix load. Heparin gtt. Statin added. Trending down. Echo pending.     2/20/20: Cardiomyopathy, EF 25%. Global. Troponin slightly decreased. Plan on angiography tomorrow to r/o ischemic etiology. Continue current management.

## 2020-02-20 NOTE — ASSESSMENT & PLAN NOTE
Small on ct of chest.  Last cxr with progression.  Leukocytosis improving.  Suspect related to chf.  Will increase diuresis and reassess

## 2020-02-20 NOTE — PROGRESS NOTES
[facility]  Gastroenterology  Progress Note    Patient Name: Xiomy Duncan  MRN: [id number]  Admission Date: 2/18/2020  Hospital Length of Stay: 2 days  Code Status: Full Code   Attending Provider: Fadia Wall MD  Primary Care Physician: Arnie Monson MD  Principal Problem: NSTEMI (non-ST elevated myocardial infarction)    Subjective:     CC= Abnormal CT scan    Interval History: Patient denies abdominal pain, diarrhea and rectal bleeding.  Tolerating diet.     Review of systems:  General: Negative for fevers, chills.  Cardiovascular:  Negative for chest pain, shortness of breath     Objective:     Vital Signs (Most Recent):  Temp: 98 °F (36.7 °C) (02/20/20 1154)  Pulse: 92 (02/20/20 1154)  Resp: 17 (02/20/20 1154)  BP: 119/84 (02/20/20 1154)  SpO2: 99 % (02/20/20 1154) Vital Signs (24h Range):  Temp:  [98 °F (36.7 °C)-99.7 °F (37.6 °C)] 98 °F (36.7 °C)  Pulse:  [] 92  Resp:  [16-18] 17  SpO2:  [91 %-99 %] 99 %  BP: (104-133)/(69-88) 119/84     Physical examination:  GEN: Well developed AAF in no apparent distress   HENT: Normocephalic, anicteric sclera   Cardiovascular: Regular rate and rhythm. Systolic murmur noted.   Chest: Non-labored respirations. Breath sounds equal   Abdomen: Soft, NTND, normoactive BS  Psych: Appropriate mood and affect.   Extermities: No C/C/E. 2+ dorsalis pedis pulses bilaterally    Recent Labs   Lab 02/19/20  0450 02/20/20  0425   WBC 20.33* 12.63   HGB 13.4 11.7*   HCT 40.3 35.5*    197     Recent Labs   Lab 02/20/20  0425   *   CALCIUM 8.9   ALBUMIN 2.9*   PROT 6.3      K 3.3*   CO2 25      BUN 22*   CREATININE 1.1   ALKPHOS 75   ALT 33   AST 56*   BILITOT 0.7     Recent Labs   Lab 02/18/20  1424  02/20/20  0425   INR 1.0  --   --    APTT 23.9   < > 39.8*    < > = values in this interval not displayed.     Imaging:  CTA chest, CT abdomen/pelvis with contrast (2/18/20):  Impression:  1. No pulmonary thromboembolism.  2.  Bilateral peribronchial thickening, suggesting bronchial airways infection or inflammation.  Associated with this, is scattered patchy ground-glass attenuation particularly within the bilateral lower lobes, nonspecific infectious or inflammatory pneumonitis versus edema are considerations.  Scattered tree-in-bud nodular foci also may reflect that of pneumonitis or edema.  Correlation is advised, follow-up is recommended.  3. Small bilateral pleural effusions, left greater than right with associated compressive atelectasis of the bilateral lower lobes.  4. Wall thickening and inflammation involving the ascending colon and transverse colon, concerning for nonspecific infectious or inflammatory colitis, correlation with any history of the same is recommended.  5. Surgical changes of the stomach and small bowel suggesting Karrie-en-Y gastric bypass.  There has been resolution of previous fluid collection in the operative bed, no findings to suggest obstruction.  6. Hepatic steatosis with hepatomegaly, correlation with LFTs recommended.  7. Thickening at the ileocecal valve, finding is nonspecific however colonoscopy is recommended for further evaluation.  8. Please see above for several additional findings.      Assessment:   60 year old female with a history of gastric cancer s/p gastrectomy presenting with NSTEMI and heart failure.  CT scan with possible colitis.  No diarrhea or abdominal pain currently.      Plan:   1.  Continue to treat acute cardiac issues.  2.  Will sign off.  Please call back with any GI sx's.    3.  Follow-up with her gastroenterologist at [facility] to determine if repeat colonoscopy is needed.        Ivana Pinzon PA-C  Gastroenterology  [facility] [place]

## 2020-02-20 NOTE — SUBJECTIVE & OBJECTIVE
Interval History: no acute issue.  Nausea some what better.  Good urine output with lasix    Objective:     Vital Signs (Most Recent):  Temp: 98.5 °F (36.9 °C) (02/20/20 0823)  Pulse: 105 (02/20/20 0823)  Resp: 18 (02/20/20 0823)  BP: 122/73 (02/20/20 0823)  SpO2: (!) 94 % (02/20/20 0823) Vital Signs (24h Range):  Temp:  [98.1 °F (36.7 °C)-99.7 °F (37.6 °C)] 98.5 °F (36.9 °C)  Pulse:  [] 105  Resp:  [16-18] 18  SpO2:  [91 %-98 %] 94 %  BP: (104-133)/(69-88) 122/73     Weight: 55.1 kg (121 lb 7.6 oz)  Body mass index is 21.52 kg/m².      Intake/Output Summary (Last 24 hours) at 2/20/2020 1010  Last data filed at 2/20/2020 0155  Gross per 24 hour   Intake 260 ml   Output 850 ml   Net -590 ml       Physical Exam   Constitutional: She appears well-developed. No distress.   Holding onto abdomen in fetal position   Eyes: Conjunctivae and EOM are normal.   Neck: Normal range of motion.   Cardiovascular:   Sinus tachycardia   Pulmonary/Chest: Effort normal. She has no wheezes (mild expiratory wheeze). She has rales.   Abdominal: She exhibits no distension. There is no tenderness.   Hypoactive bowel sounds   Musculoskeletal: She exhibits no edema.   Neurological: She is alert.   Skin: Skin is warm and dry. Capillary refill takes less than 2 seconds. She is not diaphoretic.   Nursing note and vitals reviewed.      Vents:       Lines/Drains/Airways     Central Venous Catheter Line            Port A Cath Single Lumen 02/18/20 1008 left subclavian 2 days          Peripheral Intravenous Line                 Peripheral IV - Single Lumen 02/18/20 1158 20 G Right Antecubital 1 day         Peripheral IV - Single Lumen 02/18/20 1449 22 G Left Hand 1 day                Significant Labs:    CBC/Anemia Profile:  Recent Labs   Lab 02/19/20  0450 02/20/20  0425   WBC 20.33* 12.63   HGB 13.4 11.7*   HCT 40.3 35.5*    197   MCV 94 94   RDW 15.5* 15.8*        Chemistries:  Recent Labs   Lab 02/19/20  0450 02/20/20  0425     141   K 4.0 3.3*    106   CO2 23 25   BUN 18 22*   CREATININE 1.1 1.1   CALCIUM 9.0 8.9   ALBUMIN 3.1* 2.9*   PROT 6.6 6.3   BILITOT 0.5 0.7   ALKPHOS 73 75   ALT 27 33   AST 53* 56*       ABGs: No results for input(s): PH, PCO2, HCO3, POCSATURATED, BE in the last 48 hours.    Significant Imaging:  CXR: I have reviewed all pertinent results/findings within the past 24 hours and my personal findings are:  2/20/20 worsening left effusion   CT: I have reviewed all pertinent results 2/18/20 no pe; bilateral ggo predominantly in dependent region; bilateral small effusion; septal thickenening and engorgement of vascular markings at bases.  no lad.    Echo 2/19/20  · Severely decreased left ventricular systolic function. The estimated ejection fraction is 25%. Base moves, suggestive of takatsubo.  · Concentric left ventricular remodeling.  · Grade III (severe) left ventricular diastolic dysfunction consistent with restrictive physiology.  · Normal right ventricular systolic function.  · Global hypokinetic wall motion.  · No pulmonary hypertension present.  · There is a left pleural effusion.

## 2020-02-20 NOTE — PROGRESS NOTES
[facility]  Cardiology  Progress Note    Patient Name: Xiomy Duncan  MRN: [id number]  Admission Date: 2/18/2020  Hospital Length of Stay: 2 days  Code Status: Full Code   Attending Physician: Fadia Wall MD   Primary Care Physician: Arnie Monson MD  Expected Discharge Date:   Principal Problem:NSTEMI (non-ST elevated myocardial infarction)    Subjective:     Hospital Course:   2/20/20: echo shows severely depressed LVEF.        Review of Systems   Cardiovascular: Positive for chest pain. Negative for irregular heartbeat, leg swelling, orthopnea, palpitations and paroxysmal nocturnal dyspnea.   Respiratory: Negative for shortness of breath.      Objective:     Vital Signs (Most Recent):  Temp: 98.5 °F (36.9 °C) (02/20/20 0823)  Pulse: 105 (02/20/20 0823)  Resp: 18 (02/20/20 0823)  BP: 122/73 (02/20/20 0823)  SpO2: (!) 94 % (02/20/20 0823) Vital Signs (24h Range):  Temp:  [98.1 °F (36.7 °C)-99.7 °F (37.6 °C)] 98.5 °F (36.9 °C)  Pulse:  [] 105  Resp:  [16-18] 18  SpO2:  [91 %-98 %] 94 %  BP: (104-133)/(69-88) 122/73     Weight: 55.1 kg (121 lb 7.6 oz)  Body mass index is 21.52 kg/m².     SpO2: (!) 94 %  O2 Device (Oxygen Therapy): nasal cannula      Intake/Output Summary (Last 24 hours) at 2/20/2020 0942  Last data filed at 2/20/2020 0155  Gross per 24 hour   Intake 260 ml   Output 850 ml   Net -590 ml       Lines/Drains/Airways     Central Venous Catheter Line            Port A Cath Single Lumen 02/18/20 1008 left subclavian 1 day          Peripheral Intravenous Line                 Peripheral IV - Single Lumen 02/18/20 1158 20 G Right Antecubital 1 day         Peripheral IV - Single Lumen 02/18/20 1449 22 G Left Hand 1 day                Physical Exam   Constitutional: She is oriented to person, place, and time. No distress.   Cardiovascular: Normal rate, regular rhythm and intact distal pulses.   Murmur heard.   Holosystolic murmur is present with a grade of  2/6.  Pulmonary/Chest: Effort normal and breath sounds normal.   Abdominal: Soft. Bowel sounds are normal.   Musculoskeletal:        Right lower leg: She exhibits no edema.        Left lower leg: She exhibits no edema.   Neurological: She is alert and oriented to person, place, and time.   Vitals reviewed.      Significant Labs:   BMP:   Recent Labs   Lab 02/18/20  1005 02/19/20  0450 02/20/20  0425   * 131* 111*    139 141   K 3.7 4.0 3.3*    107 106   CO2 22* 23 25   BUN 15 18 22*   CREATININE 1.1 1.1 1.1   CALCIUM 9.6 9.0 8.9   , CBC   Recent Labs   Lab 02/18/20  1005 02/19/20  0450 02/20/20  0425   WBC 15.15* 20.33* 12.63   HGB 13.7 13.4 11.7*   HCT 41.9 40.3 35.5*    235 197    and Troponin   Recent Labs   Lab 02/18/20  1005 02/18/20  1843 02/19/20  0013   TROPONINI 5.664* 4.569* 4.481*       Significant Imaging: Echocardiogram:   Transthoracic echo (TTE) complete (Cupid Only):   Results for orders placed or performed during the hospital encounter of 02/18/20   Echo Color Flow Doppler? Yes   Result Value Ref Range    BSA 1.56 m2    TDI SEPTAL 0.07 m/s    LV LATERAL E/E' RATIO 10.33 m/s    LV SEPTAL E/E' RATIO 13.29 m/s    LA WIDTH 3.59 cm    AORTIC VALVE CUSP SEPERATION 2.25 cm    TDI LATERAL 0.09 m/s    PV PEAK VELOCITY 0.50 cm/s    LVIDD 4.49 3.5 - 6.0 cm    IVS 0.95 0.6 - 1.1 cm    PW 0.98 0.6 - 1.1 cm    Ao root annulus 2.69 cm    LVIDS 3.91 2.1 - 4.0 cm    FS 13 28 - 44 %    LA volume 51.27 cm3    Sinus 2.58 cm    STJ 2.33 cm    Ascending aorta 2.27 cm    LV mass 145.44 g    LA size 3.54 cm    RVDD 3.14 cm    TAPSE 1.11 cm    RV S' 7.28 cm/s    Left Ventricle Relative Wall Thickness 0.44 cm    AV mean gradient 1 mmHg    AV valve area 3.69 cm2    AV Velocity Ratio 0.91     AV index (prosthetic) 0.87     E/A ratio 2.33     Mean e' 0.08 m/s    E wave decelartion time 78.94 msec    IVRT 0.11 msec    LVOT diameter 2.33 cm    LVOT area 4.3 cm2    LVOT peak abbey 0.72 m/s    LVOT peak VTI  8.52 cm    Ao peak mingo 0.79 m/s    Ao VTI 9.84 cm    LVOT stroke volume 36.31 cm3    AV peak gradient 2 mmHg    E/E' ratio 11.63 m/s    MV Peak E Mingo 0.93 m/s    TR Max Mingo 2.43 m/s    MV Peak A Mingo 0.40 m/s    LV Systolic Volume 66.37 mL    LV Systolic Volume Index 42.4 mL/m2    LV Diastolic Volume 91.92 mL    LV Diastolic Volume Index 58.75 mL/m2    LA Volume Index 32.8 mL/m2    LV Mass Index 93 g/m2    RA Major Axis 3.37 cm    Left Atrium Minor Axis 4.89 cm    Left Atrium Major Axis 4.61 cm    Triscuspid Valve Regurgitation Peak Gradient 24 mmHg    RA Width 2.80 cm    Narrative    · Severely decreased left ventricular systolic function. The estimated   ejection fraction is 25%. Base moves, suggestive of takatsubo.  · Concentric left ventricular remodeling.  · Grade III (severe) left ventricular diastolic dysfunction consistent   with restrictive physiology.  · Normal right ventricular systolic function.  · Global hypokinetic wall motion.  · No pulmonary hypertension present.  · There is a left pleural effusion.        Assessment and Plan:         * NSTEMI (non-ST elevated myocardial infarction)  Admit EKG with t wave abnormality. Plavix load. Heparin gtt. Statin added. Trending down. Echo pending.     2/20/20: Cardiomyopathy, EF 25%. Global. Troponin slightly decreased. Plan on angiography tomorrow to r/o ischemic etiology. Continue current management.     Elevated brain natriuretic peptide (BNP) level  Echo pending.     2/20/20: EF 25%.        VTE Risk Mitigation (From admission, onward)         Ordered     IP VTE HIGH RISK PATIENT  Once      02/18/20 1717     Place sequential compression device  Until discontinued      02/18/20 1717     Place RAMESH hose  Until discontinued      02/18/20 1717     heparin 25,000 units in dextrose 5% (100 units/ml) IV bolus from bag - ADDITIONAL PRN BOLUS - 60 units/kg (max bolus 4000 units)  As needed (PRN)     Question:  Heparin Infusion Adjustment (DO NOT MODIFY ANSWER)  Answer:   \\[email or web address]\epic\Images\Pharmacy\HeparinInfusions\heparin LOW INTENSITY nomogram for [facility] [id number].pdf    02/18/20 1422     heparin 25,000 units in dextrose 5% (100 units/ml) IV bolus from bag - ADDITIONAL PRN BOLUS - 30 units/kg (max bolus 4000 units)  As needed (PRN)     Question:  Heparin Infusion Adjustment (DO NOT MODIFY ANSWER)  Answer:  \\[email or web address]\epic\Images\Pharmacy\HeparinInfusions\heparin LOW INTENSITY nomogram for [facility] [id number].pdf    02/18/20 1422     heparin 25,000 units in dextrose 5% 250 mL (100 units/mL) infusion LOW INTENSITY nomogram - [facility]  Continuous     Question:  Heparin Infusion Adjustment (DO NOT MODIFY ANSWER)  Answer:  \\[email or web address]\epic\Images\Pharmacy\HeparinInfusions\heparin LOW INTENSITY nomogram for [facility] [id number].pdf    02/18/20 1422                Juan Castellanos MD  Cardiology  [facility] [place]

## 2020-02-20 NOTE — ASSESSMENT & PLAN NOTE
Troponin of 5 with no ST segment elevation very concerning for NSTEMI  Unsure if history of cardiac disease. Elevated BNP and possible pulmonary edema highly suspicious for new onset heart failure   Loaded with aspirin/plavix, high dose statin and BB. Ok to stop heparin infusion today  Echocardiogram significant for LVEF 25% and grade 3 diastolic dysfunction.   Continue with medical management (ROYAL, BB, statin and anticoagulation)  Diuretics as needed  Plan for [facility] in AM to r/o CAD   Appreciate further Cardiology

## 2020-02-20 NOTE — PLAN OF CARE
Problem: Fall Injury Risk  Goal: Absence of Fall and Fall-Related Injury  Outcome: Ongoing, Progressing  Intervention: Identify and Manage Contributors to Fall Injury Risk  Flowsheets (Taken 2/20/2020 2495)  Self-Care Promotion: independence encouraged  Medication Review/Management: medications reviewed

## 2020-02-20 NOTE — ASSESSMENT & PLAN NOTE
With plenty of interstitial and peribronchial inflammation vs edema, per CT  Surprisingly patient is not hypoxic  2/2 radiation vs infection vs inflammatory vs edema  Likely edema as patient clinically improved with diuretics

## 2020-02-20 NOTE — NURSING
End of shift bedside report given to ABNER Davidson. Patient in no apparent distress.     12 hour chart check complete.

## 2020-02-20 NOTE — ASSESSMENT & PLAN NOTE
Followed by Dr. Jesus.  Echo pending.  Plavix, asa and heparin.  [facility] in am

## 2020-02-20 NOTE — PROGRESS NOTES
"[facility] Medicine  Progress Note    Patient Name: Xiomy Duncan  MRN: [id number]  Patient Class: IP- Inpatient   Admission Date: 2/18/2020  Length of Stay: 2 days  Attending Physician: Fadia Wall MD  Primary Care Provider: Arnie Monson MD        Subjective:     Principal Problem:NSTEMI (non-ST elevated myocardial infarction)        HPI:  60 year old female with gastric cancer s/p subtotal gastrectomy and former smoker who presented with abdominal pain of one day in evolution. During my evaluation, patient could not provide any history due to severe pain and laying in fetal position. She was alert and made eye contact. Per ED staff, patient complained of severe abdominal pain since yesterday. Associated symptoms include nausea, vomiting "bile", cough and inability to tolerate oral meds. When asked about chemotherapy patient could not answer question. Per ED staff, family member stated last chemoradiation therapy was in August/2019. Has a port in place. Also reported daily use of marijuana. No other reported symptoms.     In the ED, patient was tachycardic to 140s, with borderline hypotension and in severe pain. Was given haloperidol IV, lorazepam IV, morphine IV, zofran IV and 3L of fluids. Pain relived temporarily. Breathing stable at room air. A CTA of chest and CT of abdomen with contrast showed peribronchial thickening, septal interlobular thickening, felice hilar ground glass changes, scattered tree in bud nodules, small bilateral pleural effusions and wall thickening ascending colon/transverse colon. No pulmonary embolism, pneumothorax, acute pathology to liver/kidney, evidence of abscess or perforation. Troponin significant for level of 5 with no ST segment elevation. Apparently no chest pain. BNP very elevated 2,496. Lactic acid 2.9. Cardiology, pulm and GI consulted. IV abx started. Admitted to hospital medicine.    Overview/Hospital Course:  No notes on " file    Interval History: feels much better today. Asking for diet to be upgraded.     Review of Systems   Constitutional: Negative for appetite change.   Respiratory: Negative.    Cardiovascular: Negative.    Gastrointestinal: Negative for diarrhea, nausea and vomiting.     Objective:     Vital Signs (Most Recent):  Temp: 99.1 °F (37.3 °C) (02/20/20 1649)  Pulse: 90 (02/20/20 1649)  Resp: 18 (02/20/20 1649)  BP: (!) 103/57 (02/20/20 1649)  SpO2: 95 % (02/20/20 1649) Vital Signs (24h Range):  Temp:  [98 °F (36.7 °C)-99.1 °F (37.3 °C)] 99.1 °F (37.3 °C)  Pulse:  [] 90  Resp:  [17-18] 18  SpO2:  [94 %-99 %] 95 %  BP: (103-133)/(57-88) 103/57     Weight: 55.1 kg (121 lb 7.6 oz)  Body mass index is 21.52 kg/m².    Intake/Output Summary (Last 24 hours) at 2/20/2020 1659  Last data filed at 2/20/2020 0155  Gross per 24 hour   Intake 260 ml   Output --   Net 260 ml      Physical Exam   Constitutional: She is oriented to person, place, and time. She appears well-developed. No distress.   Cardiovascular: Normal rate and regular rhythm.        Pulmonary/Chest: Effort normal and breath sounds normal.   Abdominal: Soft. Bowel sounds are normal. She exhibits no distension. There is no tenderness.   Musculoskeletal: Normal range of motion. She exhibits no edema.   Neurological: She is alert and oriented to person, place, and time.   Skin: Skin is warm. Capillary refill takes less than 2 seconds. She is not diaphoretic.   Psychiatric: She has a normal mood and affect. Her behavior is normal. Judgment and thought content normal.   Nursing note and vitals reviewed.      Significant Labs: All pertinent labs within the past 24 hours have been reviewed.    Significant Imaging: I have reviewed all pertinent imaging results/findings within the past 24 hours.  I have reviewed and interpreted all pertinent imaging results/findings within the past 24 hours.      Assessment/Plan:      * NSTEMI (non-ST elevated myocardial  infarction)  Troponin of 5 with no ST segment elevation very concerning for NSTEMI  Unsure if history of cardiac disease. Elevated BNP and possible pulmonary edema highly suspicious for new onset heart failure   Loaded with aspirin/plavix, high dose statin and BB. Ok to stop heparin infusion today  Echocardiogram significant for LVEF 25% and grade 3 diastolic dysfunction.   Continue with medical management (ROYAL, BB, statin and anticoagulation)  Diuretics as needed  Plan for LHC in AM to r/o CAD   Appreciate further Cardiology        Acute viral syndrome  Per patient, she was exposed to her grandchildren who were ill with what sounds like viral illness  Unsure if acute heart failure is due to viral illness vs present prior to viral infection  Procalcitonin negative, blood cultures thus far negative and patient improved with IV diuresis  Stop antibiotics, continue supportive treatment and manage heart failure        Abnormal CT of the chest  With plenty of interstitial and peribronchial inflammation vs edema, per CT  Surprisingly patient is not hypoxic  2/2 radiation vs infection vs inflammatory vs edema  Likely edema as patient clinically improved with diuretics      Abnormal CT of the abdomen  With inflammation to ascending and transverse colon without clear evidence of abscess, obstruction or perforation  2/2 radiation vs infection vs inflammatory vs edema  Likely edema as patient clinically improved with diuretics        Intractable nausea and vomiting  2/2 inflammation vs bowel edema from HF  Likely due to bowel edemas as it resolved with IV diuresis          Elevated brain natriuretic peptide (BNP) level  Suspect new onset heart failure  Trial of IV furosemide      S/P subtotal gastrectomy  For mass removal      Carcinoma of antrum of stomach  Per patient and daughter, complete chemoradiation therapy in August/2019  Has port in place      GERD (gastroesophageal reflux disease)  History of gastrointestinal bleed  secondary to gastric mass at the time. S/p subtotal gastrectomy  Pantoprazole IV for now as she is still high risk for re-bleed and need ROYAL/heparin for NSTEMI        HTN (hypertension)  BP not elevated  Start antihypertensives when indicated      VTE Risk Mitigation (From admission, onward)         Ordered     IP VTE HIGH RISK PATIENT  Once      02/18/20 1717     Place sequential compression device  Until discontinued      02/18/20 1717     Place RAMESH hose  Until discontinued      02/18/20 1717                      Fadia Summers MD  Department of Hospital Medicine   [facility]

## 2020-02-20 NOTE — ASSESSMENT & PLAN NOTE
With inflammation to ascending and transverse colon without clear evidence of abscess, obstruction or perforation  2/2 radiation vs infection vs inflammatory vs edema  Likely edema as patient clinically improved with diuretics

## 2020-02-21 VITALS
RESPIRATION RATE: 16 BRPM | SYSTOLIC BLOOD PRESSURE: 111 MMHG | OXYGEN SATURATION: 94 % | HEART RATE: 84 BPM | BODY MASS INDEX: 21.53 KG/M2 | DIASTOLIC BLOOD PRESSURE: 68 MMHG | HEIGHT: 63 IN | WEIGHT: 121.5 LBS | TEMPERATURE: 99 F

## 2020-02-21 PROBLEM — G93.31 POST VIRAL SYNDROME: Status: ACTIVE | Noted: 2020-02-18

## 2020-02-21 PROBLEM — I42.8 NON-ISCHEMIC CARDIOMYOPATHY: Status: ACTIVE | Noted: 2020-02-18

## 2020-02-21 PROBLEM — I50.41 ACUTE COMBINED SYSTOLIC AND DIASTOLIC HEART FAILURE: Status: ACTIVE | Noted: 2020-02-21

## 2020-02-21 PROBLEM — R11.2 INTRACTABLE NAUSEA AND VOMITING: Status: RESOLVED | Noted: 2020-02-19 | Resolved: 2020-02-21

## 2020-02-21 LAB
ALBUMIN SERPL BCP-MCNC: 2.6 G/DL (ref 3.5–5.2)
ALP SERPL-CCNC: 64 U/L (ref 55–135)
ALT SERPL W/O P-5'-P-CCNC: 32 U/L (ref 10–44)
ANION GAP SERPL CALC-SCNC: 10 MMOL/L (ref 8–16)
AST SERPL-CCNC: 42 U/L (ref 10–40)
BILIRUB SERPL-MCNC: 0.6 MG/DL (ref 0.1–1)
BUN SERPL-MCNC: 16 MG/DL (ref 6–20)
CALCIUM SERPL-MCNC: 8.1 MG/DL (ref 8.7–10.5)
CATH EF ESTIMATED: 35 %
CHLORIDE SERPL-SCNC: 104 MMOL/L (ref 95–110)
CO2 SERPL-SCNC: 25 MMOL/L (ref 23–29)
CREAT SERPL-MCNC: 0.8 MG/DL (ref 0.5–1.4)
EST. GFR  (AFRICAN AMERICAN): >60 ML/MIN/1.73 M^2
EST. GFR  (NON AFRICAN AMERICAN): >60 ML/MIN/1.73 M^2
GLUCOSE SERPL-MCNC: 96 MG/DL (ref 70–110)
POTASSIUM SERPL-SCNC: 3.2 MMOL/L (ref 3.5–5.1)
PROT SERPL-MCNC: 5.7 G/DL (ref 6–8.4)
SODIUM SERPL-SCNC: 139 MMOL/L (ref 136–145)

## 2020-02-21 PROCEDURE — 99152 MOD SED SAME PHYS/QHP 5/>YRS: CPT | Mod: ,,, | Performed by: INTERNAL MEDICINE

## 2020-02-21 PROCEDURE — 63600175 PHARM REV CODE 636 W HCPCS: Performed by: INTERNAL MEDICINE

## 2020-02-21 PROCEDURE — 93010 EKG 12-LEAD: ICD-10-PCS | Mod: ,,, | Performed by: INTERNAL MEDICINE

## 2020-02-21 PROCEDURE — 25500020 PHARM REV CODE 255: Performed by: INTERNAL MEDICINE

## 2020-02-21 PROCEDURE — C1769 GUIDE WIRE: HCPCS | Performed by: INTERNAL MEDICINE

## 2020-02-21 PROCEDURE — 63600175 PHARM REV CODE 636 W HCPCS: Performed by: EMERGENCY MEDICINE

## 2020-02-21 PROCEDURE — 63700000 PHARM REV CODE 250 ALT 637 W/O HCPCS: Performed by: FAMILY MEDICINE

## 2020-02-21 PROCEDURE — 80053 COMPREHEN METABOLIC PANEL: CPT

## 2020-02-21 PROCEDURE — 93458 PR CATH PLACE/CORON ANGIO, IMG SUPER/INTERP,W LEFT HEART VENTRICULOGRAPHY: ICD-10-PCS | Mod: 26,,, | Performed by: INTERNAL MEDICINE

## 2020-02-21 PROCEDURE — 93458 L HRT ARTERY/VENTRICLE ANGIO: CPT | Performed by: INTERNAL MEDICINE

## 2020-02-21 PROCEDURE — 36415 COLL VENOUS BLD VENIPUNCTURE: CPT

## 2020-02-21 PROCEDURE — 93010 ELECTROCARDIOGRAM REPORT: CPT | Mod: ,,, | Performed by: INTERNAL MEDICINE

## 2020-02-21 PROCEDURE — 94761 N-INVAS EAR/PLS OXIMETRY MLT: CPT

## 2020-02-21 PROCEDURE — C1894 INTRO/SHEATH, NON-LASER: HCPCS | Performed by: INTERNAL MEDICINE

## 2020-02-21 PROCEDURE — 25000003 PHARM REV CODE 250: Performed by: INTERNAL MEDICINE

## 2020-02-21 PROCEDURE — 99233 SBSQ HOSP IP/OBS HIGH 50: CPT | Mod: ,,, | Performed by: INTERNAL MEDICINE

## 2020-02-21 PROCEDURE — 93458 L HRT ARTERY/VENTRICLE ANGIO: CPT | Mod: 26,,, | Performed by: INTERNAL MEDICINE

## 2020-02-21 PROCEDURE — 99152 PR MOD CONSCIOUS SEDATION, SAME PHYS, 5+ YRS, FIRST 15 MIN: ICD-10-PCS | Mod: ,,, | Performed by: INTERNAL MEDICINE

## 2020-02-21 PROCEDURE — 93005 ELECTROCARDIOGRAM TRACING: CPT

## 2020-02-21 PROCEDURE — C1887 CATHETER, GUIDING: HCPCS | Performed by: INTERNAL MEDICINE

## 2020-02-21 PROCEDURE — C9113 INJ PANTOPRAZOLE SODIUM, VIA: HCPCS | Performed by: INTERNAL MEDICINE

## 2020-02-21 PROCEDURE — 99233 PR SUBSEQUENT HOSPITAL CARE,LEVL III: ICD-10-PCS | Mod: ,,, | Performed by: INTERNAL MEDICINE

## 2020-02-21 PROCEDURE — 63700000 PHARM REV CODE 250 ALT 637 W/O HCPCS

## 2020-02-21 RX ORDER — VERAPAMIL HYDROCHLORIDE 2.5 MG/ML
INJECTION, SOLUTION INTRAVENOUS
Status: DISCONTINUED | OUTPATIENT
Start: 2020-02-21 | End: 2020-02-21 | Stop reason: HOSPADM

## 2020-02-21 RX ORDER — NITROGLYCERIN 0.4 MG/1
TABLET SUBLINGUAL
Status: COMPLETED
Start: 2020-02-21 | End: 2020-02-21

## 2020-02-21 RX ORDER — FENTANYL CITRATE 50 UG/ML
INJECTION, SOLUTION INTRAMUSCULAR; INTRAVENOUS
Status: DISCONTINUED | OUTPATIENT
Start: 2020-02-21 | End: 2020-02-21 | Stop reason: HOSPADM

## 2020-02-21 RX ORDER — MIDAZOLAM HYDROCHLORIDE 1 MG/ML
INJECTION, SOLUTION INTRAMUSCULAR; INTRAVENOUS
Status: DISCONTINUED | OUTPATIENT
Start: 2020-02-21 | End: 2020-02-21 | Stop reason: HOSPADM

## 2020-02-21 RX ORDER — LIDOCAINE HYDROCHLORIDE 10 MG/ML
INJECTION, SOLUTION EPIDURAL; INFILTRATION; INTRACAUDAL; PERINEURAL
Status: DISCONTINUED | OUTPATIENT
Start: 2020-02-21 | End: 2020-02-21 | Stop reason: HOSPADM

## 2020-02-21 RX ORDER — SODIUM CHLORIDE 9 MG/ML
20 INJECTION, SOLUTION INTRAVENOUS CONTINUOUS
Status: ACTIVE | OUTPATIENT
Start: 2020-02-21 | End: 2020-02-21

## 2020-02-21 RX ORDER — HEPARIN 100 UNIT/ML
5 SYRINGE INTRAVENOUS ONCE
Status: COMPLETED | OUTPATIENT
Start: 2020-02-21 | End: 2020-02-21

## 2020-02-21 RX ORDER — NITROGLYCERIN 0.4 MG/1
0.4 TABLET SUBLINGUAL EVERY 5 MIN PRN
Status: DISCONTINUED | OUTPATIENT
Start: 2020-02-21 | End: 2020-02-21 | Stop reason: HOSPADM

## 2020-02-21 RX ORDER — FUROSEMIDE 10 MG/ML
40 INJECTION INTRAMUSCULAR; INTRAVENOUS ONCE
Status: COMPLETED | OUTPATIENT
Start: 2020-02-21 | End: 2020-02-21

## 2020-02-21 RX ORDER — NITROGLYCERIN 20 MG/100ML
INJECTION INTRAVENOUS
Status: DISCONTINUED | OUTPATIENT
Start: 2020-02-21 | End: 2020-02-21 | Stop reason: HOSPADM

## 2020-02-21 RX ORDER — HEPARIN SODIUM 1000 [USP'U]/ML
INJECTION, SOLUTION INTRAVENOUS; SUBCUTANEOUS
Status: DISCONTINUED | OUTPATIENT
Start: 2020-02-21 | End: 2020-02-21 | Stop reason: HOSPADM

## 2020-02-21 RX ADMIN — FUROSEMIDE 40 MG: 10 INJECTION, SOLUTION INTRAVENOUS at 03:02

## 2020-02-21 RX ADMIN — PROMETHAZINE HYDROCHLORIDE 12.5 MG: 25 INJECTION INTRAMUSCULAR; INTRAVENOUS at 09:02

## 2020-02-21 RX ADMIN — PANTOPRAZOLE SODIUM 40 MG: 40 INJECTION, POWDER, LYOPHILIZED, FOR SOLUTION INTRAVENOUS at 09:02

## 2020-02-21 RX ADMIN — NITROGLYCERIN: 0.4 TABLET SUBLINGUAL at 12:02

## 2020-02-21 RX ADMIN — Medication 500 UNITS: at 03:02

## 2020-02-21 RX ADMIN — MORPHINE SULFATE 2 MG: 10 INJECTION INTRAVENOUS at 12:02

## 2020-02-21 RX ADMIN — ASPIRIN 81 MG: 81 TABLET, COATED ORAL at 07:02

## 2020-02-21 RX ADMIN — NITROGLYCERIN 0.4 MG: 0.4 TABLET SUBLINGUAL at 12:02

## 2020-02-21 RX ADMIN — SODIUM CHLORIDE 1102 ML: 0.9 INJECTION, SOLUTION INTRAVENOUS at 09:02

## 2020-02-21 RX ADMIN — CLOPIDOGREL BISULFATE 75 MG: 75 TABLET ORAL at 07:02

## 2020-02-21 NOTE — ASSESSMENT & PLAN NOTE
Constellation of ggo, bilateral effusion, vascular engorgement along with elevated bnp and troponin are c/w volume overload.   Will start gentle diuresis and reassess.  procal is normal along with improving leukocytosis.  Recommend antibiotics de-escalation.

## 2020-02-21 NOTE — ASSESSMENT & PLAN NOTE
Followed by Dr. Jesus.  Echo pending.  Plavix, asa and heparin.  [facility] with normal coronaries.  ?[place] cardiomyopathy

## 2020-02-21 NOTE — PROGRESS NOTES
WRITTEN HEALTHCARE DISCHARGE INFORMATION     Things that YOU are RESPONSIBLE for to Manage Your Care At Home:    1. Getting your prescriptions filled.  2. Taking you medications as directed. DO NOT MISS ANY DOSES!  3. Going to your follow-up doctor appointments. This is important because it allows the doctor to monitor your progress and to determine if any changes need to be made to your treatment plan.    If you are unable to make your follow up appointments, please call the number listed and reschedule this appointment.     ____________HELP AT HOME____________________    Experiencing any SIGNS or SYMPTOMS: YOU CAN    Schedule a same day appopintment with your Primary Care Doctor or  you can call [facility] Line for 24/7 assistance at [phone]    If you are experience any signs or symptoms that have become severe, Call 911 and come to your nearest Emergency Room.    Thank you for choosing [facility] and allowing us to care for you.   From your care management team: Eula GAYLE [facility] [phone]    You should receive a call from [facility] within 48-72 hours to help manage your care after discharge. Please try to make sure that you answer your phone for this important phone call.  Follow-up Information     Schedule an appointment as soon as possible for a visit with Arnie Monson MD.    Specialty:  Family Medicine  Why:  Outpatient Services, Please schedule hospital follow-up appointment.   Contact information:  [place] [id number]  [phone]

## 2020-02-21 NOTE — NURSING
Discharge instructions explained and given to patient. Patient verbalized understanding of printed discharge instructions. IV's discontinued. Gauze and tape placed to sites. NO bleeding or swelling noted. Left PAC flushed with 5mls of heparin per order. Needle to left port discontinued. Gauze and tegaderm placed to site. No bleeding noted. Gauze and tegaderm to s/p cath to right arm intact with wrist stabilizer. Will continue to monitor.

## 2020-02-21 NOTE — PROGRESS NOTES
[facility]  Pulmonology  Progress Note    Patient Name: Xiomy Duncan  MRN: [id number]  Admission Date: 2/18/2020  Hospital Length of Stay: 3 days  Code Status: Full Code  Attending Provider: Fadia Wall MD  Primary Care Provider: Arnie Monson MD   Principal Problem: NSTEMI (non-ST elevated myocardial infarction)    Subjective:     Interval History: no acute issue.  S/p lhc with normal coronaries.  Nausea improved.  No chest pain.    Objective:     Vital Signs (Most Recent):  Temp: 98 °F (36.7 °C) (02/21/20 0839)  Pulse: 90 (02/21/20 0855)  Resp: 18 (02/21/20 0855)  BP: 110/69 (02/21/20 0855)  SpO2: 96 % (02/21/20 0855) Vital Signs (24h Range):  Temp:  [97.9 °F (36.6 °C)-99.1 °F (37.3 °C)] 98 °F (36.7 °C)  Pulse:  [80-99] 90  Resp:  [17-18] 18  SpO2:  [91 %-99 %] 96 %  BP: ()/(57-84) 110/69     Weight: 55.1 kg (121 lb 7.6 oz)  Body mass index is 21.52 kg/m².      Intake/Output Summary (Last 24 hours) at 2/21/2020 0911  Last data filed at 2/20/2020 1900  Gross per 24 hour   Intake 440 ml   Output --   Net 440 ml       Physical Exam   Constitutional: She appears well-developed. No distress.   Holding onto abdomen in fetal position   Eyes: Conjunctivae and EOM are normal.   Neck: Normal range of motion.   Cardiovascular:   Sinus tachycardia   Pulmonary/Chest: Effort normal. She has no wheezes (mild expiratory wheeze). She has rales.   Abdominal: Soft. Bowel sounds are normal. She exhibits no distension. There is no tenderness.   Hypoactive bowel sounds   Musculoskeletal: She exhibits no edema.   Neurological: She is alert.   Skin: Skin is warm and dry. Capillary refill takes less than 2 seconds. She is not diaphoretic.   Nursing note and vitals reviewed.      Vents:       Lines/Drains/Airways     Central Venous Catheter Line            Port A Cath Single Lumen 02/18/20 1008 left subclavian 2 days          Peripheral Intravenous Line                 Peripheral IV - Single Lumen  02/18/20 1158 20 G Right Antecubital 2 days         Peripheral IV - Single Lumen 02/18/20 1449 22 G Left Hand 2 days                Significant Labs:    CBC/Anemia Profile:  Recent Labs   Lab 02/20/20  0425   WBC 12.63   HGB 11.7*   HCT 35.5*      MCV 94   RDW 15.8*        Chemistries:  Recent Labs   Lab 02/20/20  0425 02/21/20  0452    139   K 3.3* 3.2*    104   CO2 25 25   BUN 22* 16   CREATININE 1.1 0.8   CALCIUM 8.9 8.1*   ALBUMIN 2.9* 2.6*   PROT 6.3 5.7*   BILITOT 0.7 0.6   ALKPHOS 75 64   ALT 33 32   AST 56* 42*       ABGs: No results for input(s): PH, PCO2, HCO3, POCSATURATED, BE in the last 48 hours.    Significant Imaging:  CXR: I have reviewed all pertinent results/findings within the past 24 hours and my personal findings are:  2/20/20 worsening left effusion   2/21/20 cxr pending  CT: I have reviewed all pertinent results 2/18/20 no pe; bilateral ggo predominantly in dependent region; bilateral small effusion; septal thickenening and engorgement of vascular markings at bases.  no lad.    Echo 2/19/20  · Severely decreased left ventricular systolic function. The estimated ejection fraction is 25%. Base moves, suggestive of takatsubo.  · Concentric left ventricular remodeling.  · Grade III (severe) left ventricular diastolic dysfunction consistent with restrictive physiology.  · Normal right ventricular systolic function.  · Global hypokinetic wall motion.  · No pulmonary hypertension present.  · There is a left pleural effusion.    Assessment/Plan:     * NSTEMI (non-ST elevated myocardial infarction)  Followed by Dr. Jesus.  Echo pending.  Plavix, asa and heparin.  c with normal coronaries.  ?[place] cardiomyopathy    Pleural effusion  Small on ct of chest.  Last cxr with progression.  Leukocytosis improving.  Suspect related to chf.  Will increase diuresis and reassess.  Follow up on repeat cxr.  Unless effusion significantly enlarge, I would not recommend thoracentesis.  Clinic  follow up with me in 3-4 weeks with repeat cxr is recommended.      Abnormal CT of the chest  Constellation of ggo, bilateral effusion, vascular engorgement along with elevated bnp and troponin are c/w volume overload.   Will start gentle diuresis and reassess.  procal is normal along with improving leukocytosis.  Recommend antibiotics de-escalation.           Quentin Galloway MD  Pulmonology  [facility]

## 2020-02-21 NOTE — SUBJECTIVE & OBJECTIVE
Interval History: no acute issue.  S/p lhc with normal coronaries.  Nausea improved.  No chest pain.    Objective:     Vital Signs (Most Recent):  Temp: 98 °F (36.7 °C) (02/21/20 0839)  Pulse: 90 (02/21/20 0855)  Resp: 18 (02/21/20 0855)  BP: 110/69 (02/21/20 0855)  SpO2: 96 % (02/21/20 0855) Vital Signs (24h Range):  Temp:  [97.9 °F (36.6 °C)-99.1 °F (37.3 °C)] 98 °F (36.7 °C)  Pulse:  [80-99] 90  Resp:  [17-18] 18  SpO2:  [91 %-99 %] 96 %  BP: ()/(57-84) 110/69     Weight: 55.1 kg (121 lb 7.6 oz)  Body mass index is 21.52 kg/m².      Intake/Output Summary (Last 24 hours) at 2/21/2020 0911  Last data filed at 2/20/2020 1900  Gross per 24 hour   Intake 440 ml   Output --   Net 440 ml       Physical Exam   Constitutional: She appears well-developed. No distress.   Holding onto abdomen in fetal position   Eyes: Conjunctivae and EOM are normal.   Neck: Normal range of motion.   Cardiovascular:   Sinus tachycardia   Pulmonary/Chest: Effort normal. She has no wheezes (mild expiratory wheeze). She has rales.   Abdominal: Soft. Bowel sounds are normal. She exhibits no distension. There is no tenderness.   Hypoactive bowel sounds   Musculoskeletal: She exhibits no edema.   Neurological: She is alert.   Skin: Skin is warm and dry. Capillary refill takes less than 2 seconds. She is not diaphoretic.   Nursing note and vitals reviewed.      Vents:       Lines/Drains/Airways     Central Venous Catheter Line            Port A Cath Single Lumen 02/18/20 1008 left subclavian 2 days          Peripheral Intravenous Line                 Peripheral IV - Single Lumen 02/18/20 1158 20 G Right Antecubital 2 days         Peripheral IV - Single Lumen 02/18/20 1449 22 G Left Hand 2 days                Significant Labs:    CBC/Anemia Profile:  Recent Labs   Lab 02/20/20  0425   WBC 12.63   HGB 11.7*   HCT 35.5*      MCV 94   RDW 15.8*        Chemistries:  Recent Labs   Lab 02/20/20  0425 02/21/20  0452    139   K 3.3* 3.2*     104   CO2 25 25   BUN 22* 16   CREATININE 1.1 0.8   CALCIUM 8.9 8.1*   ALBUMIN 2.9* 2.6*   PROT 6.3 5.7*   BILITOT 0.7 0.6   ALKPHOS 75 64   ALT 33 32   AST 56* 42*       ABGs: No results for input(s): PH, PCO2, HCO3, POCSATURATED, BE in the last 48 hours.    Significant Imaging:  CXR: I have reviewed all pertinent results/findings within the past 24 hours and my personal findings are:  2/20/20 worsening left effusion   2/21/20 cxr pending  CT: I have reviewed all pertinent results 2/18/20 no pe; bilateral ggo predominantly in dependent region; bilateral small effusion; septal thickenening and engorgement of vascular markings at bases.  no lad.    Echo 2/19/20  · Severely decreased left ventricular systolic function. The estimated ejection fraction is 25%. Base moves, suggestive of takatsubo.  · Concentric left ventricular remodeling.  · Grade III (severe) left ventricular diastolic dysfunction consistent with restrictive physiology.  · Normal right ventricular systolic function.  · Global hypokinetic wall motion.  · No pulmonary hypertension present.  · There is a left pleural effusion.

## 2020-02-21 NOTE — ASSESSMENT & PLAN NOTE
Small on ct of chest.  Last cxr with progression.  Leukocytosis improving.  Suspect related to chf.  Will increase diuresis and reassess.  Follow up on repeat cxr.  Unless effusion significantly enlarge, I would not recommend thoracentesis.  Clinic follow up with me in 3-4 weeks with repeat cxr is recommended.

## 2020-02-21 NOTE — NURSING
Patient awake and alert. RR even and unlabored. Patient back on unit from the cath lab. Right arm  TR band intact. No bleeding or swelling noted. Educated patient on right arm post cath care. Patient verbalized understanding. Will continue to monitor.

## 2020-02-21 NOTE — DISCHARGE SUMMARY
"[facility]  Discharge Summary      Patient Name: Xiomy Duncan  MRN: [id number]  Admission Date: 2/18/2020  Hospital Length of Stay: 3 days  Discharge Date and Time:  02/21/2020 3:34 PM  Attending Physician: Fadia Wall MD   Discharging Provider: Fadia Summers MD  Primary Care Provider: Arnie Monson MD      HPI:   60 year old female with gastric cancer s/p subtotal gastrectomy and former smoker who presented with abdominal pain of one day in evolution. During my evaluation, patient could not provide any history due to severe pain and laying in fetal position. She was alert and made eye contact. Per ED staff, patient complained of severe abdominal pain since yesterday. Associated symptoms include nausea, vomiting "bile", cough and inability to tolerate oral meds. When asked about chemotherapy patient could not answer question. Per ED staff, family member stated last chemoradiation therapy was in August/2019. Has a port in place. Also reported daily use of marijuana. No other reported symptoms.     In the ED, patient was tachycardic to 140s, with borderline hypotension and in severe pain. Was given haloperidol IV, lorazepam IV, morphine IV, zofran IV and 3L of fluids. Pain relived temporarily. Breathing stable at room air. A CTA of chest and CT of abdomen with contrast showed peribronchial thickening, septal interlobular thickening, felice hilar ground glass changes, scattered tree in bud nodules, small bilateral pleural effusions and wall thickening ascending colon/transverse colon. No pulmonary embolism, pneumothorax, acute pathology to liver/kidney, evidence of abscess or perforation. Troponin significant for level of 5 with no ST segment elevation. Apparently no chest pain. BNP very elevated 2,496. Lactic acid 2.9. Cardiology, pulm and GI consulted. IV abx started. Admitted to hospital medicine.    Procedure(s) (LRB):  Left heart cath (Left)  Ultrasound Guidance  "     Hospital Course:   Ms Duncan presented with nausea/vomiting and abdominal pain. Initial workup significant for troponemia of 5 without ST segment elevation, CT of chest/abdomen/pelvis with inflammation to peribronchial/interstitium and ascending/transverse colon and BNP 2496. Also with small bilateral pleural effusions. Initiated on empiric antibiotics. Fluids held as edema was still part of differential. Radiation exposure also part of differential. After further questioning, patient stated completing chemoradiation therapy in August/2019 (7 months ago) and had sick to contact with family member who had viral-like symptoms. Initiated on dual antiplatelet therapy, beta blocker, high dose statin. ACE-I held for low BP. Antiemetics and pain management. Pulmonology and GI consulted. Cardiology consulted for suspected NSTEMI. Echocardiogram showed LVEF 25% which is new. Also with grade 3 diastolic dysfunction. Given two separate doses of IV furosemide. All of patient's symptoms resolved with this suggesting acute heart failure exacerbation as main culprit. Underwent LHC on 2/21. Negative for coronary artery disease. Heart failure therefore non ischemic 2/2 viral illness vs Takatsubo?? Advised to quit smoking and to minimize or avoid salty foods. Repeat Echo in 2 months. Needs f/u with Cardiology but insurance requires PCP's referral. Will need one from PCP at next visit at date shown below. Activity as tolerated.      Consults:   Consults (From admission, onward)        Status Ordering Provider     Inpatient consult to Cardiology  Once     Provider:  Juan Castellanos MD    Completed ROBB BAILEY     Inpatient consult to Gastroenterology  Once     Provider:  Manny Haas MD    Acknowledged ROBB BAILEY     Inpatient consult to Pulmonology  Once     Provider:  (Not yet assigned)    Completed ROBB BAILEY        Final Active Diagnoses:    Diagnosis Date Noted POA    PRINCIPAL PROBLEM:   Non-ischemic cardiomyopathy [I42.8] 02/18/2020 Yes    Post viral syndrome [G93.3] 02/18/2020 Yes    Abnormal CT of the chest [R93.89] 02/18/2020 Yes    Abnormal CT of the abdomen [R93.5] 02/18/2020 Yes    Acute combined systolic and diastolic heart failure [I50.41] 02/21/2020 Yes    Pleural effusion [J90] 02/20/2020 Yes    Elevated brain natriuretic peptide (BNP) level [R79.89] 02/18/2020 Yes    S/P subtotal gastrectomy [Z90.3] 02/27/2019 Not Applicable    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    Specialty:  Family Medicine  Why:  Outpatient Services, Please schedule hospital follow-up appointment.   Contact information:  [facility] [place] [address] [phone]               Medications:  Reconciled Home Medications:      Medication List      CONTINUE taking these medications    ALPRAZolam 0.5 MG tablet  Commonly known as:  XANAX  Take 1 tablet (0.5 mg total) by mouth 2 (two) times daily as needed for Anxiety.     chlorzoxazone 500 mg Tab  Commonly known as:  PARAFON FORTE  TAKE 1 TABLET (500 MG TOTAL) BY MOUTH 4 (FOUR) TIMES DAILY AS NEEDED.     diphenoxylate-atropine 2.5-0.025 mg 2.5-0.025 mg per tablet  Commonly known as:  LOMOTIL  TAKE 1 TO 2 TABLETS EVERY 4 TO 6 HOURS AS NEEDED DIARRHEA     estrogen (conjugated)-medroxyprogesterone 0.45-1.5 mg per tablet  Commonly known as:  Prempro  Take 1 tablet by mouth once daily.     gabapentin 100 MG capsule  Commonly known as:  NEURONTIN  Take 100 mg by mouth 3 (three) times daily.      HYDROcodone-acetaminophen 5-325 mg per tablet  Commonly known as:  NORCO  Take 1 tablet by mouth every 6 (six) hours as needed for Pain.     megestrol 400 mg/10 mL (40 mg/mL) Susp  Commonly known as:  MEGACE  10 MLS BY MOUTH TWICE DAILY FOR 30 DAYS     metoclopramide HCl 5 MG tablet  Commonly known as:  REGLAN  TAKE 1 TABLET BY MOUTH 3 TIMES DAILY BEFORE MEALS     mirtazapine 15 MG tablet  Commonly known as:  REMERON  Take 15 mg by mouth once daily.     ondansetron 8 MG tablet  Commonly known as:  ZOFRAN  Take 1 tablet (8 mg total) by mouth every 8 (eight) hours as needed for Nausea.     * oxyCODONE-acetaminophen 5-325 mg per tablet  Commonly known as:  PERCOCET  Take 1 tablet by mouth every 4 (four) hours as needed for Pain.     * oxyCODONE-acetaminophen 7.5-325 mg per tablet  Commonly known as:  PERCOCET  Take 1 tablet by mouth every 12 (twelve) hours as needed for Pain. GREATER THAN 7 DAY QUANTITY MEDICALLY NECESSARY     pantoprazole 40 MG tablet  Commonly known as:  PROTONIX  Take 1 tablet (40 mg total) by mouth once daily.     prochlorperazine 10 MG tablet  Commonly known as:  COMPAZINE  Take 1 tablet (10 mg total) by mouth 3 (three) times daily as needed.     tiotropium-olodaterol 2.5-2.5 mcg/actuation Mist  Commonly known as:  Stiolto Respimat  TAKE 1 PUFF BY MOUTH EVERY DAY (RX CAN BE REFILLED EVERY 60 DAYS)            Indwelling Lines/Drains at time of discharge:   Lines/Drains/Airways     Central Venous Catheter Line            Port A Cath Single Lumen 02/18/20 1008 left subclavian 3 days                Time spent on the discharge of patient: > 35 minutes  Patient was seen and examined on the date of discharge and determined to be suitable for discharge.         Fadia Summers MD  Department of Hospital Medicine  [facility]

## 2020-02-21 NOTE — INTERVAL H&P NOTE
The patient has been examined and the H&P has been reviewed:    I concur with the findings and changes have been noted since the H&P was written: Depressed LVEF noted. LHC to r/o ischemia    Anesthesia/Surgery risks, benefits and alternative options discussed and understood by patient/family.          Active Hospital Problems    Diagnosis  POA    *NSTEMI (non-ST elevated myocardial infarction) [I21.4]  Yes    Pleural effusion [J90]  Yes    Intractable nausea and vomiting [R11.2]  Yes    Elevated brain natriuretic peptide (BNP) level [R79.89]  Yes    Acute viral syndrome [B34.9]  Yes    Abnormal CT of the chest [R93.89]  Yes    Abnormal CT of the abdomen [R93.5]  Yes    S/P subtotal gastrectomy [Z90.3]  Not Applicable    Carcinoma of antrum of stomach [C16.3]  Yes    GERD (gastroesophageal reflux disease) [K21.9]  Yes    HTN (hypertension) [I10]  Yes      Resolved Hospital Problems   No resolved problems to display.

## 2020-02-21 NOTE — PLAN OF CARE
02/21/20 1235   Final Note   Assessment Type Final Discharge Note   Anticipated Discharge Disposition Home   What phone number can be called within the next 1-3 days to see how you are doing after discharge? [phone]   Hospital Follow Up  Appt(s) scheduled? No  (Pt will schedule appointments )   Discharge plans and expectations educations in teach back method with documentation complete? Yes   Right Care Referral Info   Post Acute Recommendation No Care

## 2020-02-21 NOTE — SIGNIFICANT EVENT
Repeat cxr still with small left effusion.  Today effusion appeared smaller when compared to 2/20/20 film.   No indication for thoracentesis.   Recommend continue with diuresis to achieve negative fluid as tolerated.  Patient can follow up in pulmonary clinic in 2-3 weeks after discharge.

## 2020-02-21 NOTE — PLAN OF CARE
POC reviewed with pt/family; verbalized understanding; Dx NTEMI, colitis; c/o CP x2 this shift; relieved with O2, morphine and nicholas; stat EKG obtained and reported to oncall MD: medicated x2 for c/o nausea with stated relief; VSS; afebrile this shift; pt pending heart cath this AM; prep and instructions done per preop flowsheet; assessment per flowsheet; meds administered per MAR; safety precautions maintained; 0 falls, injury or acute events this shift; WCTM

## 2020-02-21 NOTE — HOSPITAL COURSE
Ms Duncan presented with nausea/vomiting and abdominal pain. Initial workup significant for troponemia of 5 without ST segment elevation, CT of chest/abdomen/pelvis with inflammation to peribronchial/interstitium and ascending/transverse colon and BNP 2496. Also with small bilateral pleural effusions. Initiated on empiric antibiotics. Fluids held as edema was still part of differential. Radiation exposure also part of differential. After further questioning, patient stated completing chemoradiation therapy in August/2019 (7 months ago) and had sick to contact with family member who had viral-like symptoms. Initiated on dual antiplatelet therapy, beta blocker, high dose statin. ACE-I held for low BP. Antiemetics and pain management. Pulmonology and GI consulted. Cardiology consulted for suspected NSTEMI. Echocardiogram showed LVEF 25% which is new. Also with grade 3 diastolic dysfunction. Given two separate doses of IV furosemide. All of patient's symptoms resolved with this suggesting acute heart failure exacerbation as main culprit. Underwent LHC on 2/21. Negative for coronary artery disease. Heart failure therefore non ischemic 2/2 viral illness vs Takatsubo?? Advised to quit smoking and to minimize or avoid salty foods. Repeat Echo in 2 months. Needs f/u with Cardiology but insurance requires PCP's referral. Will need one from PCP at next visit at date shown below. Activity as tolerated.

## 2020-02-21 NOTE — NURSING
End of shift bedside report given to ABNER Pérez. Patient in no apparent distress.     12 hour chart check complete.

## 2020-02-21 NOTE — NURSING
TR band to right arm  Discontinued. Gauze and tegaderm placed. No bleeding noted. Wrist stabilizer to right wrist intact. Will continue to  monitor

## 2020-02-21 NOTE — PLAN OF CARE
"   02/21/20 1233   Post-Acute Status   Post-Acute Authorization Other  (Home )   Other Status No Post-Acute Service Needs   Patient choice form signed by patient/caregiver List with quality metrics by geographic area provided   Discharge Delays None known at this time     EDUCATION:  Pt provided with educational information on Heart Attack.  Information reviewed and placed in :My Healthcare Packet" to be brought home for  use as resource after discharge.  Information included:  signs and symptoms to look for at discharge. AMALIA instructed pt to call the doctor if experiencing symptoms that may indicate a medical emergency: CALL 911 if signs and symptoms worsen.Reminded pt things she will be responsible for to manage his healthcare at home: getting Rx filled, attending follow up appointments, and taking medication as prescribed were discussed.   Teach back method used.  All questions answered.  Patient verbalized understanding of all information.      AMALIA provided pt with a copy of follow-up appointments. AMALIA explained/highlighted date, time, and location of each appointment. AMALIA provided pt with a blue folder and instructed pt to place all medical documents in blue folder. AMALIA explained to pt the nurse will provide an AVS with diagnosis and instructed pt to place in blue folder and bring to follow-up appointment. AMALIA notified pt's nurse, Tiki, all CM needs have been met.     "

## 2020-02-21 NOTE — NURSING
Pt c/o CP earlier in shift after eating meal; relieved with anti nausea medication; now c/o CP once again; O2 enforced ; medicated with morphine per MAR: provider on call paged to notify; received orders for stat EKG and nitroglycerin SL x1 and assess BP

## 2020-02-23 LAB
BACTERIA BLD CULT: NORMAL
BACTERIA BLD CULT: NORMAL

## 2020-02-24 ENCOUNTER — PATIENT OUTREACH (OUTPATIENT)
Dept: ADMINISTRATIVE | Facility: CLINIC | Age: 60
End: 2020-02-24

## 2020-02-24 NOTE — PATIENT INSTRUCTIONS
Discharge Instructions for Cardiomyopathy  Cardiomyopathy means that your heart is not working as it normally should. This condition can make it more difficult to do things that may have been easy for you in the past. But with proper treatment and some lifestyle changes, you and your healthcare provider can help your heart do its job.  Home care  Work hard to remove the salt from your diet. Here are tips:  · Limit canned, dried, packaged, and fast foods.  · Dont add salt to your food at the table.  · Season foods with herbs instead of salt when you cook.  · When you eat out, ask that the  not add any salt to your dish.  · Don't eat fried or greasy foods.  · Be careful of bottled beverages. They can contain a lot of salt.  Also check the labels of over-the-counter medicines and supplements. They may be high in sodium. Ask your pharmacist or provider if you need help finding a low-salt product.  Be as active as you can. Ask your healthcare provider how to get started:  · Simple activities such as walking or gardening can help.  · Find activities you enjoy and make them a priority.  · Cardiac rehabilitation programs can help you reach your activity goals. You exercise while staff closely watches the stress on your heart. These programs may be covered by insurance.  Other tips for home care:  · Limit how much fluid you have each day. Your healthcare provider will tell you how much is safe.  · Break the smoking habit. Enroll in a stop-smoking program to improve your chances of success. Join smoking cessation support groups or ask your healthcare provider about nicotine replacement products.  · Take your medicines exactly as directed. Dont skip doses. Dont stop taking your medicines without talking to your healthcare provider first.  · Some over-the-counter medicines and herbal supplements can increase your heart rate or blood pressure. This can put extra stress on your heart. Check with your pharmacist to see if  products are heart-safe and won't interact with other medicines you take.  · Visit your healthcare provider regularly. Mention any problems with your treatment plan. Together you can find a plan that works for you.  · Weigh yourself at the same time each day. The best time is in the morning after you wake up and after urinating. Wear the same clothing each time. Keep a written record of your daily weight.  · Limit how much alcohol you drink. Too much alcohol isn't good for the heart. Healthcare providers advise no more than 1 drink per day for women and 2 drinks per day for men.  Follow-up care  Make a follow-up appointment as directed by our staff.     When to call your healthcare provider  Call your healthcare provider right away if you have any of the following:  · You gain more than 2 pounds in 1 day, more than 5 pounds in 1 week, or whatever weight gain you were told to report by your healthcare provider  · New or increased chest pain that doesn't get better with medicine  · New or increased shortness of breath or coughing  · Weakness in the muscles of your face, arms, or legs  · Trouble speaking  · Rapid pulse or pounding heartbeat  · Fainting, or feeling dizzy or lightheaded  · New or increased swelling in your hands, feet, or ankles   Date Last Reviewed: 2/1/2017  © 2638-4072 [employer] [address] [place] [address], [place] [address]. All rights reserved. This information is not intended as a substitute for professional medical care. Always follow your healthcare professional's instructions.

## 2020-02-24 NOTE — PHYSICIAN QUERY
PT Name: Xiomy Duncan  MR #: [id number]     Physician Query Form - Diagnosis Clarification      CDS/: Lizz Dias               Contact information:[email or web address]    This form is a permanent document in the medical record.     Query Date: February 24, 2020    By submitting this query, we are merely seeking further clarification of documentation.  Please utilize your independent clinical judgment when addressing the question(s) below.     The medical record contains the following:      Findings Supporting Clinical Information Location in Medical Record   Sepsis Sepsis  Unsure if infection vs inflammatory or related to radiation therapy  Was afebrile but with leukocytosis, tachycardia, borderline hypotension and inflammation in lungs and colon  Agree with empiric broad spectrum IV antibiotics for now  Per patient, she was exposed to her grandchildren who were ill with what sounds like viral illness  States she completed chemoradiation back in August therefore radiation induced inflammation unlikely  Blood cultures sent. So far no growth      Assessment:  60 year old female with a history of gastric cancer s/p gastrectomy presenting with NSTEMI and heart failure.  CT scan with possible colitis.  No diarrhea or abdominal pain currently.         IV Ciprofloxacin    IV Metronidazole    IV Piperacillin-tazobactam    IV Vancomycin  note 2-19                                  Infectious disease note 2-20                  Mar ED 1 time 2-18    Mar 2-18    Mar 2-18 to 2-20    Mar 2-18 to 2-20       Please clarify if the Sepsis diagnosis has been:    [  ] Ruled In   [ x ] Ruled Out   [  ] Other/Clarification of findings (please specify):   [  ] Clinically insignificant     [  ] Clinically undetermined     Please document in your progress notes daily for the duration of treatment, until resolved, and include in your discharge summary.

## 2020-02-28 DIAGNOSIS — K21.9 GASTROESOPHAGEAL REFLUX DISEASE, ESOPHAGITIS PRESENCE NOT SPECIFIED: ICD-10-CM

## 2020-02-28 RX ORDER — PANTOPRAZOLE SODIUM 40 MG/1
TABLET, DELAYED RELEASE ORAL
Qty: 180 TABLET | Refills: 3 | Status: SHIPPED | OUTPATIENT
Start: 2020-02-28 | End: 2020-03-02 | Stop reason: SDUPTHER

## 2020-03-02 ENCOUNTER — OFFICE VISIT (OUTPATIENT)
Dept: FAMILY MEDICINE | Facility: CLINIC | Age: 60
End: 2020-03-02
Payer: MEDICAID

## 2020-03-02 VITALS
SYSTOLIC BLOOD PRESSURE: 116 MMHG | HEART RATE: 76 BPM | RESPIRATION RATE: 18 BRPM | DIASTOLIC BLOOD PRESSURE: 64 MMHG | HEIGHT: 63 IN | WEIGHT: 116 LBS | BODY MASS INDEX: 20.55 KG/M2

## 2020-03-02 DIAGNOSIS — C16.3 CARCINOMA OF ANTRUM OF STOMACH: ICD-10-CM

## 2020-03-02 DIAGNOSIS — K21.9 GASTROESOPHAGEAL REFLUX DISEASE, ESOPHAGITIS PRESENCE NOT SPECIFIED: ICD-10-CM

## 2020-03-02 DIAGNOSIS — T45.1X5A CHEMOTHERAPY-INDUCED NAUSEA: ICD-10-CM

## 2020-03-02 DIAGNOSIS — J90 PLEURAL EFFUSION: ICD-10-CM

## 2020-03-02 DIAGNOSIS — F41.9 ANXIETY DISORDER, UNSPECIFIED TYPE: ICD-10-CM

## 2020-03-02 DIAGNOSIS — G89.28 OTHER CHRONIC POSTPROCEDURAL PAIN: ICD-10-CM

## 2020-03-02 DIAGNOSIS — R11.0 CHEMOTHERAPY-INDUCED NAUSEA: ICD-10-CM

## 2020-03-02 DIAGNOSIS — I42.7 CARDIOMYOPATHY SECONDARY TO DRUG: Primary | ICD-10-CM

## 2020-03-02 DIAGNOSIS — C16.2 MALIGNANT NEOPLASM OF BODY OF STOMACH: ICD-10-CM

## 2020-03-02 PROCEDURE — 99999 PR PBB SHADOW E&M-EST. PATIENT-LVL III: ICD-10-PCS | Mod: PBBFAC,,, | Performed by: FAMILY MEDICINE

## 2020-03-02 PROCEDURE — 99214 OFFICE O/P EST MOD 30 MIN: CPT | Mod: S$PBB,,, | Performed by: FAMILY MEDICINE

## 2020-03-02 PROCEDURE — 99214 PR OFFICE/OUTPT VISIT, EST, LEVL IV, 30-39 MIN: ICD-10-PCS | Mod: S$PBB,,, | Performed by: FAMILY MEDICINE

## 2020-03-02 PROCEDURE — 99999 PR PBB SHADOW E&M-EST. PATIENT-LVL III: CPT | Mod: PBBFAC,,, | Performed by: FAMILY MEDICINE

## 2020-03-02 PROCEDURE — 99213 OFFICE O/P EST LOW 20 MIN: CPT | Mod: PBBFAC | Performed by: FAMILY MEDICINE

## 2020-03-02 RX ORDER — ALPRAZOLAM 0.5 MG/1
0.5 TABLET ORAL 2 TIMES DAILY PRN
Qty: 60 TABLET | Refills: 2 | Status: SHIPPED | OUTPATIENT
Start: 2020-03-02 | End: 2020-05-04 | Stop reason: SDUPTHER

## 2020-03-02 RX ORDER — MIRTAZAPINE 15 MG/1
15 TABLET, FILM COATED ORAL DAILY
Qty: 30 TABLET | Refills: 5 | Status: SHIPPED | OUTPATIENT
Start: 2020-03-02 | End: 2020-09-14 | Stop reason: SDUPTHER

## 2020-03-02 RX ORDER — PROMETHAZINE HYDROCHLORIDE 12.5 MG/1
12.5 TABLET ORAL EVERY 6 HOURS PRN
COMMUNITY
Start: 2020-02-17 | End: 2020-06-01 | Stop reason: ALTCHOICE

## 2020-03-02 RX ORDER — GABAPENTIN 300 MG/1
300 CAPSULE ORAL 2 TIMES DAILY
Qty: 60 CAPSULE | Refills: 1 | Status: SHIPPED | OUTPATIENT
Start: 2020-03-02 | End: 2020-04-24

## 2020-03-02 RX ORDER — SUCRALFATE 1 G/1
TABLET ORAL
COMMUNITY
Start: 2019-12-16 | End: 2020-03-02

## 2020-03-02 RX ORDER — HYOSCYAMINE SULFATE 0.125 MG
TABLET ORAL
COMMUNITY
Start: 2020-02-17 | End: 2020-03-10 | Stop reason: SDUPTHER

## 2020-03-02 RX ORDER — PANTOPRAZOLE SODIUM 40 MG/1
40 TABLET, DELAYED RELEASE ORAL 2 TIMES DAILY
Qty: 180 TABLET | Refills: 3 | Status: SHIPPED | OUTPATIENT
Start: 2020-03-02 | End: 2020-06-01 | Stop reason: SDUPTHER

## 2020-03-02 RX ORDER — PROCHLORPERAZINE MALEATE 10 MG
10 TABLET ORAL 3 TIMES DAILY PRN
Qty: 60 TABLET | Refills: 2 | Status: SHIPPED | OUTPATIENT
Start: 2020-03-02 | End: 2021-06-30 | Stop reason: SDUPTHER

## 2020-03-02 RX ORDER — OXYCODONE AND ACETAMINOPHEN 7.5; 325 MG/1; MG/1
1 TABLET ORAL EVERY 12 HOURS PRN
Qty: 60 TABLET | Refills: 0 | Status: SHIPPED | OUTPATIENT
Start: 2020-03-02 | End: 2020-04-03 | Stop reason: SDUPTHER

## 2020-03-02 RX ORDER — DIPHENOXYLATE HYDROCHLORIDE AND ATROPINE SULFATE 2.5; .025 MG/1; MG/1
TABLET ORAL
Qty: 60 TABLET | Refills: 5 | Status: SHIPPED | OUTPATIENT
Start: 2020-03-02 | End: 2022-01-01

## 2020-03-02 NOTE — PROGRESS NOTES
"Subjective:       Patient ID: Xiomy Duncan is a 60 y.o. female.    Chief Complaint: Follow-up (hospital)      Transitional Care Note    Family and/or Caretaker present at visit?  Yes.  Diagnostic tests reviewed/disposition: No diagnosic tests pending after this hospitalization.  Disease/illness education: CHF  Home health/community services discussion/referrals: Patient does not have home health established from hospital visit.  They do not need home health.  If needed, we will set up home health for the patient.   Establishment or re-establishment of referral orders for community resources: No other necessary community resources.   Discussion with other health care providers: No discussion with other health care providers necessary.     Patient was admitted for congestive heart failure.  Below is her hospital summary    60 year old female with gastric cancer s/p subtotal gastrectomy and former smoker who presented with abdominal pain of one day in evolution. During my evaluation, patient could not provide any history due to severe pain and laying in fetal position. She was alert and made eye contact. Per ED staff, patient complained of severe abdominal pain since yesterday. Associated symptoms include nausea, vomiting "bile", cough and inability to tolerate oral meds. When asked about chemotherapy patient could not answer question. Per ED staff, family member stated last chemoradiation therapy was in August/2019. Has a port in place. Also reported daily use of marijuana. No other reported symptoms.      In the ED, patient was tachycardic to 140s, with borderline hypotension and in severe pain. Was given haloperidol IV, lorazepam IV, morphine IV, zofran IV and 3L of fluids. Pain relived temporarily. Breathing stable at room air. A CTA of chest and CT of abdomen with contrast showed peribronchial thickening, septal interlobular thickening, felice hilar ground glass changes, scattered tree in bud nodules, small " bilateral pleural effusions and wall thickening ascending colon/transverse colon. No pulmonary embolism, pneumothorax, acute pathology to liver/kidney, evidence of abscess or perforation. Troponin significant for level of 5 with no ST segment elevation. Apparently no chest pain. BNP very elevated 2,496. Lactic acid 2.9. Cardiology, pulm and GI consulted. IV abx started. Admitted to hospital medicine.     Procedure(s) (LRB):  Left heart cath (Left)  Ultrasound Guidance       Hospital Course:   Ms Duncan presented with nausea/vomiting and abdominal pain. Initial workup significant for troponemia of 5 without ST segment elevation, CT of chest/abdomen/pelvis with inflammation to peribronchial/interstitium and ascending/transverse colon and BNP 2496. Also with small bilateral pleural effusions. Initiated on empiric antibiotics. Fluids held as edema was still part of differential. Radiation exposure also part of differential. After further questioning, patient stated completing chemoradiation therapy in August/2019 (7 months ago) and had sick to contact with family member who had viral-like symptoms. Initiated on dual antiplatelet therapy, beta blocker, high dose statin. ACE-I held for low BP. Antiemetics and pain management. Pulmonology and GI consulted. Cardiology consulted for suspected NSTEMI. Echocardiogram showed LVEF 25% which is new. Also with grade 3 diastolic dysfunction. Given two separate doses of IV furosemide. All of patient's symptoms resolved with this suggesting acute heart failure exacerbation as main culprit. Underwent LHC on 2/21. Negative for coronary artery disease. Heart failure therefore non ischemic 2/2 viral illness vs Takatsubo?? Advised to quit smoking and to minimize or avoid salty foods. Repeat Echo in 2 months. Needs f/u with Cardiology but insurance requires PCP's referral. Will need one from PCP at next visit at date shown below. Activity as tolerated.    Review of Systems    Constitutional: Negative for activity change, chills, fatigue, fever and unexpected weight change.   HENT: Negative for sore throat and trouble swallowing.    Respiratory: Positive for shortness of breath. Negative for cough and chest tightness.    Cardiovascular: Negative for chest pain and leg swelling.   Gastrointestinal: Negative for abdominal pain.   Endocrine: Negative for cold intolerance and heat intolerance.   Genitourinary: Negative for difficulty urinating.   Musculoskeletal: Negative for back pain and joint swelling.   Skin: Negative for rash.   Neurological: Negative for numbness.   Hematological: Negative for adenopathy.   Psychiatric/Behavioral: Negative for decreased concentration.       Objective:      Vitals:    03/02/20 1018   BP: 116/64   Pulse: 76   Resp: 18     Physical Exam   Constitutional: She is oriented to person, place, and time. She appears well-developed and well-nourished.   HENT:   Head: Normocephalic and atraumatic.   Right Ear: Tympanic membrane and ear canal normal.   Left Ear: Tympanic membrane and ear canal normal.   Eyes: Pupils are equal, round, and reactive to light. EOM are normal.   Neck: Normal range of motion. Neck supple. No JVD present. No thyromegaly present.   Cardiovascular: Normal rate, regular rhythm and intact distal pulses. Exam reveals gallop. Exam reveals no friction rub.   No murmur heard.  Pulmonary/Chest: Effort normal and breath sounds normal.   Abdominal: Soft. Bowel sounds are normal.   Musculoskeletal: She exhibits no edema.   Neurological: She is alert and oriented to person, place, and time. No cranial nerve deficit.   Skin: Skin is warm.   Psychiatric: She has a normal mood and affect. Her behavior is normal. Judgment and thought content normal.         BMP  Lab Results   Component Value Date     02/21/2020    K 3.2 (L) 02/21/2020     02/21/2020    CO2 25 02/21/2020    BUN 16 02/21/2020    CREATININE 0.8 02/21/2020    CALCIUM 8.1 (L)  02/21/2020    ANIONGAP 10 02/21/2020    ESTGFRAFRICA >60 02/21/2020    EGFRNONAA >60 02/21/2020     Lab Results   Component Value Date    WBC 12.63 02/20/2020    RBC 3.78 (L) 02/20/2020    HGB 11.7 (L) 02/20/2020    HCT 35.5 (L) 02/20/2020    MCV 94 02/20/2020    MCH 31.0 02/20/2020    MCHC 33.0 02/20/2020    RDW 15.8 (H) 02/20/2020     02/20/2020    MPV 12.2 02/20/2020    GRAN 10.4 (H) 02/20/2020    GRAN 82.1 (H) 02/20/2020    LYMPH 0.8 (L) 02/20/2020    LYMPH 6.5 (L) 02/20/2020    MONO 1.4 (H) 02/20/2020    MONO 10.9 02/20/2020    EOS 0.0 02/20/2020    BASO 0.01 02/20/2020    EOSINOPHIL 0.0 02/20/2020    BASOPHIL 0.1 02/20/2020       Assessment:       1. Cardiomyopathy secondary to drug    2. Malignant neoplasm of body of stomach    3. Carcinoma of antrum of stomach    4. Chemotherapy-induced nausea    5. Pleural effusion    6. Gastroesophageal reflux disease, esophagitis presence not specified    7. Anxiety disorder, unspecified type    8. Other chronic postprocedural pain        Plan:   Xiomy was seen today for follow-up.    Diagnoses and all orders for this visit:    Cardiomyopathy secondary to drug  -     Ambulatory referral/consult to Cardiology; Future    Malignant neoplasm of body of stomach  -     diphenoxylate-atropine 2.5-0.025 mg (LOMOTIL) 2.5-0.025 mg per tablet; 1 po bid prn diarrhea  -     ALPRAZolam (XANAX) 0.5 MG tablet; Take 1 tablet (0.5 mg total) by mouth 2 (two) times daily as needed for Anxiety.    Carcinoma of antrum of stomach  -     oxyCODONE-acetaminophen (PERCOCET) 7.5-325 mg per tablet; Take 1 tablet by mouth every 12 (twelve) hours as needed for Pain. GREATER THAN 7 DAY QUANTITY MEDICALLY NECESSARY  -     diphenoxylate-atropine 2.5-0.025 mg (LOMOTIL) 2.5-0.025 mg per tablet; 1 po bid prn diarrhea  -     ALPRAZolam (XANAX) 0.5 MG tablet; Take 1 tablet (0.5 mg total) by mouth 2 (two) times daily as needed for Anxiety.  -     mirtazapine (REMERON) 15 MG tablet; Take 1 tablet (15  mg total) by mouth once daily.    Chemotherapy-induced nausea  -     prochlorperazine (COMPAZINE) 10 MG tablet; Take 1 tablet (10 mg total) by mouth 3 (three) times daily as needed.    Pleural effusion    Gastroesophageal reflux disease, esophagitis presence not specified  -     pantoprazole (PROTONIX) 40 MG tablet; Take 1 tablet (40 mg total) by mouth 2 (two) times daily.    Anxiety disorder, unspecified type  -     mirtazapine (REMERON) 15 MG tablet; Take 1 tablet (15 mg total) by mouth once daily.    Other chronic postprocedural pain  -     gabapentin (NEURONTIN) 300 MG capsule; Take 1 capsule (300 mg total) by mouth 2 (two) times daily.      RTC in 4 weeks

## 2020-03-03 ENCOUNTER — TELEPHONE (OUTPATIENT)
Dept: FAMILY MEDICINE | Facility: CLINIC | Age: 60
End: 2020-03-03

## 2020-03-03 DIAGNOSIS — I10 ESSENTIAL HYPERTENSION: ICD-10-CM

## 2020-03-03 RX ORDER — CLONIDINE HYDROCHLORIDE 0.1 MG/1
0.1 TABLET ORAL 2 TIMES DAILY
Qty: 60 TABLET | Refills: 5 | Status: SHIPPED | OUTPATIENT
Start: 2020-03-03 | End: 2020-08-26

## 2020-03-03 NOTE — TELEPHONE ENCOUNTER
----- Message from Brenda Rivers sent at 3/3/2020  2:51 PM CST -----  Contact: fax  Xiomy Duncan  MRN: [id number]  : 1960  PCP: Arnie Monson  Home Phone      [phone]  Work Phone      Not on file.  Mobile          [phone]      MESSAGE:   Pt requesting refill or new Rx.   Is this a refill or new RX:  refill  RX name and strength: Atropine sulfate 0.025 mg/ Diphenoxylate Hydrochloride 2.5 mg  Last office visit: 3/2/2020  Is this a 30-day or 90-day RX:  30  Pharm[facility] and location:  [facility] [place]  Comments:      Phone:  [phone]

## 2020-03-11 RX ORDER — HYOSCYAMINE SULFATE 0.125 MG
TABLET ORAL
Qty: 30 TABLET | Refills: 11 | Status: SHIPPED | OUTPATIENT
Start: 2020-03-11 | End: 2020-09-14 | Stop reason: SDUPTHER

## 2020-03-23 RX ORDER — ONDANSETRON HYDROCHLORIDE 8 MG/1
TABLET, FILM COATED ORAL
Qty: 6 TABLET | Refills: 2 | Status: SHIPPED | OUTPATIENT
Start: 2020-03-23 | End: 2020-06-01 | Stop reason: SDUPTHER

## 2020-03-29 RX ORDER — METOCLOPRAMIDE 5 MG/1
TABLET ORAL
Qty: 90 TABLET | Refills: 2 | Status: SHIPPED | OUTPATIENT
Start: 2020-03-29 | End: 2020-06-23

## 2020-04-03 DIAGNOSIS — C16.3 CARCINOMA OF ANTRUM OF STOMACH: ICD-10-CM

## 2020-04-03 RX ORDER — OXYCODONE AND ACETAMINOPHEN 7.5; 325 MG/1; MG/1
1 TABLET ORAL EVERY 12 HOURS PRN
Qty: 60 TABLET | Refills: 0 | Status: SHIPPED | OUTPATIENT
Start: 2020-04-03 | End: 2020-05-04 | Stop reason: SDUPTHER

## 2020-04-03 NOTE — TELEPHONE ENCOUNTER
----- Message from Liane Velarde sent at 4/3/2020 10:32 AM CDT -----  Contact: self  Xiomy Duncan  MRN: [id number]  : 1960  PCP: Arnie Monson  Home Phone      [phone]  Work Phone      Not on file.  Mobile          [phone]      MESSAGE:   Pt requesting refill or new Rx.   Is this a refill or new RX:  refill  RX name and strength: oxyCODONE-acetaminophen (PERCOCET) 5-325 mg per tablet  Last office visit: 2020  Is this a 30-day or 90-day RX:  30  [facility] [place]  Comments:      Phone:  [phone]

## 2020-04-17 ENCOUNTER — TELEPHONE (OUTPATIENT)
Dept: FAMILY MEDICINE | Facility: CLINIC | Age: 60
End: 2020-04-17

## 2020-04-17 DIAGNOSIS — C16.3 CARCINOMA OF ANTRUM OF STOMACH: Primary | ICD-10-CM

## 2020-04-17 DIAGNOSIS — Z90.3 S/P SUBTOTAL GASTRECTOMY: ICD-10-CM

## 2020-04-17 NOTE — TELEPHONE ENCOUNTER
----- Message from Sandra Larios sent at 2020 11:00 AM CDT -----  Xiomy Duncan  MRN: [id number]  : 1960  PCP: Arnie Monson  Home Phone      [phone]  Work Phone      Not on file.  Mobile          [phone]      MESSAGE:   Patient is needing a order for home health.    [phone]

## 2020-04-22 ENCOUNTER — TELEPHONE (OUTPATIENT)
Dept: FAMILY MEDICINE | Facility: CLINIC | Age: 60
End: 2020-04-22

## 2020-04-22 NOTE — TELEPHONE ENCOUNTER
[facility] Referral faxed to [facility]

## 2020-04-22 NOTE — TELEPHONE ENCOUNTER
----- Message from Chente Copeland sent at 2020 11:50 AM CDT -----  Contact: Miranda @ [employer]  Xiomy Duncan  MRN: [id number]  : 1960  PCP: Arnie Monson  Home Phone      [phone]  Work Phone      Not on file.  Mobile          [phone]      MESSAGE: received referral -- they do not accept [facility] medicaid    PCP: Ermias

## 2020-04-24 DIAGNOSIS — G89.28 OTHER CHRONIC POSTPROCEDURAL PAIN: ICD-10-CM

## 2020-04-24 RX ORDER — GABAPENTIN 300 MG/1
CAPSULE ORAL
Qty: 60 CAPSULE | Refills: 1 | Status: SHIPPED | OUTPATIENT
Start: 2020-04-24 | End: 2020-06-01 | Stop reason: SDUPTHER

## 2020-05-04 DIAGNOSIS — C16.3 CARCINOMA OF ANTRUM OF STOMACH: ICD-10-CM

## 2020-05-04 DIAGNOSIS — C16.2 MALIGNANT NEOPLASM OF BODY OF STOMACH: ICD-10-CM

## 2020-05-04 RX ORDER — ALPRAZOLAM 0.5 MG/1
0.5 TABLET ORAL 2 TIMES DAILY PRN
Qty: 60 TABLET | Refills: 2 | Status: SHIPPED | OUTPATIENT
Start: 2020-05-04 | End: 2020-07-01 | Stop reason: SDUPTHER

## 2020-05-04 RX ORDER — OXYCODONE AND ACETAMINOPHEN 7.5; 325 MG/1; MG/1
1 TABLET ORAL EVERY 12 HOURS PRN
Qty: 60 TABLET | Refills: 0 | Status: SHIPPED | OUTPATIENT
Start: 2020-05-04 | End: 2020-06-01 | Stop reason: SDUPTHER

## 2020-05-04 NOTE — TELEPHONE ENCOUNTER
----- Message from Sandra Larios sent at 2020  8:54 AM CDT -----  Xiomy Duncan  MRN: [id number]  : 1960  PCP: Arnie Monson  Home Phone      [phone]  Work Phone      Not on file.  Mobile          [phone]      MESSAGE:   Pt requesting refill or new Rx.   Is this a refill or new RX:  refill  RX name and strength: oxyCODONE-acetaminophen (PERCOCET) 7.5-325 mg per tablet    ALPRAZolam (XANAX) 0.5 MG tablet     Last office visit: 20  Is this a 30-day or 90-day RX:  30  [facility] [place]  Comments:      Phone:  [phone]

## 2020-06-01 ENCOUNTER — OFFICE VISIT (OUTPATIENT)
Dept: FAMILY MEDICINE | Facility: CLINIC | Age: 60
End: 2020-06-01
Payer: MEDICAID

## 2020-06-01 VITALS
RESPIRATION RATE: 16 BRPM | HEIGHT: 63 IN | HEART RATE: 64 BPM | DIASTOLIC BLOOD PRESSURE: 84 MMHG | BODY MASS INDEX: 19.77 KG/M2 | TEMPERATURE: 98 F | WEIGHT: 111.56 LBS | SYSTOLIC BLOOD PRESSURE: 146 MMHG

## 2020-06-01 DIAGNOSIS — G89.28 OTHER CHRONIC POSTPROCEDURAL PAIN: ICD-10-CM

## 2020-06-01 DIAGNOSIS — T45.1X5A CHEMOTHERAPY-INDUCED NAUSEA: ICD-10-CM

## 2020-06-01 DIAGNOSIS — K21.9 GASTROESOPHAGEAL REFLUX DISEASE, ESOPHAGITIS PRESENCE NOT SPECIFIED: ICD-10-CM

## 2020-06-01 DIAGNOSIS — Z95.828 PORT-A-CATH IN PLACE: Primary | ICD-10-CM

## 2020-06-01 DIAGNOSIS — C16.3 CARCINOMA OF ANTRUM OF STOMACH: ICD-10-CM

## 2020-06-01 DIAGNOSIS — R11.0 CHEMOTHERAPY-INDUCED NAUSEA: ICD-10-CM

## 2020-06-01 PROCEDURE — 99213 OFFICE O/P EST LOW 20 MIN: CPT | Mod: PBBFAC | Performed by: FAMILY MEDICINE

## 2020-06-01 PROCEDURE — 99999 PR PBB SHADOW E&M-EST. PATIENT-LVL III: ICD-10-PCS | Mod: PBBFAC,,, | Performed by: FAMILY MEDICINE

## 2020-06-01 PROCEDURE — 99214 OFFICE O/P EST MOD 30 MIN: CPT | Mod: S$PBB,,, | Performed by: FAMILY MEDICINE

## 2020-06-01 PROCEDURE — 99999 PR PBB SHADOW E&M-EST. PATIENT-LVL III: CPT | Mod: PBBFAC,,, | Performed by: FAMILY MEDICINE

## 2020-06-01 PROCEDURE — 99214 PR OFFICE/OUTPT VISIT, EST, LEVL IV, 30-39 MIN: ICD-10-PCS | Mod: S$PBB,,, | Performed by: FAMILY MEDICINE

## 2020-06-01 RX ORDER — MEGESTROL ACETATE 40 MG/ML
SUSPENSION ORAL
Qty: 600 ML | Refills: 5 | Status: SHIPPED | OUTPATIENT
Start: 2020-06-01 | End: 2020-12-22

## 2020-06-01 RX ORDER — GABAPENTIN 300 MG/1
300 CAPSULE ORAL 2 TIMES DAILY
Qty: 60 CAPSULE | Refills: 2 | Status: SHIPPED | OUTPATIENT
Start: 2020-06-01 | End: 2020-09-14 | Stop reason: SDUPTHER

## 2020-06-01 RX ORDER — OXYCODONE AND ACETAMINOPHEN 7.5; 325 MG/1; MG/1
1 TABLET ORAL EVERY 12 HOURS PRN
Qty: 60 TABLET | Refills: 0 | Status: SHIPPED | OUTPATIENT
Start: 2020-06-01 | End: 2020-07-01 | Stop reason: SDUPTHER

## 2020-06-01 RX ORDER — PANTOPRAZOLE SODIUM 40 MG/1
40 TABLET, DELAYED RELEASE ORAL 2 TIMES DAILY
Qty: 180 TABLET | Refills: 1 | Status: SHIPPED | OUTPATIENT
Start: 2020-06-01 | End: 2021-05-13 | Stop reason: SDUPTHER

## 2020-06-01 RX ORDER — ONDANSETRON HYDROCHLORIDE 8 MG/1
8 TABLET, FILM COATED ORAL EVERY 8 HOURS PRN
Qty: 30 TABLET | Refills: 2 | Status: SHIPPED | OUTPATIENT
Start: 2020-06-01 | End: 2021-06-30 | Stop reason: SDUPTHER

## 2020-06-01 NOTE — PROGRESS NOTES
Subjective:       Patient ID: Xiomy Duncan is a 60 y.o. female.    Chief Complaint: Follow-up (3 month check up)    60 year old female with gastric carcinoma who finished taking chemo and radiation with Dr. Eli and Dr. Young comes in with c/o continued pain, poor appetite, weight loss. She says that she has tried a couple of different medications from the pain she is having after radiation without much help. She was given oxycodone recently.    Lost a lot of weight.  Can only eat small amounts of food.  Ensure gives her diarrhea.  She is taking Protonix and Reglan as needed.  She is taking 800 mg of Megace today and her weight seem to be more stable.  [facility].   She is currently taking Remeron 15 mg at night.    Follow-up   Pertinent negatives include no abdominal pain, chest pain, chills, coughing, fatigue, fever, joint swelling, numbness, rash or sore throat.     Review of Systems   Constitutional: Negative for activity change, chills, fatigue, fever and unexpected weight change.   HENT: Negative for sore throat and trouble swallowing.    Respiratory: Positive for shortness of breath. Negative for cough and chest tightness.    Cardiovascular: Negative for chest pain and leg swelling.   Gastrointestinal: Negative for abdominal pain.   Endocrine: Negative for cold intolerance and heat intolerance.   Genitourinary: Negative for difficulty urinating.   Musculoskeletal: Negative for back pain and joint swelling.   Skin: Negative for rash.   Neurological: Negative for numbness.   Hematological: Negative for adenopathy.   Psychiatric/Behavioral: Negative for decreased concentration.       Objective:      Vitals:    06/01/20 1041   BP: (!) 146/84   Pulse: 64   Resp: 16   Temp: 98.3 °F (36.8 °C)     Physical Exam   Constitutional: She is oriented to person, place, and time. She appears well-developed and well-nourished.   HENT:   Head: Normocephalic and atraumatic.    Right Ear: Tympanic membrane and ear canal normal.   Left Ear: Tympanic membrane and ear canal normal.   Eyes: Pupils are equal, round, and reactive to light. EOM are normal.   Neck: Normal range of motion. Neck supple. No JVD present. No thyromegaly present.   Cardiovascular: Normal rate, regular rhythm and intact distal pulses. Exam reveals gallop. Exam reveals no friction rub.   No murmur heard.  Pulmonary/Chest: Effort normal and breath sounds normal.   Abdominal: Soft. Bowel sounds are normal.   Musculoskeletal: She exhibits no edema.   Neurological: She is alert and oriented to person, place, and time. No cranial nerve deficit.   Skin: Skin is warm.   Psychiatric: She has a normal mood and affect. Her behavior is normal. Judgment and thought content normal.         BMP  Lab Results   Component Value Date     02/21/2020    K 3.2 (L) 02/21/2020     02/21/2020    CO2 25 02/21/2020    BUN 16 02/21/2020    CREATININE 0.8 02/21/2020    CALCIUM 8.1 (L) 02/21/2020    ANIONGAP 10 02/21/2020    ESTGFRAFRICA >60 02/21/2020    EGFRNONAA >60 02/21/2020     Lab Results   Component Value Date    WBC 12.63 02/20/2020    RBC 3.78 (L) 02/20/2020    HGB 11.7 (L) 02/20/2020    HCT 35.5 (L) 02/20/2020    MCV 94 02/20/2020    MCH 31.0 02/20/2020    MCHC 33.0 02/20/2020    RDW 15.8 (H) 02/20/2020     02/20/2020    MPV 12.2 02/20/2020    GRAN 10.4 (H) 02/20/2020    GRAN 82.1 (H) 02/20/2020    LYMPH 0.8 (L) 02/20/2020    LYMPH 6.5 (L) 02/20/2020    MONO 1.4 (H) 02/20/2020    MONO 10.9 02/20/2020    EOS 0.0 02/20/2020    BASO 0.01 02/20/2020    EOSINOPHIL 0.0 02/20/2020    BASOPHIL 0.1 02/20/2020       Assessment:       1. Port-A-Cath in place    2. Carcinoma of antrum of stomach    3. Other chronic postprocedural pain    4. Chemotherapy-induced nausea    5. Gastroesophageal reflux disease, esophagitis presence not specified        Plan:   Xiomy was seen today for follow-up.    Diagnoses and all orders for this  visit:    Cardiomyopathy secondary to drug  -     continue Eliquis  Keep appointment with cardiology    Malignant neoplasm of body of stomach  -     diphenoxylate-atropine 2.5-0.025 mg (LOMOTIL) 2.5-0.025 mg per tablet; 1 po bid prn diarrhea  -     ALPRAZolam (XANAX) 0.5 MG tablet; Take 1 tablet (0.5 mg total) by mouth 2 (two) times daily as needed for Anxiety.    Carcinoma of antrum of stomach  -     oxyCODONE-acetaminophen (PERCOCET) 7.5-325 mg per tablet; Take 1 tablet by mouth every 12 (twelve) hours as needed for Pain. GREATER THAN 7 DAY QUANTITY MEDICALLY NECESSARY  -     diphenoxylate-atropine 2.5-0.025 mg (LOMOTIL) 2.5-0.025 mg per tablet; 1 po bid prn diarrhea  -     ALPRAZolam (XANAX) 0.5 MG tablet; Take 1 tablet (0.5 mg total) by mouth 2 (two) times daily as needed for Anxiety.  -     mirtazapine (REMERON) 30 MG tablet; Take 1 tablet (30 mg total) by mouth once daily.    Chemotherapy-induced nausea  -     prochlorperazine (COMPAZINE) 10 MG tablet; Take 1 tablet (10 mg total) by mouth 3 (three) times daily as needed.    Pleural effusion  Resolved    Gastroesophageal reflux disease, esophagitis presence not specified  -     pantoprazole (PROTONIX) 40 MG tablet; Take 1 tablet (40 mg total) by mouth 2 (two) times daily.    Anxiety disorder, unspecified type  -     mirtazapine (REMERON) 30 MG tablet; Take 1 tablet (15 mg total) by mouth once daily.    Other chronic postprocedural pain  -     gabapentin (NEURONTIN) 300 MG capsule; Take 1 capsule (300 mg total) by mouth 2 (two) times daily.    Port-A-Cath in place  -     Ambulatory referral/consult to General Surgery; Future; Expected date: 06/08/2020    Carcinoma of antrum of stomach  -     ondansetron (ZOFRAN) 8 MG tablet; Take 1 tablet (8 mg total) by mouth every 8 (eight) hours as needed.  Dispense: 30 tablet; Refill: 2  -     oxyCODONE-acetaminophen (PERCOCET) 7.5-325 mg per tablet; Take 1 tablet by mouth every 12 (twelve) hours as needed for Pain. GREATER  THAN 7 DAY QUANTITY MEDICALLY NECESSARY  Dispense: 60 tablet; Refill: 0    Other chronic postprocedural pain  -     gabapentin (NEURONTIN) 300 MG capsule; Take 1 capsule (300 mg total) by mouth 2 (two) times daily.  Dispense: 60 capsule; Refill: 2    Chemotherapy-induced nausea  -     megestroL (MEGACE) 400 mg/10 mL (40 mg/mL) Susp; 10 MLS BY MOUTH TWICE DAILY  Dispense: 600 mL; Refill: 5    Gastroesophageal reflux disease, esophagitis presence not specified  -     pantoprazole (PROTONIX) 40 MG tablet; Take 1 tablet (40 mg total) by mouth 2 (two) times daily.  Dispense: 180 tablet; Refill: 1      RTC in 12 weeks

## 2020-06-02 ENCOUNTER — OFFICE VISIT (OUTPATIENT)
Dept: SURGERY | Facility: CLINIC | Age: 60
End: 2020-06-02
Payer: MEDICAID

## 2020-06-02 VITALS
RESPIRATION RATE: 16 BRPM | BODY MASS INDEX: 19.49 KG/M2 | WEIGHT: 110 LBS | OXYGEN SATURATION: 100 % | SYSTOLIC BLOOD PRESSURE: 102 MMHG | HEIGHT: 63 IN | HEART RATE: 53 BPM | DIASTOLIC BLOOD PRESSURE: 60 MMHG | TEMPERATURE: 98 F

## 2020-06-02 DIAGNOSIS — C16.3 CARCINOMA OF ANTRUM OF STOMACH: Primary | ICD-10-CM

## 2020-06-02 PROCEDURE — 99999 PR PBB SHADOW E&M-EST. PATIENT-LVL III: ICD-10-PCS | Mod: PBBFAC,,, | Performed by: SURGERY

## 2020-06-02 PROCEDURE — 99213 OFFICE O/P EST LOW 20 MIN: CPT | Mod: PBBFAC | Performed by: SURGERY

## 2020-06-02 PROCEDURE — 99213 OFFICE O/P EST LOW 20 MIN: CPT | Mod: S$PBB,,, | Performed by: SURGERY

## 2020-06-02 PROCEDURE — 99213 PR OFFICE/OUTPT VISIT, EST, LEVL III, 20-29 MIN: ICD-10-PCS | Mod: S$PBB,,, | Performed by: SURGERY

## 2020-06-02 PROCEDURE — 99999 PR PBB SHADOW E&M-EST. PATIENT-LVL III: CPT | Mod: PBBFAC,,, | Performed by: SURGERY

## 2020-06-02 NOTE — PROGRESS NOTES
Subjective:       Patient ID: Xiomy Duncan is a 60 y.o. female.    Chief Complaint: port removal    Review of patient's allergies indicates:   Allergen Reactions    Penicillins Rash     60-year-old female status post subtotal gastrectomy for stomach cancer.  Patient had surgery by Dr. Solitario at [place].  Her oncologist is Dr. Eli in [place].  She has a CT scan on the 16th of this month.  She then goes see her oncologist after that.  We will wait to get the results of her scan and for her to see Dr. Eli before proceeding with port removal.    Past Medical History:   Diagnosis Date    Anemia     Anxiety     Asthma     Cancer determined by biopsy of stomach 12/2018    Cannabis abuse, daily use     Colon polyps     Encounter for blood transfusion     GERD (gastroesophageal reflux disease) 8/7/2014    Hoarseness 8/7/2014    Hypertension     S/P subtotal gastrectomy 02/08/2019    Seizures     LAST SEIZURE >6 YEARS AGO    Thyroid nodule     Ulcer     Vaginal delivery     x4     Past Surgical History:   Procedure Laterality Date    CHOLECYSTECTOMY      ESOPHAGOGASTRODUODENOSCOPY N/A 12/15/2018    Procedure: ESOPHAGOGASTRODUODENOSCOPY (EGD);  Surgeon: Alisson Villagomez MD;  Location: [facility] ENDO;  Service: Endoscopy;  Laterality: N/A;    GASTRECTOMY N/A 2/8/2019    Procedure: OPEN GASTRECTOMY- radical subtotal;  Surgeon: Jesus Tripp MD;  Location: [facility] OR;  Service: General;  Laterality: N/A;  OPEN  GASTRECTOMY  PER ABIEL ON 1/29/19  @ 302PM-LO  RN PRE OP 2-1-19    HERNIA REPAIR N/A 01/15/2020    incisional hernia repair    LEFT HEART CATHETERIZATION Left 2/21/2020    Procedure: Left heart cath;  Surgeon: Juan Castellanos MD;  Location: [facility] CATH LAB;  Service: Cardiology;  Laterality: Left;    PORTACATH PLACEMENT Left 04/2019    TONSILLECTOMY      TUBAL LIGATION      ULTRASOUND GUIDANCE  2/21/2020    Procedure: Ultrasound Guidance;  Surgeon: Juan Castellanos MD;   Location: [facility] CATH LAB;  Service: Cardiology;;     Family History   Problem Relation Age of Onset    Bone cancer Mother     Hypertension Brother     Hypertension Maternal Uncle     Diabetes Maternal Uncle     Hypertension Maternal Grandmother     Stomach cancer Maternal Grandfather     Breast cancer Neg Hx     Colon cancer Neg Hx     Ovarian cancer Neg Hx      Social History     Socioeconomic History    Marital status: Single     Spouse name: Not on file    Number of children: Not on file    Years of education: Not on file    Highest education level: Not on file   Occupational History    Occupation:      Employer: [employer]     Comment: [employer]   Social Needs    Financial resource strain: Not on file    Food insecurity:     Worry: Not on file     Inability: Not on file    Transportation needs:     Medical: Not on file     Non-medical: Not on file   Tobacco Use    Smoking status: Former Smoker     Packs/day: 2.00     Years: 46.00     Pack years: 92.00     Last attempt to quit: 10/15/2018     Years since quittin.6    Smokeless tobacco: Never Used   Substance and Sexual Activity    Alcohol use: No    Drug use: Yes     Types: Marijuana     Comment: every day    Sexual activity: Not Currently     Birth control/protection: None, Post-menopausal     Comment: single   Lifestyle    Physical activity:     Days per week: Not on file     Minutes per session: Not on file    Stress: Not on file   Relationships    Social connections:     Talks on phone: Not on file     Gets together: Not on file     Attends Zoroastrianism service: Not on file     Active member of club or organization: Not on file     Attends meetings of clubs or organizations: Not on file     Relationship status: Not on file   Other Topics Concern    Not on file   Social History Narrative    Not on file     Vitals:    20 0948   BP: 102/60   Pulse: (!) 53   Resp: 16   Temp: 97.8 °F (36.6 °C)       Review of Systems   All  other systems reviewed and are negative.      Objective:      Physical Exam   Constitutional: She is oriented to person, place, and time. She appears well-developed and well-nourished.   HENT:   Head: Normocephalic.   Eyes: Pupils are equal, round, and reactive to light.   Neck: Normal range of motion.   Pulmonary/Chest: Effort normal.   Abdominal: Soft.   Musculoskeletal: Normal range of motion.   Neurological: She is alert and oriented to person, place, and time.   Skin: Skin is warm and dry.   Psychiatric: She has a normal mood and affect.       Assessment:       1. Carcinoma of antrum of stomach        Plan:         Xiomy was seen today for port removal.    Diagnoses and all orders for this visit:    Carcinoma of antrum of stomach     Patient here for port removal.  Patient has a CT scan and follow-up with her oncologist this month.  She will call back to reschedule if it is okay to have her port removed.    No follow-ups on file.          Agapito Perkins Jr, MD

## 2020-06-02 NOTE — LETTER
June 2, 2020      Arnie Monson MD  [address]  [facility] [address]           [address] [address]  Phone: [phone]          Patient: Xiomy Duncan   MR Number: [id number]   YOB: 1960   Date of Visit: 6/2/2020       Dear Dr. Arnie Monson:    Thank you for referring Xiomy Duncan to me for evaluation. Attached you will find relevant portions of my assessment and plan of care.    If you have questions, please do not hesitate to call me. I look forward to following Xiomy Duncan along with you.    Sincerely,    Agapito Perkins Jr., MD    Enclosure  CC:  No Recipients    If you would like to receive this communication electronically, please contact [email or web address] or [phone] to request more information on [email or web address] Link access.    For providers and/or their staff who would like to refer a patient to [facility], please contact us through our one-stop-shop provider referral line, [facility] , at [phone].    If you feel you have received this communication in error or would no longer like to receive these types of communications, please e-mail [email or web address]

## 2020-07-01 DIAGNOSIS — C16.3 CARCINOMA OF ANTRUM OF STOMACH: ICD-10-CM

## 2020-07-01 DIAGNOSIS — C16.2 MALIGNANT NEOPLASM OF BODY OF STOMACH: ICD-10-CM

## 2020-07-01 RX ORDER — OXYCODONE AND ACETAMINOPHEN 7.5; 325 MG/1; MG/1
1 TABLET ORAL EVERY 12 HOURS PRN
Qty: 60 TABLET | Refills: 0 | Status: SHIPPED | OUTPATIENT
Start: 2020-07-01 | End: 2020-08-03 | Stop reason: SDUPTHER

## 2020-07-01 RX ORDER — ALPRAZOLAM 0.5 MG/1
0.5 TABLET ORAL 2 TIMES DAILY PRN
Qty: 60 TABLET | Refills: 2 | Status: SHIPPED | OUTPATIENT
Start: 2020-07-01 | End: 2020-08-11 | Stop reason: SDUPTHER

## 2020-07-01 NOTE — TELEPHONE ENCOUNTER
oxyCODONE-acetaminophen (PERCOCET) 7.5-325 mg per tablet  ALPRAZolam (XANAX) 0.5 MG tablet ()  Last office visit: 2020  Is this a 30-day or 90-day RX:  30-Day  [facility] [place]    Percocet last printed on 2020  Xanax last printed on 2020

## 2020-07-01 NOTE — TELEPHONE ENCOUNTER
----- Message from Vannesa Monzon sent at 2020 10:22 AM CDT -----  Regarding: med refill  Contact: Dwayne Duncan  MRN: [id number]  : 1960  PCP: Arnie Monson  Home Phone      [phone]  Work Phone      Not on file.  Mobile          [phone]      MESSAGE:   Pt requesting refill or new Rx.   Is this a refill or new RX:  refill  RX name and strength:   oxyCODONE-acetaminophen (PERCOCET) 7.5-325 mg per tablet  ALPRAZolam (XANAX) 0.5 MG tablet ()  Last office visit: 2020  Is this a 30-day or 90-day RX:  30-Day  [facility] [place]  Comments:      Phone:  [phone]

## 2020-07-06 ENCOUNTER — TELEPHONE (OUTPATIENT)
Dept: OBSTETRICS AND GYNECOLOGY | Facility: CLINIC | Age: 60
End: 2020-07-06

## 2020-07-06 ENCOUNTER — OFFICE VISIT (OUTPATIENT)
Dept: OBSTETRICS AND GYNECOLOGY | Facility: CLINIC | Age: 60
End: 2020-07-06
Payer: MEDICAID

## 2020-07-06 VITALS
HEART RATE: 62 BPM | HEIGHT: 62 IN | DIASTOLIC BLOOD PRESSURE: 82 MMHG | BODY MASS INDEX: 20.06 KG/M2 | WEIGHT: 109 LBS | SYSTOLIC BLOOD PRESSURE: 132 MMHG | RESPIRATION RATE: 17 BRPM

## 2020-07-06 DIAGNOSIS — Z12.4 CERVICAL CANCER SCREENING: Primary | ICD-10-CM

## 2020-07-06 DIAGNOSIS — Z12.31 BREAST CANCER SCREENING BY MAMMOGRAM: ICD-10-CM

## 2020-07-06 DIAGNOSIS — N95.1 MENOPAUSAL STATE: ICD-10-CM

## 2020-07-06 DIAGNOSIS — Z01.419 WELL WOMAN EXAM WITH ROUTINE GYNECOLOGICAL EXAM: ICD-10-CM

## 2020-07-06 PROCEDURE — 87624 HPV HI-RISK TYP POOLED RSLT: CPT

## 2020-07-06 PROCEDURE — 88175 CYTOPATH C/V AUTO FLUID REDO: CPT

## 2020-07-06 PROCEDURE — 99214 OFFICE O/P EST MOD 30 MIN: CPT | Mod: PBBFAC | Performed by: ADVANCED PRACTICE MIDWIFE

## 2020-07-06 PROCEDURE — 99396 PREV VISIT EST AGE 40-64: CPT | Mod: S$PBB,,, | Performed by: ADVANCED PRACTICE MIDWIFE

## 2020-07-06 PROCEDURE — 99396 PR PREVENTIVE VISIT,EST,40-64: ICD-10-PCS | Mod: S$PBB,,, | Performed by: ADVANCED PRACTICE MIDWIFE

## 2020-07-06 PROCEDURE — 99999 PR PBB SHADOW E&M-EST. PATIENT-LVL IV: ICD-10-PCS | Mod: PBBFAC,,, | Performed by: ADVANCED PRACTICE MIDWIFE

## 2020-07-06 PROCEDURE — 99999 PR PBB SHADOW E&M-EST. PATIENT-LVL IV: CPT | Mod: PBBFAC,,, | Performed by: ADVANCED PRACTICE MIDWIFE

## 2020-07-06 RX ORDER — VENLAFAXINE HYDROCHLORIDE 75 MG/1
75 CAPSULE, EXTENDED RELEASE ORAL DAILY
Qty: 30 CAPSULE | Refills: 5 | Status: SHIPPED | OUTPATIENT
Start: 2020-07-06 | End: 2020-12-14

## 2020-07-06 RX ORDER — VENLAFAXINE HYDROCHLORIDE 75 MG/1
75 CAPSULE, EXTENDED RELEASE ORAL DAILY
Qty: 30 CAPSULE | Refills: 2 | Status: SHIPPED | OUTPATIENT
Start: 2020-07-06 | End: 2020-07-06

## 2020-07-06 NOTE — PROGRESS NOTES
Subjective:    Patient ID: Xiomy Duncan is a 60 y.o. y.o. female.     Chief Complaint: Annual Well Woman Exam     History of Present Illness:  Xiomy presents today for Annual Well Woman exam. She describes her menses as absent .She denies pelvic pain.  She denies breast tenderness, masses, nipple discharge. She denies difficulty with urination or bowel movements. She admits to menopausal symptoms such as hotflashes, vaginal dryness, and night sweats. She denies bloating, early satiety, or weight changes. She is not sexually active. Is co vasomotor issues, HRT contraindicate with co morbidities, will rx Effexor.   MMG ORDERED , PAP today   The following portions of the patient's history were reviewed and updated as appropriate: allergies, current medications, past family history, past medical history, past social history, past surgical history and problem list.      Menstrual History:   No LMP recorded (lmp unknown). Patient is postmenopausal..     OB History        4    Para   4    Term   4            AB        Living   4       SAB        TAB        Ectopic        Multiple        Live Births                     The following portions of the patient's history were reviewed and updated as appropriate: allergies, current medications, past family history, past medical history, past social history, past surgical history and problem list.        ROS:     Review of Systems   Constitutional: Positive for fatigue and unexpected weight change.   Gastrointestinal: Positive for abdominal pain, nausea and vomiting.   Endocrine: Positive for hair loss.   Musculoskeletal: Positive for arthralgias.   Neurological: Positive for numbness.        Since her ca tx    Psychiatric/Behavioral: Positive for depression. The patient is nervous/anxious.    All other systems reviewed and are negative.      Objective:    Vital Signs:  Vitals:    20 1508   BP: 132/82   Pulse: 62   Resp: 17   Weight: 49.4 kg (109  "lb)   Height: 5' 2" (1.575 m)         Physical Exam:  General:  alert, cooperative, appears stated age   Skin:  Skin color, texture, turgor normal. No rashes or lesions   HEENT:  conjunctivae/corneas clear. PERRL.   Neck: supple, trachea midline, no adenopathy or thyromegally   Respiratory:  clear to auscultation bilaterally   Heart:  regular rate and rhythm, S1, S2 normal, no murmur, click, rub or gallop   Breasts:  no discharge, erythema, or tenderness, self-exam is taught and encouraged   Abdomen:  normal findings: bowel sounds normal, no masses palpable, no organomegaly and soft, non-tender   Pelvis: External genitalia: normal general appearance  Urinary system: urethral meatus normal, bladder nontender  Vaginal: normal mucosa without prolapse or lesions  Cervix: normal appearance  Uterus: normal size, shape, position  Adnexa: normal size, nontender bilaterally   Extremities: Normal ROM; no edema, no cyanosis   Neurologial: Normal strength and tone. No focal numbness or weakness. Reflexes 2+ and equal.   Psychiatric: normal mood, speech, dress, and thought processes         Assessment:       Healthy female exam.     1. Cervical cancer screening    2. Breast cancer screening by mammogram    3. Well woman exam with routine gynecological exam    4. Menopausal state          Plan:       All questions answered.  Await pap smear results.  Breast self exam technique reviewed and patient encouraged to perform self-exam monthly.  Follow up in 1 year.  Follow up as needed.  Pap smear.    Effexor     COUNSELING:  Xiomy was counseled on A.C.O.G. Pap guidelines and recommendations for yearly pelvic exams in addition to recommendations for monthly self breast exams; to see her PCP for other health maintenance.     "

## 2020-07-13 LAB
FINAL PATHOLOGIC DIAGNOSIS: NORMAL
HPV HR 12 DNA SPEC QL NAA+PROBE: NEGATIVE
HPV16 AG SPEC QL: NEGATIVE
HPV18 DNA SPEC QL NAA+PROBE: NEGATIVE
Lab: NORMAL

## 2020-07-20 ENCOUNTER — HOSPITAL ENCOUNTER (OUTPATIENT)
Dept: RADIOLOGY | Facility: HOSPITAL | Age: 60
Discharge: HOME | End: 2020-07-20
Attending: ADVANCED PRACTICE MIDWIFE
Payer: MEDICAID

## 2020-07-20 VITALS — BODY MASS INDEX: 20.06 KG/M2 | WEIGHT: 109 LBS | HEIGHT: 62 IN

## 2020-07-20 DIAGNOSIS — Z12.31 BREAST CANCER SCREENING BY MAMMOGRAM: ICD-10-CM

## 2020-07-20 PROCEDURE — 77067 SCR MAMMO BI INCL CAD: CPT | Mod: TC

## 2020-07-20 PROCEDURE — 77063 MAMMO DIGITAL SCREENING BILAT WITH TOMOSYNTHESIS_CAD: ICD-10-PCS | Mod: 26,,, | Performed by: RADIOLOGY

## 2020-07-20 PROCEDURE — 77067 MAMMO DIGITAL SCREENING BILAT WITH TOMOSYNTHESIS_CAD: ICD-10-PCS | Mod: 26,,, | Performed by: RADIOLOGY

## 2020-07-20 PROCEDURE — 77063 BREAST TOMOSYNTHESIS BI: CPT | Mod: 26,,, | Performed by: RADIOLOGY

## 2020-07-20 PROCEDURE — 77067 SCR MAMMO BI INCL CAD: CPT | Mod: 26,,, | Performed by: RADIOLOGY

## 2020-07-21 ENCOUNTER — TELEPHONE (OUTPATIENT)
Dept: SURGERY | Facility: CLINIC | Age: 60
End: 2020-07-21

## 2020-07-21 NOTE — TELEPHONE ENCOUNTER
Spoke with pt, she states that she needed an f/u appt with . Pt is scheduled for Thursday @ 9:45am.         ----- Message from Precious Bhakta sent at 7/21/2020  8:37 AM CDT -----  Name of Who is Calling: [facility] [place]     What is the request in detail: Would like for patient to be seen by provider before she goes back to see him for her follow up appointment. Please contact to further discuss and advise      What Number to Call Back: [phone]

## 2020-07-23 ENCOUNTER — OFFICE VISIT (OUTPATIENT)
Dept: SURGERY | Facility: CLINIC | Age: 60
End: 2020-07-23
Payer: MEDICAID

## 2020-07-23 VITALS
HEART RATE: 88 BPM | DIASTOLIC BLOOD PRESSURE: 89 MMHG | BODY MASS INDEX: 20.06 KG/M2 | WEIGHT: 109 LBS | HEIGHT: 62 IN | SYSTOLIC BLOOD PRESSURE: 134 MMHG

## 2020-07-23 DIAGNOSIS — C16.3 CARCINOMA OF ANTRUM OF STOMACH: Primary | ICD-10-CM

## 2020-07-23 DIAGNOSIS — K43.2 INCISIONAL HERNIA, WITHOUT OBSTRUCTION OR GANGRENE: ICD-10-CM

## 2020-07-23 PROCEDURE — 99214 PR OFFICE/OUTPT VISIT, EST, LEVL IV, 30-39 MIN: ICD-10-PCS | Mod: S$GLB,,, | Performed by: SURGERY

## 2020-07-23 PROCEDURE — 99214 OFFICE O/P EST MOD 30 MIN: CPT | Mod: S$GLB,,, | Performed by: SURGERY

## 2020-07-23 RX ORDER — NYSTATIN 100000 [USP'U]/ML
4 SUSPENSION ORAL 4 TIMES DAILY
Qty: 160 ML | Refills: 1 | Status: SHIPPED | OUTPATIENT
Start: 2020-07-23 | End: 2020-08-02

## 2020-07-23 NOTE — H&P (VIEW-ONLY)
Subjective:       Patient ID: Xiomy Duncan is a 60 y.o. female.    Chief Complaint: Follow-up    HPI 61 yo female with gastric cancer s/p resection with stable weight and no complaints  Review of Systems   Constitutional: Negative.    HENT: Negative.    Eyes: Negative.    Respiratory: Negative.    Cardiovascular: Negative.    Gastrointestinal: Negative.    Endocrine: Negative.    Musculoskeletal: Negative.    Integumentary:  Negative.   Allergic/Immunologic: Negative.    Neurological: Negative.    Hematological: Negative.    Psychiatric/Behavioral: Negative.    All other systems reviewed and are negative.        Objective:      Physical Exam  Vitals signs reviewed.   Constitutional:       Appearance: She is well-developed.   HENT:      Head: Normocephalic and atraumatic.      Right Ear: External ear normal.      Left Ear: External ear normal.      Nose: Nose normal.   Eyes:      Conjunctiva/sclera: Conjunctivae normal.      Pupils: Pupils are equal, round, and reactive to light.   Neck:      Musculoskeletal: Normal range of motion and neck supple.   Cardiovascular:      Rate and Rhythm: Normal rate and regular rhythm.      Heart sounds: Normal heart sounds.   Pulmonary:      Effort: Pulmonary effort is normal.      Breath sounds: Normal breath sounds.   Abdominal:      General: Bowel sounds are normal.      Palpations: Abdomen is soft.   Musculoskeletal: Normal range of motion.   Skin:     General: Skin is warm and dry.   Neurological:      Mental Status: She is alert and oriented to person, place, and time.      Deep Tendon Reflexes: Reflexes are normal and symmetric.   Psychiatric:         Behavior: Behavior normal.         Thought Content: Thought content normal.         Assessment:       1. Carcinoma of antrum of stomach    2. Incisional hernia, without obstruction or gangrene        Plan:       She has been having some nausea and needs to see GI for evaluation and I will see her back in 3 months

## 2020-07-23 NOTE — PROGRESS NOTES
Subjective:       Patient ID: Xiomy Duncan is a 60 y.o. female.    Chief Complaint: Follow-up    HPI 59 yo female with gastric cancer s/p resection with stable weight and no complaints  Review of Systems   Constitutional: Negative.    HENT: Negative.    Eyes: Negative.    Respiratory: Negative.    Cardiovascular: Negative.    Gastrointestinal: Negative.    Endocrine: Negative.    Musculoskeletal: Negative.    Integumentary:  Negative.   Allergic/Immunologic: Negative.    Neurological: Negative.    Hematological: Negative.    Psychiatric/Behavioral: Negative.    All other systems reviewed and are negative.        Objective:      Physical Exam  Vitals signs reviewed.   Constitutional:       Appearance: She is well-developed.   HENT:      Head: Normocephalic and atraumatic.      Right Ear: External ear normal.      Left Ear: External ear normal.      Nose: Nose normal.   Eyes:      Conjunctiva/sclera: Conjunctivae normal.      Pupils: Pupils are equal, round, and reactive to light.   Neck:      Musculoskeletal: Normal range of motion and neck supple.   Cardiovascular:      Rate and Rhythm: Normal rate and regular rhythm.      Heart sounds: Normal heart sounds.   Pulmonary:      Effort: Pulmonary effort is normal.      Breath sounds: Normal breath sounds.   Abdominal:      General: Bowel sounds are normal.      Palpations: Abdomen is soft.   Musculoskeletal: Normal range of motion.   Skin:     General: Skin is warm and dry.   Neurological:      Mental Status: She is alert and oriented to person, place, and time.      Deep Tendon Reflexes: Reflexes are normal and symmetric.   Psychiatric:         Behavior: Behavior normal.         Thought Content: Thought content normal.         Assessment:       1. Carcinoma of antrum of stomach    2. Incisional hernia, without obstruction or gangrene        Plan:       She has been having some nausea and needs to see GI for evaluation and I will see her back in 3 months

## 2020-07-27 ENCOUNTER — TELEPHONE (OUTPATIENT)
Dept: SURGERY | Facility: CLINIC | Age: 60
End: 2020-07-27

## 2020-07-29 ENCOUNTER — TELEPHONE (OUTPATIENT)
Dept: GASTROENTEROLOGY | Facility: CLINIC | Age: 60
End: 2020-07-29

## 2020-07-29 DIAGNOSIS — Z01.818 PREOP TESTING: Primary | ICD-10-CM

## 2020-07-29 DIAGNOSIS — C16.2 MALIGNANT NEOPLASM OF BODY OF STOMACH: Primary | ICD-10-CM

## 2020-07-29 NOTE — TELEPHONE ENCOUNTER
Pt notified of appt for EGD 8/19/20-O[facility] and to have COVID test on 8/16/20 between 9a-6p [facility].  Pt also instructed to take her last dose of Eliquis on 8/16/20.  Pt verbalized understanding.

## 2020-08-11 DIAGNOSIS — C16.3 CARCINOMA OF ANTRUM OF STOMACH: ICD-10-CM

## 2020-08-11 DIAGNOSIS — C16.2 MALIGNANT NEOPLASM OF BODY OF STOMACH: ICD-10-CM

## 2020-08-11 NOTE — TELEPHONE ENCOUNTER
RX name and strength: ALPRAZolam (XANAX) 0.5 MG tablet  Last office visit: 06/01/2020  Is this a 30-day or 90-day RX:  30  [facility] [place]     Xanax last printed on 7/1/2020

## 2020-08-11 NOTE — TELEPHONE ENCOUNTER
----- Message from Liane Velarde sent at 2020  2:13 PM CDT -----  Contact: self  Xiomy Duncan  MRN: [id number]  : 1960  PCP: Arnie Monson  Home Phone      [phone]  Work Phone      Not on file.  Mobile          [phone]      MESSAGE:   Pt requesting refill or new Rx.   Is this a refill or new RX:  refill  RX name and strength: ALPRAZolam (XANAX) 0.5 MG tablet  Last office visit: 2020  Is this a 30-day or 90-day RX:  30  [facility] [place]   Comments:      Phone:  [phone]

## 2020-08-12 ENCOUNTER — TELEPHONE (OUTPATIENT)
Dept: FAMILY MEDICINE | Facility: CLINIC | Age: 60
End: 2020-08-12

## 2020-08-12 NOTE — TELEPHONE ENCOUNTER
----- Message from Vannesa Monzon sent at 2020 11:14 AM CDT -----  Regarding: Med refill  Contact: Self  Xiomy Duncan  MRN: [id number]  : 1960  PCP: Arnie Monson  Home Phone      [phone]  Work Phone      Not on file.  Mobile          [phone]      MESSAGE:   Pt requesting refill or new Rx.   Is this a refill or new RX:  refill  RX name and strength: ALPRAZolam (XANAX) 0.5 MG tablet ()  Last office visit: 2020  Is this a 30-day or 90-day RX:  30-Day  [facility] [place]  Comments:      Phone: [phone]

## 2020-08-12 NOTE — TELEPHONE ENCOUNTER
RX name and strength: ALPRAZolam (XANAX) 0.5 MG tablet ()  Last office visit: 2020  Is this a 30-day or 90-day RX:  30-Day  Pharmacy name and location:  [facility] in [place]        ALPRAZolam (XANAX) 0.5 MG tablet 60 tablet 2 2020 No   Sig - Route: Take 1 tablet (0.5 mg total) by mouth 2 (two) times daily as needed for Anxiety. - Oral   Sent to pharmacy as: ALPRAZolam (XANAX) 0.5 MG tablet   Class: Normal   Order: [id number]   Date/Time Signed: 2020 14:06       E-Prescribing Status: Receipt confirmed by pharmacy (2020  2:06 PM CDT)   Pharmacy    [id number] [place] [id number]    Associated Diagnoses               Patient has refills

## 2020-08-14 ENCOUNTER — TELEPHONE (OUTPATIENT)
Dept: GASTROENTEROLOGY | Facility: CLINIC | Age: 60
End: 2020-08-14

## 2020-08-14 RX ORDER — ALPRAZOLAM 0.5 MG/1
0.5 TABLET ORAL 2 TIMES DAILY PRN
Qty: 60 TABLET | Refills: 2 | Status: SHIPPED | OUTPATIENT
Start: 2020-08-14 | End: 2020-09-24 | Stop reason: SDUPTHER

## 2020-08-14 NOTE — TELEPHONE ENCOUNTER
Spoke with pt and reminded her to have COVID test on 8/16/20 between 9a-6p [facility] [address]  Pt also instructed to take her last dose of Eliquis on 8/16/20.  Pt verbalized understanding.

## 2020-08-16 ENCOUNTER — LAB VISIT (OUTPATIENT)
Dept: URGENT CARE | Facility: CLINIC | Age: 60
End: 2020-08-16
Payer: MEDICAID

## 2020-08-16 VITALS — TEMPERATURE: 98 F

## 2020-08-16 DIAGNOSIS — Z01.818 PREOP TESTING: ICD-10-CM

## 2020-08-16 PROCEDURE — U0003 INFECTIOUS AGENT DETECTION BY NUCLEIC ACID (DNA OR RNA); SEVERE ACUTE RESPIRATORY SYNDROME CORONAVIRUS 2 (SARS-COV-2) (CORONAVIRUS DISEASE [COVID-19]), AMPLIFIED PROBE TECHNIQUE, MAKING USE OF HIGH THROUGHPUT TECHNOLOGIES AS DESCRIBED BY CMS-2020-01-R: HCPCS

## 2020-08-17 LAB — SARS-COV-2 RNA RESP QL NAA+PROBE: NOT DETECTED

## 2020-08-19 ENCOUNTER — ANESTHESIA (OUTPATIENT)
Dept: ENDOSCOPY | Facility: HOSPITAL | Age: 60
End: 2020-08-19
Payer: MEDICAID

## 2020-08-19 ENCOUNTER — HOSPITAL ENCOUNTER (OUTPATIENT)
Facility: HOSPITAL | Age: 60
Discharge: HOME | End: 2020-08-19
Attending: INTERNAL MEDICINE | Admitting: INTERNAL MEDICINE
Payer: MEDICAID

## 2020-08-19 ENCOUNTER — ANESTHESIA EVENT (OUTPATIENT)
Dept: ENDOSCOPY | Facility: HOSPITAL | Age: 60
End: 2020-08-19
Payer: MEDICAID

## 2020-08-19 VITALS
HEART RATE: 72 BPM | RESPIRATION RATE: 18 BRPM | TEMPERATURE: 99 F | OXYGEN SATURATION: 97 % | SYSTOLIC BLOOD PRESSURE: 122 MMHG | DIASTOLIC BLOOD PRESSURE: 61 MMHG

## 2020-08-19 DIAGNOSIS — K21.9 GASTROESOPHAGEAL REFLUX DISEASE, ESOPHAGITIS PRESENCE NOT SPECIFIED: Primary | ICD-10-CM

## 2020-08-19 DIAGNOSIS — C80.1 MALIGNANT NEOPLASM: ICD-10-CM

## 2020-08-19 PROCEDURE — 00731 ANES UPR GI NDSC PX NOS: CPT | Performed by: INTERNAL MEDICINE

## 2020-08-19 PROCEDURE — 43239 EGD BIOPSY SINGLE/MULTIPLE: CPT | Mod: ,,, | Performed by: INTERNAL MEDICINE

## 2020-08-19 PROCEDURE — 37000009 HC ANESTHESIA EA ADD 15 MINS: Performed by: INTERNAL MEDICINE

## 2020-08-19 PROCEDURE — 43239 PR EGD, FLEX, W/BIOPSY, SGL/MULTI: ICD-10-PCS | Mod: ,,, | Performed by: INTERNAL MEDICINE

## 2020-08-19 PROCEDURE — D9220A PRA ANESTHESIA: Mod: ANES,,, | Performed by: ANESTHESIOLOGY

## 2020-08-19 PROCEDURE — 63600175 PHARM REV CODE 636 W HCPCS: Performed by: NURSE ANESTHETIST, CERTIFIED REGISTERED

## 2020-08-19 PROCEDURE — 88305 TISSUE EXAM BY PATHOLOGIST: CPT | Performed by: PATHOLOGY

## 2020-08-19 PROCEDURE — 37000008 HC ANESTHESIA 1ST 15 MINUTES: Performed by: INTERNAL MEDICINE

## 2020-08-19 PROCEDURE — 88305 TISSUE EXAM BY PATHOLOGIST: CPT | Mod: 26,,, | Performed by: PATHOLOGY

## 2020-08-19 PROCEDURE — 88305 TISSUE EXAM BY PATHOLOGIST: ICD-10-PCS | Mod: 26,,, | Performed by: PATHOLOGY

## 2020-08-19 PROCEDURE — D9220A PRA ANESTHESIA: ICD-10-PCS | Mod: ANES,,, | Performed by: ANESTHESIOLOGY

## 2020-08-19 PROCEDURE — 43239 EGD BIOPSY SINGLE/MULTIPLE: CPT | Performed by: INTERNAL MEDICINE

## 2020-08-19 PROCEDURE — 25000003 PHARM REV CODE 250: Performed by: NURSE ANESTHETIST, CERTIFIED REGISTERED

## 2020-08-19 PROCEDURE — D9220A PRA ANESTHESIA: ICD-10-PCS | Mod: CRNA,,, | Performed by: REGISTERED NURSE

## 2020-08-19 PROCEDURE — 27201012 HC FORCEPS, HOT/COLD, DISP: Performed by: INTERNAL MEDICINE

## 2020-08-19 PROCEDURE — D9220A PRA ANESTHESIA: Mod: CRNA,,, | Performed by: REGISTERED NURSE

## 2020-08-19 RX ORDER — LIDOCAINE HYDROCHLORIDE 20 MG/ML
INJECTION INTRAVENOUS
Status: DISCONTINUED | OUTPATIENT
Start: 2020-08-19 | End: 2020-08-19

## 2020-08-19 RX ORDER — LIDOCAINE HYDROCHLORIDE 20 MG/ML
INJECTION, SOLUTION EPIDURAL; INFILTRATION; INTRACAUDAL; PERINEURAL
Status: DISCONTINUED
Start: 2020-08-19 | End: 2020-08-19 | Stop reason: HOSPADM

## 2020-08-19 RX ORDER — SODIUM CHLORIDE 9 MG/ML
INJECTION, SOLUTION INTRAVENOUS CONTINUOUS PRN
Status: DISCONTINUED | OUTPATIENT
Start: 2020-08-19 | End: 2020-08-19

## 2020-08-19 RX ORDER — PROPOFOL 10 MG/ML
INJECTION, EMULSION INTRAVENOUS
Status: DISCONTINUED
Start: 2020-08-19 | End: 2020-08-19 | Stop reason: HOSPADM

## 2020-08-19 RX ORDER — PROPOFOL 10 MG/ML
VIAL (ML) INTRAVENOUS
Status: DISCONTINUED | OUTPATIENT
Start: 2020-08-19 | End: 2020-08-19

## 2020-08-19 RX ADMIN — PROPOFOL 100 MG: 10 INJECTION, EMULSION INTRAVENOUS at 08:08

## 2020-08-19 RX ADMIN — SODIUM CHLORIDE: 9 INJECTION, SOLUTION INTRAVENOUS at 08:08

## 2020-08-19 RX ADMIN — PROPOFOL 50 MG: 10 INJECTION, EMULSION INTRAVENOUS at 08:08

## 2020-08-19 RX ADMIN — Medication 100 MG: at 08:08

## 2020-08-19 RX ADMIN — PROPOFOL 50 MG: 10 INJECTION, EMULSION INTRAVENOUS at 09:08

## 2020-08-19 NOTE — TRANSFER OF CARE
Anesthesia Transfer of Care Note    Patient: Xiomy Duncan    Procedure(s) Performed: Procedure(s) (LRB):  EGD (ESOPHAGOGASTRODUODENOSCOPY) (N/A)    Patient location: GI    Anesthesia Type: general    Transport from OR: Transported from OR on room air with adequate spontaneous ventilation    Post pain: adequate analgesia    Post assessment: no apparent anesthetic complications and tolerated procedure well    Post vital signs: stable    Level of consciousness: awake, alert and oriented    Nausea/Vomiting: no nausea/vomiting    Complications: none    Transfer of care protocol was followed      Last vitals:   Visit Vitals  BP (!) 179/73 (BP Location: Left arm, Patient Position: Lying)   Pulse 92   Temp 37.1 °C (98.7 °F) (Oral)   Resp 20   LMP  (LMP Unknown)   SpO2 (!) 94%   Breastfeeding No

## 2020-08-19 NOTE — ANESTHESIA PREPROCEDURE EVALUATION
08/19/2020  Xiomy Duncan is a 60 y.o., female.    Anesthesia Evaluation     I have reviewed the Nursing Notes.       Review of Systems  Anesthesia Hx:  No problems with previous Anesthesia   Social:  Former Smoker    Hematology/Oncology:        Hematology Comments: Pt on apixaban Current/Recent Cancer.   EENT/Dental:   hoarseness   Cardiovascular:   Denies Pacemaker. Hypertension  Denies Valvular problems/Murmurs.  Denies CABG/stent.  CHF hyperlipidemia     ECG has been reviewed. Pt walks up 15 stairs daily without GOMEZ/CP  Cath in 2/2020:  EF visually estimated at 25%, no CAD (non-ischemic cardiomyopathy);  Echo 2/2020:  · Severely decreased left ventricular systolic function. The estimated ejection fraction is 25%. Base moves, suggestive of takatsubo.  · Concentric left ventricular remodeling.  · Grade III (severe) left ventricular diastolic dysfunction consistent with restrictive physiology.  · Normal right ventricular systolic function.  · Global hypokinetic wall motion.  · No pulmonary hypertension present.  · There is a left pleural effusion.   Pulmonary:   Asthma    Renal/:  Renal/ Normal     Hepatic/GI:   No Bowel Prep. GERD Hx stomach ca now s/p subtotal gastrectomy  Hx upper GI bleed   Neurological:   Denies CVA. Seizures    Endocrine:  Endocrine Normal    Psych:   Psychiatric History          Physical Exam  General:  Well nourished    Airway/Jaw/Neck:  AIRWAY FINDINGS: Normal      Chest/Lungs:  Chest/Lungs Clear    Heart/Vascular:  Heart Findings: Normal       Mental Status:  Mental Status Findings: Normal        Anesthesia Plan  Type of Anesthesia, risks & benefits discussed:  Anesthesia Type:  general  Patient's Preference:   Intra-op Monitoring Plan: standard ASA monitors  Intra-op Monitoring Plan Comments:   Post Op Pain Control Plan:   Post Op Pain Control Plan Comments:   Induction:    IV  Beta Blocker:         Informed Consent: Patient understands risks and agrees with Anesthesia plan.  Questions answered. Anesthesia consent signed with patient.  ASA Score: 3     Day of Surgery Review of History & Physical:        Anesthesia Plan Notes: EF of 25%, will induce gently and use combo of propofol & etomidate        Ready For Surgery From Anesthesia Perspective.

## 2020-08-19 NOTE — INTERVAL H&P NOTE
The patient has been examined and the H&P has been reviewed:    I concur with the findings and no changes have occurred since H&P was written.    Procedure risks, benefits and alternative options discussed and understood by patient/family.          Active Hospital Problems    Diagnosis  POA    Malignant neoplasm [C80.1]  Yes      Resolved Hospital Problems   No resolved problems to display.

## 2020-08-19 NOTE — [employer] PATIENT INSTRUCTIONS
Discharge Summary/Instructions after an Endoscopic Procedure  Patient Name: Xiomy Duncan  Patient MRN: [id number]  Patient YOB: 1960 Wednesday, August 19, 2020  Alisson Villagomez MD  RESTRICTIONS:  During your procedure today, you received medications for sedation.  These   medications may affect your judgment, balance and coordination.  Therefore,   for 24 hours, you have the following restrictions:   - DO NOT drive a car, operate machinery, make legal/financial decisions,   sign important papers or drink alcohol.    ACTIVITY:  Today: no heavy lifting, straining or running due to procedural   sedation/anesthesia.  The following day: return to full activity including work.  DIET:  Eat and drink normally unless instructed otherwise.     TREATMENT FOR COMMON SIDE EFFECTS:  - Mild abdominal pain, nausea, belching, bloating or excessive gas:  rest,   eat lightly and use a heating pad.  - Sore Throat: treat with throat lozenges and/or gargle with warm salt   water.  - Because air was used during the procedure, expelling large amounts of air   from your rectum or belching is normal.  - If a bowel prep was taken, you may not have a bowel movement for 1-3 days.    This is normal.  SYMPTOMS TO WATCH FOR AND REPORT TO YOUR PHYSICIAN:  1. Abdominal pain or bloating, other than gas cramps.  2. Chest pain.  3. Back pain.  4. Signs of infection such as: chills or fever occurring within 24 hours   after the procedure.  5. Rectal bleeding, which would show as bright red, maroon, or black stools.   (A tablespoon of blood from the rectum is not serious, especially if   hemorrhoids are present.)  6. Vomiting.  7. Weakness or dizziness.  GO DIRECTLY TO THE NEAREST EMERGENCY ROOM IF YOU HAVE ANY OF THE FOLLOWING:      Difficulty breathing              Chills and/or fever over 101 F   Persistent vomiting and/or vomiting blood   Severe abdominal pain   Severe chest pain   Black, tarry stools   Bleeding- more than one  tablespoon   Any other symptom or condition that you feel may need urgent attention  Your doctor recommends these additional instructions:  If any biopsies were taken, your doctors clinic will contact you in 1 to 2   weeks with any results.  - Await pathology results.   - Return to GI clinic PRN.   - Discharge patient to home.  For questions, problems or results please call your physician - Alisson Villagomez MD at Work:  [phone].  [facility] can be reached at [phone]     IF A COMPLICATION OR EMERGENCY SITUATION ARISES AND YOU ARE UNABLE TO REACH   YOUR PHYSICIAN - GO DIRECTLY TO THE EMERGENCY ROOM.  Alisson Villagomez MD  8/19/2020 9:21:42 AM  This report has been verified and signed electronically.  [employer]

## 2020-08-21 LAB
FINAL PATHOLOGIC DIAGNOSIS: NORMAL
GROSS: NORMAL

## 2020-08-25 ENCOUNTER — TELEPHONE (OUTPATIENT)
Dept: GASTROENTEROLOGY | Facility: CLINIC | Age: 60
End: 2020-08-25

## 2020-08-25 NOTE — TELEPHONE ENCOUNTER
EGD report and progress notes faxed.       ----- Message from Elmer Jennings sent at 8/25/2020  3:58 PM CDT -----  Regarding: request  Contact: Mary/ [phone]  [facility] called in to request office notes from the patient last visit and any test information. Their fax number is [phone]. Please advise.

## 2020-08-25 NOTE — ANESTHESIA POSTPROCEDURE EVALUATION
Anesthesia Post Evaluation    Patient: Xiomy Duncan    Procedure(s) Performed: Procedure(s) (LRB):  EGD (ESOPHAGOGASTRODUODENOSCOPY) (N/A)    Final Anesthesia Type: general    Patient location during evaluation: GI PACU  Patient participation: Yes- Able to Participate  Level of consciousness: awake and alert  Post-procedure vital signs: reviewed and stable  Pain management: adequate  Airway patency: patent    PONV status at discharge: No PONV  Anesthetic complications: no      Cardiovascular status: blood pressure returned to baseline and hemodynamically stable  Respiratory status: unassisted and spontaneous ventilation  Hydration status: euvolemic  Follow-up not needed.          Vitals Value Taken Time   /61 08/19/20 0939   Temp 37.1 °C (98.7 °F) 08/19/20 0913   Pulse 72 08/19/20 0939   Resp 18 08/19/20 0939   SpO2 97 % 08/19/20 0939         Event Time   Out of Recovery 09:39:00         Pain/Elodia Score: No data recorded

## 2020-08-31 ENCOUNTER — PATIENT MESSAGE (OUTPATIENT)
Dept: SURGERY | Facility: CLINIC | Age: 60
End: 2020-08-31

## 2020-09-01 DIAGNOSIS — C16.3 CARCINOMA OF ANTRUM OF STOMACH: ICD-10-CM

## 2020-09-01 NOTE — TELEPHONE ENCOUNTER
RX name and strength: Oxycodone 7.5-325 mg  Last office visit: 6/1/20  Is this a 30-day or 90-day RX:  30 day  [facility] last printed on 8/3/2020

## 2020-09-01 NOTE — TELEPHONE ENCOUNTER
----- Message from Chente Copeland sent at 2020  2:36 PM CDT -----  Contact: Patient  Xiomy Duncan  MRN: [id number]  : 1960  PCP: Arnie Monson  Home Phone      [phone]  Work Phone      Not on file.  Mobile          [phone]      MESSAGE:   Pt requesting refill or new Rx.   Is this a refill or new RX:  refill  RX name and strength: Oxycodone 7.5-325 mg  Last office visit: 20  Is this a 30-day or 90-day RX:  30 day  [facility] [place]  Comments:      Phone:  [phone]    PCP: Ermias

## 2020-09-02 RX ORDER — OXYCODONE AND ACETAMINOPHEN 7.5; 325 MG/1; MG/1
1 TABLET ORAL EVERY 12 HOURS PRN
Qty: 60 TABLET | Refills: 0 | Status: SHIPPED | OUTPATIENT
Start: 2020-09-02 | End: 2020-10-07 | Stop reason: SDUPTHER

## 2020-09-14 ENCOUNTER — APPOINTMENT (OUTPATIENT)
Dept: RADIOLOGY | Facility: CLINIC | Age: 60
End: 2020-09-14
Attending: FAMILY MEDICINE
Payer: MEDICAID

## 2020-09-14 ENCOUNTER — OFFICE VISIT (OUTPATIENT)
Dept: FAMILY MEDICINE | Facility: CLINIC | Age: 60
End: 2020-09-14
Payer: MEDICAID

## 2020-09-14 VITALS
SYSTOLIC BLOOD PRESSURE: 102 MMHG | HEART RATE: 69 BPM | BODY MASS INDEX: 18.78 KG/M2 | WEIGHT: 106 LBS | HEIGHT: 63 IN | DIASTOLIC BLOOD PRESSURE: 72 MMHG

## 2020-09-14 DIAGNOSIS — G89.28 OTHER CHRONIC POSTPROCEDURAL PAIN: ICD-10-CM

## 2020-09-14 DIAGNOSIS — C16.3 CARCINOMA OF ANTRUM OF STOMACH: ICD-10-CM

## 2020-09-14 DIAGNOSIS — F41.9 ANXIETY DISORDER, UNSPECIFIED TYPE: ICD-10-CM

## 2020-09-14 DIAGNOSIS — R63.4 WEIGHT LOSS: Primary | ICD-10-CM

## 2020-09-14 DIAGNOSIS — I10 ESSENTIAL HYPERTENSION: ICD-10-CM

## 2020-09-14 DIAGNOSIS — S93.602A SPRAIN OF LEFT FOOT, INITIAL ENCOUNTER: ICD-10-CM

## 2020-09-14 DIAGNOSIS — Z90.3 S/P SUBTOTAL GASTRECTOMY: ICD-10-CM

## 2020-09-14 PROCEDURE — 99214 OFFICE O/P EST MOD 30 MIN: CPT | Mod: PBBFAC,25 | Performed by: FAMILY MEDICINE

## 2020-09-14 PROCEDURE — 99999 PR PBB SHADOW E&M-EST. PATIENT-LVL IV: ICD-10-PCS | Mod: PBBFAC,,, | Performed by: FAMILY MEDICINE

## 2020-09-14 PROCEDURE — 73630 X-RAY EXAM OF FOOT: CPT | Mod: TC,PO,LT

## 2020-09-14 PROCEDURE — 99214 PR OFFICE/OUTPT VISIT, EST, LEVL IV, 30-39 MIN: ICD-10-PCS | Mod: S$PBB,,, | Performed by: FAMILY MEDICINE

## 2020-09-14 PROCEDURE — 99999 PR PBB SHADOW E&M-EST. PATIENT-LVL IV: CPT | Mod: PBBFAC,,, | Performed by: FAMILY MEDICINE

## 2020-09-14 PROCEDURE — 99214 OFFICE O/P EST MOD 30 MIN: CPT | Mod: S$PBB,,, | Performed by: FAMILY MEDICINE

## 2020-09-14 RX ORDER — MIRTAZAPINE 30 MG/1
30 TABLET, FILM COATED ORAL NIGHTLY
Qty: 30 TABLET | Refills: 5 | Status: SHIPPED | OUTPATIENT
Start: 2020-09-14 | End: 2021-02-10 | Stop reason: SDUPTHER

## 2020-09-14 RX ORDER — GABAPENTIN 300 MG/1
300 CAPSULE ORAL 2 TIMES DAILY
Qty: 60 CAPSULE | Refills: 5 | Status: SHIPPED | OUTPATIENT
Start: 2020-09-14 | End: 2021-02-10 | Stop reason: SDUPTHER

## 2020-09-14 RX ORDER — OLANZAPINE 2.5 MG/1
2.5 TABLET ORAL NIGHTLY
Qty: 30 TABLET | Refills: 11 | Status: SHIPPED | OUTPATIENT
Start: 2020-09-14 | End: 2020-12-14 | Stop reason: SDUPTHER

## 2020-09-14 RX ORDER — HYOSCYAMINE SULFATE 0.125 MG
TABLET ORAL
Qty: 30 TABLET | Refills: 5 | Status: SHIPPED | OUTPATIENT
Start: 2020-09-14 | End: 2021-10-20 | Stop reason: SDUPTHER

## 2020-09-14 NOTE — PROGRESS NOTES
Subjective:       Patient ID: Xiomy Duncan is a 60 y.o. female.    Chief Complaint: Follow-up (3month)    60 year old female with gastric carcinoma who finished taking chemo and radiation with Dr. Eli and Dr. Young comes in with c/o continued pain, poor appetite, weight loss. She says that she has tried a couple of different medications from the pain she is having after radiation without much help.  She states the pain is controlled using the Percocet.  She takes 1 or 2 daily.  She takes Levsin for abdominal cramps  Lost a lot of weight.  Can only eat small amounts of food.  Ensure gives her diarrhea.  She is taking Protonix and Reglan as needed.  She is taking 800 mg of Megace but has not helped.  She [facility].   She is currently taking Remeron 30 mg at night.  She is still losing weight.  She sprained her foot last week and is still having significant pain.  She has a history of hypertension.  She is no longer taking Catapres.    Follow-up  Associated symptoms include arthralgias. Pertinent negatives include no abdominal pain, chest pain, chills, coughing, fatigue, fever, joint swelling, numbness, rash or sore throat.     Review of Systems   Constitutional: Positive for unexpected weight change. Negative for activity change, chills, fatigue and fever.   HENT: Negative for sore throat and trouble swallowing.    Respiratory: Positive for shortness of breath. Negative for cough and chest tightness.    Cardiovascular: Negative for chest pain and leg swelling.   Gastrointestinal: Negative for abdominal pain.   Endocrine: Negative for cold intolerance and heat intolerance.   Genitourinary: Negative for difficulty urinating.   Musculoskeletal: Positive for arthralgias and gait problem. Negative for back pain and joint swelling.   Skin: Negative for rash.   Neurological: Negative for dizziness and numbness.   Hematological: Negative for adenopathy.   Psychiatric/Behavioral:  Negative for decreased concentration and dysphoric mood. The patient is not nervous/anxious.        Objective:      Vitals:    09/14/20 1554   BP: 102/72   Pulse: 69     Physical Exam  Constitutional:       Appearance: She is well-developed.   HENT:      Head: Normocephalic and atraumatic.      Right Ear: Tympanic membrane and ear canal normal.      Left Ear: Tympanic membrane and ear canal normal.   Eyes:      Pupils: Pupils are equal, round, and reactive to light.   Neck:      Musculoskeletal: Normal range of motion and neck supple.      Thyroid: No thyromegaly.      Vascular: No JVD.   Cardiovascular:      Rate and Rhythm: Normal rate and regular rhythm.      Heart sounds: No murmur. No friction rub. Gallop present.    Pulmonary:      Effort: Pulmonary effort is normal.      Breath sounds: Normal breath sounds.   Abdominal:      General: Bowel sounds are normal.      Palpations: Abdomen is soft.   Musculoskeletal:        Feet:    Skin:     General: Skin is warm.   Neurological:      Mental Status: She is alert and oriented to person, place, and time.      Cranial Nerves: No cranial nerve deficit.   Psychiatric:         Behavior: Behavior normal.         Thought Content: Thought content normal.         Judgment: Judgment normal.           BMP  Lab Results   Component Value Date     02/21/2020    K 3.2 (L) 02/21/2020     02/21/2020    CO2 25 02/21/2020    BUN 16 02/21/2020    CREATININE 0.8 02/21/2020    CALCIUM 8.1 (L) 02/21/2020    ANIONGAP 10 02/21/2020    ESTGFRAFRICA >60 02/21/2020    EGFRNONAA >60 02/21/2020     Lab Results   Component Value Date    WBC 12.63 02/20/2020    RBC 3.78 (L) 02/20/2020    HGB 11.7 (L) 02/20/2020    HCT 35.5 (L) 02/20/2020    MCV 94 02/20/2020    MCH 31.0 02/20/2020    MCHC 33.0 02/20/2020    RDW 15.8 (H) 02/20/2020     02/20/2020    MPV 12.2 02/20/2020    GRAN 10.4 (H) 02/20/2020    GRAN 82.1 (H) 02/20/2020    LYMPH 0.8 (L) 02/20/2020    LYMPH 6.5 (L) 02/20/2020     MONO 1.4 (H) 02/20/2020    MONO 10.9 02/20/2020    EOS 0.0 02/20/2020    BASO 0.01 02/20/2020    EOSINOPHIL 0.0 02/20/2020    BASOPHIL 0.1 02/20/2020       Assessment:       1. Weight loss    2. Sprain of left foot, initial encounter    3. Carcinoma of antrum of stomach    4. Anxiety disorder, unspecified type    5. Other chronic postprocedural pain    6. Essential hypertension    7. S/P subtotal gastrectomy        Plan:   Xiomy was seen today for follow-up.    Diagnoses and all orders for this visit:    Cardiomyopathy secondary to drug  -     continue Eliquis  Keep appointment with cardiology    Malignant neoplasm of body of stomach  -     diphenoxylate-atropine 2.5-0.025 mg (LOMOTIL) 2.5-0.025 mg per tablet; 1 po bid prn diarrhea  -     ALPRAZolam (XANAX) 0.5 MG tablet; Take 1 tablet (0.5 mg total) by mouth 2 (two) times daily as needed for Anxiety.    Carcinoma of antrum of stomach  -     oxyCODONE-acetaminophen (PERCOCET) 7.5-325 mg per tablet; Take 1 tablet by mouth every 12 (twelve) hours as needed for Pain. GREATER THAN 7 DAY QUANTITY MEDICALLY NECESSARY  -     diphenoxylate-atropine 2.5-0.025 mg (LOMOTIL) 2.5-0.025 mg per tablet; 1 po bid prn diarrhea  -     ALPRAZolam (XANAX) 0.5 MG tablet; Take 1 tablet (0.5 mg total) by mouth 2 (two) times daily as needed for Anxiety.  -     mirtazapine (REMERON) 30 MG tablet; Take 1 tablet (30 mg total) by mouth once daily.  Add Zyprexa 2.5 mg po q hs.  Stop Reglan    Chemotherapy-induced nausea  -     prochlorperazine (COMPAZINE) 10 MG tablet; Take 1 tablet (10 mg total) by mouth 3 (three) times daily as needed.    Pleural effusion  Resolved    Gastroesophageal reflux disease, esophagitis presence not specified  -     pantoprazole (PROTONIX) 40 MG tablet; Take 1 tablet (40 mg total) by mouth 2 (two) times daily.    Anxiety disorder, unspecified type  -     mirtazapine (REMERON) 30 MG tablet; Take 1 tablet (15 mg total) by mouth once daily.    Other chronic  postprocedural pain  -     gabapentin (NEURONTIN) 300 MG capsule; Take 1 capsule (300 mg total) by mouth 2 (two) times daily.    Weight loss  -     OLANZapine (ZYPREXA) 2.5 MG tablet; Take 1 tablet (2.5 mg total) by mouth every evening.  Dispense: 30 tablet; Refill: 11    Sprain of left foot, initial encounter  -     X-Ray Foot Complete Left; Future; Expected date: 09/14/2020    Carcinoma of antrum of stomach  -     mirtazapine (REMERON) 30 MG tablet; Take 1 tablet (30 mg total) by mouth every evening.  Dispense: 30 tablet; Refill: 5    Anxiety disorder, unspecified type  -     mirtazapine (REMERON) 30 MG tablet; Take 1 tablet (30 mg total) by mouth every evening.  Dispense: 30 tablet; Refill: 5  -     OLANZapine (ZYPREXA) 2.5 MG tablet; Take 1 tablet (2.5 mg total) by mouth every evening.  Dispense: 30 tablet; Refill: 11    Other chronic postprocedural pain  -     gabapentin (NEURONTIN) 300 MG capsule; Take 1 capsule (300 mg total) by mouth 2 (two) times daily.  Dispense: 60 capsule; Refill: 5    Essential hypertension    S/P subtotal gastrectomy  -     hyoscyamine (ANASPAZ,LEVSIN) 0.125 mg Tab; TAKE 2 TABLETS BY MOUTH EVERY 8 HOURS AS NEEDED FOR SPASMS  Dispense: 30 tablet; Refill: 5      RTC in 1 month

## 2020-09-15 ENCOUNTER — TELEPHONE (OUTPATIENT)
Dept: FAMILY MEDICINE | Facility: CLINIC | Age: 60
End: 2020-09-15

## 2020-09-15 NOTE — TELEPHONE ENCOUNTER
----- Message from Liane Velarde sent at 9/15/2020  9:59 AM CDT -----  Contact: self  Xiomy Duncan  MRN: [id number]  : 1960  PCP: Arnie Monson  Home Phone      [phone]  Work Phone      Not on file.  Mobile          [phone]      MESSAGE:   Pt requesting refill or new Rx.   Is this a refill or new RX:  refill  RX name and strength: ALPRAZolam (XANAX) 0.5 MG tablet  Last office visit: 2020  Is this a 30-day or 90-day RX:  30  [facility] [place]  Comments:      Phone:  [phone]

## 2020-09-15 NOTE — TELEPHONE ENCOUNTER
ALPRAZolam (XANAX) 0.5 MG tablet 60 tablet 2 8/14/2020 9/14/2020 No   Sig - Route: Take 1 tablet (0.5 mg total) by mouth 2 (two) times daily as needed for Anxiety. - Oral   Sent to pharmacy as: ALPRAZolam (XANAX) 0.5 MG tablet   Class: Normal   Order: [id number]   Date/Time Signed: 8/14/2020 06:45       E-Prescribing Status: Receipt confirmed by pharmacy (8/14/2020  6:45 AM CDT)   Pharmacy    [id number] - [place] - [address]      Pt should have refills

## 2020-09-24 DIAGNOSIS — C16.2 MALIGNANT NEOPLASM OF BODY OF STOMACH: ICD-10-CM

## 2020-09-24 DIAGNOSIS — C16.3 CARCINOMA OF ANTRUM OF STOMACH: ICD-10-CM

## 2020-09-24 NOTE — TELEPHONE ENCOUNTER
----- Message from Chente Copeland sent at 2020 10:40 AM CDT -----  Contact: Patient  Xiomy Duncan  MRN: [id number]  : 1960  PCP: Arnie Monson  Home Phone      [phone]  Work Phone      Not on file.  Mobile          [phone]      MESSAGE:   Pt requesting refill or new Rx.   Is this a refill or new RX:  refill  RX name and strength: Alprazolam 0.5 mg   Last office visit: 20  Is this a 30-day or 90-day RX:  30 day  [facility] [place]  Comments:      Phone:  [phone]    PCP: Ermias

## 2020-09-25 RX ORDER — ALPRAZOLAM 0.5 MG/1
0.5 TABLET ORAL 2 TIMES DAILY PRN
Qty: 60 TABLET | Refills: 2 | Status: SHIPPED | OUTPATIENT
Start: 2020-09-25 | End: 2020-12-14 | Stop reason: SDUPTHER

## 2020-10-07 DIAGNOSIS — C16.3 CARCINOMA OF ANTRUM OF STOMACH: ICD-10-CM

## 2020-10-08 RX ORDER — OXYCODONE AND ACETAMINOPHEN 7.5; 325 MG/1; MG/1
1 TABLET ORAL EVERY 12 HOURS PRN
Qty: 60 TABLET | Refills: 0 | Status: SHIPPED | OUTPATIENT
Start: 2020-10-08 | End: 2020-11-13 | Stop reason: SDUPTHER

## 2020-10-27 ENCOUNTER — TELEPHONE (OUTPATIENT)
Dept: FAMILY MEDICINE | Facility: CLINIC | Age: 60
End: 2020-10-27

## 2020-10-27 NOTE — TELEPHONE ENCOUNTER
----- Message from Vannesa Monzon sent at 10/27/2020 11:30 AM CDT -----  Regarding: Med refill  Contact: Self  Xiomy Duncan  MRN: [id number]  : 1960  PCP: Arnie Monson  Home Phone      [phone]  Work Phone      Not on file.  Mobile          [phone]      MESSAGE:   Pt requesting refill or new Rx.   Is this a refill or new RX:  refill  RX name and strength: ALPRAZolam (XANAX) 0.5 MG tablet ()  Last office visit: 2020  Is this a 30-day or 90-day RX:  30-Day  [facility] [place]  Comments:      Phone:  [phone]

## 2020-10-27 NOTE — TELEPHONE ENCOUNTER
RX name and strength: ALPRAZolam (XANAX) 0.5 MG tablet ()  Last office visit: 2020  Is this a 30-day or 90-day RX:  30-Day  Pharmacy name and location:  [facility]       ALPRAZolam (XANAX) 0.5 MG tablet 60 tablet 2 2020 10/25/2020 No   Sig - Route: Take 1 tablet (0.5 mg total) by mouth 2 (two) times daily as needed for Anxiety. - Oral   Sent to pharmacy as: ALPRAZolam (XANAX) 0.5 MG tablet   Class: Normal   Order: [id number]   Date/Time Signed: 2020 08:03       E-Prescribing Status: Receipt confirmed by pharmacy (2020  8:03 AM CDT)   [id number] [place] [id number]

## 2020-11-13 DIAGNOSIS — C16.3 CARCINOMA OF ANTRUM OF STOMACH: ICD-10-CM

## 2020-11-13 NOTE — TELEPHONE ENCOUNTER
----- Message from Sandra Larios sent at 2020 12:47 PM CST -----  Xiomy Duncan  MRN: [id number]  : 1960  PCP: Arnie Monson  Home Phone      [phone]  Work Phone      Not on file.  Mobile          [phone]      MESSAGE:   Pt requesting refill or new Rx.   Is this a refill or new RX:  refil  RX name and strength: oxyCODONE-acetaminophen (PERCOCET) 7.5-325 mg per tablet  Last office visit: 20  Is this a 30-day or 90-day RX:  30  [facility] [place]  Comments:      Phone:  [phone]

## 2020-11-13 NOTE — TELEPHONE ENCOUNTER
RX name and strength: oxyCODONE-acetaminophen (PERCOCET) 7.5-325 mg per tablet  Last office visit: 09/14/20  Is this a 30-day or 90-day RX:  30  [facility] [place]      Percocet last printed on 10/8/2020

## 2020-11-15 RX ORDER — OXYCODONE AND ACETAMINOPHEN 7.5; 325 MG/1; MG/1
1 TABLET ORAL EVERY 12 HOURS PRN
Qty: 60 TABLET | Refills: 0 | Status: SHIPPED | OUTPATIENT
Start: 2020-11-15 | End: 2020-12-14 | Stop reason: SDUPTHER

## 2020-12-14 ENCOUNTER — OFFICE VISIT (OUTPATIENT)
Dept: FAMILY MEDICINE | Facility: CLINIC | Age: 60
End: 2020-12-14
Payer: MEDICAID

## 2020-12-14 VITALS
DIASTOLIC BLOOD PRESSURE: 60 MMHG | RESPIRATION RATE: 18 BRPM | WEIGHT: 98.75 LBS | TEMPERATURE: 97 F | SYSTOLIC BLOOD PRESSURE: 102 MMHG | BODY MASS INDEX: 17.5 KG/M2 | HEART RATE: 62 BPM | HEIGHT: 63 IN

## 2020-12-14 DIAGNOSIS — F41.9 ANXIETY DISORDER, UNSPECIFIED TYPE: ICD-10-CM

## 2020-12-14 DIAGNOSIS — C16.2 MALIGNANT NEOPLASM OF BODY OF STOMACH: ICD-10-CM

## 2020-12-14 DIAGNOSIS — C16.3 CARCINOMA OF ANTRUM OF STOMACH: ICD-10-CM

## 2020-12-14 DIAGNOSIS — R63.4 WEIGHT LOSS: ICD-10-CM

## 2020-12-14 PROCEDURE — 99215 OFFICE O/P EST HI 40 MIN: CPT | Mod: PBBFAC | Performed by: FAMILY MEDICINE

## 2020-12-14 PROCEDURE — 99999 PR PBB SHADOW E&M-EST. PATIENT-LVL V: ICD-10-PCS | Mod: PBBFAC,,, | Performed by: FAMILY MEDICINE

## 2020-12-14 PROCEDURE — 99999 PR PBB SHADOW E&M-EST. PATIENT-LVL V: CPT | Mod: PBBFAC,,, | Performed by: FAMILY MEDICINE

## 2020-12-14 PROCEDURE — 99214 OFFICE O/P EST MOD 30 MIN: CPT | Mod: S$PBB,,, | Performed by: FAMILY MEDICINE

## 2020-12-14 PROCEDURE — 99214 PR OFFICE/OUTPT VISIT, EST, LEVL IV, 30-39 MIN: ICD-10-PCS | Mod: S$PBB,,, | Performed by: FAMILY MEDICINE

## 2020-12-14 RX ORDER — METOPROLOL SUCCINATE 25 MG/1
TABLET, EXTENDED RELEASE ORAL
COMMUNITY
Start: 2020-11-30 | End: 2021-06-30

## 2020-12-14 RX ORDER — OLANZAPINE 5 MG/1
5 TABLET ORAL NIGHTLY
Qty: 30 TABLET | Refills: 1 | Status: SHIPPED | OUTPATIENT
Start: 2020-12-14 | End: 2021-01-13 | Stop reason: SDUPTHER

## 2020-12-14 RX ORDER — SPIRONOLACTONE 25 MG/1
TABLET ORAL
COMMUNITY
Start: 2020-11-30 | End: 2021-06-30 | Stop reason: SDUPTHER

## 2020-12-14 RX ORDER — SACUBITRIL AND VALSARTAN 49; 51 MG/1; MG/1
TABLET, FILM COATED ORAL
COMMUNITY
Start: 2020-11-30 | End: 2022-01-24

## 2020-12-14 RX ORDER — OXYCODONE AND ACETAMINOPHEN 7.5; 325 MG/1; MG/1
1 TABLET ORAL EVERY 12 HOURS PRN
Qty: 60 TABLET | Refills: 0 | Status: SHIPPED | OUTPATIENT
Start: 2020-12-14 | End: 2021-01-12 | Stop reason: SDUPTHER

## 2020-12-14 RX ORDER — ALPRAZOLAM 0.5 MG/1
0.5 TABLET ORAL 2 TIMES DAILY PRN
Qty: 60 TABLET | Refills: 2 | Status: SHIPPED | OUTPATIENT
Start: 2020-12-14 | End: 2021-02-10 | Stop reason: ALTCHOICE

## 2020-12-14 RX ORDER — OMEPRAZOLE 40 MG/1
40 CAPSULE, DELAYED RELEASE ORAL DAILY
COMMUNITY
Start: 2020-10-28 | End: 2021-06-30

## 2020-12-14 RX ORDER — ONDANSETRON 8 MG/1
8 TABLET, ORALLY DISINTEGRATING ORAL EVERY 6 HOURS PRN
COMMUNITY
Start: 2020-11-19 | End: 2021-02-10 | Stop reason: SDUPTHER

## 2020-12-14 NOTE — PROGRESS NOTES
Subjective:       Patient ID: Xiomy Duncan is a 60 y.o. female.    Chief Complaint: Follow-up    60 year old female with gastric carcinoma who finished taking chemo and radiation with Dr. Eli and Dr. Young comes in with c/o continued pain, poor appetite, weight loss. She says that she has tried a couple of different medications for the pain.  She continues to loose weight.  Lost a lot of weight.  Can only eat small amounts of food.  Ensure gives her diarrhea.  She is taking Protonix and Reglan as needed.  She is taking 800 mg of Megace today and her weight seem to be more stable.  She [facility] to [facility] disorder.   She is currently taking Remeron 15 mg at night.  I started her on Zyprexa at last visit but not too helpful with weight gain.    Follow-up  Pertinent negatives include no abdominal pain, chest pain, chills, coughing, fatigue, fever, joint swelling, numbness, rash or sore throat.     Review of Systems   Constitutional: Negative for activity change, chills, fatigue, fever and unexpected weight change.   HENT: Negative for sore throat and trouble swallowing.    Respiratory: Positive for shortness of breath. Negative for cough and chest tightness.    Cardiovascular: Negative for chest pain and leg swelling.   Gastrointestinal: Negative for abdominal pain.   Endocrine: Negative for cold intolerance and heat intolerance.   Genitourinary: Negative for difficulty urinating.   Musculoskeletal: Negative for back pain and joint swelling.   Skin: Negative for rash.   Neurological: Negative for numbness.   Hematological: Negative for adenopathy.   Psychiatric/Behavioral: Negative for decreased concentration.       Objective:      Vitals:    12/14/20 1521   BP: 102/60   Pulse: 62   Resp: 18   Temp: 97 °F (36.1 °C)     Physical Exam  Constitutional:       Appearance: She is well-developed.   HENT:      Head: Normocephalic and atraumatic.      Right Ear: Tympanic membrane and ear canal normal.       Left Ear: Tympanic membrane and ear canal normal.   Eyes:      Pupils: Pupils are equal, round, and reactive to light.   Neck:      Musculoskeletal: Normal range of motion and neck supple.      Thyroid: No thyromegaly.      Vascular: No JVD.   Cardiovascular:      Rate and Rhythm: Normal rate and regular rhythm.      Heart sounds: No murmur. No friction rub. Gallop present.    Pulmonary:      Effort: Pulmonary effort is normal.      Breath sounds: Normal breath sounds.   Abdominal:      General: Bowel sounds are normal.      Palpations: Abdomen is soft.   Skin:     General: Skin is warm.   Neurological:      Mental Status: She is alert and oriented to person, place, and time.      Cranial Nerves: No cranial nerve deficit.   Psychiatric:         Behavior: Behavior normal.         Thought Content: Thought content normal.         Judgment: Judgment normal.           BMP  Lab Results   Component Value Date     02/21/2020    K 3.2 (L) 02/21/2020     02/21/2020    CO2 25 02/21/2020    BUN 16 02/21/2020    CREATININE 0.8 02/21/2020    CALCIUM 8.1 (L) 02/21/2020    ANIONGAP 10 02/21/2020    ESTGFRAFRICA >60 02/21/2020    EGFRNONAA >60 02/21/2020     Lab Results   Component Value Date    WBC 12.63 02/20/2020    RBC 3.78 (L) 02/20/2020    HGB 11.7 (L) 02/20/2020    HCT 35.5 (L) 02/20/2020    MCV 94 02/20/2020    MCH 31.0 02/20/2020    MCHC 33.0 02/20/2020    RDW 15.8 (H) 02/20/2020     02/20/2020    MPV 12.2 02/20/2020    GRAN 10.4 (H) 02/20/2020    GRAN 82.1 (H) 02/20/2020    LYMPH 0.8 (L) 02/20/2020    LYMPH 6.5 (L) 02/20/2020    MONO 1.4 (H) 02/20/2020    MONO 10.9 02/20/2020    EOS 0.0 02/20/2020    BASO 0.01 02/20/2020    EOSINOPHIL 0.0 02/20/2020    BASOPHIL 0.1 02/20/2020       Assessment:       1. Carcinoma of antrum of stomach    2. Malignant neoplasm of body of stomach    3. Anxiety disorder, unspecified type    4. Weight loss        Plan:   Xiomy was seen today for  follow-up.    Diagnoses and all orders for this visit:    Cardiomyopathy secondary to drug  -     continue Eliquis  Keep appointment with cardiology    Malignant neoplasm of body of stomach  -     diphenoxylate-atropine 2.5-0.025 mg (LOMOTIL) 2.5-0.025 mg per tablet; 1 po bid prn diarrhea  -     ALPRAZolam (XANAX) 0.5 MG tablet; Take 1 tablet (0.5 mg total) by mouth 2 (two) times daily as needed for Anxiety.    Carcinoma of antrum of stomach  -     oxyCODONE-acetaminophen (PERCOCET) 7.5-325 mg per tablet; Take 1 tablet by mouth every 12 (twelve) hours as needed for Pain. GREATER THAN 7 DAY QUANTITY MEDICALLY NECESSARY  -     diphenoxylate-atropine 2.5-0.025 mg (LOMOTIL) 2.5-0.025 mg per tablet; 1 po bid prn diarrhea  -     ALPRAZolam (XANAX) 0.5 MG tablet; Take 1 tablet (0.5 mg total) by mouth 2 (two) times daily as needed for Anxiety.  -     mirtazapine (REMERON) 30 MG tablet; Take 1 tablet (30 mg total) by mouth once daily.    Chemotherapy-induced nausea  -     prochlorperazine (COMPAZINE) 10 MG tablet; Take 1 tablet (10 mg total) by mouth 3 (three) times daily as needed.    Pleural effusion  Resolved    Gastroesophageal reflux disease, esophagitis presence not specified  -     pantoprazole (PROTONIX) 40 MG tablet; Take 1 tablet (40 mg total) by mouth 2 (two) times daily.    Anxiety disorder  Continue mirtazapine (REMERON) 30 MG tablet; Take 1 tablet (15 mg total) by mouth once daily.  Stop Effexor  Increase Zyprexa to 5 mg po daily    Other chronic postprocedural pain  -     gabapentin (NEURONTIN) 300 MG capsule; Take 1 capsule (300 mg total) by mouth 2 (two) times daily.    Weight loss  -     OLANZapine (ZYPREXA) 5 MG tablet; Take 1 tablet (5 mg total) by mouth every evening.  Dispense: 30 tablet; Refill: 1  [facility]

## 2021-01-12 DIAGNOSIS — C16.3 CARCINOMA OF ANTRUM OF STOMACH: ICD-10-CM

## 2021-01-12 RX ORDER — OXYCODONE AND ACETAMINOPHEN 7.5; 325 MG/1; MG/1
1 TABLET ORAL EVERY 12 HOURS PRN
Qty: 60 TABLET | Refills: 0 | Status: SHIPPED | OUTPATIENT
Start: 2021-01-12 | End: 2021-02-10 | Stop reason: SDUPTHER

## 2021-01-12 NOTE — TELEPHONE ENCOUNTER
RX name and strength: Oxycodone 7.5-325 mg  Last office visit: 12/14/20  Is this a 30-day or 90-day RX:  30 day  [facility] last printed on 12/14/2020

## 2021-01-12 NOTE — TELEPHONE ENCOUNTER
----- Message from Chente Copeland sent at 2021 12:15 PM CST -----  Contact: Patient  Xiomy Duncan  MRN: [id number]  : 1960  PCP: Arnie Monson  Home Phone      [phone]  Work Phone      Not on file.  Mobile          [phone]      MESSAGE:   Pt requesting refill or new Rx.   Is this a refill or new RX:  refill  RX name and strength: Oxycodone 7.5-325 mg  Last office visit: 20  Is this a 30-day or 90-day RX:  30 day  [facility] [place]  Comments:      Phone:  [phone]    PCP: Ermias

## 2021-01-13 ENCOUNTER — OFFICE VISIT (OUTPATIENT)
Dept: INTERNAL MEDICINE | Facility: CLINIC | Age: 61
End: 2021-01-13
Payer: MEDICAID

## 2021-01-13 VITALS
HEIGHT: 63 IN | TEMPERATURE: 98 F | BODY MASS INDEX: 17.62 KG/M2 | HEART RATE: 68 BPM | WEIGHT: 99.44 LBS | DIASTOLIC BLOOD PRESSURE: 62 MMHG | SYSTOLIC BLOOD PRESSURE: 116 MMHG | RESPIRATION RATE: 16 BRPM

## 2021-01-13 DIAGNOSIS — R63.4 WEIGHT LOSS: ICD-10-CM

## 2021-01-13 DIAGNOSIS — I42.8 NON-ISCHEMIC CARDIOMYOPATHY: ICD-10-CM

## 2021-01-13 DIAGNOSIS — F41.9 ANXIETY DISORDER, UNSPECIFIED TYPE: Primary | ICD-10-CM

## 2021-01-13 PROCEDURE — 99213 OFFICE O/P EST LOW 20 MIN: CPT | Mod: PBBFAC,PN | Performed by: FAMILY MEDICINE

## 2021-01-13 PROCEDURE — 99999 PR PBB SHADOW E&M-EST. PATIENT-LVL III: ICD-10-PCS | Mod: PBBFAC,,, | Performed by: FAMILY MEDICINE

## 2021-01-13 PROCEDURE — 99213 PR OFFICE/OUTPT VISIT, EST, LEVL III, 20-29 MIN: ICD-10-PCS | Mod: S$PBB,,, | Performed by: FAMILY MEDICINE

## 2021-01-13 PROCEDURE — 99999 PR PBB SHADOW E&M-EST. PATIENT-LVL III: CPT | Mod: PBBFAC,,, | Performed by: FAMILY MEDICINE

## 2021-01-13 PROCEDURE — 99213 OFFICE O/P EST LOW 20 MIN: CPT | Mod: S$PBB,,, | Performed by: FAMILY MEDICINE

## 2021-01-13 RX ORDER — ALBUTEROL SULFATE 90 UG/1
AEROSOL, METERED RESPIRATORY (INHALATION)
COMMUNITY
Start: 2020-12-15 | End: 2021-07-01 | Stop reason: SDUPTHER

## 2021-01-13 RX ORDER — VENLAFAXINE HYDROCHLORIDE 75 MG/1
CAPSULE, EXTENDED RELEASE ORAL
COMMUNITY
Start: 2021-01-09 | End: 2021-01-13

## 2021-01-13 RX ORDER — OLANZAPINE 7.5 MG/1
7.5 TABLET ORAL NIGHTLY
Qty: 30 TABLET | Refills: 1 | Status: SHIPPED | OUTPATIENT
Start: 2021-01-13 | End: 2021-02-07

## 2021-02-06 DIAGNOSIS — R63.4 WEIGHT LOSS: ICD-10-CM

## 2021-02-06 DIAGNOSIS — F41.9 ANXIETY DISORDER, UNSPECIFIED TYPE: ICD-10-CM

## 2021-02-07 RX ORDER — OLANZAPINE 7.5 MG/1
7.5 TABLET ORAL NIGHTLY
Qty: 30 TABLET | Refills: 1 | Status: SHIPPED | OUTPATIENT
Start: 2021-02-07 | End: 2021-02-10 | Stop reason: SDUPTHER

## 2021-02-07 RX ORDER — OLANZAPINE 5 MG/1
TABLET ORAL
Qty: 30 TABLET | Refills: 1 | Status: SHIPPED | OUTPATIENT
Start: 2021-02-07 | End: 2021-02-07

## 2021-02-10 ENCOUNTER — OFFICE VISIT (OUTPATIENT)
Dept: INTERNAL MEDICINE | Facility: CLINIC | Age: 61
End: 2021-02-10
Payer: MEDICAID

## 2021-02-10 VITALS
SYSTOLIC BLOOD PRESSURE: 110 MMHG | HEIGHT: 62 IN | DIASTOLIC BLOOD PRESSURE: 74 MMHG | RESPIRATION RATE: 18 BRPM | HEART RATE: 60 BPM | TEMPERATURE: 98 F | OXYGEN SATURATION: 96 % | BODY MASS INDEX: 18.46 KG/M2 | WEIGHT: 100.31 LBS

## 2021-02-10 DIAGNOSIS — C16.2 MALIGNANT NEOPLASM OF BODY OF STOMACH: ICD-10-CM

## 2021-02-10 DIAGNOSIS — F41.9 ANXIETY DISORDER, UNSPECIFIED TYPE: ICD-10-CM

## 2021-02-10 DIAGNOSIS — R63.4 WEIGHT LOSS: ICD-10-CM

## 2021-02-10 DIAGNOSIS — C16.3 CARCINOMA OF ANTRUM OF STOMACH: ICD-10-CM

## 2021-02-10 DIAGNOSIS — G89.28 OTHER CHRONIC POSTPROCEDURAL PAIN: ICD-10-CM

## 2021-02-10 PROCEDURE — 99999 PR PBB SHADOW E&M-EST. PATIENT-LVL IV: ICD-10-PCS | Mod: PBBFAC,,, | Performed by: FAMILY MEDICINE

## 2021-02-10 PROCEDURE — 99213 PR OFFICE/OUTPT VISIT, EST, LEVL III, 20-29 MIN: ICD-10-PCS | Mod: S$PBB,,, | Performed by: FAMILY MEDICINE

## 2021-02-10 PROCEDURE — 99213 OFFICE O/P EST LOW 20 MIN: CPT | Mod: S$PBB,,, | Performed by: FAMILY MEDICINE

## 2021-02-10 PROCEDURE — 99999 PR PBB SHADOW E&M-EST. PATIENT-LVL IV: CPT | Mod: PBBFAC,,, | Performed by: FAMILY MEDICINE

## 2021-02-10 PROCEDURE — 99214 OFFICE O/P EST MOD 30 MIN: CPT | Mod: PBBFAC,PN | Performed by: FAMILY MEDICINE

## 2021-02-10 RX ORDER — MIRTAZAPINE 30 MG/1
30 TABLET, FILM COATED ORAL NIGHTLY
Qty: 30 TABLET | Refills: 5 | Status: SHIPPED | OUTPATIENT
Start: 2021-02-10 | End: 2021-06-30 | Stop reason: SDUPTHER

## 2021-02-10 RX ORDER — OXYCODONE AND ACETAMINOPHEN 7.5; 325 MG/1; MG/1
1 TABLET ORAL EVERY 12 HOURS PRN
Qty: 60 TABLET | Refills: 0 | Status: SHIPPED | OUTPATIENT
Start: 2021-02-10 | End: 2021-03-10 | Stop reason: SDUPTHER

## 2021-02-10 RX ORDER — OLANZAPINE 10 MG/1
10 TABLET ORAL NIGHTLY
Qty: 30 TABLET | Refills: 1 | Status: SHIPPED | OUTPATIENT
Start: 2021-02-10 | End: 2021-03-10 | Stop reason: SDUPTHER

## 2021-02-10 RX ORDER — ALPRAZOLAM 0.5 MG/1
0.5 TABLET ORAL 2 TIMES DAILY PRN
Qty: 60 TABLET | Refills: 2 | OUTPATIENT
Start: 2021-02-10 | End: 2021-03-12

## 2021-02-10 RX ORDER — ALPRAZOLAM 0.5 MG/1
0.5 TABLET ORAL 2 TIMES DAILY PRN
Qty: 60 TABLET | Refills: 2 | Status: CANCELLED | OUTPATIENT
Start: 2021-02-10 | End: 2021-03-12

## 2021-02-10 RX ORDER — GABAPENTIN 300 MG/1
300 CAPSULE ORAL 2 TIMES DAILY
Qty: 60 CAPSULE | Refills: 5 | Status: SHIPPED | OUTPATIENT
Start: 2021-02-10 | End: 2021-04-07 | Stop reason: SDUPTHER

## 2021-02-10 NOTE — TELEPHONE ENCOUNTER
----- Message from Erni Bustamante sent at 2/10/2021  1:51 PM CST -----  Regarding: request medication  Patient is switching from [facility] to [facility] and we need rxs sent over for alprazolam and sertraline.

## 2021-02-15 DIAGNOSIS — C16.3 CARCINOMA OF ANTRUM OF STOMACH: ICD-10-CM

## 2021-02-15 DIAGNOSIS — C16.2 MALIGNANT NEOPLASM OF BODY OF STOMACH: ICD-10-CM

## 2021-02-15 RX ORDER — ALPRAZOLAM 0.25 MG/1
0.25 TABLET ORAL 2 TIMES DAILY PRN
Qty: 60 TABLET | Refills: 2 | Status: CANCELLED | OUTPATIENT
Start: 2021-02-15 | End: 2021-03-17

## 2021-03-10 DIAGNOSIS — F41.9 ANXIETY DISORDER, UNSPECIFIED TYPE: ICD-10-CM

## 2021-03-10 DIAGNOSIS — R63.4 WEIGHT LOSS: ICD-10-CM

## 2021-03-10 DIAGNOSIS — C16.3 CARCINOMA OF ANTRUM OF STOMACH: ICD-10-CM

## 2021-03-10 RX ORDER — OLANZAPINE 10 MG/1
10 TABLET ORAL NIGHTLY
Qty: 30 TABLET | Refills: 1 | Status: SHIPPED | OUTPATIENT
Start: 2021-03-10 | End: 2021-05-24 | Stop reason: SDUPTHER

## 2021-03-10 RX ORDER — OXYCODONE AND ACETAMINOPHEN 7.5; 325 MG/1; MG/1
1 TABLET ORAL EVERY 12 HOURS PRN
Qty: 60 TABLET | Refills: 0 | Status: SHIPPED | OUTPATIENT
Start: 2021-03-10 | End: 2021-04-07 | Stop reason: SDUPTHER

## 2021-04-07 ENCOUNTER — OFFICE VISIT (OUTPATIENT)
Dept: INTERNAL MEDICINE | Facility: CLINIC | Age: 61
End: 2021-04-07
Payer: MEDICAID

## 2021-04-07 VITALS
HEART RATE: 78 BPM | HEIGHT: 62 IN | WEIGHT: 100.75 LBS | SYSTOLIC BLOOD PRESSURE: 122 MMHG | RESPIRATION RATE: 16 BRPM | BODY MASS INDEX: 18.54 KG/M2 | DIASTOLIC BLOOD PRESSURE: 70 MMHG

## 2021-04-07 DIAGNOSIS — F41.9 ANXIETY DISORDER, UNSPECIFIED TYPE: ICD-10-CM

## 2021-04-07 DIAGNOSIS — C16.3 CARCINOMA OF ANTRUM OF STOMACH: ICD-10-CM

## 2021-04-07 DIAGNOSIS — G89.28 OTHER CHRONIC POSTPROCEDURAL PAIN: ICD-10-CM

## 2021-04-07 DIAGNOSIS — R63.4 WEIGHT LOSS: Primary | ICD-10-CM

## 2021-04-07 PROCEDURE — 99213 OFFICE O/P EST LOW 20 MIN: CPT | Mod: S$PBB,,, | Performed by: FAMILY MEDICINE

## 2021-04-07 PROCEDURE — 99999 PR PBB SHADOW E&M-EST. PATIENT-LVL III: CPT | Mod: PBBFAC,,, | Performed by: FAMILY MEDICINE

## 2021-04-07 PROCEDURE — 99213 OFFICE O/P EST LOW 20 MIN: CPT | Mod: PBBFAC,PN | Performed by: FAMILY MEDICINE

## 2021-04-07 PROCEDURE — 99213 PR OFFICE/OUTPT VISIT, EST, LEVL III, 20-29 MIN: ICD-10-PCS | Mod: S$PBB,,, | Performed by: FAMILY MEDICINE

## 2021-04-07 PROCEDURE — 99999 PR PBB SHADOW E&M-EST. PATIENT-LVL III: ICD-10-PCS | Mod: PBBFAC,,, | Performed by: FAMILY MEDICINE

## 2021-04-07 RX ORDER — GABAPENTIN 300 MG/1
300 CAPSULE ORAL 2 TIMES DAILY
Qty: 60 CAPSULE | Refills: 5 | Status: SHIPPED | OUTPATIENT
Start: 2021-04-07 | End: 2021-06-30 | Stop reason: SDUPTHER

## 2021-04-07 RX ORDER — OXYCODONE AND ACETAMINOPHEN 7.5; 325 MG/1; MG/1
1 TABLET ORAL EVERY 12 HOURS PRN
Qty: 60 TABLET | Refills: 0 | Status: SHIPPED | OUTPATIENT
Start: 2021-04-07 | End: 2021-05-13 | Stop reason: SDUPTHER

## 2021-04-09 ENCOUNTER — IMMUNIZATION (OUTPATIENT)
Dept: PHARMACY | Facility: CLINIC | Age: 61
End: 2021-04-09
Payer: MEDICAID

## 2021-04-09 DIAGNOSIS — Z23 NEED FOR VACCINATION: Primary | ICD-10-CM

## 2021-05-07 ENCOUNTER — IMMUNIZATION (OUTPATIENT)
Dept: PHARMACY | Facility: CLINIC | Age: 61
End: 2021-05-07
Payer: MEDICAID

## 2021-05-07 DIAGNOSIS — Z23 NEED FOR VACCINATION: Primary | ICD-10-CM

## 2021-05-24 DIAGNOSIS — R63.4 WEIGHT LOSS: ICD-10-CM

## 2021-05-24 DIAGNOSIS — F41.9 ANXIETY DISORDER, UNSPECIFIED TYPE: ICD-10-CM

## 2021-05-24 RX ORDER — OLANZAPINE 10 MG/1
10 TABLET ORAL NIGHTLY
Qty: 30 TABLET | Refills: 1 | Status: SHIPPED | OUTPATIENT
Start: 2021-05-24 | End: 2021-06-30 | Stop reason: SDUPTHER

## 2021-06-29 NOTE — TELEPHONE ENCOUNTER
Pharmacist called to discuss interaction between Reglan and Effexor. Per Eveline, can fill and inform patient of interaction and possible side effects. Pt instructed on side effects to look for such as muscle twitching, facial twitches. Pt voiced understanding and will discontinue medication if notices any symptoms.    0

## 2021-06-30 ENCOUNTER — OFFICE VISIT (OUTPATIENT)
Dept: INTERNAL MEDICINE | Facility: CLINIC | Age: 61
End: 2021-06-30
Payer: MEDICAID

## 2021-06-30 VITALS
OXYGEN SATURATION: 98 % | BODY MASS INDEX: 19.55 KG/M2 | SYSTOLIC BLOOD PRESSURE: 106 MMHG | RESPIRATION RATE: 18 BRPM | HEART RATE: 70 BPM | HEIGHT: 62 IN | DIASTOLIC BLOOD PRESSURE: 70 MMHG | WEIGHT: 106.25 LBS

## 2021-06-30 DIAGNOSIS — C16.3 CARCINOMA OF ANTRUM OF STOMACH: ICD-10-CM

## 2021-06-30 DIAGNOSIS — K21.00 GASTROESOPHAGEAL REFLUX DISEASE WITH ESOPHAGITIS WITHOUT HEMORRHAGE: ICD-10-CM

## 2021-06-30 DIAGNOSIS — R11.0 CHEMOTHERAPY-INDUCED NAUSEA: ICD-10-CM

## 2021-06-30 DIAGNOSIS — G89.28 OTHER CHRONIC POSTPROCEDURAL PAIN: ICD-10-CM

## 2021-06-30 DIAGNOSIS — F41.9 ANXIETY DISORDER, UNSPECIFIED TYPE: ICD-10-CM

## 2021-06-30 DIAGNOSIS — R63.4 WEIGHT LOSS: ICD-10-CM

## 2021-06-30 DIAGNOSIS — T45.1X5A CHEMOTHERAPY-INDUCED NAUSEA: ICD-10-CM

## 2021-06-30 PROCEDURE — 99999 PR PBB SHADOW E&M-EST. PATIENT-LVL IV: CPT | Mod: PBBFAC,,, | Performed by: FAMILY MEDICINE

## 2021-06-30 PROCEDURE — 99214 OFFICE O/P EST MOD 30 MIN: CPT | Mod: PBBFAC,PN | Performed by: FAMILY MEDICINE

## 2021-06-30 PROCEDURE — 99999 PR PBB SHADOW E&M-EST. PATIENT-LVL IV: ICD-10-PCS | Mod: PBBFAC,,, | Performed by: FAMILY MEDICINE

## 2021-06-30 PROCEDURE — 99214 OFFICE O/P EST MOD 30 MIN: CPT | Mod: S$PBB,,, | Performed by: FAMILY MEDICINE

## 2021-06-30 PROCEDURE — 99214 PR OFFICE/OUTPT VISIT, EST, LEVL IV, 30-39 MIN: ICD-10-PCS | Mod: S$PBB,,, | Performed by: FAMILY MEDICINE

## 2021-06-30 RX ORDER — PROCHLORPERAZINE MALEATE 10 MG
10 TABLET ORAL 3 TIMES DAILY PRN
Qty: 60 TABLET | Refills: 5 | Status: SHIPPED | OUTPATIENT
Start: 2021-06-30 | End: 2021-10-20 | Stop reason: SDUPTHER

## 2021-06-30 RX ORDER — MIRTAZAPINE 30 MG/1
30 TABLET, FILM COATED ORAL NIGHTLY
Qty: 30 TABLET | Refills: 5 | Status: SHIPPED | OUTPATIENT
Start: 2021-06-30 | End: 2021-10-20 | Stop reason: SDUPTHER

## 2021-06-30 RX ORDER — MEGESTROL ACETATE 40 MG/ML
SUSPENSION ORAL
Qty: 600 ML | Refills: 5 | Status: SHIPPED | OUTPATIENT
Start: 2021-06-30 | End: 2021-10-20 | Stop reason: SDUPTHER

## 2021-06-30 RX ORDER — OLANZAPINE 10 MG/1
10 TABLET ORAL NIGHTLY
Qty: 30 TABLET | Refills: 5 | Status: SHIPPED | OUTPATIENT
Start: 2021-06-30 | End: 2021-10-20 | Stop reason: SDUPTHER

## 2021-06-30 RX ORDER — GABAPENTIN 300 MG/1
300 CAPSULE ORAL 2 TIMES DAILY
Qty: 60 CAPSULE | Refills: 5 | Status: SHIPPED | OUTPATIENT
Start: 2021-06-30 | End: 2021-10-20 | Stop reason: SDUPTHER

## 2021-06-30 RX ORDER — PANTOPRAZOLE SODIUM 40 MG/1
40 TABLET, DELAYED RELEASE ORAL DAILY
Qty: 90 TABLET | Refills: 5 | Status: SHIPPED | OUTPATIENT
Start: 2021-06-30 | End: 2021-10-20 | Stop reason: SDUPTHER

## 2021-08-04 DIAGNOSIS — Z12.31 OTHER SCREENING MAMMOGRAM: ICD-10-CM

## 2021-08-20 ENCOUNTER — TELEPHONE (OUTPATIENT)
Dept: INTERNAL MEDICINE | Facility: CLINIC | Age: 61
End: 2021-08-20

## 2021-09-17 DIAGNOSIS — C16.3 CARCINOMA OF ANTRUM OF STOMACH: ICD-10-CM

## 2021-09-17 RX ORDER — OXYCODONE AND ACETAMINOPHEN 7.5; 325 MG/1; MG/1
1 TABLET ORAL EVERY 12 HOURS PRN
Qty: 60 TABLET | Refills: 0 | Status: CANCELLED | OUTPATIENT
Start: 2021-09-17

## 2021-10-19 ENCOUNTER — TELEPHONE (OUTPATIENT)
Dept: OBSTETRICS AND GYNECOLOGY | Facility: CLINIC | Age: 61
End: 2021-10-19

## 2021-10-19 DIAGNOSIS — C16.3 CARCINOMA OF ANTRUM OF STOMACH: ICD-10-CM

## 2021-10-19 RX ORDER — OXYCODONE AND ACETAMINOPHEN 7.5; 325 MG/1; MG/1
1 TABLET ORAL EVERY 12 HOURS PRN
Qty: 60 TABLET | Refills: 0 | Status: CANCELLED | OUTPATIENT
Start: 2021-10-19

## 2021-10-20 ENCOUNTER — OFFICE VISIT (OUTPATIENT)
Dept: FAMILY MEDICINE | Facility: CLINIC | Age: 61
End: 2021-10-20
Payer: MEDICARE

## 2021-10-20 VITALS
SYSTOLIC BLOOD PRESSURE: 108 MMHG | BODY MASS INDEX: 18.84 KG/M2 | RESPIRATION RATE: 16 BRPM | HEART RATE: 100 BPM | HEIGHT: 62 IN | WEIGHT: 102.38 LBS | DIASTOLIC BLOOD PRESSURE: 60 MMHG

## 2021-10-20 DIAGNOSIS — C16.2 MALIGNANT NEOPLASM OF BODY OF STOMACH: ICD-10-CM

## 2021-10-20 DIAGNOSIS — Z90.3 S/P SUBTOTAL GASTRECTOMY: ICD-10-CM

## 2021-10-20 DIAGNOSIS — I42.7 CARDIOMYOPATHY SECONDARY TO DRUG: ICD-10-CM

## 2021-10-20 DIAGNOSIS — K21.00 GASTROESOPHAGEAL REFLUX DISEASE WITH ESOPHAGITIS WITHOUT HEMORRHAGE: ICD-10-CM

## 2021-10-20 DIAGNOSIS — C16.3 CARCINOMA OF ANTRUM OF STOMACH: ICD-10-CM

## 2021-10-20 DIAGNOSIS — I10 ESSENTIAL HYPERTENSION: Primary | ICD-10-CM

## 2021-10-20 DIAGNOSIS — R63.4 WEIGHT LOSS: ICD-10-CM

## 2021-10-20 DIAGNOSIS — J41.0 SIMPLE CHRONIC BRONCHITIS: ICD-10-CM

## 2021-10-20 DIAGNOSIS — G89.28 OTHER CHRONIC POSTPROCEDURAL PAIN: ICD-10-CM

## 2021-10-20 DIAGNOSIS — R11.0 CHEMOTHERAPY-INDUCED NAUSEA: ICD-10-CM

## 2021-10-20 DIAGNOSIS — Z23 IMMUNIZATION DUE: ICD-10-CM

## 2021-10-20 DIAGNOSIS — T45.1X5A CHEMOTHERAPY-INDUCED NAUSEA: ICD-10-CM

## 2021-10-20 DIAGNOSIS — F41.9 ANXIETY DISORDER, UNSPECIFIED TYPE: ICD-10-CM

## 2021-10-20 PROCEDURE — 90472 IMMUNIZATION ADMIN EACH ADD: CPT | Mod: PBBFAC

## 2021-10-20 PROCEDURE — 99999 PR PBB SHADOW E&M-EST. PATIENT-LVL III: ICD-10-PCS | Mod: PBBFAC,,, | Performed by: FAMILY MEDICINE

## 2021-10-20 PROCEDURE — 90471 IMMUNIZATION ADMIN: CPT | Mod: PBBFAC

## 2021-10-20 PROCEDURE — 99214 OFFICE O/P EST MOD 30 MIN: CPT | Mod: S$PBB,,, | Performed by: FAMILY MEDICINE

## 2021-10-20 PROCEDURE — 99999 PR PBB SHADOW E&M-EST. PATIENT-LVL III: CPT | Mod: PBBFAC,,, | Performed by: FAMILY MEDICINE

## 2021-10-20 PROCEDURE — 99213 OFFICE O/P EST LOW 20 MIN: CPT | Mod: PBBFAC | Performed by: FAMILY MEDICINE

## 2021-10-20 PROCEDURE — 99214 PR OFFICE/OUTPT VISIT, EST, LEVL IV, 30-39 MIN: ICD-10-PCS | Mod: S$PBB,,, | Performed by: FAMILY MEDICINE

## 2021-10-20 RX ORDER — MIRTAZAPINE 30 MG/1
30 TABLET, FILM COATED ORAL NIGHTLY
Qty: 30 TABLET | Refills: 5 | Status: SHIPPED | OUTPATIENT
Start: 2021-10-20 | End: 2022-01-01 | Stop reason: SDUPTHER

## 2021-10-20 RX ORDER — DIPHENOXYLATE HYDROCHLORIDE AND ATROPINE SULFATE 2.5; .025 MG/1; MG/1
TABLET ORAL
Qty: 60 TABLET | Refills: 5 | Status: CANCELLED | OUTPATIENT
Start: 2021-10-20

## 2021-10-20 RX ORDER — ALBUTEROL SULFATE 90 UG/1
AEROSOL, METERED RESPIRATORY (INHALATION)
Qty: 8.5 G | Refills: 4 | Status: SHIPPED | OUTPATIENT
Start: 2021-10-20 | End: 2022-01-01 | Stop reason: SDUPTHER

## 2021-10-20 RX ORDER — GABAPENTIN 300 MG/1
300 CAPSULE ORAL 2 TIMES DAILY
Qty: 60 CAPSULE | Refills: 5 | Status: SHIPPED | OUTPATIENT
Start: 2021-10-20 | End: 2022-01-24

## 2021-10-20 RX ORDER — OLANZAPINE 15 MG/1
15 TABLET ORAL NIGHTLY
Qty: 30 TABLET | Refills: 5 | Status: SHIPPED | OUTPATIENT
Start: 2021-10-20 | End: 2022-01-24

## 2021-10-20 RX ORDER — SACUBITRIL AND VALSARTAN 49; 51 MG/1; MG/1
TABLET, FILM COATED ORAL
Qty: 60 TABLET | Refills: 11 | Status: SHIPPED | OUTPATIENT
Start: 2021-10-20 | End: 2022-01-01 | Stop reason: SDUPTHER

## 2021-10-20 RX ORDER — PROCHLORPERAZINE MALEATE 10 MG
10 TABLET ORAL 3 TIMES DAILY PRN
Qty: 60 TABLET | Refills: 5 | Status: SHIPPED | OUTPATIENT
Start: 2021-10-20 | End: 2022-01-01 | Stop reason: SDUPTHER

## 2021-10-20 RX ORDER — CHLORZOXAZONE 500 MG/1
TABLET ORAL
Status: CANCELLED | OUTPATIENT
Start: 2021-10-20

## 2021-10-20 RX ORDER — OMEPRAZOLE 40 MG/1
CAPSULE, DELAYED RELEASE ORAL
Qty: 90 CAPSULE | Refills: 3 | Status: SHIPPED | OUTPATIENT
Start: 2021-10-20 | End: 2022-01-24

## 2021-10-20 RX ORDER — PANTOPRAZOLE SODIUM 40 MG/1
40 TABLET, DELAYED RELEASE ORAL DAILY
Qty: 90 TABLET | Refills: 5 | Status: SHIPPED | OUTPATIENT
Start: 2021-10-20 | End: 2022-01-24

## 2021-10-20 RX ORDER — OXYCODONE AND ACETAMINOPHEN 7.5; 325 MG/1; MG/1
1 TABLET ORAL EVERY 12 HOURS PRN
Qty: 60 TABLET | Refills: 0 | Status: SHIPPED | OUTPATIENT
Start: 2021-10-20 | End: 2021-11-19 | Stop reason: SDUPTHER

## 2021-10-20 RX ORDER — CLONIDINE HYDROCHLORIDE 0.1 MG/1
TABLET ORAL
Qty: 60 TABLET | Refills: 5 | Status: SHIPPED | OUTPATIENT
Start: 2021-10-20 | End: 2022-01-24

## 2021-10-20 RX ORDER — METOPROLOL SUCCINATE 25 MG/1
TABLET, EXTENDED RELEASE ORAL
Qty: 90 TABLET | Refills: 3 | Status: SHIPPED | OUTPATIENT
Start: 2021-10-20 | End: 2022-01-24

## 2021-10-20 RX ORDER — VENLAFAXINE HYDROCHLORIDE 75 MG/1
CAPSULE, EXTENDED RELEASE ORAL
Qty: 30 CAPSULE | Refills: 5 | Status: SHIPPED | OUTPATIENT
Start: 2021-10-20 | End: 2022-01-24

## 2021-10-20 RX ORDER — HYOSCYAMINE SULFATE 0.125 MG
TABLET ORAL
Qty: 30 TABLET | Refills: 5 | Status: SHIPPED | OUTPATIENT
Start: 2021-10-20 | End: 2022-01-24

## 2021-10-20 RX ORDER — MEGESTROL ACETATE 40 MG/ML
SUSPENSION ORAL
Qty: 600 ML | Refills: 5 | Status: SHIPPED | OUTPATIENT
Start: 2021-10-20 | End: 2022-01-01 | Stop reason: SDUPTHER

## 2021-10-22 ENCOUNTER — HOSPITAL ENCOUNTER (OUTPATIENT)
Dept: RADIOLOGY | Facility: HOSPITAL | Age: 61
Discharge: HOME | End: 2021-10-22
Attending: FAMILY MEDICINE
Payer: MEDICARE

## 2021-10-22 VITALS — WEIGHT: 102 LBS | BODY MASS INDEX: 18.77 KG/M2 | HEIGHT: 62 IN

## 2021-10-22 DIAGNOSIS — Z12.31 OTHER SCREENING MAMMOGRAM: ICD-10-CM

## 2021-10-22 PROCEDURE — 77067 SCR MAMMO BI INCL CAD: CPT | Mod: TC

## 2021-10-27 ENCOUNTER — TELEPHONE (OUTPATIENT)
Dept: FAMILY MEDICINE | Facility: CLINIC | Age: 61
End: 2021-10-27
Payer: MEDICARE

## 2021-11-19 DIAGNOSIS — C16.3 CARCINOMA OF ANTRUM OF STOMACH: ICD-10-CM

## 2021-11-19 RX ORDER — OXYCODONE AND ACETAMINOPHEN 7.5; 325 MG/1; MG/1
1 TABLET ORAL EVERY 12 HOURS PRN
Qty: 60 TABLET | Refills: 0 | Status: SHIPPED | OUTPATIENT
Start: 2021-11-19 | End: 2022-01-03 | Stop reason: SDUPTHER

## 2022-01-01 ENCOUNTER — OFFICE VISIT (OUTPATIENT)
Dept: INTERNAL MEDICINE | Facility: CLINIC | Age: 62
End: 2022-01-01
Payer: MEDICARE

## 2022-01-01 ENCOUNTER — TELEPHONE (OUTPATIENT)
Dept: FAMILY MEDICINE | Facility: CLINIC | Age: 62
End: 2022-01-01
Payer: MEDICAID

## 2022-01-01 ENCOUNTER — OFFICE VISIT (OUTPATIENT)
Dept: FAMILY MEDICINE | Facility: CLINIC | Age: 62
End: 2022-01-01
Payer: MEDICARE

## 2022-01-01 ENCOUNTER — HOSPITAL ENCOUNTER (OUTPATIENT)
Dept: RADIOLOGY | Facility: HOSPITAL | Age: 62
Discharge: HOME | End: 2022-11-04
Attending: FAMILY MEDICINE
Payer: MEDICARE

## 2022-01-01 VITALS
SYSTOLIC BLOOD PRESSURE: 98 MMHG | HEIGHT: 62 IN | HEART RATE: 96 BPM | BODY MASS INDEX: 16.76 KG/M2 | WEIGHT: 91.06 LBS | DIASTOLIC BLOOD PRESSURE: 58 MMHG | RESPIRATION RATE: 20 BRPM

## 2022-01-01 VITALS
HEART RATE: 80 BPM | HEIGHT: 62 IN | RESPIRATION RATE: 18 BRPM | WEIGHT: 80 LBS | BODY MASS INDEX: 14.72 KG/M2 | SYSTOLIC BLOOD PRESSURE: 102 MMHG | DIASTOLIC BLOOD PRESSURE: 60 MMHG

## 2022-01-01 VITALS
BODY MASS INDEX: 15.17 KG/M2 | DIASTOLIC BLOOD PRESSURE: 42 MMHG | HEIGHT: 62 IN | WEIGHT: 82.44 LBS | HEART RATE: 80 BPM | SYSTOLIC BLOOD PRESSURE: 72 MMHG | RESPIRATION RATE: 12 BRPM

## 2022-01-01 VITALS
HEART RATE: 72 BPM | BODY MASS INDEX: 18.24 KG/M2 | RESPIRATION RATE: 16 BRPM | DIASTOLIC BLOOD PRESSURE: 68 MMHG | SYSTOLIC BLOOD PRESSURE: 118 MMHG | WEIGHT: 99.13 LBS | HEIGHT: 62 IN

## 2022-01-01 VITALS
RESPIRATION RATE: 18 BRPM | HEART RATE: 89 BPM | WEIGHT: 83.56 LBS | OXYGEN SATURATION: 98 % | HEIGHT: 62 IN | DIASTOLIC BLOOD PRESSURE: 60 MMHG | BODY MASS INDEX: 15.38 KG/M2 | SYSTOLIC BLOOD PRESSURE: 110 MMHG

## 2022-01-01 VITALS — WEIGHT: 80 LBS | BODY MASS INDEX: 14.72 KG/M2 | HEIGHT: 62 IN

## 2022-01-01 DIAGNOSIS — F41.9 ANXIETY DISORDER, UNSPECIFIED TYPE: ICD-10-CM

## 2022-01-01 DIAGNOSIS — Z90.3 S/P SUBTOTAL GASTRECTOMY: ICD-10-CM

## 2022-01-01 DIAGNOSIS — I42.7 CARDIOMYOPATHY SECONDARY TO DRUG: ICD-10-CM

## 2022-01-01 DIAGNOSIS — T45.1X5A CHEMOTHERAPY-INDUCED NAUSEA: ICD-10-CM

## 2022-01-01 DIAGNOSIS — J41.0 SIMPLE CHRONIC BRONCHITIS: ICD-10-CM

## 2022-01-01 DIAGNOSIS — C16.3 CARCINOMA OF ANTRUM OF STOMACH: ICD-10-CM

## 2022-01-01 DIAGNOSIS — R53.1 WEAKNESS: ICD-10-CM

## 2022-01-01 DIAGNOSIS — I50.41 ACUTE COMBINED SYSTOLIC AND DIASTOLIC HEART FAILURE: Primary | ICD-10-CM

## 2022-01-01 DIAGNOSIS — Z90.3 S/P SUBTOTAL GASTRECTOMY: Primary | ICD-10-CM

## 2022-01-01 DIAGNOSIS — D50.8 IRON DEFICIENCY ANEMIA SECONDARY TO INADEQUATE DIETARY IRON INTAKE: ICD-10-CM

## 2022-01-01 DIAGNOSIS — K25.7 CHRONIC GASTRIC ULCER WITHOUT HEMORRHAGE AND WITHOUT PERFORATION: ICD-10-CM

## 2022-01-01 DIAGNOSIS — R63.0 ANOREXIA: ICD-10-CM

## 2022-01-01 DIAGNOSIS — H25.013 CORTICAL AGE-RELATED CATARACT OF BOTH EYES: ICD-10-CM

## 2022-01-01 DIAGNOSIS — Z01.818 PREOPERATIVE EVALUATION TO RULE OUT SURGICAL CONTRAINDICATION: ICD-10-CM

## 2022-01-01 DIAGNOSIS — R63.4 WEIGHT LOSS: ICD-10-CM

## 2022-01-01 DIAGNOSIS — Z12.31 SCREENING MAMMOGRAM FOR BREAST CANCER: ICD-10-CM

## 2022-01-01 DIAGNOSIS — R11.0 CHEMOTHERAPY-INDUCED NAUSEA: ICD-10-CM

## 2022-01-01 DIAGNOSIS — R63.0 ANOREXIA: Primary | ICD-10-CM

## 2022-01-01 DIAGNOSIS — Z12.31 SCREENING MAMMOGRAM FOR BREAST CANCER: Primary | ICD-10-CM

## 2022-01-01 DIAGNOSIS — C16.3 CARCINOMA OF ANTRUM OF STOMACH: Primary | ICD-10-CM

## 2022-01-01 DIAGNOSIS — I10 ESSENTIAL HYPERTENSION: ICD-10-CM

## 2022-01-01 DIAGNOSIS — I10 HTN (HYPERTENSION): ICD-10-CM

## 2022-01-01 PROCEDURE — 77063 MAMMO DIGITAL SCREENING BILAT WITH TOMO: ICD-10-PCS | Mod: 26,,, | Performed by: RADIOLOGY

## 2022-01-01 PROCEDURE — 99999 PR PBB SHADOW E&M-EST. PATIENT-LVL V: CPT | Mod: PBBFAC,,, | Performed by: FAMILY MEDICINE

## 2022-01-01 PROCEDURE — 99495 TRANSJ CARE MGMT MOD F2F 14D: CPT | Mod: S$PBB | Performed by: FAMILY MEDICINE

## 2022-01-01 PROCEDURE — 99999 PR STA SHADOW: CPT | Mod: PBBFAC,,, | Performed by: FAMILY MEDICINE

## 2022-01-01 PROCEDURE — 99213 OFFICE O/P EST LOW 20 MIN: CPT | Mod: S$PBB | Performed by: FAMILY MEDICINE

## 2022-01-01 PROCEDURE — 77063 BREAST TOMOSYNTHESIS BI: CPT | Mod: 26,,, | Performed by: RADIOLOGY

## 2022-01-01 PROCEDURE — 99999 PR PBB SHADOW E&M-EST. PATIENT-LVL IV: ICD-10-PCS | Mod: PBBFAC,,, | Performed by: FAMILY MEDICINE

## 2022-01-01 PROCEDURE — 77067 MAMMO DIGITAL SCREENING BILAT WITH TOMO: ICD-10-PCS | Mod: 26,,, | Performed by: RADIOLOGY

## 2022-01-01 PROCEDURE — G0180 MD CERTIFICATION HHA PATIENT: HCPCS | Mod: ,,, | Performed by: FAMILY MEDICINE

## 2022-01-01 PROCEDURE — 77063 BREAST TOMOSYNTHESIS BI: CPT | Mod: TC

## 2022-01-01 PROCEDURE — 99214 OFFICE O/P EST MOD 30 MIN: CPT | Mod: PBBFAC | Performed by: FAMILY MEDICINE

## 2022-01-01 PROCEDURE — 99999 PR PBB SHADOW E&M-EST. PATIENT-LVL IV: CPT | Mod: PBBFAC,,, | Performed by: FAMILY MEDICINE

## 2022-01-01 PROCEDURE — 99214 OFFICE O/P EST MOD 30 MIN: CPT | Mod: S$PBB | Performed by: FAMILY MEDICINE

## 2022-01-01 PROCEDURE — 99999 PR PBB SHADOW E&M-EST. PATIENT-LVL III: CPT | Mod: PBBFAC,,, | Performed by: FAMILY MEDICINE

## 2022-01-01 PROCEDURE — 99999 PR PBB SHADOW E&M-EST. PATIENT-LVL V: ICD-10-PCS | Mod: PBBFAC,,, | Performed by: FAMILY MEDICINE

## 2022-01-01 PROCEDURE — 99999 PR PBB SHADOW E&M-EST. PATIENT-LVL III: ICD-10-PCS | Mod: PBBFAC,,, | Performed by: FAMILY MEDICINE

## 2022-01-01 PROCEDURE — 99215 OFFICE O/P EST HI 40 MIN: CPT | Mod: PBBFAC | Performed by: FAMILY MEDICINE

## 2022-01-01 PROCEDURE — 99213 OFFICE O/P EST LOW 20 MIN: CPT | Mod: PBBFAC | Performed by: FAMILY MEDICINE

## 2022-01-01 PROCEDURE — 77067 SCR MAMMO BI INCL CAD: CPT | Mod: 26,,, | Performed by: RADIOLOGY

## 2022-01-01 PROCEDURE — G0180 PR HOME HEALTH MD CERTIFICATION: ICD-10-PCS | Mod: ,,, | Performed by: FAMILY MEDICINE

## 2022-01-01 PROCEDURE — 99999 PR STA SHADOW: ICD-10-PCS | Mod: PBBFAC,,, | Performed by: FAMILY MEDICINE

## 2022-01-01 RX ORDER — IRON POLYSACCHARIDE COMPLEX 150 MG
150 CAPSULE ORAL DAILY
Qty: 30 CAPSULE | Refills: 5 | Status: SHIPPED | OUTPATIENT
Start: 2022-01-01 | End: 2022-01-01 | Stop reason: SINTOL

## 2022-01-01 RX ORDER — OXYCODONE AND ACETAMINOPHEN 7.5; 325 MG/1; MG/1
1 TABLET ORAL EVERY 12 HOURS PRN
Qty: 60 TABLET | Refills: 0 | Status: SHIPPED | OUTPATIENT
Start: 2022-01-01 | End: 2022-01-01 | Stop reason: SDUPTHER

## 2022-01-01 RX ORDER — OXYCODONE AND ACETAMINOPHEN 7.5; 325 MG/1; MG/1
1 TABLET ORAL EVERY 8 HOURS PRN
Qty: 90 TABLET | Refills: 0 | Status: SHIPPED | OUTPATIENT
Start: 2022-01-01 | End: 2022-01-01 | Stop reason: SDUPTHER

## 2022-01-01 RX ORDER — PROCHLORPERAZINE MALEATE 10 MG
10 TABLET ORAL 3 TIMES DAILY PRN
Qty: 60 TABLET | Refills: 5 | Status: SHIPPED | OUTPATIENT
Start: 2022-01-01 | End: 2022-01-01 | Stop reason: SDUPTHER

## 2022-01-01 RX ORDER — IRON POLYSACCHARIDE COMPLEX 150 MG
150 CAPSULE ORAL DAILY
Qty: 30 CAPSULE | Refills: 5 | Status: SHIPPED | OUTPATIENT
Start: 2022-01-01 | End: 2022-01-01 | Stop reason: SDUPTHER

## 2022-01-01 RX ORDER — MEGESTROL ACETATE 20 MG/1
20 TABLET ORAL DAILY
Qty: 30 TABLET | Refills: 11
Start: 2022-01-01 | End: 2023-01-01

## 2022-01-01 RX ORDER — MIRTAZAPINE 30 MG/1
30 TABLET, FILM COATED ORAL NIGHTLY
Qty: 30 TABLET | Refills: 5 | Status: SHIPPED | OUTPATIENT
Start: 2022-01-01 | End: 2022-01-01 | Stop reason: SDUPTHER

## 2022-01-01 RX ORDER — MEGESTROL ACETATE 40 MG/ML
SUSPENSION ORAL
Qty: 600 ML | Refills: 5 | Status: SHIPPED | OUTPATIENT
Start: 2022-01-01 | End: 2022-01-01

## 2022-01-01 RX ORDER — ALBUTEROL SULFATE 90 UG/1
AEROSOL, METERED RESPIRATORY (INHALATION)
Qty: 8.5 G | Refills: 4 | Status: SHIPPED | OUTPATIENT
Start: 2022-01-01 | End: 2023-06-28

## 2022-01-01 RX ORDER — ONDANSETRON 8 MG/1
TABLET, ORALLY DISINTEGRATING ORAL
Qty: 20 TABLET | Refills: 4 | Status: SHIPPED | OUTPATIENT
Start: 2022-01-01 | End: 2023-01-01 | Stop reason: SDUPTHER

## 2022-01-01 RX ORDER — METOPROLOL SUCCINATE 25 MG/1
TABLET, EXTENDED RELEASE ORAL
Qty: 90 TABLET | Refills: 3 | Status: SHIPPED | OUTPATIENT
Start: 2022-01-01 | End: 2022-01-01

## 2022-01-01 RX ORDER — ONDANSETRON HYDROCHLORIDE 8 MG/1
TABLET, FILM COATED ORAL
COMMUNITY
Start: 2022-01-01 | End: 2022-01-01 | Stop reason: SDUPTHER

## 2022-01-01 RX ORDER — SACUBITRIL AND VALSARTAN 49; 51 MG/1; MG/1
TABLET, FILM COATED ORAL
Qty: 60 TABLET | Refills: 5 | Status: SHIPPED | OUTPATIENT
Start: 2022-01-01 | End: 2022-01-01

## 2022-01-01 RX ORDER — ASPIRIN 81 MG
100 TABLET, DELAYED RELEASE (ENTERIC COATED) ORAL 2 TIMES DAILY PRN
COMMUNITY
Start: 2022-01-01

## 2022-01-01 RX ORDER — TIOTROPIUM BROMIDE AND OLODATEROL 3.124; 2.736 UG/1; UG/1
2 SPRAY, METERED RESPIRATORY (INHALATION) DAILY
COMMUNITY
Start: 2022-01-01

## 2022-01-01 RX ORDER — PROCHLORPERAZINE MALEATE 10 MG
10 TABLET ORAL 3 TIMES DAILY PRN
Qty: 60 TABLET | Refills: 5 | Status: SHIPPED | OUTPATIENT
Start: 2022-01-01 | End: 2023-01-01 | Stop reason: SDUPTHER

## 2022-01-01 RX ORDER — METOPROLOL SUCCINATE 25 MG/1
TABLET, EXTENDED RELEASE ORAL
COMMUNITY
Start: 2022-01-01 | End: 2023-01-01

## 2022-01-01 RX ORDER — MIRTAZAPINE 30 MG/1
30 TABLET, FILM COATED ORAL NIGHTLY
Qty: 30 TABLET | Refills: 5 | Status: SHIPPED | OUTPATIENT
Start: 2022-01-01 | End: 2023-06-28

## 2022-01-24 ENCOUNTER — OFFICE VISIT (OUTPATIENT)
Dept: FAMILY MEDICINE | Facility: CLINIC | Age: 62
End: 2022-01-24
Payer: MEDICARE

## 2022-01-24 VITALS
WEIGHT: 97.69 LBS | SYSTOLIC BLOOD PRESSURE: 110 MMHG | DIASTOLIC BLOOD PRESSURE: 64 MMHG | HEIGHT: 62 IN | HEART RATE: 45 BPM | BODY MASS INDEX: 17.98 KG/M2 | RESPIRATION RATE: 18 BRPM

## 2022-01-24 DIAGNOSIS — D49.0 GASTRIC NEOPLASM: ICD-10-CM

## 2022-01-24 DIAGNOSIS — Z90.3 S/P SUBTOTAL GASTRECTOMY: ICD-10-CM

## 2022-01-24 DIAGNOSIS — I42.8 NON-ISCHEMIC CARDIOMYOPATHY: Primary | ICD-10-CM

## 2022-01-24 DIAGNOSIS — I10 ESSENTIAL HYPERTENSION: ICD-10-CM

## 2022-01-24 DIAGNOSIS — I42.7 CARDIOMYOPATHY SECONDARY TO DRUG: ICD-10-CM

## 2022-01-24 DIAGNOSIS — I10 PRIMARY HYPERTENSION: ICD-10-CM

## 2022-01-24 DIAGNOSIS — C16.3 CARCINOMA OF ANTRUM OF STOMACH: ICD-10-CM

## 2022-01-24 PROCEDURE — 99999 PR STA SHADOW: CPT | Mod: PBBFAC,,, | Performed by: FAMILY MEDICINE

## 2022-01-24 PROCEDURE — 99214 OFFICE O/P EST MOD 30 MIN: CPT | Mod: S$PBB | Performed by: FAMILY MEDICINE

## 2022-01-24 PROCEDURE — 99999 PR PBB SHADOW E&M-EST. PATIENT-LVL III: CPT | Mod: PBBFAC,,, | Performed by: FAMILY MEDICINE

## 2022-01-24 PROCEDURE — 99213 OFFICE O/P EST LOW 20 MIN: CPT | Mod: PBBFAC | Performed by: FAMILY MEDICINE

## 2022-01-24 PROCEDURE — 99999 PR PBB SHADOW E&M-EST. PATIENT-LVL III: ICD-10-PCS | Mod: PBBFAC,,, | Performed by: FAMILY MEDICINE

## 2022-01-24 RX ORDER — METOPROLOL SUCCINATE 25 MG/1
TABLET, EXTENDED RELEASE ORAL
Qty: 90 TABLET | Refills: 3
Start: 2022-01-24 | End: 2022-01-01

## 2022-01-24 RX ORDER — SUCRALFATE 1 G/1
1 TABLET ORAL 2 TIMES DAILY
Qty: 60 TABLET | Refills: 11 | Status: SHIPPED | OUTPATIENT
Start: 2022-01-24 | End: 2023-01-01 | Stop reason: SDUPTHER

## 2022-01-24 RX ORDER — OXYCODONE AND ACETAMINOPHEN 7.5; 325 MG/1; MG/1
1 TABLET ORAL EVERY 12 HOURS PRN
Qty: 60 TABLET | Refills: 0 | Status: SHIPPED | OUTPATIENT
Start: 2022-02-02 | End: 2022-02-07 | Stop reason: SDUPTHER

## 2022-01-24 NOTE — PROGRESS NOTES
Subjective:       Patient ID: Xiomy Duncan is a 62 y.o. female.    Chief Complaint: Follow-up    Xiomy Duncan is a 62 y.o. female with gastric carcinoma who finished taking chemo and radiation with Dr. Eli and Dr. Young.  Patient is currently on Remeron.  I tried her on olanzapine, but daughter says it made her pass out so it was discontinued.  This seems to be working.  The rapid weight loss has stopped.  She has lost a few lb.  She is starting to eat a little better.  She will still vomit 1 hr after a meal once or twice daily.  Her weight seems average at 100 lb.  Her daughter is here and has a list of all of her medications which we reviewed.  She takes Carafate twice daily.  She has chronic pain after her surgery.  She takes Percocet 10 mg p.o. b.i.d. to control this.  Lost a lot of weight.  Can only eat small amounts of food.  Ensure gives her diarrhea.   She is taking 800 mg of Megace to help appetite.  [facility]   She is currently taking Remeron 30 mg at night.  She is seeing cardiology for her cardiomyopathy.  She is taking Entresto and metoprolol XL 25 mg po daily.    Follow-up  Pertinent negatives include no abdominal pain, chest pain, chills, coughing, fatigue, fever, joint swelling, numbness, rash, sore throat or weakness.     Review of Systems   Constitutional: Negative for activity change, chills, fatigue, fever and unexpected weight change.   HENT: Negative for sore throat and trouble swallowing.    Respiratory: Negative for cough, chest tightness and shortness of breath.    Cardiovascular: Negative for chest pain and leg swelling.   Gastrointestinal: Negative for abdominal pain.   Endocrine: Negative for cold intolerance and heat intolerance.   Genitourinary: Negative for difficulty urinating.   Musculoskeletal: Negative for back pain and joint swelling.   Skin: Negative for rash.   Neurological: Positive for syncope. Negative for tremors,  weakness and numbness.   Hematological: Negative for adenopathy.   Psychiatric/Behavioral: Negative for decreased concentration.       Objective:      Vitals:    01/24/22 0950   BP: 110/64   Pulse: (!) 45   Resp: 18     Physical Exam  Constitutional:       Appearance: She is well-developed.   HENT:      Head: Normocephalic and atraumatic.      Right Ear: Tympanic membrane and ear canal normal.      Left Ear: Tympanic membrane and ear canal normal.   Eyes:      Pupils: Pupils are equal, round, and reactive to light.   Neck:      Thyroid: No thyromegaly.      Vascular: No JVD.   Cardiovascular:      Rate and Rhythm: Normal rate and regular rhythm.      Heart sounds: No murmur heard.  No friction rub. Gallop present.    Pulmonary:      Effort: Pulmonary effort is normal.      Breath sounds: Normal breath sounds.   Abdominal:      General: Bowel sounds are normal.      Palpations: Abdomen is soft.   Musculoskeletal:      Cervical back: Normal range of motion and neck supple.   Skin:     General: Skin is warm.   Neurological:      Mental Status: She is alert and oriented to person, place, and time.      Cranial Nerves: No cranial nerve deficit.   Psychiatric:         Behavior: Behavior normal.         Thought Content: Thought content normal.         Judgment: Judgment normal.           BMP  Lab Results   Component Value Date     02/21/2020    K 3.2 (L) 02/21/2020     02/21/2020    CO2 25 02/21/2020    BUN 16 02/21/2020    CREATININE 0.8 02/21/2020    CALCIUM 8.1 (L) 02/21/2020    ANIONGAP 10 02/21/2020    ESTGFRAFRICA >60 02/21/2020    EGFRNONAA >60 02/21/2020     Lab Results   Component Value Date    WBC 12.63 02/20/2020    RBC 3.78 (L) 02/20/2020    HGB 11.7 (L) 02/20/2020    HCT 35.5 (L) 02/20/2020    MCV 94 02/20/2020    MCH 31.0 02/20/2020    MCHC 33.0 02/20/2020    RDW 15.8 (H) 02/20/2020     02/20/2020    MPV 12.2 02/20/2020    GRAN 10.4 (H) 02/20/2020    GRAN 82.1 (H) 02/20/2020    LYMPH 0.8  (L) 02/20/2020    LYMPH 6.5 (L) 02/20/2020    MONO 1.4 (H) 02/20/2020    MONO 10.9 02/20/2020    EOS 0.0 02/20/2020    BASO 0.01 02/20/2020    EOSINOPHIL 0.0 02/20/2020    BASOPHIL 0.1 02/20/2020       Assessment:       1. Non-ischemic cardiomyopathy    2. Gastric neoplasm    3. Primary hypertension    4. Essential hypertension    5. Cardiomyopathy secondary to drug    6. Carcinoma of antrum of stomach        Plan:   Xiomy was seen today for follow-up.    Diagnoses and all orders for this visit:    Weight loss-secondary to gastrectomy for stomach cancer  Continue Megace  Continue Remeron 30 mg daily    Cardiomyopathy secondary to drug  -     continue Entresto, Metoprolol  Keep appointment with cardiology    Carcinoma of antrum of stomach  -     oxyCODONE-acetaminophen (PERCOCET) 7.5-325 mg per tablet; Take 1 tablet by mouth every 12 (twelve) hours as needed for Pain. GREATER THAN 7 DAY QUANTITY MEDICALLY NECESSARY  -     mirtazapine (REMERON) 30 MG tablet; Take 1 tablet (30 mg total) by mouth once daily.  Keep appointment with Oncology    Chemotherapy-induced nausea  -     prochlorperazine (COMPAZINE) 10 MG tablet; Take 1 tablet (10 mg total) by mouth 3 (three) times daily as needed.    Gastroesophageal reflux disease, esophagitis presence not specified  sucralfate    Anxiety disorder  Continue mirtazapine (REMERON) 30 MG tablet; Take 1 tablet (15 mg total) by mouth once daily.  Discontinue olanzapine due to syncope.    Non-ischemic cardiomyopathy    Gastric neoplasm  -     sucralfate (CARAFATE) 1 gram tablet; Take 1 tablet (1 g total) by mouth 2 (two) times daily.  Dispense: 60 tablet; Refill: 11    Primary hypertension    Essential hypertension  -     metoprolol succinate (TOPROL-XL) 25 MG 24 hr tablet; Take 1 tablet orally once a day.  Dispense: 90 tablet; Refill: 3    Cardiomyopathy secondary to drug  -     metoprolol succinate (TOPROL-XL) 25 MG 24 hr tablet; Take 1 tablet orally once a day.  Dispense: 90  tablet; Refill: 3    Carcinoma of antrum of stomach  -     oxyCODONE-acetaminophen (PERCOCET) 7.5-325 mg per tablet; Take 1 tablet by mouth every 12 (twelve) hours as needed for Pain. GREATER THAN 7 DAY QUANTITY MEDICALLY NECESSARY  Dispense: 60 tablet; Refill: 0          RTC in 3 months

## 2022-02-07 ENCOUNTER — TELEPHONE (OUTPATIENT)
Dept: FAMILY MEDICINE | Facility: CLINIC | Age: 62
End: 2022-02-07
Payer: MEDICARE

## 2022-02-07 DIAGNOSIS — C16.3 CARCINOMA OF ANTRUM OF STOMACH: ICD-10-CM

## 2022-03-07 DIAGNOSIS — C16.3 CARCINOMA OF ANTRUM OF STOMACH: ICD-10-CM

## 2022-03-07 RX ORDER — OXYCODONE AND ACETAMINOPHEN 7.5; 325 MG/1; MG/1
1 TABLET ORAL EVERY 12 HOURS PRN
Qty: 60 TABLET | Refills: 0 | Status: SHIPPED | OUTPATIENT
Start: 2022-03-07 | End: 2022-01-01 | Stop reason: SDUPTHER

## 2022-03-07 NOTE — TELEPHONE ENCOUNTER
Care Due:                  Date            Visit Type   Department     Provider  --------------------------------------------------------------------------------                                EP -                              [facility] FAMILY Arnie Altman  Last Visit: 01-      CARE ([facility])   [facility] -                              [facility] Arnie Altman  Next Visit: 04-      [facility]                                                            Last  Test          Frequency    Reason                     Performed    Due Date  --------------------------------------------------------------------------------    CBC.........  12 months..  mirtazapine..............  Not Found    Overdue    CMP.........  12 months..  mirtazapine..............  Not Found    Overdue    Lipid Panel.  12 months..  mirtazapine..............  Not Found    Overdue    Powered by [employer] by [address]. Reference number: [id number].   3/07/2022 1:02:34 PM CST

## 2022-03-07 NOTE — TELEPHONE ENCOUNTER
----- Message from Adi Estrada sent at 3/7/2022 12:58 PM CST -----  Contact: self  Xiomy Duncan  MRN: [id number]  : 1960  PCP: Arnie Monson  Home Phone      [phone]  Work Phone      Not on file.  Mobile          [phone]      MESSAGE:   Pt requesting refill or new Rx.   Is this a refill or new RX:  refill  RX name and strength: oxyCODONE-acetaminophen (PERCOCET) 7.5-325 mg per tablet  Last office visit: 2022  Is this a 30-day or 90-day RX:  30  Ph[facility] and location[facility]   Comments:      Phone:  [phone]

## 2022-04-05 NOTE — TELEPHONE ENCOUNTER
No new care gaps identified.  Powered by [employer]. Reference number: [id number].   4/05/2022 8:26:54 AM CDT

## 2022-04-06 NOTE — TELEPHONE ENCOUNTER
Refill Routing Note   Medication(s) are not appropriate for processing by [facility] for the following reason(s):      - Required laboratory values are abnormal    [facility] action(s):  Defer       Medication Therapy Plan: Rx written 1/24/22 submitted as no print  Medication reconciliation completed: No     Appointments  past 12m or future 3m with PCP    Date Provider   Last Visit   1/24/2022 Arnie Monson MD   Next Visit   4/18/2022 Arnie Monson MD   ED visits in past 90 days: 0        Note composed:6:19 PM 04/06/2022

## 2022-04-11 NOTE — TELEPHONE ENCOUNTER
No new care gaps identified.  Powered by [employer]. Reference number: [id number].   4/11/2022 12:45:05 PM CDT

## 2022-04-18 NOTE — PROGRESS NOTES
Subjective:       Patient ID: Xiomy Duncan is a 62 y.o. female.    Chief Complaint: Follow-up (3 month f/u )    Xiomy Duncan is a 62 y.o. female with gastric carcinoma who finished taking chemo and radiation with Dr. Eli and Dr. Young.  Patient is currently on Remeron.  I tried her on olanzapine, but daughter says it made her pass out so it was discontinued.  The Remeron seems to be working.  The rapid weight loss has stopped.  She has lost a few lb.  She is starting to eat a little better.  She will still vomit 1 hr after a meal once or twice daily.  Her weight seems average at 100 lb.  Her daughter is here and has a list of all of her medications which we reviewed.  She was sent to a dietician and the meal plan seems to be working  She takes Carafate twice daily.  She has chronic pain after her surgery.  She takes Percocet 10 mg p.o. b.i.d. to control this.  Lost a lot of weight.  Can only eat small amounts of food.  Ensure gives her diarrhea.   She is taking 800 mg of Megace to help appetite.  [facility]   She is currently taking Remeron 30 mg at night.  She is seeing cardiology for her cardiomyopathy.  She is taking Entresto and metoprolol XL 25 mg po daily.    Follow-up  Pertinent negatives include no abdominal pain, chest pain, chills, coughing, fatigue, fever, joint swelling, numbness, rash, sore throat or weakness.     Review of Systems   Constitutional: Negative for activity change, chills, fatigue, fever and unexpected weight change.   HENT: Negative for sore throat and trouble swallowing.    Respiratory: Negative for cough, chest tightness and shortness of breath.    Cardiovascular: Negative for chest pain and leg swelling.   Gastrointestinal: Negative for abdominal pain.   Endocrine: Negative for cold intolerance and heat intolerance.   Genitourinary: Negative for difficulty urinating.   Musculoskeletal: Negative for back pain and joint swelling.    Skin: Negative for rash.   Neurological: Positive for syncope. Negative for tremors, weakness and numbness.   Hematological: Negative for adenopathy.   Psychiatric/Behavioral: Negative for decreased concentration.       Objective:      Vitals:    04/18/22 1104   BP: 118/68   Pulse: 72   Resp: 16     Physical Exam  Constitutional:       Appearance: She is well-developed.   HENT:      Head: Normocephalic and atraumatic.      Right Ear: Tympanic membrane and ear canal normal.      Left Ear: Tympanic membrane and ear canal normal.   Eyes:      Pupils: Pupils are equal, round, and reactive to light.   Neck:      Thyroid: No thyromegaly.      Vascular: No JVD.   Cardiovascular:      Rate and Rhythm: Normal rate and regular rhythm.      Heart sounds: No murmur heard.    No friction rub. Gallop present.   Pulmonary:      Effort: Pulmonary effort is normal.      Breath sounds: Normal breath sounds.   Abdominal:      General: Bowel sounds are normal.      Palpations: Abdomen is soft.   Musculoskeletal:      Cervical back: Normal range of motion and neck supple.   Skin:     General: Skin is warm.   Neurological:      Mental Status: She is alert and oriented to person, place, and time.      Cranial Nerves: No cranial nerve deficit.   Psychiatric:         Behavior: Behavior normal.         Thought Content: Thought content normal.         Judgment: Judgment normal.           BMP  Lab Results   Component Value Date     02/21/2020    K 3.2 (L) 02/21/2020     02/21/2020    CO2 25 02/21/2020    BUN 16 02/21/2020    CREATININE 0.8 02/21/2020    CALCIUM 8.1 (L) 02/21/2020    ANIONGAP 10 02/21/2020    ESTGFRAFRICA >60 02/21/2020    EGFRNONAA >60 02/21/2020     Lab Results   Component Value Date    WBC 12.63 02/20/2020    RBC 3.78 (L) 02/20/2020    HGB 11.7 (L) 02/20/2020    HCT 35.5 (L) 02/20/2020    MCV 94 02/20/2020    MCH 31.0 02/20/2020    MCHC 33.0 02/20/2020    RDW 15.8 (H) 02/20/2020     02/20/2020    MPV  12.2 02/20/2020    GRAN 10.4 (H) 02/20/2020    GRAN 82.1 (H) 02/20/2020    LYMPH 0.8 (L) 02/20/2020    LYMPH 6.5 (L) 02/20/2020    MONO 1.4 (H) 02/20/2020    MONO 10.9 02/20/2020    EOS 0.0 02/20/2020    BASO 0.01 02/20/2020    EOSINOPHIL 0.0 02/20/2020    BASOPHIL 0.1 02/20/2020       Assessment:       1. S/P subtotal gastrectomy    2. Chemotherapy-induced nausea    3. Cardiomyopathy secondary to drug    4. Weight loss    5. Iron deficiency anemia secondary to inadequate dietary iron intake        Plan:   Xiomy was seen today for follow-up.    Diagnoses and all orders for this visit:    Weight loss-secondary to gastrectomy for stomach cancer  Continue Megace  Continue Remeron 30 mg daily    Cardiomyopathy secondary to drug  -     continue Entresto, Metoprolol  Keep appointment with cardiology    Carcinoma of antrum of stomach  -     oxyCODONE-acetaminophen (PERCOCET) 7.5-325 mg per tablet; Take 1 tablet by mouth every 12 (twelve) hours as needed for Pain. GREATER THAN 7 DAY QUANTITY MEDICALLY NECESSARY  -     mirtazapine (REMERON) 30 MG tablet; Take 1 tablet (30 mg total) by mouth once daily.  Keep appointment with Oncology    Chemotherapy-induced nausea  -     prochlorperazine (COMPAZINE) 10 MG tablet; Take 1 tablet (10 mg total) by mouth 3 (three) times daily as needed.    Gastroesophageal reflux disease, esophagitis presence not specified  sucralfate    Anxiety disorder  Continue mirtazapine (REMERON) 30 MG tablet; Take 1 tablet (15 mg total) by mouth once daily.  Discontinue olanzapine due to syncope.    S/P subtotal gastrectomy    Chemotherapy-induced nausea  -     megestroL (MEGACE) 400 mg/10 mL (40 mg/mL) Susp; Take 10 MLS BY MOUTH TWICE DAILY  Dispense: 600 mL; Refill: 5    Cardiomyopathy secondary to drug    Weight loss    Iron deficiency anemia secondary to inadequate dietary iron intake  -     iron polysaccharides (NIFEREX) 150 mg iron Cap; Take 1 capsule (150 mg total) by mouth once daily.   Dispense: 30 capsule; Refill: 5          RTC in 3 months

## 2022-05-05 NOTE — TELEPHONE ENCOUNTER
No new care gaps identified.  [facility] Embedded Care Gaps. Reference number: [id number]. 5/05/2022   1:47:03 PM CDT

## 2022-05-09 NOTE — TELEPHONE ENCOUNTER
Care Due:                  Date            Visit Type   Department     Provider  --------------------------------------------------------------------------------                                EP -                              [facility] FAMILY Arnie Altman  Last Visit: 04-      CARE ([facility])   [facility] -                              [facility] Arnie Altman  Next Visit: 07-      [facility] ([facility])   MEDICINE       [place]                                                            Last  Test          Frequency    Reason                     Performed    Due Date  --------------------------------------------------------------------------------    CBC.........  12 months..  mirtazapine..............  Not Found    Overdue    CMP.........  12 months..  mirtazapine..............  Not Found    Overdue    Lipid Panel.  12 months..  mirtazapine..............  Not Found    Overdue    [employer] Embedded Care Gaps. Reference number: [id number]. 5/09/2022   10:16:11 AM CDT

## 2022-05-10 NOTE — TELEPHONE ENCOUNTER
Refill Routing Note   Medication(s) are not appropriate for processing by [facility] for the following reason(s):      - Required laboratory values are outdated    [facility] action(s):  Defer Medication-related problems identified: Requires labs     Medication Therapy Plan: Labs (CBC, CMP, lipid panel) overdue, last drawn 02/21/2020  Medication reconciliation completed: No     Appointments  past 12m or future 3m with PCP    Date Provider   Last Visit   4/18/2022 Arnie Monson MD   Next Visit   7/18/2022 Arnie Monson MD   ED visits in past 90 days: 0        Note composed:7:43 PM 05/09/2022

## 2022-05-12 NOTE — TELEPHONE ENCOUNTER
No new care gaps identified.  [facility] Embedded Care Gaps. Reference number: [id number]. 5/12/2022   11:59:42 AM NATET

## 2022-05-12 NOTE — TELEPHONE ENCOUNTER
----- Message from Chente Copeland sent at 2022 11:50 AM CDT -----  Contact: Patient  Xiomy Duncan  MRN: [id number]  : 1960  PCP: Arnie Monson  Home Phone      [phone]  Work Phone      Not on file.  Mobile          [phone]      MESSAGE:   Pt requesting refill or new Rx.   Is this a refill or new RX:  refill  RX name and strength: Oxycodone 7.5-325  Last office visit: 22  Is this a 30-day or 90-day RX:  30 day  Pharmacy name and location:  [facility]  Comments:      Phone:  [phone]    PCP: Ermias

## 2022-06-13 NOTE — TELEPHONE ENCOUNTER
----- Message from Rakel Renteria sent at 6/13/2022 11:32 AM CDT -----  Contact: self  Is this a refill or new RX:   refill  RX name and strength:   oxyCODONE-acetaminophen (PERCOCET) 7.5-325 mg per tablet  Last office visit:   04/18/2022  Is this a 30-day or 90-day RX:   60  Pharmacy name and location:   [facility]  Comments:      Phone:  [phone]

## 2022-07-15 NOTE — TELEPHONE ENCOUNTER
Care Due:                  Date            Visit Type   Department     Provider  --------------------------------------------------------------------------------                                EP -                              [facility] FAMILY Arnie Altman  Last Visit: 04-      CARE ([facility])   [facility] -                              [facility] FAMILY Arnie Altman  Next Visit: 07-      [facility] ([facility])   MEDICINE       [place]                                                            Last  Test          Frequency    Reason                     Performed    Due Date  --------------------------------------------------------------------------------    CBC.........  12 months..  mirtazapine..............  Not Found    Overdue    CMP.........  12 months..  mirtazapine..............  Not Found    Overdue    Lipid Panel.  12 months..  mirtazapine..............  Not Found    Overdue    [employer] Embedded Care Gaps. Reference number: [id number]. 7/15/2022   12:56:00 PM CDT

## 2022-07-15 NOTE — TELEPHONE ENCOUNTER
----- Message from Chente Copeland sent at 7/15/2022 12:05 PM CDT -----  Contact: Patient  Xiomy Duncan  MRN: [id number]  : 1960  PCP: Arnie Monson  Home Phone      [phone]  Work Phone      Not on file.  Mobile          [phone]      MESSAGE:   Pt requesting refill or new Rx.   Is this a refill or new RX:  refill  RX name and strength: Oxycodone 7.5-325 mg  Last office visit: 22  Is this a 30-day or 90-day RX:  30 day  Pharmacy name and location:  [facility]  Comments:      Phone:  [phone]    PCP: Ermias

## 2022-07-18 NOTE — PROGRESS NOTES
Subjective:       Patient ID: Xiomy Duncan is a 62 y.o. female.    Chief Complaint: Pre-op Exam (Pt here for surgery clearance for Cataracts. Pt's surgery is scheduled 08/04 )    Xiomy Duncan is a 62 y.o. female with gastric carcinoma who finished taking chemo and radiation with Dr. Eli and Dr. Young.  Patient is currently on Remeron.  I tried her on olanzapine, but daughter says it made her pass out so it was discontinued.  The Remeron seems to be working.  The rapid weight loss has stopped.  She has lost a few lb.  She is starting to eat a little better.  She will still vomit 1 hr after a meal once or twice daily.  Her weight seems average at 100 lb.  Her daughter is here and has a list of all of her medications which we reviewed.  She was sent to a dietician and the meal plan seems to be working  She takes Carafate twice daily.  She has chronic pain after her surgery.  She takes Percocet 10 mg p.o. b.i.d. to control this.  Lost a lot of weight.  Can only eat small amounts of food.  Ensure gives her diarrhea.   She is taking 800 mg of Megace to help appetite.  [facility]   She is currently taking Remeron 30 mg at night.  She is seeing cardiology for her cardiomyopathy.  She is taking Entresto and metoprolol XL 25 mg po daily.    Follow-up  Pertinent negatives include no abdominal pain, chest pain, chills, coughing, fatigue, fever, joint swelling, numbness, rash, sore throat or weakness.     Review of Systems   Constitutional: Negative for activity change, chills, fatigue, fever and unexpected weight change.   HENT: Negative for sore throat and trouble swallowing.    Respiratory: Negative for cough, chest tightness and shortness of breath.    Cardiovascular: Negative for chest pain and leg swelling.   Gastrointestinal: Negative for abdominal pain.   Endocrine: Negative for cold intolerance and heat intolerance.   Genitourinary: Negative for difficulty  urinating.   Musculoskeletal: Negative for back pain and joint swelling.   Skin: Negative for rash.   Neurological: Positive for syncope. Negative for tremors, weakness and numbness.   Hematological: Negative for adenopathy.   Psychiatric/Behavioral: Negative for decreased concentration.       Objective:      Vitals:    07/18/22 1346   BP: (!) 98/58   Pulse: 96   Resp: 20     Physical Exam  Constitutional:       Appearance: She is well-developed.   HENT:      Head: Normocephalic and atraumatic.      Right Ear: Tympanic membrane and ear canal normal.      Left Ear: Tympanic membrane and ear canal normal.   Eyes:      Pupils: Pupils are equal, round, and reactive to light.   Neck:      Thyroid: No thyromegaly.      Vascular: No JVD.   Cardiovascular:      Rate and Rhythm: Normal rate and regular rhythm.      Heart sounds: No murmur heard.    No friction rub. Gallop present.   Pulmonary:      Effort: Pulmonary effort is normal.      Breath sounds: Normal breath sounds.   Abdominal:      General: Bowel sounds are normal.      Palpations: Abdomen is soft.   Musculoskeletal:      Cervical back: Normal range of motion and neck supple.   Skin:     General: Skin is warm.   Neurological:      Mental Status: She is alert and oriented to person, place, and time.      Cranial Nerves: No cranial nerve deficit.   Psychiatric:         Behavior: Behavior normal.         Thought Content: Thought content normal.         Judgment: Judgment normal.           BMP  Lab Results   Component Value Date     02/21/2020    K 3.2 (L) 02/21/2020     02/21/2020    CO2 25 02/21/2020    BUN 16 02/21/2020    CREATININE 0.8 02/21/2020    CALCIUM 8.1 (L) 02/21/2020    ANIONGAP 10 02/21/2020    ESTGFRAFRICA >60 02/21/2020    EGFRNONAA >60 02/21/2020     Lab Results   Component Value Date    WBC 12.63 02/20/2020    RBC 3.78 (L) 02/20/2020    HGB 11.7 (L) 02/20/2020    HCT 35.5 (L) 02/20/2020    MCV 94 02/20/2020    MCH 31.0 02/20/2020    MCHC  33.0 02/20/2020    RDW 15.8 (H) 02/20/2020     02/20/2020    MPV 12.2 02/20/2020    GRAN 10.4 (H) 02/20/2020    GRAN 82.1 (H) 02/20/2020    LYMPH 0.8 (L) 02/20/2020    LYMPH 6.5 (L) 02/20/2020    MONO 1.4 (H) 02/20/2020    MONO 10.9 02/20/2020    EOS 0.0 02/20/2020    BASO 0.01 02/20/2020    EOSINOPHIL 0.0 02/20/2020    BASOPHIL 0.1 02/20/2020       Assessment:       1. Carcinoma of antrum of stomach    2. Preoperative evaluation to rule out surgical contraindication    3. Cortical age-related cataract of both eyes    4. Cardiomyopathy secondary to drug    5. S/P subtotal gastrectomy    6. Weight loss        Plan:   Xiomy was seen today for follow-up.    Diagnoses and all orders for this visit:    Weight loss-secondary to gastrectomy for stomach cancer  Continue Megace  Continue Remeron 30 mg daily    Cardiomyopathy secondary to drug  -     continue Entresto, Metoprolol  Keep appointment with cardiology    Carcinoma of antrum of stomach  -     oxyCODONE-acetaminophen (PERCOCET) 7.5-325 mg per tablet; Take 1 tablet by mouth every 12 (twelve) hours as needed for Pain. GREATER THAN 7 DAY QUANTITY MEDICALLY NECESSARY  -     mirtazapine (REMERON) 30 MG tablet; Take 1 tablet (30 mg total) by mouth once daily.  Keep appointment with Oncology    Chemotherapy-induced nausea  -     prochlorperazine (COMPAZINE) 10 MG tablet; Take 1 tablet (10 mg total) by mouth 3 (three) times daily as needed.    Gastroesophageal reflux disease, esophagitis presence not specified  sucralfate    Anxiety disorder  Continue mirtazapine (REMERON) 30 MG tablet; Take 1 tablet (15 mg total) by mouth once daily.  Discontinue olanzapine due to syncope.    Carcinoma of antrum of stomach    Preoperative evaluation to rule out surgical contraindication    Cortical age-related cataract of both eyes    Cardiomyopathy secondary to drug    S/P subtotal gastrectomy    Weight loss          RTC in 3 months

## 2022-10-17 NOTE — PROGRESS NOTES
Subjective:       Patient ID: Xiomy Duncan is a 62 y.o. female.    Chief Complaint: Follow-up    Xiomy Duncan is a 62 y.o. female with gastric carcinoma who finished taking chemo and radiation with Dr. Eli and Dr. Young.  Patient is currently on Remeron.  I tried her on olanzapine, but daughter says it made her pass out so it was discontinued.  The Remeron seems to be working.  The rapid weight loss has stopped.  She was referred to a dietitian by her oncologist.  She is now drinking more milk shakes and Ensure.  She is starting to eat a little better.  She lacks the ability to taste which does not help.  She was sent to a dietician and the meal plan seems to be working.  She gained 4 lbs since the consult.  She takes Carafate twice daily.  She has chronic pain after her surgery.  She takes Percocet 10 mg p.o. b.i.d. to control this.  Lost a lot of weight.  Can only eat small amounts of food.  Ensure gives her diarrhea.   She is taking 800 mg of Megace to help appetite.  [facility]   She is currently taking Remeron 30 mg at night.  She is seeing cardiology for her cardiomyopathy.  She is taking Entresto and metoprolol XL 25 mg po daily.    HPI  Review of Systems   Constitutional:  Negative for activity change, chills, fatigue, fever and unexpected weight change.   HENT:  Negative for sore throat and trouble swallowing.    Respiratory:  Negative for cough, chest tightness and shortness of breath.    Cardiovascular:  Negative for chest pain and leg swelling.   Gastrointestinal:  Negative for abdominal pain.   Endocrine: Negative for cold intolerance and heat intolerance.   Genitourinary:  Negative for difficulty urinating.   Musculoskeletal:  Negative for back pain and joint swelling.   Skin:  Negative for rash.   Neurological:  Positive for syncope. Negative for tremors, weakness and numbness.   Hematological:  Negative for adenopathy.   Psychiatric/Behavioral:   Negative for decreased concentration.        Objective:      Vitals:    10/17/22 1330   BP: 102/60   Pulse: 80   Resp: 18     Physical Exam  Constitutional:       Appearance: She is well-developed.   HENT:      Head: Normocephalic and atraumatic.      Right Ear: Tympanic membrane and ear canal normal.      Left Ear: Tympanic membrane and ear canal normal.   Eyes:      Pupils: Pupils are equal, round, and reactive to light.   Neck:      Thyroid: No thyromegaly.      Vascular: No JVD.   Cardiovascular:      Rate and Rhythm: Normal rate and regular rhythm.      Heart sounds: No murmur heard.    No friction rub. Gallop present.   Pulmonary:      Effort: Pulmonary effort is normal.      Breath sounds: Normal breath sounds.   Abdominal:      General: Bowel sounds are normal.      Palpations: Abdomen is soft.   Musculoskeletal:      Cervical back: Normal range of motion and neck supple.   Skin:     General: Skin is warm.   Neurological:      Mental Status: She is alert and oriented to person, place, and time.      Cranial Nerves: No cranial nerve deficit.   Psychiatric:         Behavior: Behavior normal.         Thought Content: Thought content normal.         Judgment: Judgment normal.           Assessment:       1. Iron deficiency anemia secondary to inadequate dietary iron intake    2. Carcinoma of antrum of stomach    3. Anxiety disorder, unspecified type    4. Cardiomyopathy secondary to drug    5. Chemotherapy-induced nausea        Plan:   Xiomy was seen today for follow-up.    Diagnoses and all orders for this visit:    Weight loss-secondary to gastrectomy for stomach cancer  Continue Megace  Continue Remeron 30 mg daily  Follow recommendations from dietitians    Cardiomyopathy secondary to drug  continue Entresto, Metoprolol  Keep appointment with cardiology    Carcinoma of antrum of stomach  oxyCODONE-acetaminophen (PERCOCET) 7.5-325 mg per tablet; Take 1 tablet by mouth every 12 (twelve) hours as needed for  Pain. GREATER THAN 7 DAY QUANTITY MEDICALLY NECESSARY  mirtazapine (REMERON) 30 MG tablet; Take 1 tablet (30 mg total) by mouth once daily.  Keep appointment with Oncology    Chemotherapy-induced nausea  -     prochlorperazine (COMPAZINE) 10 MG tablet; Take 1 tablet (10 mg total) by mouth 3 (three) times daily as needed.    Anxiety disorder  Continue mirtazapine (REMERON) 30 MG tablet; Take 1 tablet (15 mg total) by mouth once daily.  Discontinue olanzapine due to syncope.    1. Iron deficiency anemia secondary to inadequate dietary iron intake  -     iron polysaccharides (NIFEREX) 150 mg iron Cap; Take 1 capsule (150 mg total) by mouth once daily.  Dispense: 30 capsule; Refill: 5    2. Carcinoma of antrum of stomach  -     mirtazapine (REMERON) 30 MG tablet; Take 1 tablet (30 mg total) by mouth every evening.  Dispense: 30 tablet; Refill: 5  -     ondansetron (ZOFRAN-ODT) 8 MG TbDL; Dissolve one tablet by mouth every 6 hours as needed  Dispense: 20 tablet; Refill: 4    3. Anxiety disorder, unspecified type  -     mirtazapine (REMERON) 30 MG tablet; Take 1 tablet (30 mg total) by mouth every evening.  Dispense: 30 tablet; Refill: 5    4. Cardiomyopathy secondary to drug  -     sacubitriL-valsartan (ENTRESTO) 49-51 mg per tablet; Take 1 tablet orally 2 times a day.  Dispense: 60 tablet; Refill: 5    5. Chemotherapy-induced nausea  -     prochlorperazine (COMPAZINE) 10 MG tablet; Take 1 tablet (10 mg total) by mouth 3 (three) times daily as needed (nausea).  Dispense: 60 tablet; Refill: 5     RTC in 3 months

## 2022-10-18 NOTE — TELEPHONE ENCOUNTER
----- Message from Chente Copeland sent at 10/18/2022  1:30 PM CDT -----  Contact: Patient  Xiomy Duncan  MRN: [id number]  : 1960  PCP: Arnie Monson  Home Phone      [phone]  Work Phone      Not on file.  Mobile          [phone]      MESSAGE: requesting MMG be ordered & scheduled -  [facility] - any day - morning     Call [phone]    PCP: Ermias

## 2022-11-17 NOTE — TELEPHONE ENCOUNTER
----- Message from Chente Copeland sent at 2022 12:55 PM CST -----  Contact: Daughter - Kenney Duncan  MRN: [id number]  : 1960  PCP: Arnie Monson  Home Phone      [phone]  Work Phone      Not on file.  Mobile          [phone]      MESSAGE: requesting appt for hosp f/u with Dr Gaitan in the next 2-3 days -- please advise    Call Kenney @ [phone]    PCP: Ermias

## 2022-11-18 PROBLEM — K25.7 CHRONIC GASTRIC ULCER WITHOUT HEMORRHAGE AND WITHOUT PERFORATION: Status: ACTIVE | Noted: 2022-01-01

## 2022-11-18 PROBLEM — R63.0 ANOREXIA: Status: ACTIVE | Noted: 2022-01-01

## 2022-11-18 NOTE — ASSESSMENT & PLAN NOTE
Ref[facility], Dr Ortiz.  Could patient benefit from a feeding tube?.  Continue boost   Soft foods.  Small amounts.  Eat as frequently as possible.

## 2022-11-18 NOTE — ASSESSMENT & PLAN NOTE
Keep appointment with Cardiology.  Continue Entresto 4951 mg p.o. b.i.d.,  Continue metoprolol 25 mg p.o. daily

## 2022-11-18 NOTE — PROGRESS NOTES
Subjective:       Patient ID: Xiomy Duncan is a 62 y.o. female.    Chief Complaint: hospital f/u      Transitional Care Note    Family and/or Caretaker present at visit?  Yes.  Diagnostic tests reviewed/disposition: No diagnosic tests pending after this hospitalization.  Disease/illness education: CHF, gastric ulcer, edema  Home health/community services discussion/referrals: Patient has home health established at unknown.  Getting home physical therapy .   Establishment or re-establishment of referral orders for community resources: No other necessary community resources.   Discussion with other health care providers: No discussion with other health care providers necessary.     62-year-old black female with a history of gastrectomy for gastric carcinoma present to the emergency room with abdominal pain, swelling to legs and arms.  She was evaluated by Cardiology for the swelling and the family tells me she has significant congestive heart failure.  I am not sure if they diuresed her.  Gastroenterologist performed an EGD and told family that she had a large ulcer and is now on Carafate.  Patient is having a lot of pain.  She takes Percocet twice daily and it helps but wears off quickly.  She is losing a lot of weight.  She still having swelling in her arms and legs.  I do not have any records from her hospitalization except for her discharge medications.  She is scheduled to see cardiologist next week.    Review of Systems   Constitutional:  Positive for unexpected weight change. Negative for activity change, chills, fatigue and fever.   HENT:  Negative for sore throat and trouble swallowing.    Respiratory:  Negative for cough, chest tightness and shortness of breath.    Cardiovascular:  Negative for chest pain and leg swelling.   Gastrointestinal:  Positive for abdominal pain.   Endocrine: Negative for cold intolerance and heat intolerance.   Genitourinary:  Negative for difficulty urinating.    Musculoskeletal:  Negative for back pain and joint swelling.   Skin:  Negative for rash.   Neurological:  Negative for numbness.   Hematological:  Negative for adenopathy.   Psychiatric/Behavioral:  Negative for decreased concentration.      Objective:      Vitals:    11/18/22 1553   BP: 110/60   Pulse: 89   Resp: 18     Physical Exam  Constitutional:       Appearance: She is ill-appearing.      Comments: Cachexia     HENT:      Head: Normocephalic and atraumatic.      Nose: Nose normal.   Cardiovascular:      Rate and Rhythm: Normal rate and regular rhythm.      Heart sounds: No murmur heard.    No friction rub. No gallop.   Pulmonary:      Effort: Pulmonary effort is normal.      Breath sounds: Normal breath sounds.   Abdominal:      Tenderness: There is abdominal tenderness.      Comments: Tender to palpation in all quadrants.     Musculoskeletal:      Right lower leg: Edema present.      Left lower leg: Edema present.      Comments: Hand edema   Neurological:      General: No focal deficit present.      Mental Status: She is alert and oriented to person, place, and time.   Psychiatric:         Mood and Affect: Mood normal.       Assessment:       1. Acute combined systolic and diastolic heart failure    2. Carcinoma of antrum of stomach    3. Weakness    4. Chronic gastric ulcer without hemorrhage and without perforation    5. Anorexia        Plan:   1. Acute combined systolic and diastolic heart failure  Assessment & Plan:  Keep appointment with Cardiology.  Continue Entresto 4951 mg p.o. b.i.d.,  Continue metoprolol 25 mg p.o. daily      2. Carcinoma of antrum of stomach  -     oxyCODONE-acetaminophen (PERCOCET) 7.5-325 mg per tablet; Take 1 tablet by mouth every 8 (eight) hours as needed for Pain.  Dispense: 90 tablet; Refill: 0  -     megestroL (MEGACE) 20 MG Tab; Take 1 tablet (20 mg total) by mouth once daily.  Dispense: 30 tablet; Refill: 11  -     WALKER FOR HOME USE  -     Cancel: Ambulatory  referral/consult to Gastroenterology; Future; Expected date: 11/25/2022  -     Ambulatory referral/consult to Gastroenterology; Future; Expected date: 11/25/2022    3. Weakness  -     WALKER FOR HOME USE    4. Chronic gastric ulcer without hemorrhage and without perforation  Assessment & Plan:  Recently seen by EGD last week.  This is per family's history.  Continue Carafate and Protonix   I would like to send patient to Gastroenterology, Dr. Ortiz.    Requested medical records from [place].    Orders:  -     Cancel: Ambulatory referral/consult to Gastroenterology; Future; Expected date: 11/25/2022  -     Ambulatory referral/consult to Gastroenterology; Future; Expected date: 11/25/2022    5. Anorexia  Assessment & Plan:  Refer patient to [facility], Dr Ortiz.  Could patient benefit from a feeding tube?.  Continue boost   Soft foods.  Small amounts.  Eat as frequently as possible.    Orders:  -     Ambulatory referral/consult to Gastroenterology; Future; Expected date: 11/25/2022     RTC in 2 weeks

## 2022-11-18 NOTE — ASSESSMENT & PLAN NOTE
Recently seen by EGD last week.  This is per family's history.  Continue Carafate and Protonix   I would like to send patient to Gastroenterology, Dr. Ortiz.    Requested medical records from [place].

## 2022-11-23 NOTE — TELEPHONE ENCOUNTER
----- Message from Rakel Renteria sent at 2022  2:53 PM CST -----  Contact: Lakisha Tomlinson/daughter  Xiomy Duncan  MRN: [id number]  : 1960  PCP: Arnie Monson  Home Phone      [phone]  Work Phone      Not on file.  Mobile          [phone]      MESSAGE:   Pt's daughter called stating she would like to speak with someone in regards to referrals. Daughter states that Dr Edwards's office needs her to go back to office that did Endoscopy.   Daughter is trying to figure out where mother has to go.       Phone:  [phone]

## 2022-12-05 NOTE — PROGRESS NOTES
Subjective:       Patient ID: Xiomy Duncan is a 62 y.o. female.    Chief Complaint: Follow-up (2 week follow up)    62-year-old black female with a history of gastrectomy for gastric carcinoma present to the emergency room with abdominal pain, swelling to legs and arms.  She was evaluated by Cardiology for the swelling and the family tells me she has significant congestive heart failure.  I am not sure if they diuresed her.  Gastroenterologist performed an EGD and told family that she had a large ulcer and is now on Carafate.  Patient is having a lot of pain.  She takes Percocet marcie  daily and it helps but wears off quickly.  She is losing a lot of weight.  She still having swelling in her arms and legs.  I do not have any records from her hospitalization except for her discharge medications.  She is scheduled to see cardiologist next week. I am trying to refer her to Dr Ortiz, whom I trust.  She seems to be waisting away.  [facility] not helping.    Review of Systems   Constitutional:  Positive for unexpected weight change. Negative for activity change, chills, fatigue and fever.   HENT:  Negative for sore throat and trouble swallowing.    Respiratory:  Negative for cough, chest tightness and shortness of breath.    Cardiovascular:  Negative for chest pain and leg swelling.   Gastrointestinal:  Positive for abdominal pain.   Endocrine: Negative for cold intolerance and heat intolerance.   Genitourinary:  Negative for difficulty urinating.   Musculoskeletal:  Negative for back pain and joint swelling.   Skin:  Negative for rash.   Neurological:  Negative for numbness.   Hematological:  Negative for adenopathy.   Psychiatric/Behavioral:  Negative for decreased concentration.      Objective:      Vitals:    12/02/22 1441   BP: (!) 72/42   Pulse: 80   Resp: 12     Physical Exam  Constitutional:       Appearance: She is ill-appearing.      Comments: Cachexia     HENT:      Head: Normocephalic and  atraumatic.      Nose: Nose normal.   Cardiovascular:      Rate and Rhythm: Normal rate and regular rhythm.      Heart sounds: No murmur heard.    No friction rub. No gallop.   Pulmonary:      Effort: Pulmonary effort is normal.      Breath sounds: Normal breath sounds.   Abdominal:      Tenderness: There is abdominal tenderness.      Comments: Tender to palpation in all quadrants.     Musculoskeletal:      Right lower leg: Edema present.      Left lower leg: Edema present.      Comments: Hand edema   Neurological:      General: No focal deficit present.      Mental Status: She is alert and oriented to person, place, and time.   Psychiatric:         Mood and Affect: Mood normal.       Assessment:       1. Anorexia    2. Simple chronic bronchitis    3. Carcinoma of antrum of stomach    4. S/P subtotal gastrectomy    5. Chronic gastric ulcer without hemorrhage and without perforation        Plan:   1. Anorexia  Assessment & Plan:  She cannot eat.  Maybe GI can get her a feeding tube.  Will refer to Dr. Ortiz      Orders:  -     Ambulatory referral/consult to Gastroenterology; Future; Expected date: 12/09/2022    2. Simple chronic bronchitis  -     albuterol (PROVENTIL/VENTOLIN HFA) 90 mcg/actuation inhaler; Inhale 2 puff every 4 to 6 hours a sneeded for shortness or breath or wheezing  Dispense: 8.5 g; Refill: 4    3. Carcinoma of antrum of stomach    4. S/P subtotal gastrectomy  Assessment & Plan:  She is loosing weight.    Refer to Dr Ortiz to see if something can be done.  I trust her.      Orders:  -     Ambulatory referral/consult to Gastroenterology; Future; Expected date: 12/09/2022    5. Chronic gastric ulcer without hemorrhage and without perforation  -     Ambulatory referral/consult to Gastroenterology; Future; Expected date: 12/09/2022     RTC in 3 months

## 2023-01-01 ENCOUNTER — TELEPHONE (OUTPATIENT)
Dept: NUTRITION | Facility: CLINIC | Age: 63
End: 2023-01-01
Payer: MEDICARE

## 2023-01-01 ENCOUNTER — EXTERNAL HOME HEALTH (OUTPATIENT)
Dept: HOME HEALTH SERVICES | Facility: HOSPITAL | Age: 63
End: 2023-01-01
Payer: MEDICARE

## 2023-01-01 ENCOUNTER — OFFICE VISIT (OUTPATIENT)
Dept: CARDIOLOGY | Facility: CLINIC | Age: 63
End: 2023-01-01
Payer: MEDICAID

## 2023-01-01 ENCOUNTER — EXTERNAL HOME HEALTH (OUTPATIENT)
Dept: HOME HEALTH SERVICES | Facility: HOSPITAL | Age: 63
End: 2023-01-01
Payer: MEDICAID

## 2023-01-01 ENCOUNTER — TELEPHONE (OUTPATIENT)
Dept: FAMILY MEDICINE | Facility: CLINIC | Age: 63
End: 2023-01-01
Payer: MEDICARE

## 2023-01-01 ENCOUNTER — DOCUMENT SCAN (OUTPATIENT)
Dept: HOME HEALTH SERVICES | Facility: HOSPITAL | Age: 63
End: 2023-01-01
Payer: MEDICARE

## 2023-01-01 ENCOUNTER — LAB VISIT (OUTPATIENT)
Dept: LAB | Facility: HOSPITAL | Age: 63
End: 2023-01-01
Attending: INTERNAL MEDICINE
Payer: MEDICARE

## 2023-01-01 ENCOUNTER — OFFICE VISIT (OUTPATIENT)
Dept: FAMILY MEDICINE | Facility: CLINIC | Age: 63
End: 2023-01-01
Payer: MEDICAID

## 2023-01-01 ENCOUNTER — PATIENT MESSAGE (OUTPATIENT)
Dept: NUTRITION | Facility: CLINIC | Age: 63
End: 2023-01-01
Payer: MEDICARE

## 2023-01-01 ENCOUNTER — OFFICE VISIT (OUTPATIENT)
Dept: GASTROENTEROLOGY | Facility: CLINIC | Age: 63
End: 2023-01-01
Payer: MEDICAID

## 2023-01-01 ENCOUNTER — PATIENT OUTREACH (OUTPATIENT)
Dept: HOME HEALTH SERVICES | Facility: HOSPITAL | Age: 63
End: 2023-01-01
Payer: MEDICARE

## 2023-01-01 ENCOUNTER — TELEPHONE (OUTPATIENT)
Dept: GASTROENTEROLOGY | Facility: CLINIC | Age: 63
End: 2023-01-01
Payer: MEDICARE

## 2023-01-01 VITALS
DIASTOLIC BLOOD PRESSURE: 60 MMHG | BODY MASS INDEX: 18.55 KG/M2 | WEIGHT: 101.38 LBS | SYSTOLIC BLOOD PRESSURE: 102 MMHG

## 2023-01-01 VITALS
DIASTOLIC BLOOD PRESSURE: 68 MMHG | WEIGHT: 96.81 LBS | HEIGHT: 62 IN | SYSTOLIC BLOOD PRESSURE: 118 MMHG | BODY MASS INDEX: 17.81 KG/M2 | HEART RATE: 60 BPM | RESPIRATION RATE: 16 BRPM

## 2023-01-01 VITALS
BODY MASS INDEX: 17.8 KG/M2 | SYSTOLIC BLOOD PRESSURE: 115 MMHG | DIASTOLIC BLOOD PRESSURE: 60 MMHG | WEIGHT: 97.31 LBS | HEART RATE: 60 BPM | OXYGEN SATURATION: 100 %

## 2023-01-01 VITALS
WEIGHT: 110 LBS | HEART RATE: 84 BPM | SYSTOLIC BLOOD PRESSURE: 92 MMHG | RESPIRATION RATE: 12 BRPM | DIASTOLIC BLOOD PRESSURE: 68 MMHG | BODY MASS INDEX: 20.24 KG/M2 | HEIGHT: 62 IN

## 2023-01-01 DIAGNOSIS — I42.8 NON-ISCHEMIC CARDIOMYOPATHY: ICD-10-CM

## 2023-01-01 DIAGNOSIS — I50.41 ACUTE COMBINED SYSTOLIC AND DIASTOLIC HEART FAILURE: ICD-10-CM

## 2023-01-01 DIAGNOSIS — I10 PRIMARY HYPERTENSION: Primary | ICD-10-CM

## 2023-01-01 DIAGNOSIS — R63.4 WEIGHT LOSS: ICD-10-CM

## 2023-01-01 DIAGNOSIS — Z90.3 S/P SUBTOTAL GASTRECTOMY: ICD-10-CM

## 2023-01-01 DIAGNOSIS — E43 SEVERE PROTEIN-CALORIE MALNUTRITION: Primary | ICD-10-CM

## 2023-01-01 DIAGNOSIS — I50.41 ACUTE COMBINED SYSTOLIC AND DIASTOLIC HEART FAILURE: Primary | ICD-10-CM

## 2023-01-01 DIAGNOSIS — F40.240 CLAUSTROPHOBIA: ICD-10-CM

## 2023-01-01 DIAGNOSIS — D49.0 GASTRIC NEOPLASM: ICD-10-CM

## 2023-01-01 DIAGNOSIS — E78.5 HYPERLIPIDEMIA, UNSPECIFIED HYPERLIPIDEMIA TYPE: ICD-10-CM

## 2023-01-01 DIAGNOSIS — C16.3 CARCINOMA OF ANTRUM OF STOMACH: ICD-10-CM

## 2023-01-01 DIAGNOSIS — J41.0 SIMPLE CHRONIC BRONCHITIS: ICD-10-CM

## 2023-01-01 DIAGNOSIS — E87.6 HYPOKALEMIA: ICD-10-CM

## 2023-01-01 DIAGNOSIS — R79.89 ELEVATED BRAIN NATRIURETIC PEPTIDE (BNP) LEVEL: ICD-10-CM

## 2023-01-01 DIAGNOSIS — C16.3 CARCINOMA OF ANTRUM OF STOMACH: Primary | ICD-10-CM

## 2023-01-01 DIAGNOSIS — R94.5 ABNORMAL RESULTS OF LIVER FUNCTION STUDIES: ICD-10-CM

## 2023-01-01 LAB
ALBUMIN SERPL BCP-MCNC: 1.8 G/DL (ref 3.5–5.2)
ANION GAP SERPL CALC-SCNC: 13 MMOL/L (ref 8–16)
BNP SERPL-MCNC: 154 PG/ML (ref 0–99)
BUN SERPL-MCNC: 17 MG/DL (ref 8–23)
CALCIUM SERPL-MCNC: 7.6 MG/DL (ref 8.7–10.5)
CHLORIDE SERPL-SCNC: 104 MMOL/L (ref 95–110)
CO2 SERPL-SCNC: 28 MMOL/L (ref 23–29)
CREAT SERPL-MCNC: 0.9 MG/DL (ref 0.5–1.4)
EST. GFR  (NO RACE VARIABLE): >60 ML/MIN/1.73 M^2
GLUCOSE SERPL-MCNC: 64 MG/DL (ref 70–110)
POTASSIUM SERPL-SCNC: 3.1 MMOL/L (ref 3.5–5.1)
SODIUM SERPL-SCNC: 145 MMOL/L (ref 136–145)

## 2023-01-01 PROCEDURE — 99999 PR PBB SHADOW E&M-EST. PATIENT-LVL II: CPT | Mod: PBBFAC,,, | Performed by: NURSE PRACTITIONER

## 2023-01-01 PROCEDURE — 1159F PR MEDICATION LIST DOCUMENTED IN MEDICAL RECORD: ICD-10-PCS | Mod: CPTII,,, | Performed by: NURSE PRACTITIONER

## 2023-01-01 PROCEDURE — 80048 BASIC METABOLIC PNL TOTAL CA: CPT | Performed by: INTERNAL MEDICINE

## 2023-01-01 PROCEDURE — 99215 PR OFFICE/OUTPT VISIT, EST, LEVL V, 40-54 MIN: ICD-10-PCS | Mod: S$PBB,,, | Performed by: FAMILY MEDICINE

## 2023-01-01 PROCEDURE — 3074F SYST BP LT 130 MM HG: CPT | Mod: CPTII,,, | Performed by: NURSE PRACTITIONER

## 2023-01-01 PROCEDURE — 3078F PR MOST RECENT DIASTOLIC BLOOD PRESSURE < 80 MM HG: ICD-10-PCS | Mod: CPTII,,, | Performed by: FAMILY MEDICINE

## 2023-01-01 PROCEDURE — 3078F DIAST BP <80 MM HG: CPT | Mod: CPTII,,, | Performed by: NURSE PRACTITIONER

## 2023-01-01 PROCEDURE — 3008F BODY MASS INDEX DOCD: CPT | Mod: CPTII,,, | Performed by: NURSE PRACTITIONER

## 2023-01-01 PROCEDURE — 1160F RVW MEDS BY RX/DR IN RCRD: CPT | Mod: CPTII,,, | Performed by: FAMILY MEDICINE

## 2023-01-01 PROCEDURE — 82040 ASSAY OF SERUM ALBUMIN: CPT | Performed by: NURSE PRACTITIONER

## 2023-01-01 PROCEDURE — 99999 PR PBB SHADOW E&M-EST. PATIENT-LVL III: CPT | Mod: PBBFAC,,, | Performed by: NURSE PRACTITIONER

## 2023-01-01 PROCEDURE — 1160F PR REVIEW ALL MEDS BY PRESCRIBER/CLIN PHARMACIST DOCUMENTED: ICD-10-PCS | Mod: CPTII,,, | Performed by: FAMILY MEDICINE

## 2023-01-01 PROCEDURE — 3074F SYST BP LT 130 MM HG: CPT | Mod: CPTII,,, | Performed by: FAMILY MEDICINE

## 2023-01-01 PROCEDURE — 3008F PR BODY MASS INDEX (BMI) DOCUMENTED: ICD-10-PCS | Mod: CPTII,,, | Performed by: NURSE PRACTITIONER

## 2023-01-01 PROCEDURE — 99214 OFFICE O/P EST MOD 30 MIN: CPT | Mod: PBBFAC | Performed by: FAMILY MEDICINE

## 2023-01-01 PROCEDURE — 3008F BODY MASS INDEX DOCD: CPT | Mod: CPTII,,, | Performed by: FAMILY MEDICINE

## 2023-01-01 PROCEDURE — 99999 PR PBB SHADOW E&M-EST. PATIENT-LVL IV: ICD-10-PCS | Mod: PBBFAC,,, | Performed by: INTERNAL MEDICINE

## 2023-01-01 PROCEDURE — 99212 OFFICE O/P EST SF 10 MIN: CPT | Mod: PBBFAC | Performed by: NURSE PRACTITIONER

## 2023-01-01 PROCEDURE — 99215 PR OFFICE/OUTPT VISIT, EST, LEVL V, 40-54 MIN: ICD-10-PCS | Mod: S$PBB,,, | Performed by: NURSE PRACTITIONER

## 2023-01-01 PROCEDURE — 36415 COLL VENOUS BLD VENIPUNCTURE: CPT | Performed by: INTERNAL MEDICINE

## 2023-01-01 PROCEDURE — G0179 PR HOME HEALTH MD RECERTIFICATION: ICD-10-PCS | Mod: ,,, | Performed by: FAMILY MEDICINE

## 2023-01-01 PROCEDURE — 1159F PR MEDICATION LIST DOCUMENTED IN MEDICAL RECORD: ICD-10-PCS | Mod: CPTII,,, | Performed by: FAMILY MEDICINE

## 2023-01-01 PROCEDURE — 1160F PR REVIEW ALL MEDS BY PRESCRIBER/CLIN PHARMACIST DOCUMENTED: ICD-10-PCS | Mod: CPTII,,, | Performed by: INTERNAL MEDICINE

## 2023-01-01 PROCEDURE — 3078F PR MOST RECENT DIASTOLIC BLOOD PRESSURE < 80 MM HG: ICD-10-PCS | Mod: CPTII,,, | Performed by: INTERNAL MEDICINE

## 2023-01-01 PROCEDURE — 1159F MED LIST DOCD IN RCRD: CPT | Mod: CPTII,,, | Performed by: FAMILY MEDICINE

## 2023-01-01 PROCEDURE — 1159F MED LIST DOCD IN RCRD: CPT | Mod: CPTII,,, | Performed by: INTERNAL MEDICINE

## 2023-01-01 PROCEDURE — 99214 PR OFFICE/OUTPT VISIT, EST, LEVL IV, 30-39 MIN: ICD-10-PCS | Mod: S$PBB,,, | Performed by: FAMILY MEDICINE

## 2023-01-01 PROCEDURE — 1160F RVW MEDS BY RX/DR IN RCRD: CPT | Mod: CPTII,,, | Performed by: INTERNAL MEDICINE

## 2023-01-01 PROCEDURE — 99214 OFFICE O/P EST MOD 30 MIN: CPT | Mod: PBBFAC,PO | Performed by: INTERNAL MEDICINE

## 2023-01-01 PROCEDURE — G0180 MD CERTIFICATION HHA PATIENT: HCPCS | Mod: ,,, | Performed by: FAMILY MEDICINE

## 2023-01-01 PROCEDURE — 99999 PR PBB SHADOW E&M-EST. PATIENT-LVL IV: CPT | Mod: PBBFAC,,, | Performed by: FAMILY MEDICINE

## 2023-01-01 PROCEDURE — 99214 OFFICE O/P EST MOD 30 MIN: CPT | Mod: S$PBB,,, | Performed by: INTERNAL MEDICINE

## 2023-01-01 PROCEDURE — 3078F PR MOST RECENT DIASTOLIC BLOOD PRESSURE < 80 MM HG: ICD-10-PCS | Mod: CPTII,,, | Performed by: NURSE PRACTITIONER

## 2023-01-01 PROCEDURE — 3008F PR BODY MASS INDEX (BMI) DOCUMENTED: ICD-10-PCS | Mod: CPTII,,, | Performed by: INTERNAL MEDICINE

## 2023-01-01 PROCEDURE — G0180 PR HOME HEALTH MD CERTIFICATION: ICD-10-PCS | Mod: ,,, | Performed by: FAMILY MEDICINE

## 2023-01-01 PROCEDURE — 99214 OFFICE O/P EST MOD 30 MIN: CPT | Mod: S$PBB,,, | Performed by: FAMILY MEDICINE

## 2023-01-01 PROCEDURE — 99214 PR OFFICE/OUTPT VISIT, EST, LEVL IV, 30-39 MIN: ICD-10-PCS | Mod: S$PBB,,, | Performed by: INTERNAL MEDICINE

## 2023-01-01 PROCEDURE — G0179 MD RECERTIFICATION HHA PT: HCPCS | Mod: ,,, | Performed by: FAMILY MEDICINE

## 2023-01-01 PROCEDURE — 99999 PR PBB SHADOW E&M-EST. PATIENT-LVL II: ICD-10-PCS | Mod: PBBFAC,,, | Performed by: NURSE PRACTITIONER

## 2023-01-01 PROCEDURE — 99214 OFFICE O/P EST MOD 30 MIN: CPT | Mod: S$PBB,,, | Performed by: NURSE PRACTITIONER

## 2023-01-01 PROCEDURE — 3074F SYST BP LT 130 MM HG: CPT | Mod: CPTII,,, | Performed by: INTERNAL MEDICINE

## 2023-01-01 PROCEDURE — 83880 ASSAY OF NATRIURETIC PEPTIDE: CPT | Performed by: NURSE PRACTITIONER

## 2023-01-01 PROCEDURE — 99215 OFFICE O/P EST HI 40 MIN: CPT | Mod: S$PBB,,, | Performed by: FAMILY MEDICINE

## 2023-01-01 PROCEDURE — 1159F MED LIST DOCD IN RCRD: CPT | Mod: CPTII,,, | Performed by: NURSE PRACTITIONER

## 2023-01-01 PROCEDURE — 99215 OFFICE O/P EST HI 40 MIN: CPT | Mod: S$PBB,,, | Performed by: NURSE PRACTITIONER

## 2023-01-01 PROCEDURE — 99999 PR PBB SHADOW E&M-EST. PATIENT-LVL IV: ICD-10-PCS | Mod: PBBFAC,,, | Performed by: FAMILY MEDICINE

## 2023-01-01 PROCEDURE — 3078F DIAST BP <80 MM HG: CPT | Mod: CPTII,,, | Performed by: FAMILY MEDICINE

## 2023-01-01 PROCEDURE — 3074F PR MOST RECENT SYSTOLIC BLOOD PRESSURE < 130 MM HG: ICD-10-PCS | Mod: CPTII,,, | Performed by: INTERNAL MEDICINE

## 2023-01-01 PROCEDURE — 99999 PR PBB SHADOW E&M-EST. PATIENT-LVL III: ICD-10-PCS | Mod: PBBFAC,,, | Performed by: NURSE PRACTITIONER

## 2023-01-01 PROCEDURE — 3074F PR MOST RECENT SYSTOLIC BLOOD PRESSURE < 130 MM HG: ICD-10-PCS | Mod: CPTII,,, | Performed by: FAMILY MEDICINE

## 2023-01-01 PROCEDURE — 3008F PR BODY MASS INDEX (BMI) DOCUMENTED: ICD-10-PCS | Mod: CPTII,,, | Performed by: FAMILY MEDICINE

## 2023-01-01 PROCEDURE — 1159F PR MEDICATION LIST DOCUMENTED IN MEDICAL RECORD: ICD-10-PCS | Mod: CPTII,,, | Performed by: INTERNAL MEDICINE

## 2023-01-01 PROCEDURE — 99213 OFFICE O/P EST LOW 20 MIN: CPT | Mod: PBBFAC | Performed by: NURSE PRACTITIONER

## 2023-01-01 PROCEDURE — 3078F DIAST BP <80 MM HG: CPT | Mod: CPTII,,, | Performed by: INTERNAL MEDICINE

## 2023-01-01 PROCEDURE — 3074F PR MOST RECENT SYSTOLIC BLOOD PRESSURE < 130 MM HG: ICD-10-PCS | Mod: CPTII,,, | Performed by: NURSE PRACTITIONER

## 2023-01-01 PROCEDURE — 99999 PR PBB SHADOW E&M-EST. PATIENT-LVL IV: CPT | Mod: PBBFAC,,, | Performed by: INTERNAL MEDICINE

## 2023-01-01 PROCEDURE — 99214 PR OFFICE/OUTPT VISIT, EST, LEVL IV, 30-39 MIN: ICD-10-PCS | Mod: S$PBB,,, | Performed by: NURSE PRACTITIONER

## 2023-01-01 PROCEDURE — 3008F BODY MASS INDEX DOCD: CPT | Mod: CPTII,,, | Performed by: INTERNAL MEDICINE

## 2023-01-01 RX ORDER — OXYCODONE AND ACETAMINOPHEN 7.5; 325 MG/1; MG/1
1 TABLET ORAL EVERY 8 HOURS PRN
Qty: 90 TABLET | Refills: 0 | Status: SHIPPED | OUTPATIENT
Start: 2023-01-01 | End: 2023-01-01 | Stop reason: SDUPTHER

## 2023-01-01 RX ORDER — SUCRALFATE 1 G/1
1 TABLET ORAL 2 TIMES DAILY
Qty: 60 TABLET | Refills: 11 | Status: SHIPPED | OUTPATIENT
Start: 2023-01-01 | End: 2023-06-28

## 2023-01-01 RX ORDER — DIAZEPAM 2 MG/1
2 TABLET ORAL EVERY 12 HOURS PRN
Qty: 2 TABLET | Refills: 0 | Status: ON HOLD | OUTPATIENT
Start: 2023-01-01 | End: 2023-01-01 | Stop reason: SDUPTHER

## 2023-01-01 RX ORDER — FUROSEMIDE 20 MG/1
20 TABLET ORAL DAILY
Qty: 30 TABLET | Refills: 2 | Status: SHIPPED | OUTPATIENT
Start: 2023-01-01 | End: 2023-06-28

## 2023-01-01 RX ORDER — PANTOPRAZOLE SODIUM 40 MG/1
TABLET, DELAYED RELEASE ORAL
Qty: 90 TABLET | Refills: 3 | Status: SHIPPED | OUTPATIENT
Start: 2023-01-01 | End: 2023-06-28

## 2023-01-01 RX ORDER — FUROSEMIDE 20 MG/1
20 TABLET ORAL DAILY
Qty: 30 TABLET | Refills: 0 | Status: SHIPPED | OUTPATIENT
Start: 2023-01-01 | End: 2023-01-01 | Stop reason: SDUPTHER

## 2023-01-01 RX ORDER — FUROSEMIDE 20 MG/1
20 TABLET ORAL 2 TIMES DAILY
Qty: 30 TABLET | Refills: 0 | Status: SHIPPED | OUTPATIENT
Start: 2023-01-01 | End: 2023-01-01 | Stop reason: SDUPTHER

## 2023-01-01 RX ORDER — POTASSIUM CHLORIDE 20 MEQ/1
20 TABLET, EXTENDED RELEASE ORAL DAILY
Qty: 30 TABLET | Refills: 2 | Status: SHIPPED | OUTPATIENT
Start: 2023-01-01 | End: 2023-06-28

## 2023-01-01 RX ORDER — OXYCODONE AND ACETAMINOPHEN 7.5; 325 MG/1; MG/1
1 TABLET ORAL EVERY 8 HOURS PRN
Qty: 90 TABLET | Refills: 0 | Status: ON HOLD | OUTPATIENT
Start: 2023-01-01 | End: 2023-01-01 | Stop reason: SDUPTHER

## 2023-01-04 PROBLEM — R63.4 WEIGHT LOSS: Status: ACTIVE | Noted: 2023-01-01

## 2023-01-04 PROBLEM — K92.2 UPPER GI BLEED: Status: RESOLVED | Noted: 2018-12-14 | Resolved: 2023-01-01

## 2023-01-04 NOTE — PROGRESS NOTES
"Subjective:       Patient ID: Xiomy Duncan is a 62 y.o. female.    Chief Complaint: Nausea    63 yo F with h/o stomach cancer s/p resection 2019 here for f/u, previously seen by Dr. Villagomez.  The pt has caregivers at several different hospital systems and thus records are not complete.  Her PCP asked me to see her due to weight loss and abdominal pain.  His records show that pt was admitted into [facility] with fluid overload and she had EGD at that time, but neither he nor I have these records.  Her son and daughter state that nothing was really done about the fluid overload (attributed to heart failure) and that she was told she has a stomach ulcer "from aspirin" and was given carafate.  We called the [facility] and they do not have any records on her so I am left to presume that she had EGD with Dr. Lee as an inpatient only.  Her complaint is mid belly pain on occasion which she feels is due to a hernia (she has h/o incisional hernia after gastrectomy).  She also has some mild nausea.  Her daughter is more concerned with her overall swelling, which is severe on her left hand.  She is frustrated b/c providers say "well it isn't an issue with my organs" and just send her elsewhere.  The pt and her daughter both deny having recent heart echo or having any u/s while admitted at [facility].  She says that her appetite is not really good but she makes herself eat.  Her kids say that she will eat good if it is something she likes; she agrees and states her son is trying to make her eat stuff she doesn't want to eat.  She eats several times per day but gets full easily and can only eat 4-5 bites at a time.  She denies vomiting or dysphagia.  She gets periumbilical pain after eating which is self limited and moderate in intensity.  She has 1 BM qod without any issues.  She previously had to take Colace but now she takes probiotics with satisfactory results.  She feels that she has fluid everywhere and that is the " "source for weight gain noted today.    She sees Dr. Pierce for Heme/Onc at [facility] and they all state that they have been told "her cancer has nothing to do with her symptoms."    Review of Systems   Constitutional:  Negative for chills and fever.   Respiratory:  Negative for shortness of breath and wheezing.    Cardiovascular:  Positive for leg swelling. Negative for chest pain and palpitations.   Gastrointestinal:  Positive for abdominal pain.   Neurological:  Negative for dizziness and speech difficulty.       Objective:      /60 (BP Location: Left arm, Patient Position: Sitting, BP Method: Small (Manual))   Wt 46 kg (101 lb 6.4 oz)   LMP  (LMP Unknown)   BMI 18.55 kg/m²     Physical Exam  Constitutional:       Appearance: Normal appearance. She is well-developed.      Comments: thin   HENT:      Head: Normocephalic and atraumatic.   Eyes:      Extraocular Movements: Extraocular movements intact.      Pupils: Pupils are equal, round, and reactive to light.   Pulmonary:      Effort: Pulmonary effort is normal. No respiratory distress.   Abdominal:      General: There is no distension.      Palpations: Abdomen is soft.   Musculoskeletal:         General: Swelling present. No signs of injury. Normal range of motion.      Cervical back: Normal range of motion and neck supple.      Comments: She has 2-3+ pitting edema up to her hips.  Her left hand is markedly swollen without injury.   Neurological:      General: No focal deficit present.      Mental Status: She is alert and oriented to person, place, and time.   Psychiatric:         Mood and Affect: Mood normal.         Behavior: Behavior normal.         Thought Content: Thought content normal.         Judgment: Judgment normal.       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    Relevant Medications    pantoprazole (PROTONIX) 40 MG tablet    Other Relevant Orders    FL Upper GI    CT Chest Abdomen Pelvis With Contrast (xpd)    CBC Auto Differential (Completed)    Comprehensive Metabolic Panel (Completed)    Prealbumin    Protime-INR (Completed)       Endocrine    Weight loss    Relevant Orders    TSH (Completed)       GI    S/P subtotal gastrectomy    Relevant Medications    pantoprazole (PROTONIX) 40 MG tablet    Other Relevant Orders    FL Upper GI    CT Chest Abdomen Pelvis With Contrast (xpd)    CBC Auto Differential (Completed)    Comprehensive Metabolic Panel (Completed)    Prealbumin    Protime-INR (Completed)     Other Visit Diagnoses       Abnormal results of liver function studies        Relevant Orders    Protime-INR (Completed)              Plan:       Carcinoma of antrum of stomach  -     FL Upper GI; Future; Expected date: 01/04/2023  -     CT Chest Abdomen Pelvis With Contrast (xpd); Future; Expected date: 01/04/2023  -     CBC Auto Differential; Future; Expected date: 01/04/2023  -     Comprehensive Metabolic Panel; Future; Expected date: 01/04/2023  -     Prealbumin; Future; Expected date: 01/04/2023  -     Protime-INR; Future; Expected date: 01/04/2023  -     pantoprazole (PROTONIX) 40 MG tablet; Take 1 tablet (40 mg) orally every day.  Dispense: 90 tablet; Refill: 3    S/P subtotal gastrectomy  -     FL Upper GI; Future; Expected date: 01/04/2023  -     CT Chest Abdomen Pelvis With Contrast (xpd); Future; Expected date: 01/04/2023  -     CBC Auto Differential; Future; Expected date: 01/04/2023  -     Comprehensive Metabolic Panel; Future; Expected date:  01/04/2023  -     Prealbumin; Future; Expected date: 01/04/2023  -     Protime-INR; Future; Expected date: 01/04/2023  -     pantoprazole (PROTONIX) 40 MG tablet; Take 1 tablet (40 mg) orally every day.  Dispense: 90 tablet; Refill: 3    Weight loss  -     TSH; Future; Expected date: 01/04/2023    Acute combined systolic and diastolic heart failure  -     B-TYPE NATRIURETIC PEPTIDE; Future; Expected date: 01/04/2023  -     Echo; Future  -     SCHEDULED EKG 12-LEAD (to Muse); Future    Abnormal results of liver function studies  -     Protime-INR; Future; Expected date: 01/04/2023    There are no labs or imaging since 2020 in our system.  I can not do EGD until we know heart status which is why I am doing non-invasive testing first.  She lives in [place] with her son and that involves a lot of driving as her care is in [facility].      ADDENDUM:  [facility] called to tell us that the pt switched insurance to [employer] as of January 1, and [facility] does not accept this plan.  Therefore we were unable to get these studies scheduled closer to her home, she will have to come here to have them.  [facility] accept this insurance which means testing and procedures will be covered, but MD office visits will not.  My staff called and informed the daughter of this who is calling the insurance company, very upset.  I will let her PCP know as well.

## 2023-01-17 NOTE — TELEPHONE ENCOUNTER
----- Message from Юлия Ochoa sent at 1/17/2023 11:26 AM CST -----  Type:  Patient Returning Call    Who Called: Pt Daughter Lakisha  Who Left Message for Patient: Elif (nurse)  Does the patient know what this is regarding?: Instructions that pt needs  Would the patient rather a call back or a response via [facility]?  call  Best Call Back Number: [phone]  Additional Information:

## 2023-01-17 NOTE — TELEPHONE ENCOUNTER
Per Dr. Ortiz, patient to start taking miralax everyday for constipation issues. CT scan rescheduled to 1/26/2023.

## 2023-01-23 NOTE — PROGRESS NOTES
Subjective:       Patient ID: Xiomy Duncan is a 63 y.o. female.    Chief Complaint: Follow-up (3 month follow up)      62-year-old black female with a history of gastrectomy for gastric carcinoma present to the emergency room with abdominal pain, swelling to legs and arms.  She was evaluated by Cardiology for the swelling and the family tells me she has significant congestive heart failure.  Gastroenterologist performed an EGD and told family that she had a large ulcer and is now on Carafate.  Patient is having a lot of pain.  She takes Percocet marcie  daily and it helps but wears off quickly.  She is losing a lot of weight.  She still having swelling in her arms and legs.  I do not have any records from her hospitalization except for her discharge medications.   I sent her to Dr Ortiz, whom I trust.  She did UGI series showing small stomach but no mass.  She would like patient to see Cardiology for further evaluation of the swelling and get cardiology clinic before she does an EGD.  She is still swollen.  Probably from low albumen and CHF combination.  She is not taking Lasix at this time.  She needs a new prescription.  She is had cardiology evaluation by [facility].  Her only medications metoprolol.  She was on Entresto by stop taking it.    Review of Systems   Constitutional:  Positive for unexpected weight change. Negative for activity change, chills, fatigue and fever.   HENT:  Negative for sore throat and trouble swallowing.    Respiratory:  Negative for cough, chest tightness and shortness of breath.    Cardiovascular:  Positive for leg swelling. Negative for chest pain.   Gastrointestinal:  Positive for abdominal pain.   Endocrine: Negative for cold intolerance and heat intolerance.   Genitourinary:  Negative for difficulty urinating.   Musculoskeletal:  Negative for back pain and joint swelling.   Skin:  Negative for rash.   Neurological:  Negative for numbness.   Hematological:  Negative for adenopathy.    Psychiatric/Behavioral:  Negative for decreased concentration.      Objective:      Vitals:    01/23/23 1506   BP: 92/68   Pulse: 84   Resp: 12     Physical Exam  Constitutional:       Appearance: She is ill-appearing.      Comments:      HENT:      Head: Normocephalic and atraumatic.      Nose: Nose normal.   Cardiovascular:      Rate and Rhythm: Normal rate and regular rhythm.      Heart sounds: No murmur heard.    No friction rub. No gallop.   Pulmonary:      Effort: Pulmonary effort is normal.      Breath sounds: Normal breath sounds.   Abdominal:      Tenderness: There is abdominal tenderness.      Comments: Tender to palpation in all quadrants.     Musculoskeletal:         General: Swelling present.      Right lower leg: Edema present.      Left lower leg: Edema present.      Comments: Hand edema and lower extremity edema   Neurological:      General: No focal deficit present.      Mental Status: She is alert and oriented to person, place, and time.      Motor: Weakness present.   Psychiatric:         Mood and Affect: Mood normal.       Lab Results   Component Value Date    WBC 9.95 01/04/2023    HGB 10.9 (L) 01/04/2023    HCT 33.9 (L) 01/04/2023    MCV 99 (H) 01/04/2023     01/04/2023       CMP  Sodium   Date Value Ref Range Status   01/04/2023 139 136 - 145 mmol/L Final     Potassium   Date Value Ref Range Status   01/04/2023 3.8 3.5 - 5.1 mmol/L Final     Chloride   Date Value Ref Range Status   01/04/2023 108 95 - 110 mmol/L Final     CO2   Date Value Ref Range Status   01/04/2023 28 23 - 29 mmol/L Final     Glucose   Date Value Ref Range Status   01/04/2023 83 70 - 110 mg/dL Final     BUN   Date Value Ref Range Status   01/04/2023 17 7 - 17 mg/dL Final     Creatinine   Date Value Ref Range Status   01/04/2023 0.76 0.50 - 1.40 mg/dL Final     Calcium   Date Value Ref Range Status   01/04/2023 7.2 (L) 8.7 - 10.5 mg/dL Final     Total Protein   Date Value Ref Range Status   01/04/2023 5.0 (L) 6.0 -  8.4 g/dL Final     Albumin   Date Value Ref Range Status   01/04/2023 2.2 (L) 3.5 - 5.2 g/dL Final     Total Bilirubin   Date Value Ref Range Status   01/04/2023 0.3 0.1 - 1.0 mg/dL Final     Comment:     For infants and newborns, interpretation of results should be based  on gestational age, weight and in agreement with clinical  observations.    Premature Infant recommended reference ranges:  Up to 24 hours.............<8.0 mg/dL  Up to 48 hours............<12.0 mg/dL  3-5 days..................<15.0 mg/dL  6-29 days.................<15.0 mg/dL       Alkaline Phosphatase   Date Value Ref Range Status   01/04/2023 159 (H) 38 - 126 U/L Final     AST   Date Value Ref Range Status   01/04/2023 48 (H) 15 - 46 U/L Final     ALT   Date Value Ref Range Status   01/04/2023 27 10 - 44 U/L Final     Anion Gap   Date Value Ref Range Status   01/04/2023 3 (L) 8 - 16 mmol/L Final     eGFR   Date Value Ref Range Status   01/04/2023 >60.0 >60 mL/min/1.73 m^2 Final       Assessment:       1. Acute combined systolic and diastolic heart failure    2. Carcinoma of antrum of stomach    3. Claustrophobia    4. S/P subtotal gastrectomy        Plan:   1. Acute combined systolic and diastolic heart failure  -     Ambulatory referral/consult to Cardiology; Future; Expected date: 01/30/2023  -     furosemide (LASIX) 20 MG tablet; Take 1 tablet (20 mg total) by mouth once daily.  Dispense: 30 tablet; Refill: 0    2. Carcinoma of antrum of stomach  -     oxyCODONE-acetaminophen (PERCOCET) 7.5-325 mg per tablet; Take 1 tablet by mouth every 8 (eight) hours as needed for Pain.  Dispense: 90 tablet; Refill: 0    3. Claustrophobia  Comments:  Take Valium 1 hour before CT scan  Orders:  -     diazePAM (VALIUM) 2 MG tablet; Take 1 tablet (2 mg total) by mouth every 12 (twelve) hours as needed for Anxiety (before ct scan).  Dispense: 2 tablet; Refill: 0    4. S/P subtotal gastrectomy       RTC in 3 months

## 2023-01-26 NOTE — PROGRESS NOTES
[facility]    CC: Swelling to BLE       Patient ID: Xiomy Duncan is a 63 y.o. female with a past medical history of HTN, HLD, non-ischemic cardiomyopathy, acute combined systolic and diastolic heart failure    HPI  Recently seen by PCP for follow up ER visit for abdominal pain, swelling to arms and legs  No records from recent hospitalization  Per GI large ulcer, takes Carafate; Albumin low; 6  Referred for swelling prior to repeat EGD  Placed on Lasix 20mg QD    Previously with reports of hypotension     Echo 1/2023  The left ventricle is normal in size with concentric remodeling and normal systolic function.  The estimated ejection fraction is 65%.  Normal left ventricular diastolic function.  Normal right ventricular size with normal right ventricular systolic function.  Mild aortic regurgitation.  Mild pulmonic regurgitation.  Small circumferential pericardial effusion. Effusion is fluid.  Mild tricuspid regurgitation.  Mild mitral regurgitation.  Normal central venous pressure (3 mmHg).  The estimated PA systolic pressure is 64 mmHg.  There is pulmonary hypertension.    LHC 2/2020  Normal coronary arteries.  Non ischemic cardiomyopathy  Coronary cameral fistula  There is moderate left ventricular systolic dysfunction.  The ejection fraction is 30-40% by visual estimate.    Echo 2/19/2020   Severely decreased left ventricular systolic function. The estimated ejection fraction is 25%. Base moves, suggestive of takatsubo.  Concentric left ventricular remodeling.  Grade III (severe) left ventricular diastolic dysfunction consistent with restrictive physiology.  Normal right ventricular systolic function.  Global hypokinetic wall motion.  No pulmonary hypertension present.  There is a left pleural effusion.    BP stable today  Nonsmoker; prior history 8 hear    No family history CAD  Denies CP, SOB/GOMEZ, orthopnea, PND, syncope, palps, LE edema.       Past Medical History:   Diagnosis Date     Anemia     Anxiety     Asthma     Cancer determined by biopsy of stomach 2018    Cannabis abuse, daily use     Colon polyps     Encounter for blood transfusion     GERD (gastroesophageal reflux disease) 2014    Hoarseness 2014    Hypertension     S/P subtotal gastrectomy 2019    Seizures     LAST SEIZURE >6 YEARS AGO    Thyroid nodule     Ulcer     Vaginal delivery     x4     Past Surgical History:   Procedure Laterality Date    CHOLECYSTECTOMY      ESOPHAGOGASTRODUODENOSCOPY N/A 12/15/2018    Procedure: ESOPHAGOGASTRODUODENOSCOPY (EGD);  Surgeon: Alisson Villagomez MD;  Location: [facility] ENDO;  Service: Endoscopy;  Laterality: N/A;    ESOPHAGOGASTRODUODENOSCOPY N/A 2020    Procedure: EGD (ESOPHAGOGASTRODUODENOSCOPY);  Surgeon: Alisson Villagomez MD;  [facility];  Service: Endoscopy;  Laterality: N/A;    GASTRECTOMY N/A 2019    Procedure: OPEN GASTRECTOMY- radical subtotal;  Surgeon: Jesus Tripp MD;  Location: [facility];  Service: General;  Laterality: N/A;  OPEN  GASTRECTOMY  PER ABIEL ON 19  @ 302PM-LO  RN PRE OP 19    HERNIA REPAIR N/A 01/15/2020    incisional hernia repair    LEFT HEART CATHETERIZATION Left 2020    Procedure: Left heart cath;  Surgeon: Juan Castellanos MD;  Location: [facility] CATH LAB;  Service: Cardiology;  Laterality: Left;    PORTACATH PLACEMENT Left 2019    TONSILLECTOMY      TUBAL LIGATION      ULTRASOUND GUIDANCE  2020    Procedure: Ultrasound Guidance;  Surgeon: Juan Castellanos MD;  Location: [facility] CATH LAB;  Service: Cardiology;;     Social History     Socioeconomic History    Marital status: Single   Occupational History    Occupation:      Employer: [employer]     Comment: [employer]   Tobacco Use    Smoking status: Former     Packs/day: 0.00     Years: 46.00     Pack years: 0.00     Types: Cigarettes     Quit date: 10/15/2018     Years since quittin.2    Smokeless tobacco: Never   Substance and Sexual Activity     Alcohol use: Not Currently    Drug use: Yes     Types: Marijuana     Comment: every day    Sexual activity: Not Currently     Birth control/protection: None, Post-menopausal     Comment: single     Family History   Problem Relation Age of Onset    Breast cancer Father     Bone cancer Mother     Hypertension Brother     Hypertension Maternal Uncle     Diabetes Maternal Uncle     Hypertension Maternal Grandmother     Stomach cancer Maternal Grandfather     Colon cancer Neg Hx     Ovarian cancer Neg Hx        Review of patient's allergies indicates:   Allergen Reactions    Penicillins Rash       Medication List with Changes/Refills   Current Medications    ALBUTEROL (PROVENTIL/VENTOLIN HFA) 90 MCG/ACTUATION INHALER    Inhale 2 puff every 4 to 6 hours a sneeded for shortness or breath or wheezing    CYPROHEPTADINE (PERIACTIN) 4 MG TABLET    Take two tablets by mouth three times a day    DIAZEPAM (VALIUM) 2 MG TABLET    Take 1 tablet (2 mg total) by mouth every 12 (twelve) hours as needed for Anxiety (before ct scan).    DOCUSATE SODIUM 100 MG CAPSULE    Take 1 tablet orally 2 times a day as needed.    FUROSEMIDE (LASIX) 20 MG TABLET    Take 1 tablet (20 mg total) by mouth once daily.    MEGESTROL (MEGACE) 400 MG/10 ML (40 MG/ML) SUSP    Take 10 mLs (400 mg total) by mouth 2 (two) times daily.    METOPROLOL SUCCINATE (TOPROL-XL) 25 MG 24 HR TABLET    Take 1 tablet by mouth once a day.    MIRTAZAPINE (REMERON) 30 MG TABLET    Take 1 tablet (30 mg total) by mouth every evening.    MULTIVITAMIN/IRON/FOLIC ACID (CENTRUM ORAL)    Take by mouth.    ONDANSETRON (ZOFRAN-ODT) 8 MG TBDL    Dissolve one tablet by mouth every 6 hours as needed    OXYCODONE-ACETAMINOPHEN (PERCOCET) 7.5-325 MG PER TABLET    Take 1 tablet by mouth every 8 (eight) hours as needed for Pain.    PANTOPRAZOLE (PROTONIX) 40 MG TABLET    Take 1 tablet (40 mg) orally every day.    PROCHLORPERAZINE (COMPAZINE) 10 MG TABLET    Take 1 tablet (10 mg total) by  mouth 3 (three) times daily as needed (nausea).    SUCRALFATE (CARAFATE) 1 GRAM TABLET    Take 1 tablet (1 g total) by mouth 2 (two) times daily.    TIOTROPIUM-OLODATEROL (STIOLTO RESPIMAT) 2.5-2.5 MCG/ACTUATION MIST    Mist 2 puffs (inhalation) once a day.           ROS    There were no vitals filed for this visit.       Physical Exam      Labs:  Most Recent Data  CBC:   Lab Results   Component Value Date    WBC 9.95 01/04/2023    HGB 10.9 (L) 01/04/2023    HCT 33.9 (L) 01/04/2023     01/04/2023    MCV 99 (H) 01/04/2023    RDW 15.7 (H) 01/04/2023     BMP:   Lab Results   Component Value Date     01/04/2023    K 3.8 01/04/2023     01/04/2023    CO2 28 01/04/2023    BUN 17 01/04/2023    CREATININE 0.76 01/04/2023    GLU 83 01/04/2023    CALCIUM 7.2 (L) 01/04/2023    MG 1.7 03/01/2019    PHOS 2.9 03/01/2019     LFTS;   Lab Results   Component Value Date    PROT 5.0 (L) 01/04/2023    ALBUMIN 2.2 (L) 01/04/2023    BILITOT 0.3 01/04/2023    AST 48 (H) 01/04/2023    ALKPHOS 159 (H) 01/04/2023    ALT 27 01/04/2023     COAGS:   Lab Results   Component Value Date    INR 1.2 01/04/2023     FLP:   Lab Results   Component Value Date    CHOL 175 06/01/2018    HDL 39 (L) 06/01/2018    LDLCALC 115.0 06/01/2018    TRIG 105 06/01/2018    CHOLHDL 22.3 06/01/2018     CARDIAC:   Lab Results   Component Value Date    TROPONINI 4.481 (H) 02/19/2020    BNP 2,496 (H) 02/18/2020     Assessment/Plan:  Problem List Items Addressed This Visit          Cardiac/Vascular    Acute combined systolic and diastolic heart failure       Increase Lasix to 40mg x 5 days, then resume 20mg daily   Repeat BMP prior to next visit  F/U 1 week    Will need lipid panel in future       Total duration of face to face visit time 30 minutes.  Total time spent counseling greater than fifty percent of total visit time.  Counseling included discussion regarding imaging findings, diagnosis, possibilities, treatment options, risks and benefits.  The  patient had many questions regarding the options and long-term effects.    Linda Wylie, SHREEP-C  Cardiology Clinic  [facility]

## 2023-02-01 NOTE — PROGRESS NOTES
"   Cardiology Clinic note    Subjective:   Patient ID:  Xiomy Duncan is a 63 y.o. female who presents for evaluation of BLE swelling,  HTN, HLD, non-ischemic cardiomyopathy, acute combined systolic and diastolic heart failure      HPI:   Xiomy Duncan  has a past medical history of Anemia, Anxiety, Asthma, Cancer determined by biopsy of stomach (12/2018), Cannabis abuse, daily use, Colon polyps, Encounter for blood transfusion, GERD (gastroesophageal reflux disease) (8/7/2014), Hoarseness (8/7/2014), Hypertension, S/P subtotal gastrectomy (02/08/2019), Seizures, Thyroid nodule, Ulcer, and Vaginal delivery.      HPI  Recently seen by PCP 1/26/2022 for follow up ER visit for abdominal pain, swelling to arms and legs  No records from recent hospitalization  Per GI large ulcer, takes Carafate; Albumin low; 6  Referred for swelling prior to repeat EGD  Placed on Lasix 20mg QD  Previously with hypotension; Entresto stopped     Seen by myself 1/26/2023 with recommendations for BNP and increase Lasix to 40mg x 5 days ; did not get BMP and visited ED below:    Visited [facility] ED 1/29/2023  "Patient's work-up was unremarkable. Her renal function is normal. She is not retaining urine. Her BNP is not elevated. Her albumin is low which is the likely culprit in her symptoms. She was seen recently by cardiology and recommended to follow-up after a trial of Lasix. We will give her 1 dose of Bumex in the emergency department to see if this helps. At this point I do not have an indication to place her in the hospital. [BS]   1537 On review of patient's chart she had a CT scan done last week that showed similar type findings with anasarca. She is scheduled to have follow-up with her cardiologist this week. [BS]"    Per ED: weight 119lbs    Chest x-ray   Small bilateral pleural effusions with overlying hazy opacities likely reflecting atelectasis.    EKG  NSR    Echo 1/2023  The left ventricle is normal in size " with concentric remodeling and normal systolic function.  The estimated ejection fraction is 65%.  Normal left ventricular diastolic function.  Normal right ventricular size with normal right ventricular systolic function.  Mild aortic regurgitation.  Mild pulmonic regurgitation.  Small circumferential pericardial effusion. Effusion is fluid.  Mild tricuspid regurgitation.  Mild mitral regurgitation.  Normal central venous pressure (3 mmHg).  The estimated PA systolic pressure is 64 mmHg.  There is pulmonary hypertension.    LHC 2/2020  Normal coronary arteries.  Non ischemic cardiomyopathy  Coronary cameral fistula  There is moderate left ventricular systolic dysfunction.  The ejection fraction is 30-40% by visual estimate.    Echo 2/19/2020   Severely decreased left ventricular systolic function. The estimated ejection fraction is 25%. Base moves, suggestive of takatsubo.  Concentric left ventricular remodeling.  Grade III (severe) left ventricular diastolic dysfunction consistent with restrictive physiology.  Normal right ventricular systolic function.  Global hypokinetic wall motion.  No pulmonary hypertension present.  There is a left pleural effusion.    BP stable today; 115/60  Weight 97lbs; was 110 last week   Nonsmoker; prior history 8 hear    Per chart review; upper/lower extremity swelling since 12/2022; Entresto stopped due to low BP. Poor nutrition. Recommended High protein Ensure; she tells me today she does not take due to unpleasant taste.     No family history CAD  Denies CP, SOB/GOMEZ, orthopnea, PND, syncope, palps, LE edema.     Patient Active Problem List    Diagnosis Date Noted    Weight loss 01/04/2023    Anorexia 11/18/2022    Chronic gastric ulcer without hemorrhage and without perforation 11/18/2022    Malignant neoplasm 08/19/2020    Well woman exam with routine gynecological exam 07/06/2020    Menopausal state 07/06/2020    Acute combined systolic and diastolic heart failure 02/21/2020     Pleural effusion 02/20/2020    Non-ischemic cardiomyopathy 02/18/2020    Elevated brain natriuretic peptide (BNP) level 02/18/2020    Post viral syndrome 02/18/2020    Abnormal CT of the chest 02/18/2020    Abnormal CT of the abdomen 02/18/2020    Gastric neoplasm 01/15/2020    S/P subtotal gastrectomy 02/27/2019    Carcinoma of antrum of stomach 01/16/2019    Malignant neoplasm of stomach 01/16/2019    Preoperative evaluation to rule out surgical contraindication 12/26/2017    GERD (gastroesophageal reflux disease) 08/07/2014    Urge incontinence 07/01/2014    Thyroid nodule 06/25/2014    Hyperlipidemia 06/25/2014    Hx of colonic polyps 06/25/2014    Overweight (BMI 25.0-29.9) 06/25/2014    Pre-diabetes 06/25/2014    HTN (hypertension) 05/28/2014    Stable asthma 05/28/2014       Patient's Medications   New Prescriptions    No medications on file   Previous Medications    ALBUTEROL (PROVENTIL/VENTOLIN HFA) 90 MCG/ACTUATION INHALER    Inhale 2 puff every 4 to 6 hours a sneeded for shortness or breath or wheezing    CYPROHEPTADINE (PERIACTIN) 4 MG TABLET    Take two tablets by mouth three times a day    DIAZEPAM (VALIUM) 2 MG TABLET    Take 1 tablet (2 mg total) by mouth every 12 (twelve) hours as needed for Anxiety (before ct scan).    DOCUSATE SODIUM 100 MG CAPSULE    Take 1 tablet orally 2 times a day as needed.    FUROSEMIDE (LASIX) 20 MG TABLET    Take 1 tablet (20 mg total) by mouth once daily.    MEGESTROL (MEGACE) 400 MG/10 ML (40 MG/ML) SUSP    Take 10 mLs (400 mg total) by mouth 2 (two) times daily.    METOPROLOL SUCCINATE (TOPROL-XL) 25 MG 24 HR TABLET    Take 1 tablet by mouth once a day.    MIRTAZAPINE (REMERON) 30 MG TABLET    Take 1 tablet (30 mg total) by mouth every evening.    MULTIVITAMIN/IRON/FOLIC ACID (CENTRUM ORAL)    Take by mouth.    ONDANSETRON (ZOFRAN-ODT) 8 MG TBDL    Dissolve one tablet by mouth every 6 hours as needed    OXYCODONE-ACETAMINOPHEN (PERCOCET) 7.5-325 MG PER TABLET    Take 1  tablet by mouth every 8 (eight) hours as needed for Pain.    PANTOPRAZOLE (PROTONIX) 40 MG TABLET    Take 1 tablet (40 mg) orally every day.    PROCHLORPERAZINE (COMPAZINE) 10 MG TABLET    Take 1 tablet (10 mg total) by mouth 3 (three) times daily as needed (nausea).    SUCRALFATE (CARAFATE) 1 GRAM TABLET    Take 1 tablet (1 g total) by mouth 2 (two) times daily.    TIOTROPIUM-OLODATEROL (STIOLTO RESPIMAT) 2.5-2.5 MCG/ACTUATION MIST    Mist 2 puffs (inhalation) once a day.   Modified Medications    No medications on file   Discontinued Medications    No medications on file        ROS      Objective:   Vitals  There were no vitals filed for this visit.       Physical Exam      Lab Results    Lab Results   Component Value Date    WBC 9.95 01/04/2023    HGB 10.9 (L) 01/04/2023    HCT 33.9 (L) 01/04/2023    MCV 99 (H) 01/04/2023       Lab Results   Component Value Date     01/04/2023    INR 1.2 01/04/2023       Lab Results   Component Value Date    K 3.8 01/04/2023    MG 1.7 03/01/2019    BUN 17 01/04/2023    CREATININE 0.76 01/04/2023       Lab Results   Component Value Date    GLU 83 01/04/2023    HGBA1C 5.6 12/15/2018       Lab Results   Component Value Date    AST 48 (H) 01/04/2023    ALT 27 01/04/2023    ALBUMIN 2.2 (L) 01/04/2023    PROT 5.0 (L) 01/04/2023       Lab Results   Component Value Date    CHOL 175 06/01/2018    HDL 39 (L) 06/01/2018    LDLCALC 115.0 06/01/2018    TRIG 105 06/01/2018       Lab Results   Component Value Date    .8 (H) 03/01/2019    BNP 2,496 (H) 02/18/2020     Assessment:     Problem List Items Addressed This Visit          Cardiac/Vascular    HTN (hypertension) - Primary    Hyperlipidemia    Non-ischemic cardiomyopathy    Elevated brain natriuretic peptide (BNP) level    Acute combined systolic and diastolic heart failure       Plan:   Per chart review; history swelling bilateral upper/lower extremities, low albumin, hypotension, poor dietary intake due to large gastric  ulcer; takes carafate and protonix     Albumin, BNP today; continue Lasix   Nutrition consult     Continue with current medical plan and lifestyle changes.      No orders of the defined types were placed in this encounter.      Follow up in 2 months   Return sooner for concerns or questions. If symptoms persist go to the ED    She expressed verbal understanding and agreed with the plan    Thank you for the opportunity to care for this patient. Will be available for questions if needed.     Total duration of face to face visit time 30 minutes.  Total time spent counseling greater than fifty percent of total visit time.  Counseling included discussion regarding imaging findings, diagnosis, possibilities, treatment options, risks and benefits.    FIGUEROA Conley-C  Cardiology Clinic  [facility]

## 2023-02-27 NOTE — PROGRESS NOTES
Subjective:       Patient ID: Xiomy uDncan is a 63 y.o. female.    Chief Complaint: Follow-up (4 week follow up)      63-year-old black female with a history of gastrectomy for gastric carcinoma, cardiomyopathy, history of anasarca from protein malnutrition and low albumin level, here for follow-up.  She has chronic abdominal pain.  I am currently prescribing Percocet 7.5 mg 3 times daily.  Patient is taking Periactin, Remeron to help with her appetite but is not very effective.  She has severe social determinants of health.  She lives 50 miles away and transportation is a problem.  She had difficulties with her last insurance so the gastroenterologist could not run the test necessary.  They have now changed the insurance.  Patient gets home health and the occupational therapist his feels patient needs inpatient rehab.  I do not believe she qualifies for inpatient rehab.  Patient cannot walk very far.  She has arm weakness and leg weakness.  Currently her weight is stable at 96 lb.  Patient has cardiomyopathy and her ejection fraction seems to be better.  She had a good evaluation with Cardiology and I reviewed the note.  Patient still has significant lower extremity edema.  She is currently on metoprolol 25 mg daily  Patient is supposed to get some tests with Dr. Ortiz.  This has been put on hold because of insurance issues.  This has now been resolved so I will refer patient back to her.  Patient currently taking Protonix and Carafate for stomach ulcer.  Upper GI series ordered by Dr. Ortiz did not reveal a mass or ulcer.      Review of Systems   Constitutional:  Positive for unexpected weight change. Negative for activity change, chills, fatigue and fever.   HENT:  Negative for sore throat and trouble swallowing.    Respiratory:  Negative for cough, chest tightness and shortness of breath.    Cardiovascular:  Positive for leg swelling. Negative for chest pain.   Gastrointestinal:  Positive for abdominal  pain.   Endocrine: Negative for cold intolerance and heat intolerance.   Genitourinary:  Negative for difficulty urinating.   Musculoskeletal:  Negative for back pain and joint swelling.   Skin:  Negative for rash.   Neurological:  Negative for numbness.   Hematological:  Negative for adenopathy.   Psychiatric/Behavioral:  Negative for decreased concentration.      Objective:      Vitals:    02/27/23 1458   BP: 118/68   Pulse: 60   Resp: 16     Physical Exam  Constitutional:       Appearance: She is ill-appearing.      Comments:      HENT:      Head: Normocephalic and atraumatic.      Nose: Nose normal.   Cardiovascular:      Rate and Rhythm: Normal rate and regular rhythm.      Heart sounds: No murmur heard.    No friction rub. No gallop.   Pulmonary:      Effort: Pulmonary effort is normal.      Breath sounds: Normal breath sounds.   Abdominal:      Tenderness: There is abdominal tenderness.      Comments: Tender to palpation in all quadrants.     Musculoskeletal:         General: Swelling present.      Right lower leg: Edema present.      Left lower leg: Edema present.      Comments: Hand edema and lower extremity edema   Neurological:      General: No focal deficit present.      Mental Status: She is alert and oriented to person, place, and time.      Motor: Weakness present.   Psychiatric:         Mood and Affect: Mood normal.       Echo 1/2023  The left ventricle is normal in size with concentric remodeling and normal systolic function.  The estimated ejection fraction is 65%.  Normal left ventricular diastolic function.  Normal right ventricular size with normal right ventricular systolic function.  Mild aortic regurgitation.  Mild pulmonic regurgitation.  Small circumferential pericardial effusion. Effusion is fluid.  Mild tricuspid regurgitation.  Mild mitral regurgitation.  Normal central venous pressure (3 mmHg).  The estimated PA systolic pressure is 64 mmHg.  There is pulmonary hypertension.    Lab  Results   Component Value Date    WBC 9.95 01/04/2023    HGB 10.9 (L) 01/04/2023    HCT 33.9 (L) 01/04/2023    MCV 99 (H) 01/04/2023     01/04/2023       CMP  Sodium   Date Value Ref Range Status   02/01/2023 145 136 - 145 mmol/L Final     Potassium   Date Value Ref Range Status   02/01/2023 3.1 (L) 3.5 - 5.1 mmol/L Final     Chloride   Date Value Ref Range Status   02/01/2023 104 95 - 110 mmol/L Final     CO2   Date Value Ref Range Status   02/01/2023 28 23 - 29 mmol/L Final     Glucose   Date Value Ref Range Status   02/01/2023 64 (L) 70 - 110 mg/dL Final     BUN   Date Value Ref Range Status   02/01/2023 17 8 - 23 mg/dL Final     Creatinine   Date Value Ref Range Status   02/01/2023 0.9 0.5 - 1.4 mg/dL Final     Calcium   Date Value Ref Range Status   02/01/2023 7.6 (L) 8.7 - 10.5 mg/dL Final     Total Protein   Date Value Ref Range Status   01/04/2023 5.0 (L) 6.0 - 8.4 g/dL Final     Albumin   Date Value Ref Range Status   02/01/2023 1.8 (L) 3.5 - 5.2 g/dL Final     Total Bilirubin   Date Value Ref Range Status   01/04/2023 0.3 0.1 - 1.0 mg/dL Final     Comment:     For infants and newborns, interpretation of results should be based  on gestational age, weight and in agreement with clinical  observations.    Premature Infant recommended reference ranges:  Up to 24 hours.............<8.0 mg/dL  Up to 48 hours............<12.0 mg/dL  3-5 days..................<15.0 mg/dL  6-29 days.................<15.0 mg/dL       Alkaline Phosphatase   Date Value Ref Range Status   01/04/2023 159 (H) 38 - 126 U/L Final     AST   Date Value Ref Range Status   01/04/2023 48 (H) 15 - 46 U/L Final     ALT   Date Value Ref Range Status   01/04/2023 27 10 - 44 U/L Final     Anion Gap   Date Value Ref Range Status   02/01/2023 13 8 - 16 mmol/L Final     eGFR   Date Value Ref Range Status   02/01/2023 >60 >60 mL/min/1.73 m^2 Final     BMP  Lab Results   Component Value Date     02/01/2023    K 3.1 (L) 02/01/2023      02/01/2023    CO2 28 02/01/2023    BUN 17 02/01/2023    CREATININE 0.9 02/01/2023    CALCIUM 7.6 (L) 02/01/2023    ANIONGAP 13 02/01/2023    EGFRNORACEVR >60 02/01/2023       Assessment:       1. Severe protein-calorie malnutrition    2. Acute combined systolic and diastolic heart failure    3. Carcinoma of antrum of stomach    4. Gastric neoplasm    5. Hypokalemia    6. Non-ischemic cardiomyopathy    7. Simple chronic bronchitis        Plan:   1. Severe protein-calorie malnutrition  Assessment & Plan:  Refer patient back to Gastroenterology.  Does patient need a feeding tube?    Her albumin is extremely low and is probably accounting for her pleural effusions and lower extremity edema.  Encouraged patient to drink boost 2 or 3 times daily.  Continue Remeron, Megace, Periactin to help with appetite.  This does not seem be working well though.  Consider adding Marinol.    Orders:  -     Ambulatory referral/consult to Gastroenterology; Future; Expected date: 03/06/2023    2. Acute combined systolic and diastolic heart failure  -     furosemide (LASIX) 20 MG tablet; Take 1 tablet (20 mg total) by mouth once daily.  Dispense: 30 tablet; Refill: 2    3. Carcinoma of antrum of stomach  Assessment & Plan:  Status post gastrectomy.  Patient now has severe protein calorie malnutrition due to inability to eat.  She is taking Remeron and Periactin.  Consider adding Marinol.  Continue Percocet 3 times daily as needed for pain    Orders:  -     oxyCODONE-acetaminophen (PERCOCET) 7.5-325 mg per tablet; Take 1 tablet by mouth every 8 (eight) hours as needed for Pain.  Dispense: 90 tablet; Refill: 0  -     Ambulatory referral/consult to Gastroenterology; Future; Expected date: 03/06/2023    4. Gastric neoplasm  -     sucralfate (CARAFATE) 1 gram tablet; Take 1 tablet (1 g total) by mouth 2 (two) times daily.  Dispense: 60 tablet; Refill: 11    5. Hypokalemia  -     Basic Metabolic Panel; Future; Expected date: 02/27/2023    6.  Non-ischemic cardiomyopathy  Assessment & Plan:  Patient seen by Cardiology.    Continue Lasix 20 mg daily   Continue metoprolol 25 mg daily  Continue potassium 20 mEq p.o. daily  Patient seems to be medically cleared for endoscopy.      7. Simple chronic bronchitis  Assessment & Plan:  Continue albuterol         RTC in 3 months

## 2023-02-28 PROBLEM — E43 SEVERE PROTEIN-CALORIE MALNUTRITION: Status: ACTIVE | Noted: 2023-01-01

## 2023-02-28 PROBLEM — J41.0 SIMPLE CHRONIC BRONCHITIS: Status: ACTIVE | Noted: 2023-01-01

## 2023-02-28 PROBLEM — G93.31 POST VIRAL SYNDROME: Status: RESOLVED | Noted: 2020-02-18 | Resolved: 2023-01-01

## 2023-02-28 NOTE — ASSESSMENT & PLAN NOTE
Patient seen by Cardiology.    Continue Lasix 20 mg daily   Continue metoprolol 25 mg daily  Continue potassium 20 mEq p.o. daily  Patient seems to be medically cleared for endoscopy.

## 2023-02-28 NOTE — ASSESSMENT & PLAN NOTE
Status post gastrectomy.  Patient now has severe protein calorie malnutrition due to inability to eat.  She is taking Remeron and Periactin.  Consider adding Marinol.  Continue Percocet 3 times daily as needed for pain

## 2023-02-28 NOTE — ASSESSMENT & PLAN NOTE
Refer patient back to Gastroenterology.  Does patient need a feeding tube?    Her albumin is extremely low and is probably accounting for her pleural effusions and lower extremity edema.  Encouraged patient to drink boost 2 or 3 times daily.  Continue Remeron, Megace, Periactin to help with appetite.  This does not seem be working well though.  Consider adding Marinol.

## 2023-03-26 PROBLEM — I27.20 PULMONARY HYPERTENSION: Status: ACTIVE | Noted: 2023-01-01

## 2023-03-26 PROBLEM — R60.1 ANASARCA: Status: ACTIVE | Noted: 2023-01-01

## 2023-03-26 PROBLEM — R00.1 SINUS BRADYCARDIA: Status: ACTIVE | Noted: 2023-01-01

## 2023-03-27 PROBLEM — F32.2 CURRENT SEVERE EPISODE OF MAJOR DEPRESSIVE DISORDER WITHOUT PSYCHOTIC FEATURES WITHOUT PRIOR EPISODE: Status: ACTIVE | Noted: 2023-01-01

## 2023-03-30 PROBLEM — R79.89 ELEVATED LFTS: Status: RESOLVED | Noted: 2020-02-18 | Resolved: 2023-01-01

## 2023-04-05 ENCOUNTER — PATIENT OUTREACH (OUTPATIENT)
Dept: ADMINISTRATIVE | Facility: OTHER | Age: 63
End: 2023-04-05
Payer: MEDICARE

## 2023-04-05 NOTE — PROGRESS NOTES
CHW - Outreach Attempt    Community Health Worker left a voicemail message for 1st attempt to contact patient regarding: case management  Community Health Worker to attempt to contact patient on: 4/5/23

## 2023-04-06 ENCOUNTER — OUTPATIENT CASE MANAGEMENT (OUTPATIENT)
Dept: ADMINISTRATIVE | Facility: OTHER | Age: 63
End: 2023-04-06
Payer: MEDICARE

## 2023-04-06 NOTE — PROGRESS NOTES
Outpatient Care Management  Patient Not Qualified    Patient: Xiomy Duncan  MRN:  [id number]  Date of Service:  4/6/2023  Completed by:  Cheyenne Nuñez RN    Chief Complaint   Patient presents with    OPCM Chart Review       Patient Summary     Program:  OPCM High Risk     Reason Not Qualified:  Other (see comment)    Will send to [facility] to have referred to appropriate case management team.

## 2023-04-26 ENCOUNTER — TELEPHONE (OUTPATIENT)
Dept: FAMILY MEDICINE | Facility: CLINIC | Age: 63
End: 2023-04-26

## 2023-04-26 NOTE — TELEPHONE ENCOUNTER
----- Message from Chente Brito sent at 2023  1:37 PM CDT -----  Xiomy Duncan  MRN: [id number]  : 1960  PCP: Arnie Monson  Home Phone      [phone]  Work Phone      Not on file.  [employer]          [phone]      MESSAGE:     [facility] called to inform Dr Gaitan this pt is  and death certificate need to be sign in [facility].    [phone]

## 2023-08-11 NOTE — TELEPHONE ENCOUNTER
----- Message from Brittney Barrett MD sent at 11/20/2018 10:14 AM CST -----  xr without explanation of patient's pain or muscle spasms.  Go ahead with MRI.    
Attempted to contact pt no answer   
Spoke to patient this morning.  vj  
Spoke to patient, states she had MRI done on 11/20/2018. Requesting results. Advise      Phone: [phone]  
I attest my time as attending is greater than 50% of the total combined time spent on qualifying patient care activities by the PA/NP and attending.

## 2024-05-07 NOTE — TELEPHONE ENCOUNTER
Left message for pt to contact me to schedule an appointment for a nutrition consult.  Conact number: [phone].   No indicators present

## 2024-12-31 NOTE — TELEPHONE ENCOUNTER
No new care gaps identified.  [facility] Embedded Care Gaps. Reference number: [id number]. 6/13/2022   11:51:01 AM CEZAR   No

## 2025-06-06 NOTE — TELEPHONE ENCOUNTER
----- Message from Chente Copleand sent at 2/10/2020  9:33 AM CST -----  Contact: Patient  Xiomy Duncan  MRN: [id number]  : 1960  PCP: Arnie Monson  Home Phone      [phone]  Work Phone      Not on file.  Mobile          [phone]      MESSAGE: requesting refills on Percocet 5-325 mg  & Xanax 0.5 mg - both 30 day  -  LOV  19 -- uses [facility] in [place]    Call [phone]    PCP: Ermias   Male

## (undated) DEVICE — Device

## (undated) DEVICE — SUT CTD VICRYL 3-0 CR/SH

## (undated) DEVICE — SYS CLSR DERMABOND PRINEO 22CM

## (undated) DEVICE — NDL HYPO REG 25G X 1 1/2

## (undated) DEVICE — PACK CATH LAB

## (undated) DEVICE — ELECTRODE REM PLYHSV RETURN 9

## (undated) DEVICE — SEE MEDLINE ITEM 152622

## (undated) DEVICE — APPLIER LIGACLIP MED 11IN

## (undated) DEVICE — SUT ETHIBOND 0 CR/CT-2 8-18

## (undated) DEVICE — APPLICATOR CHLORAPREP ORN 26ML

## (undated) DEVICE — GLOVE BIOGEL 7.5

## (undated) DEVICE — KIT MANIFOLD LOW PRESS TUBING

## (undated) DEVICE — SEE MEDLINE ITEM 157148

## (undated) DEVICE — STAPLER INT PROX TX 30X3.5MM

## (undated) DEVICE — HEMOSTAT VASC BAND REG 24CM

## (undated) DEVICE — KIT HAND CONTROL HIGH PRESSUR

## (undated) DEVICE — BLANKET UPPER BODY 78.7X29.9IN

## (undated) DEVICE — GLOVE SURG BIOGEL LATEX SZ 7.5

## (undated) DEVICE — COVER OVERHEAD SURG LT BLUE

## (undated) DEVICE — SUT VICRYL PLUS 3-0 SH 18IN

## (undated) DEVICE — SUT STRATAFIX 4-0 30CM PS-2

## (undated) DEVICE — SUT 1 36IN PDS II VIO MONO

## (undated) DEVICE — SUT 3/0 27IN PDS II VIO MO

## (undated) DEVICE — SOL 9P NACL IRR PIC IL

## (undated) DEVICE — CANISTER SUCTION 2 LTR

## (undated) DEVICE — SEE MEDLINE ITEM 157117

## (undated) DEVICE — SYR 10CC LUER LOCK

## (undated) DEVICE — HANDLE SCISSORS HS ACE 23CM

## (undated) DEVICE — SEE MEDLINE ITEM 146417

## (undated) DEVICE — ELECTRODE 6 FLAT BLD

## (undated) DEVICE — CART STAPLE RELD 30X3.5 BLU

## (undated) DEVICE — UNDERGLOVES BIOGEL PI SIZE 8

## (undated) DEVICE — PACK ENDOSCOPY GENERAL

## (undated) DEVICE — RELOAD ECHELON FLEX BLU 60MM

## (undated) DEVICE — SPONGE LAP 18X18 PREWASHED

## (undated) DEVICE — OMNIPAQUE 350MG 150ML VIAL

## (undated) DEVICE — STAPLER ECHELON FLEX GST 60MM

## (undated) DEVICE — KIT SYR REUSABLE

## (undated) DEVICE — SEE MEDLINE ITEM 146292

## (undated) DEVICE — STAPLER SKIN PROXIMATE WIDE

## (undated) DEVICE — TRAY FOLEY 16FR INFECTION CONT

## (undated) DEVICE — RELOAD ECHELON FLEX GRN 60MM

## (undated) DEVICE — SLEEVE SCD EXPRESS CALF MEDIUM

## (undated) DEVICE — CATH OPTITORQUE TIGER 5F 100CM

## (undated) DEVICE — WIRE GUIDE SAFE-T-J .035 260CM

## (undated) DEVICE — RELOAD ECHELON FLEX WHT 60MM

## (undated) DEVICE — SEE MEDLINE ITEM 152740

## (undated) DEVICE — PAD DEFIB CADENCE ADULT R2

## (undated) DEVICE — KIT GLIDESHEATH SLEND 6FR 10CM